# Patient Record
Sex: FEMALE | Race: WHITE | NOT HISPANIC OR LATINO | Employment: OTHER | URBAN - METROPOLITAN AREA
[De-identification: names, ages, dates, MRNs, and addresses within clinical notes are randomized per-mention and may not be internally consistent; named-entity substitution may affect disease eponyms.]

---

## 2017-01-10 ENCOUNTER — OFFICE VISIT (OUTPATIENT)
Dept: FAMILY MEDICINE | Facility: CLINIC | Age: 82
End: 2017-01-10
Payer: COMMERCIAL

## 2017-01-10 VITALS
SYSTOLIC BLOOD PRESSURE: 146 MMHG | OXYGEN SATURATION: 97 % | BODY MASS INDEX: 33.81 KG/M2 | HEART RATE: 50 BPM | DIASTOLIC BLOOD PRESSURE: 53 MMHG | WEIGHT: 198.06 LBS | HEIGHT: 64 IN | TEMPERATURE: 96 F

## 2017-01-10 DIAGNOSIS — N30.00 ACUTE CYSTITIS WITHOUT HEMATURIA: Primary | ICD-10-CM

## 2017-01-10 LAB
BACTERIA #/AREA URNS HPF: ABNORMAL /HPF
BILIRUB UR QL STRIP: NEGATIVE
CLARITY UR: ABNORMAL
COLOR UR: YELLOW
GLUCOSE UR QL STRIP: NEGATIVE
HGB UR QL STRIP: ABNORMAL
KETONES UR QL STRIP: NEGATIVE
LEUKOCYTE ESTERASE UR QL STRIP: ABNORMAL
MICROSCOPIC COMMENT: ABNORMAL
NITRITE UR QL STRIP: POSITIVE
PH UR STRIP: 7 [PH] (ref 5–8)
PROT UR QL STRIP: NEGATIVE
RBC #/AREA URNS HPF: 3 /HPF (ref 0–4)
SP GR UR STRIP: 1.01 (ref 1–1.03)
SQUAMOUS #/AREA URNS HPF: 3 /HPF
URN SPEC COLLECT METH UR: ABNORMAL
WBC #/AREA URNS HPF: >100 /HPF (ref 0–5)

## 2017-01-10 PROCEDURE — 99213 OFFICE O/P EST LOW 20 MIN: CPT | Mod: S$GLB,,,

## 2017-01-10 PROCEDURE — 3078F DIAST BP <80 MM HG: CPT | Mod: S$GLB,,,

## 2017-01-10 PROCEDURE — 1160F RVW MEDS BY RX/DR IN RCRD: CPT | Mod: S$GLB,,,

## 2017-01-10 PROCEDURE — 99999 PR PBB SHADOW E&M-EST. PATIENT-LVL V: CPT | Mod: PBBFAC,,,

## 2017-01-10 PROCEDURE — 81000 URINALYSIS NONAUTO W/SCOPE: CPT | Mod: PO

## 2017-01-10 PROCEDURE — 1159F MED LIST DOCD IN RCRD: CPT | Mod: S$GLB,,,

## 2017-01-10 PROCEDURE — 1157F ADVNC CARE PLAN IN RCRD: CPT | Mod: S$GLB,,,

## 2017-01-10 PROCEDURE — 3077F SYST BP >= 140 MM HG: CPT | Mod: S$GLB,,,

## 2017-01-10 PROCEDURE — 87086 URINE CULTURE/COLONY COUNT: CPT

## 2017-01-10 RX ORDER — SULFAMETHOXAZOLE AND TRIMETHOPRIM 800; 160 MG/1; MG/1
1 TABLET ORAL 2 TIMES DAILY
Qty: 20 TABLET | Refills: 0 | Status: SHIPPED | OUTPATIENT
Start: 2017-01-10 | End: 2017-01-20

## 2017-01-10 NOTE — PROGRESS NOTES
Subjective:       Patient ID: Marysol Lyles is a 81 y.o. female.    Chief Complaint: Urinary Tract Infection    HPI   The patient presents today with a 1 day(s) complaint of dysuria that she describes as intermittent moderate intensity pressure at Her urinary meatus during and after urination. she voices associated urinary urgency and frequency. she denies fever, flank pain, or hematuria. She can not identify and exacerbating or mitigating factors.        Review of Systems   Constitutional: Negative for chills, diaphoresis and fever.   HENT: Negative.    Eyes: Negative.    Respiratory: Negative for chest tightness, shortness of breath and wheezing.    Cardiovascular: Negative for chest pain and palpitations.   Gastrointestinal: Negative for abdominal pain.   Endocrine: Negative.    Genitourinary: Positive for dysuria, frequency and urgency. Negative for flank pain and hematuria.   Musculoskeletal: Negative for back pain.   Skin: Negative.    Allergic/Immunologic: Negative.    Neurological: Negative.    Hematological: Negative.    Psychiatric/Behavioral: Negative.          Objective:      Physical Exam   Constitutional: She is oriented to person, place, and time. She appears well-developed and well-nourished.   HENT:   Head: Normocephalic and atraumatic.   Eyes: Conjunctivae are normal. Pupils are equal, round, and reactive to light. No scleral icterus.   Neck: Normal range of motion. Neck supple.   Cardiovascular: Normal rate, regular rhythm, normal heart sounds and intact distal pulses.  Exam reveals no gallop and no friction rub.    No murmur heard.  Pulmonary/Chest: Effort normal and breath sounds normal. No respiratory distress. She has no wheezes. She has no rales. She exhibits no tenderness.   Abdominal: Normal appearance and bowel sounds are normal. There is no tenderness. There is no CVA tenderness.   Musculoskeletal: Normal range of motion.   Lymphadenopathy:     She has no cervical adenopathy.    Neurological: She is alert and oriented to person, place, and time. She exhibits normal muscle tone. Coordination normal.   Skin: Skin is warm and dry. No rash noted. No erythema. No pallor.   Psychiatric: She has a normal mood and affect. Her behavior is normal. Judgment and thought content normal.   Vitals reviewed.      Assessment:       1. Acute cystitis without hematuria          Plan:   Acute cystitis without hematuria  -     Urinalysis  -     Urine culture  -     sulfamethoxazole-trimethoprim 800-160mg (BACTRIM DS) 800-160 mg Tab; Take 1 tablet by mouth 2 (two) times daily.  Dispense: 20 tablet; Refill: 0          Disclaimer: This note is prepared using voice recognition software.  As such there may be errors in the dictation.  It has not been proofread.

## 2017-01-11 LAB — BACTERIA UR CULT: NORMAL

## 2017-02-21 ENCOUNTER — OFFICE VISIT (OUTPATIENT)
Dept: OPTOMETRY | Facility: CLINIC | Age: 82
End: 2017-02-21
Payer: COMMERCIAL

## 2017-02-21 DIAGNOSIS — H52.4 PRESBYOPIA OU: ICD-10-CM

## 2017-02-21 DIAGNOSIS — I10 ESSENTIAL HYPERTENSION: ICD-10-CM

## 2017-02-21 DIAGNOSIS — Z13.5 SCREENING FOR OTHER EYE CONDITIONS: ICD-10-CM

## 2017-02-21 DIAGNOSIS — H52.203 ASTIGMATISM, BILATERAL: ICD-10-CM

## 2017-02-21 DIAGNOSIS — H25.13 SENILE NUCLEAR SCLEROSIS, BILATERAL: Primary | ICD-10-CM

## 2017-02-21 DIAGNOSIS — H26.9 CORTICAL CATARACT OF BOTH EYES: ICD-10-CM

## 2017-02-21 PROCEDURE — 92004 COMPRE OPH EXAM NEW PT 1/>: CPT | Mod: S$GLB,,, | Performed by: OPTOMETRIST

## 2017-02-21 PROCEDURE — 99999 PR PBB SHADOW E&M-EST. PATIENT-LVL IV: CPT | Mod: PBBFAC,,, | Performed by: OPTOMETRIST

## 2017-02-21 NOTE — PATIENT INSTRUCTIONS
Bilateral nuclear sclerotic/cortical cataract, more advanced in the left eye.  Otherwise, good ocular health in both eyes.  Best correctable VA of 20/40+1 in the right eye, and 20/50+2 in the left eye.  Advised Mrs. Lyles she should consider cataract surgery in the left eye.  She will consider and will notify me when to proceed with referral to Dr. [facility]wood at the [facility].  Issued new spectacle lens Rx, but advised not to fill Rx if she will be having cataract surgery in the near future.  Otherwise, recheck in one year.

## 2017-02-21 NOTE — MR AVS SNAPSHOT
[facility]  [address]  [facility] [address]  Phone: [phone]  Fax: [phone]                  Marysol Lyles   2017 1:00 PM   Office Visit    Description:  Female : 1935   Provider:  Asa Mccarthy OD   Department:  [facility]           Reason for Visit     Concerns About Ocular Health                To Do List           Future Appointments        Provider Department Dept Phone    2017 1:00 PM KRISHAN [facility] [phone]    10/24/2017 9:00 AM DC DEXA1 [facility] [phone]    10/24/2017 9:30 AM SATNAM, MELODY [facility] [phone]      Goals (5 Years of Data)     None      [facility]     [facility] -  Assistance  Registered nurses in the [facility] provide clinical advisement, health education, appointment booking, and other advisory services.  Call for this free service at [phone].             Medications           Message regarding Medications     Verify the changes and/or additions to your medication regime listed below are the same as discussed with your clinician today.  If any of these changes or additions are incorrect, please notify your healthcare provider.             Verify that the below list of medications is an accurate representation of the medications you are currently taking.  If none reported, the list may be blank. If incorrect, please contact your healthcare provider. Carry this list with you in case of emergency.           Current Medications     ACETAMINOPHEN (TYLENOL EXTRA STRENGTH ORAL) 500 mg. Every 4-6 hours PRN    alendronate (FOSAMAX) 70 MG tablet Take 1 tablet (70 mg total) by mouth every 7 days.    atenolol (TENORMIN) 50 MG tablet Take 1 tablet (50 mg total) by mouth once daily.    BIOTIN ORAL Take by mouth once daily.    CALCIUM CARBONATE/VITAMIN D3 (CALTRATE 600 + D ORAL) Take by mouth 2 (two)  times daily.     cetirizine (ZYRTEC) 10 MG tablet Take 1 tablet (10 mg total) by mouth once daily.    ciclopirox (PENLAC) 8 % Soln     CYANOCOBALAMIN, VITAMIN B-12, (VITAMIN B-12 ORAL) As directed    dicyclomine (BENTYL) 10 MG capsule TAKE ONE CAPSULE BY MOUTH FOUR TIMES DAILY BEFORE MEALS AND AT NIGHT    duloxetine (CYMBALTA) 60 MG capsule Take 1 capsule (60 mg total) by mouth once daily.    fluticasone (FLONASE) 50 mcg/actuation nasal spray 1 spray by Each Nare route once daily.    gabapentin (NEURONTIN) 300 MG capsule Take 1 capsule (300 mg total) by mouth 3 (three) times daily.    GLUCOSAMINE HCL/CHONDRO KIDD A (GLUCOSAMINE-CHONDROITIN ORAL) Every day    LACTOBACILLUS ACIDOPHILUS (PROBIOTIC ORAL) Take by mouth.    MULTIVITAMIN ORAL 2 (two) times daily. Every day    promethazine (PHENERGAN) 25 MG tablet TK 1 T PO Q 4 TO 6 H PRN N    SIMETHICONE (GAS-X ORAL) Take by mouth.    solifenacin (VESICARE) 10 MG tablet Take 1 tablet (10 mg total) by mouth once daily.    tavaborole (KERYDIN) 5 % Rom Apply 1 application topically once daily.    urea (UMECTA NAIL FILM PEN) 40 % NFSP Apply 1 application topically 2 (two) times daily.    vit C,E,Zn,Cu glu-om3-lut-eduardo (OCUVITE ADULT 50+) 250-5-1 mg Cap Take by mouth.    vitamin D 1000 units Tab Take 185 mg by mouth once daily.           Clinical Reference Information           Your Vitals Were     Last Period                   05/06/1994           Allergies as of 2/21/2017     Formaldehyde    Latex, Natural Rubber    Mercury    Penicillins    Adhesive      Immunizations Administered on Date of Encounter - 2/21/2017     None      Instructions    Bilateral nuclear sclerotic/cortical cataract, more advanced in the left eye.  Otherwise, good ocular health in both eyes.  Best correctable VA of 20/40+1 in the right eye, and 20/50+2 in the left eye.  Advised Mrs. Lyles she should consider cataract surgery in the left eye.  She will consider and will notify me when to proceed with  referral to Dr. Haywood at the [facility].  Issued new spectacle lens Rx, but advised not to fill Rx if she will be having cataract surgery in the near future.  Otherwise, recheck in one year.          Language Assistance Services     ATTENTION: Language assistance services are available, free of charge. Please call [phone].      ATENCIÓN: Si habla fabienañol, tiene a vilchis disposición servicios gratuitos de asistencia lingüística. Llame al [phone].     CHÚ Ý: N?u b?n nói Ti?ng Vi?t, có các d?ch v? h? tr? ngôn ng? mi?n phí dành cho b?n. G?i s? [phone].         [facility] complies with applicable Federal civil rights laws and does not discriminate on the basis of race, color, national origin, age, disability, or sex.

## 2017-02-21 NOTE — PROGRESS NOTES
"HPI     Concerns About Ocular Health    Additional comments: Eye exam and refraction.   No acute ocular/vision   problems.  Uses OTC for reading.  None other except plano sunglasses   outdoors           Comments   81 yr old wf                   Approximate date of last eye examination:  02/07/2013  Name of last eye doctor seen:  Dr Mccarthy     Wears glasses full-time:  No, uses +1.75 otc readers, only  Present glasses are bifocal / S V Distance / S V Reading:    Approximate age of present glasses:    Got new glasses following last exam, or subsequently?:     Any problem with VA with glasses:    Wears CLs?:  no  Headaches:  no  Eye pain/discomfort:  no                                                                                       Flashes:  no  Floaters:  no  Diplopia/Double Vision:  no         Patient's Ocular History:         Any eye surgeries:  no         Any eye injury:  no         Any treatment for eye disease:  no  Family history of eye disease:  none  Significant patient medical history:         1. Diabetes  no   2. HBP:  yes              3. Other (describe)  High cholesterol, gastric bypass sx, lost   108 lbs     ! OTC eyedrops currently using:  no   ! Prescription eye meds currently using:  no   ! Any history of allergy/adverse reaction to any eye meds used?  no       ! Any history of adverse reaction to eyedrops used during previous eye   exams?  no                  ! Any history of allergy/adverse reaction to Novacaine or similar meds?    no              ! Any history of allergy/adverse reaction to Epinephrine or similar meds?    no                 ! Patient okay with use of anesthetic eye drops to check eye pressure?    yes             ! Okay to use drops to dilate pupils today?  yes         Allergies/Medications/Medical History/Family History reviewed today?:  yes               PD =   65/61  Desired reading distance =  15.25"                                                                        " Last edited by Asa Mccarthy, OD on 2/21/2017 12:00 PM. (History)            Assessment /Plan     For exam results, see Encounter Report.    1. Senile nuclear sclerosis, bilateral     2. Cortical cataract of both eyes     3. Essential hypertension     4. Screening for other eye conditions     5. Astigmatism, bilateral     6. Presbyopia OU                  Bilateral nuclear sclerotic/cortical cataract, more advanced in the left eye.  Otherwise, good ocular health in both eyes.  Best correctable VA of 20/40+1 in the right eye, and 20/50+2 in the left eye.  Advised Mrs. Lyles she should consider cataract surgery in the left eye.  She will consider and will notify me when to proceed with referral to Dr. Haywood at the [facility].  Issued new spectacle lens Rx, but advised not to fill Rx if she will be having cataract surgery in the near future.  Otherwise, recheck in one year.

## 2017-02-25 RX ORDER — CICLOPIROX 80 MG/ML
SOLUTION TOPICAL
Qty: 6.6 ML | Refills: 0 | Status: SHIPPED | OUTPATIENT
Start: 2017-02-25 | End: 2018-12-11

## 2017-03-01 ENCOUNTER — PATIENT MESSAGE (OUTPATIENT)
Dept: FAMILY MEDICINE | Facility: CLINIC | Age: 82
End: 2017-03-01

## 2017-03-08 DIAGNOSIS — Z83.719 FAMILY HISTORY OF COLONIC POLYPS: ICD-10-CM

## 2017-03-08 DIAGNOSIS — Z86.010 HISTORY OF COLON POLYPS: ICD-10-CM

## 2017-03-08 RX ORDER — DICYCLOMINE HYDROCHLORIDE 10 MG/1
CAPSULE ORAL
Qty: 120 CAPSULE | Refills: 0 | Status: SHIPPED | OUTPATIENT
Start: 2017-03-08 | End: 2017-05-05 | Stop reason: SDUPTHER

## 2017-03-30 ENCOUNTER — CLINICAL SUPPORT (OUTPATIENT)
Dept: FAMILY MEDICINE | Facility: CLINIC | Age: 82
End: 2017-03-30
Payer: COMMERCIAL

## 2017-03-30 VITALS — DIASTOLIC BLOOD PRESSURE: 70 MMHG | SYSTOLIC BLOOD PRESSURE: 128 MMHG

## 2017-03-30 DIAGNOSIS — Z01.30 BLOOD PRESSURE CHECK: Primary | ICD-10-CM

## 2017-03-30 PROCEDURE — 99499 UNLISTED E&M SERVICE: CPT | Mod: S$GLB,,, | Performed by: FAMILY MEDICINE

## 2017-04-01 RX ORDER — ALENDRONATE SODIUM 70 MG/1
TABLET ORAL
Qty: 12 TABLET | Refills: 0 | OUTPATIENT
Start: 2017-04-01

## 2017-05-05 DIAGNOSIS — Z86.010 HISTORY OF COLON POLYPS: ICD-10-CM

## 2017-05-05 DIAGNOSIS — Z83.719 FAMILY HISTORY OF COLONIC POLYPS: ICD-10-CM

## 2017-05-05 RX ORDER — DICYCLOMINE HYDROCHLORIDE 10 MG/1
CAPSULE ORAL
Qty: 120 CAPSULE | Refills: 0 | Status: SHIPPED | OUTPATIENT
Start: 2017-05-05 | End: 2017-07-14 | Stop reason: SDUPTHER

## 2017-06-26 RX ORDER — CETIRIZINE HYDROCHLORIDE 10 MG/1
TABLET ORAL
Qty: 30 TABLET | Refills: 0 | Status: SHIPPED | OUTPATIENT
Start: 2017-06-26 | End: 2017-11-07 | Stop reason: SDUPTHER

## 2017-07-14 DIAGNOSIS — Z86.010 HISTORY OF COLON POLYPS: ICD-10-CM

## 2017-07-14 DIAGNOSIS — Z83.719 FAMILY HISTORY OF COLONIC POLYPS: ICD-10-CM

## 2017-07-14 RX ORDER — DICYCLOMINE HYDROCHLORIDE 10 MG/1
CAPSULE ORAL
Qty: 120 CAPSULE | Refills: 0 | Status: SHIPPED | OUTPATIENT
Start: 2017-07-14 | End: 2017-09-16 | Stop reason: SDUPTHER

## 2017-07-21 ENCOUNTER — TELEPHONE (OUTPATIENT)
Dept: FAMILY MEDICINE | Facility: CLINIC | Age: 82
End: 2017-07-21

## 2017-07-21 NOTE — TELEPHONE ENCOUNTER
----- Message from Antonina Jorgensen sent at 7/21/2017 12:08 PM CDT -----  pt has bladder inf, pls call in rx today..[phone] ([place])   ..  [facility] [address] - [place]ILLO - [address] [facility]  [address]  [place] [address]  Phone: [phone] Fax: [phone]

## 2017-07-22 ENCOUNTER — OFFICE VISIT (OUTPATIENT)
Dept: FAMILY MEDICINE | Facility: CLINIC | Age: 82
End: 2017-07-22
Payer: COMMERCIAL

## 2017-07-22 VITALS
TEMPERATURE: 98 F | SYSTOLIC BLOOD PRESSURE: 129 MMHG | HEART RATE: 54 BPM | BODY MASS INDEX: 34.01 KG/M2 | WEIGHT: 199.19 LBS | HEIGHT: 64 IN | DIASTOLIC BLOOD PRESSURE: 64 MMHG

## 2017-07-22 DIAGNOSIS — N39.0 RECURRENT UTI: ICD-10-CM

## 2017-07-22 DIAGNOSIS — R30.0 DYSURIA: Primary | ICD-10-CM

## 2017-07-22 PROCEDURE — 99213 OFFICE O/P EST LOW 20 MIN: CPT | Mod: S$GLB,,, | Performed by: FAMILY MEDICINE

## 2017-07-22 PROCEDURE — 1125F AMNT PAIN NOTED PAIN PRSNT: CPT | Mod: S$GLB,,, | Performed by: FAMILY MEDICINE

## 2017-07-22 PROCEDURE — 99999 PR PBB SHADOW E&M-EST. PATIENT-LVL IV: CPT | Mod: PBBFAC,,, | Performed by: FAMILY MEDICINE

## 2017-07-22 PROCEDURE — 1159F MED LIST DOCD IN RCRD: CPT | Mod: S$GLB,,, | Performed by: FAMILY MEDICINE

## 2017-07-22 RX ORDER — SULFAMETHOXAZOLE AND TRIMETHOPRIM 800; 160 MG/1; MG/1
1 TABLET ORAL 2 TIMES DAILY
Qty: 20 TABLET | Refills: 0 | Status: SHIPPED | OUTPATIENT
Start: 2017-07-22 | End: 2017-08-01

## 2017-07-22 RX ORDER — PHENAZOPYRIDINE HYDROCHLORIDE 200 MG/1
TABLET, FILM COATED ORAL
COMMUNITY
Start: 2017-07-21 | End: 2017-11-06

## 2017-07-22 NOTE — PROGRESS NOTES
"Subjective:      Patient ID: Marysol Lyles is a 81 y.o. female.    Chief Complaint: Dysuria    HPI she has had recurrent UTI's over the last 3 years. She has been having about 2 per year.  She has not had fever. Currently, she has burning at the end of the urination. She states that it tingles with this. She has not had blood in the urine. She has been on pyridium and it helps the symtpoms. She has not had abx.      Review of Systems    Objective:   /64   Pulse (!) 54   Temp 98.1 °F (36.7 °C) (Oral)   Ht 5' 4" (1.626 m)   Wt 90.4 kg (199 lb 3.2 oz)   LMP 05/06/1994   BMI 34.19 kg/m²     Physical Exam   Constitutional: She appears well-developed and well-nourished.   Eyes: EOM are normal. Pupils are equal, round, and reactive to light.   Cardiovascular: Normal rate and regular rhythm.    Pulmonary/Chest: Effort normal and breath sounds normal.   Abdominal: Soft. Bowel sounds are normal. She exhibits no distension and no mass. There is no tenderness. There is no rebound and no guarding.       Assessment:     1. Dysuria    2. Recurrent UTI        Plan:   Marysol ROBB was seen today for dysuria.    Diagnoses and all orders for this visit:    Dysuria  -     Urinalysis; Future  -     Urine culture; Future  -     sulfamethoxazole-trimethoprim 800-160mg (BACTRIM DS) 800-160 mg Tab; Take 1 tablet by mouth 2 (two) times daily.    Recurrent UTI  -     Urinalysis; Future  -     Urine culture; Future  -     sulfamethoxazole-trimethoprim 800-160mg (BACTRIM DS) 800-160 mg Tab; Take 1 tablet by mouth 2 (two) times daily.      She will check a U/A in 2 weeks (she is on meds now treating this so I will cover her empirically) and if the U;/A is still infected then, I will get the culture and retreat.  If the U/A is neg, I will refer to urology in [place] for her recurrent uti'S.    "

## 2017-08-07 ENCOUNTER — LAB VISIT (OUTPATIENT)
Dept: LAB | Facility: HOSPITAL | Age: 82
End: 2017-08-07
Attending: FAMILY MEDICINE
Payer: COMMERCIAL

## 2017-08-07 DIAGNOSIS — R30.0 DYSURIA: ICD-10-CM

## 2017-08-07 DIAGNOSIS — N39.0 RECURRENT UTI: ICD-10-CM

## 2017-08-07 LAB
BILIRUB UR QL STRIP: NEGATIVE
CLARITY UR: CLEAR
COLOR UR: YELLOW
GLUCOSE UR QL STRIP: NEGATIVE
HGB UR QL STRIP: ABNORMAL
KETONES UR QL STRIP: NEGATIVE
LEUKOCYTE ESTERASE UR QL STRIP: NEGATIVE
NITRITE UR QL STRIP: NEGATIVE
PH UR STRIP: 6 [PH] (ref 5–8)
PROT UR QL STRIP: NEGATIVE
SP GR UR STRIP: 1.02 (ref 1–1.03)
URN SPEC COLLECT METH UR: ABNORMAL

## 2017-08-07 PROCEDURE — 87086 URINE CULTURE/COLONY COUNT: CPT

## 2017-08-07 PROCEDURE — 81002 URINALYSIS NONAUTO W/O SCOPE: CPT | Mod: PO

## 2017-08-08 LAB — BACTERIA UR CULT: NORMAL

## 2017-08-15 ENCOUNTER — TELEPHONE (OUTPATIENT)
Dept: FAMILY MEDICINE | Facility: CLINIC | Age: 82
End: 2017-08-15

## 2017-08-15 RX ORDER — METOPROLOL TARTRATE 25 MG/1
25 TABLET, FILM COATED ORAL 2 TIMES DAILY
Qty: 180 TABLET | Refills: 3 | Status: SHIPPED | OUTPATIENT
Start: 2017-08-15 | End: 2018-10-09 | Stop reason: SDUPTHER

## 2017-08-15 NOTE — TELEPHONE ENCOUNTER
----- Message from Aminata Mckinney sent at 8/15/2017  1:56 PM CDT -----  Contact:  [facility] in [place]  Pt is calling nurse staff regarding if there is another Rx to sub.Pt Atenolol is on back order.     [facility] [id number]  [place] - [address] [facility] [address] [facility]  [address]  [place] [address]  Phone: [phone] Fax: [phone]

## 2017-08-15 NOTE — TELEPHONE ENCOUNTER
Stop the atenolol.     I have signed for the following orders AND/OR meds.  Please call the patient and ask the patient to schedule the testing AND/OR inform about any medications that were sent.      No orders of the defined types were placed in this encounter.        Medications Ordered This Encounter      metoprolol tartrate (LOPRESSOR) 25 MG tablet          Sig: Take 1 tablet (25 mg total) by mouth 2 (two) times daily.          Dispense:  180 tablet          Refill:  3

## 2017-09-16 DIAGNOSIS — Z86.010 HISTORY OF COLON POLYPS: ICD-10-CM

## 2017-09-16 DIAGNOSIS — Z83.719 FAMILY HISTORY OF COLONIC POLYPS: ICD-10-CM

## 2017-09-17 RX ORDER — DICYCLOMINE HYDROCHLORIDE 10 MG/1
CAPSULE ORAL
Qty: 120 CAPSULE | Refills: 0 | Status: SHIPPED | OUTPATIENT
Start: 2017-09-17 | End: 2017-11-07 | Stop reason: SDUPTHER

## 2017-10-03 RX ORDER — GABAPENTIN 300 MG/1
300 CAPSULE ORAL 3 TIMES DAILY
Qty: 90 CAPSULE | Refills: 11 | Status: SHIPPED | OUTPATIENT
Start: 2017-10-03 | End: 2019-03-21 | Stop reason: SDUPTHER

## 2017-10-23 ENCOUNTER — TELEPHONE (OUTPATIENT)
Dept: RADIOLOGY | Facility: HOSPITAL | Age: 82
End: 2017-10-23

## 2017-10-24 ENCOUNTER — TELEPHONE (OUTPATIENT)
Dept: NEUROSURGERY | Facility: CLINIC | Age: 82
End: 2017-10-24

## 2017-10-24 ENCOUNTER — HOSPITAL ENCOUNTER (OUTPATIENT)
Dept: RADIOLOGY | Facility: HOSPITAL | Age: 82
Discharge: HOME | End: 2017-10-24
Attending: INTERNAL MEDICINE
Payer: COMMERCIAL

## 2017-10-24 DIAGNOSIS — M85.80 OSTEOPENIA: ICD-10-CM

## 2017-10-24 DIAGNOSIS — I10 ESSENTIAL HYPERTENSION: ICD-10-CM

## 2017-10-24 DIAGNOSIS — E21.3 HYPERPARATHYROIDISM: ICD-10-CM

## 2017-10-24 PROCEDURE — 77080 DXA BONE DENSITY AXIAL: CPT | Mod: TC,PO

## 2017-10-24 PROCEDURE — 77080 DXA BONE DENSITY AXIAL: CPT | Mod: 26,,, | Performed by: RADIOLOGY

## 2017-10-27 ENCOUNTER — PATIENT MESSAGE (OUTPATIENT)
Dept: FAMILY MEDICINE | Facility: CLINIC | Age: 82
End: 2017-10-27

## 2017-10-27 DIAGNOSIS — E78.5 HYPERLIPIDEMIA, UNSPECIFIED HYPERLIPIDEMIA TYPE: Primary | ICD-10-CM

## 2017-10-27 RX ORDER — ALENDRONATE SODIUM 70 MG/1
TABLET ORAL
COMMUNITY
Start: 2017-08-01 | End: 2017-11-29

## 2017-10-27 RX ORDER — GABAPENTIN 300 MG/1
300 CAPSULE ORAL
COMMUNITY
End: 2017-11-29

## 2017-10-27 RX ORDER — SOLIFENACIN SUCCINATE 10 MG/1
TABLET, FILM COATED ORAL
COMMUNITY
Start: 2017-09-16 | End: 2017-11-01

## 2017-10-27 RX ORDER — CETIRIZINE HYDROCHLORIDE 10 MG/1
10 TABLET ORAL
COMMUNITY
End: 2017-11-01

## 2017-10-27 RX ORDER — METOPROLOL TARTRATE 25 MG/1
TABLET, FILM COATED ORAL
COMMUNITY
Start: 2017-08-15 | End: 2017-11-29

## 2017-10-31 ENCOUNTER — PATIENT MESSAGE (OUTPATIENT)
Dept: FAMILY MEDICINE | Facility: CLINIC | Age: 82
End: 2017-10-31

## 2017-11-01 ENCOUNTER — HOSPITAL ENCOUNTER (OUTPATIENT)
Dept: RADIOLOGY | Facility: HOSPITAL | Age: 82
Discharge: HOME | End: 2017-11-01
Attending: NURSE PRACTITIONER
Payer: COMMERCIAL

## 2017-11-01 ENCOUNTER — OFFICE VISIT (OUTPATIENT)
Dept: FAMILY MEDICINE | Facility: CLINIC | Age: 82
End: 2017-11-01
Payer: COMMERCIAL

## 2017-11-01 VITALS
HEART RATE: 82 BPM | WEIGHT: 202 LBS | HEIGHT: 64 IN | DIASTOLIC BLOOD PRESSURE: 79 MMHG | BODY MASS INDEX: 34.49 KG/M2 | TEMPERATURE: 99 F | SYSTOLIC BLOOD PRESSURE: 136 MMHG

## 2017-11-01 DIAGNOSIS — J01.00 ACUTE MAXILLARY SINUSITIS, RECURRENCE NOT SPECIFIED: Primary | ICD-10-CM

## 2017-11-01 DIAGNOSIS — J20.9 BRONCHITIS WITH BRONCHOSPASM: ICD-10-CM

## 2017-11-01 DIAGNOSIS — J01.00 ACUTE MAXILLARY SINUSITIS, RECURRENCE NOT SPECIFIED: ICD-10-CM

## 2017-11-01 PROCEDURE — 71020 XR CHEST PA AND LATERAL: CPT | Mod: 26,,, | Performed by: RADIOLOGY

## 2017-11-01 PROCEDURE — 99213 OFFICE O/P EST LOW 20 MIN: CPT | Mod: S$GLB,,, | Performed by: NURSE PRACTITIONER

## 2017-11-01 PROCEDURE — 71020 XR CHEST PA AND LATERAL: CPT | Mod: TC,PO

## 2017-11-01 PROCEDURE — 99999 PR PBB SHADOW E&M-EST. PATIENT-LVL III: CPT | Mod: PBBFAC,,, | Performed by: NURSE PRACTITIONER

## 2017-11-01 RX ORDER — ALBUTEROL SULFATE 90 UG/1
2 AEROSOL, METERED RESPIRATORY (INHALATION) EVERY 6 HOURS PRN
Qty: 8 G | Refills: 0 | Status: SHIPPED | OUTPATIENT
Start: 2017-11-01 | End: 2018-01-19 | Stop reason: SDUPTHER

## 2017-11-01 RX ORDER — AZITHROMYCIN 250 MG/1
250 TABLET, FILM COATED ORAL DAILY
Qty: 6 TABLET | Refills: 0 | Status: SHIPPED | OUTPATIENT
Start: 2017-11-01 | End: 2017-11-06

## 2017-11-01 RX ORDER — CODEINE PHOSPHATE AND GUAIFENESIN 10; 100 MG/5ML; MG/5ML
5 SOLUTION ORAL 3 TIMES DAILY PRN
Qty: 115 ML | Refills: 0 | Status: SHIPPED | OUTPATIENT
Start: 2017-11-01 | End: 2017-11-11

## 2017-11-01 RX ORDER — BENZONATATE 200 MG/1
200 CAPSULE ORAL 3 TIMES DAILY PRN
Qty: 30 CAPSULE | Refills: 0 | Status: SHIPPED | OUTPATIENT
Start: 2017-11-01 | End: 2017-11-11

## 2017-11-01 NOTE — PATIENT INSTRUCTIONS
Acute Bacterial Rhinosinusitis (ABRS)    Acute bacterial rhinosinusitis (ABRS) is an infection of your nasal cavity and sinuses. Its caused by bacteria. Acute means that youve had symptoms for less than 12 weeks.  Understanding your sinuses  The nasal cavity is the large air-filled space behind your nose. The sinuses are a group of spaces formed by the bones of your face. They connect with your nasal cavity. ABRS causes the tissue lining these spaces to become inflamed. Mucus may not drain normally. This leads to facial pain and other symptoms.  What causes ABRS?  ABRS most often follows an upper respiratory infection caused by a virus. Bacteria then infect the lining of your nasal cavity and sinuses. But you can also get ABRS if you have:  · Nasal allergies  · Long-term nasal swelling and congestion not caused by allergies  · Blockage in the nose  Symptoms of ABRS  The symptoms of ABRS may be different for each person, and can include:  · Nasal congestion  · Runny nose  · Fluid draining from the nose down the throat (postnasal drip)  · Headache  · Cough  · Pain in the sinuses  · Thick, colored fluid from the nose (mucus)  · Fever  Diagnosing ABRS  ABRS may be diagnosed if youve had an upper respiratory infection like a cold and cough for longer than 10 to 14 days. Your health care provider will ask about your symptoms and your medical history. The provider will check your vital signs, including your temperature. Youll have a physical exam. The health care provider will check your ears, nose, and throat. You likely wont need any tests. If ABRS comes back, you may have a culture or other tests.  Treatment for ABRS  Treatment may include:  · Antibiotic medicine. This is for symptoms that last for at least 10 to 14 days.  · Nasal corticosteroid medicine. Drops or spray used in the nose can lessen swelling and congestion.  · Over-the-counter pain medicine. This is to lessen sinus pain and pressure.  · Nasal  decongestant medicine. Spray or drops may help to lessen congestion. Do not use them for more than a few days.  · Salt wash (saline irrigation). This can help to loosen mucus.  Possible complications of ABRS  ABRS may come back or become long-term (chronic).  In rare cases, ABRS may cause complications such as:   · Inflamed tissue around the brain and spinal cord (meningitis)  · Inflamed tissue around the eyes (orbital cellulitis)  · Inflamed bones around the sinuses (osteitis)  These problems may need to be treated in a hospital with intravenous (IV) antibiotic medicine or surgery.  When to call the health care provider  Call your health care provider if you have any of the following:  · Symptoms that dont get better, or get worse  · Symptoms that dont get better after 3 to 5 days on antibiotics  · Trouble seeing  · Swelling around your eyes  · Confusion or trouble staying awake   Date Last Reviewed: 3/3/2015  © 1256-0710 [employer]. [address] [place] [address]. All rights reserved. This information is not intended as a substitute for professional medical care. Always follow your healthcare professional's instructions.        What Is Acute Bronchitis?  Acute bronchitis is when the airways in your lungs (bronchial tubes) become red and swollen (inflamed). It is usually caused by a viral infection. But it can also occur because of a bacteria or allergen. Symptoms include a cough that produces yellow or greenish mucus and can last for days or sometimes weeks.  Inside healthy lungs    Air travels in and out of the lungs through the airways. The linings of these airways produce sticky mucus. This mucus traps particles that enter the lungs. Tiny structures called cilia then sweep the particles out of the airways.     Healthy airway: Airways are normally open. Air moves in and out easily.      Healthy cilia: Tiny, hairlike cilia sweep mucus and particles up and out of the airways.   Lungs  with bronchitis  Bronchitis often occurs with a cold or the flu virus. The airways become inflamed (red and swollen). There is a deep hacking cough from the extra mucus. Other symptoms may include:  · Wheezing  · Chest discomfort  · Shortness of breath  · Mild fever  A second infection, this time due to bacteria, may then occur. And airways irritated by allergens or smoke are more likely to get infected.        Inflamed airway: Inflammation and extra mucus narrow the airway, causing shortness of breath.      Impaired cilia: Extra mucus impairs cilia, causing congestion and wheezing. Smoking makes the problem worse.   Making a diagnosis  A physical exam, health history, and certain tests help your healthcare provider make the diagnosis.  Health history  Your healthcare provider will ask you about your symptoms.  The exam  Your provider listens to your chest for signs of congestion. He or she may also check your ears, nose, and throat.  Possible tests  · A sputum test for bacteria. This requires a sample of mucus from your lungs.  · A nasal or throat swab. This tests to see if you have a bacterial infection.  · A chest X-ray. This is done if your healthcare provider thinks you have pneumonia.  · Tests to check for an underlying condition. Other tests may be done to check for things such as allergies, asthma, or COPD (chronic obstructive pulmonary disease). You may need to see a specialist for more lung function testing.  Treating a cough  The main treatment for bronchitis is easing symptoms. Avoiding smoke, allergens, and other things that trigger coughing can often help. If the infection is bacterial, you may be given antibiotics. During the illness, it's important to get plenty of sleep. To ease symptoms:  · Dont smoke. Also avoid secondhand smoke.  · Use a humidifier. Or try breathing in steam from a hot shower. This may help loosen mucus.  · Drink a lot of water and juice. They can soothe the throat and may help  thin mucus.  · Sit up or use extra pillows when in bed. This helps to lessen coughing and congestion.  · Ask your provider about using medicine. Ask about using cough medicine, pain and fever medicine, or a decongestant.  Antibiotics  Most cases of bronchitis are caused by cold or flu viruses. They dont need antibiotics to treat them, even if your mucus is thick and green or yellow. Antibiotics dont treat viral illness and antibiotics have not been shown to have any benefit in cases of acute bronchitis. Taking antibiotics when they are not needed increases your risk of getting an infection later that is antibiotic-resistant. Antibiotics can also cause severe cases of diarrhea that require other antibiotics to treat.  It is important that you accept your healthcare provider's opinion to not use antibiotics. Your provider will prescribe antibiotics if the infection is caused by bacteria. If they are prescribed:  · Take all of the medicine. Take the medicine until it is used up, even if symptoms have improved. If you dont, the bronchitis may come back.  · Take the medicines as directed. For instance, some medicines should be taken with food.  · Ask about side effects. Ask your provider or pharmacist what side effects are common, and what to do about them.  Follow-up care  You should see your provider again in 2 to 3 weeks. By this time, symptoms should have improved. An infection that lasts longer may mean you have a more serious problem.  Prevention  · Avoid tobacco smoke. If you smoke, quit. Stay away from smoky places. Ask friends and family not to smoke around you, or in your home or car.  · Get checked for allergies.  · Ask your provider about getting a yearly flu shot. Also ask about pneumococcal or pneumonia shots.  · Wash your hands often. This helps reduce the chance of picking up viruses that cause colds and flu.  Call your healthcare provider if:  · Symptoms worsen, or you have new symptoms  · Breathing  problems worsen or  become severe  · Symptoms dont get better within a week, or within 3 days of taking antibiotics   Date Last Reviewed: 2/1/2017  © 5319-1994 [employer]. [address] [place] [address], [place] [address]. All rights reserved. This information is not intended as a substitute for professional medical care. Always follow your healthcare professional's instructions.

## 2017-11-01 NOTE — PROGRESS NOTES
"Subjective:      Patient ID: Marysol Lyles is a 82 y.o. female.    Chief Complaint: Wheezing and Cough    Wheezing    This is a new problem. Associated symptoms include coughing, headaches, rhinorrhea and a sore throat. Pertinent negatives include no chest pain, chills, diarrhea, ear pain, fever, rash, shortness of breath or vomiting.   Cough   This is a new problem. Episode onset: Saturday. The problem has been gradually worsening. The cough is productive of sputum (dark green sputum). Associated symptoms include headaches, postnasal drip, rhinorrhea, a sore throat and wheezing. Pertinent negatives include no chest pain, chills, ear pain, fever, nasal congestion, rash or shortness of breath. Nothing aggravates the symptoms. She has tried prescription cough suppressant (Mucinex DM) for the symptoms. The treatment provided moderate relief.     Review of Systems   Constitutional: Negative for activity change, appetite change, chills, fatigue and fever.   HENT: Positive for postnasal drip, rhinorrhea, sinus pressure and sore throat. Negative for congestion, ear pain, tinnitus and trouble swallowing.    Eyes: Negative for pain and discharge.   Respiratory: Positive for cough and wheezing. Negative for chest tightness and shortness of breath.    Cardiovascular: Negative for chest pain, palpitations and leg swelling.   Gastrointestinal: Negative for blood in stool, diarrhea, nausea and vomiting.   Endocrine: Negative.    Genitourinary: Negative for frequency.   Skin: Negative for rash and wound.   Neurological: Positive for headaches.   Hematological: Negative.    Psychiatric/Behavioral: Negative for suicidal ideas. The patient is not nervous/anxious.        Objective:     /79   Pulse 82   Temp 99.2 °F (37.3 °C) (Oral)   Ht 5' 4" (1.626 m)   Wt 91.6 kg (202 lb)   LMP 05/06/1994   BMI 34.67 kg/m²     Physical Exam   Constitutional: She is oriented to person, place, and time. She appears well-developed and " well-nourished.   HENT:   Head: Normocephalic.   Right Ear: Tympanic membrane, external ear and ear canal normal.   Left Ear: External ear and ear canal normal. A middle ear effusion is present.   Nose: Mucosal edema and rhinorrhea present. Right sinus exhibits maxillary sinus tenderness. Right sinus exhibits no frontal sinus tenderness. Left sinus exhibits maxillary sinus tenderness. Left sinus exhibits no frontal sinus tenderness.   Mouth/Throat: Posterior oropharyngeal edema and posterior oropharyngeal erythema present. No oropharyngeal exudate.   Nasal mucosa erythematous and boggy   Eyes: Pupils are equal, round, and reactive to light.   Cardiovascular: Normal rate, regular rhythm and normal heart sounds.    Pulmonary/Chest: Effort normal. No respiratory distress. She has wheezes in the right upper field, the right middle field, the right lower field, the left upper field, the left middle field and the left lower field. She has rhonchi in the left lower field. She has no rales.   Mild scattered wheezes throughout   Neurological: She is alert and oriented to person, place, and time.   Skin: Skin is warm and dry. No rash noted.   Psychiatric: She has a normal mood and affect. Her behavior is normal. Judgment and thought content normal.   Vitals reviewed.    Assessment:     1. Acute maxillary sinusitis, recurrence not specified    2. Bronchitis with bronchospasm        Plan:   Acute maxillary sinusitis, recurrence not specified  -     X-Ray Chest PA And Lateral; Future; Expected date: 11/01/2017  -     benzonatate (TESSALON) 200 MG capsule; Take 1 capsule (200 mg total) by mouth 3 (three) times daily as needed.  Dispense: 30 capsule; Refill: 0  -     guaifenesin-codeine 100-10 mg/5 ml (GUAIFENESIN AC)  mg/5 mL syrup; Take 5 mLs by mouth 3 (three) times daily as needed for Cough.  Dispense: 115 mL; Refill: 0  -     albuterol 90 mcg/actuation inhaler; Inhale 2 puffs into the lungs every 6 (six) hours as needed  for Wheezing.  Dispense: 8 g; Refill: 0  -     azithromycin (ZITHROMAX Z-NATHAN) 250 MG tablet; Take 1 tablet (250 mg total) by mouth once daily. Take 2 tablet day 1 the 1 tablet daily until complete  Dispense: 6 tablet; Refill: 0    Bronchitis with bronchospasm  -     X-Ray Chest PA And Lateral; Future; Expected date: 11/01/2017  -     benzonatate (TESSALON) 200 MG capsule; Take 1 capsule (200 mg total) by mouth 3 (three) times daily as needed.  Dispense: 30 capsule; Refill: 0  -     guaifenesin-codeine 100-10 mg/5 ml (GUAIFENESIN AC)  mg/5 mL syrup; Take 5 mLs by mouth 3 (three) times daily as needed for Cough.  Dispense: 115 mL; Refill: 0  -     albuterol 90 mcg/actuation inhaler; Inhale 2 puffs into the lungs every 6 (six) hours as needed for Wheezing.  Dispense: 8 g; Refill: 0    Educational handout provided  Return if symptoms worsen or fail to improve.

## 2017-11-06 ENCOUNTER — OFFICE VISIT (OUTPATIENT)
Dept: FAMILY MEDICINE | Facility: CLINIC | Age: 82
End: 2017-11-06
Payer: COMMERCIAL

## 2017-11-06 VITALS
HEART RATE: 57 BPM | DIASTOLIC BLOOD PRESSURE: 65 MMHG | WEIGHT: 202.63 LBS | SYSTOLIC BLOOD PRESSURE: 132 MMHG | HEIGHT: 64 IN | TEMPERATURE: 98 F | BODY MASS INDEX: 34.59 KG/M2

## 2017-11-06 DIAGNOSIS — N39.0 RECURRENT UTI: Primary | ICD-10-CM

## 2017-11-06 LAB
BACTERIA #/AREA URNS HPF: ABNORMAL /HPF
BILIRUB UR QL STRIP: NEGATIVE
CLARITY UR: ABNORMAL
COLOR UR: YELLOW
GLUCOSE UR QL STRIP: NEGATIVE
HGB UR QL STRIP: ABNORMAL
HYALINE CASTS #/AREA URNS LPF: 0 /LPF
KETONES UR QL STRIP: NEGATIVE
LEUKOCYTE ESTERASE UR QL STRIP: ABNORMAL
MICROSCOPIC COMMENT: ABNORMAL
NITRITE UR QL STRIP: NEGATIVE
PH UR STRIP: 6 [PH] (ref 5–8)
PROT UR QL STRIP: ABNORMAL
RBC #/AREA URNS HPF: 50 /HPF (ref 0–4)
SP GR UR STRIP: 1.02 (ref 1–1.03)
SQUAMOUS #/AREA URNS HPF: 6 /HPF
URN SPEC COLLECT METH UR: ABNORMAL
WBC #/AREA URNS HPF: >100 /HPF (ref 0–5)

## 2017-11-06 PROCEDURE — 99213 OFFICE O/P EST LOW 20 MIN: CPT | Mod: S$GLB,,, | Performed by: NURSE PRACTITIONER

## 2017-11-06 PROCEDURE — 99999 PR PBB SHADOW E&M-EST. PATIENT-LVL V: CPT | Mod: PBBFAC,,, | Performed by: NURSE PRACTITIONER

## 2017-11-06 PROCEDURE — 81000 URINALYSIS NONAUTO W/SCOPE: CPT | Mod: PO

## 2017-11-06 PROCEDURE — 87086 URINE CULTURE/COLONY COUNT: CPT

## 2017-11-06 RX ORDER — PHENAZOPYRIDINE HYDROCHLORIDE 100 MG/1
100 TABLET, FILM COATED ORAL 2 TIMES DAILY PRN
Qty: 6 TABLET | Refills: 0 | Status: SHIPPED | OUTPATIENT
Start: 2017-11-06 | End: 2017-11-09

## 2017-11-06 RX ORDER — NITROFURANTOIN 25; 75 MG/1; MG/1
100 CAPSULE ORAL 2 TIMES DAILY
Qty: 20 CAPSULE | Refills: 0 | Status: SHIPPED | OUTPATIENT
Start: 2017-11-06 | End: 2017-11-16

## 2017-11-06 NOTE — PROGRESS NOTES
Subjective:       Patient ID: Marysol Lyles is a 82 y.o. female.    Chief Complaint: Urinary Tract Infection    Dysuria    This is a new problem. The current episode started in the past 7 days. The problem occurs every urination. The problem has been unchanged. The quality of the pain is described as burning. The pain is mild. There has been no fever. There is no history of pyelonephritis. Associated symptoms include frequency and urgency. Pertinent negatives include no behavior changes, chills, discharge, flank pain, hematuria, hesitancy, nausea, possible pregnancy, sweats, vomiting, weight loss, bubble bath use, constipation, rash or withholding. She has tried nothing for the symptoms. The treatment provided no relief. Her past medical history is significant for hypertension and recurrent UTIs. There is no history of catheterization, diabetes insipidus, diabetes mellitus, genitourinary reflux, kidney stones, STD, urinary stasis or a urological procedure.     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    Alcohol use No      Comment: occasionally    Drug use: No    Sexual activity: No     Past Surgical History:   Procedure Laterality Date    APPENDECTOMY      COLONOSCOPY W/ BIOPSIES  2/2011    repeat in 3 years    PARTIAL KNEE ARTHROPLASTY      Right    SLEEVE GASTROPLASTY  2/8/12    TONSILLECTOMY       TOTAL HIP ARTHROPLASTY      TOTAL KNEE ARTHROPLASTY         Review of Systems   Constitutional: Negative.  Negative for chills and weight loss.   Eyes: Negative.    Respiratory: Negative.    Cardiovascular: Negative.    Gastrointestinal: Negative.  Negative for constipation, nausea and vomiting.   Endocrine: Negative.    Genitourinary: Positive for dysuria, frequency and urgency. Negative for flank pain, hematuria and hesitancy.   Musculoskeletal: Negative.    Skin: Negative.  Negative for rash.   Allergic/Immunologic: Negative.    Neurological: Negative.    Psychiatric/Behavioral: Negative.        Objective:      Physical Exam   Constitutional: She is oriented to person, place, and time. She appears well-developed and well-nourished.   HENT:   Head: Normocephalic.   Right Ear: External ear normal.   Left Ear: External ear normal.   Nose: Nose normal.   Mouth/Throat: Oropharynx is clear and moist.   Eyes: Conjunctivae are normal. Pupils are equal, round, and reactive to light.   Neck: Normal range of motion. Neck supple.   Cardiovascular: Normal rate, regular rhythm and normal heart sounds.    Pulmonary/Chest: Effort normal and breath sounds normal.   Abdominal: Soft. Bowel sounds are normal.   Musculoskeletal: Normal range of motion.   Neurological: She is alert and oriented to person, place, and time.   Skin: Skin is warm and dry. Capillary refill takes 2 to 3 seconds.   Psychiatric: She has a normal mood and affect. Her behavior is normal. Judgment and thought content normal.   Nursing note and vitals reviewed.      Assessment:       1. Recurrent UTI        Plan:           Marysol ROBB was seen today for urinary tract infection.    Diagnoses and all orders for this visit:    Recurrent UTI  -     Urinalysis  -     Ambulatory referral to Urology  -     Urine culture  -     Urinalysis Microscopic  -     nitrofurantoin, macrocrystal-monohydrate, (MACROBID) 100 MG capsule; Take 1 capsule (100 mg total) by mouth 2 (two)  times daily.  -     phenazopyridine (PYRIDIUM) 100 MG tablet; Take 1 tablet (100 mg total) by mouth 2 (two) times daily as needed.

## 2017-11-07 DIAGNOSIS — Z86.010 HISTORY OF COLON POLYPS: ICD-10-CM

## 2017-11-07 DIAGNOSIS — Z83.719 FAMILY HISTORY OF COLONIC POLYPS: ICD-10-CM

## 2017-11-07 RX ORDER — DICYCLOMINE HYDROCHLORIDE 10 MG/1
CAPSULE ORAL
Qty: 120 CAPSULE | Refills: 0 | Status: SHIPPED | OUTPATIENT
Start: 2017-11-07 | End: 2017-12-28 | Stop reason: SDUPTHER

## 2017-11-08 RX ORDER — CETIRIZINE HYDROCHLORIDE 10 MG/1
TABLET ORAL
Qty: 30 TABLET | Refills: 0 | Status: SHIPPED | OUTPATIENT
Start: 2017-11-08 | End: 2017-12-18 | Stop reason: SDUPTHER

## 2017-11-09 ENCOUNTER — LAB VISIT (OUTPATIENT)
Dept: LAB | Facility: HOSPITAL | Age: 82
End: 2017-11-09
Attending: FAMILY MEDICINE
Payer: COMMERCIAL

## 2017-11-09 DIAGNOSIS — E78.5 HYPERLIPIDEMIA, UNSPECIFIED HYPERLIPIDEMIA TYPE: ICD-10-CM

## 2017-11-09 LAB
CHOLEST SERPL-MCNC: 220 MG/DL
CHOLEST/HDLC SERPL: 3.1 {RATIO}
HDLC SERPL-MCNC: 71 MG/DL
HDLC SERPL: 32.3 %
LDLC SERPL CALC-MCNC: 133 MG/DL
NONHDLC SERPL-MCNC: 149 MG/DL
TRIGL SERPL-MCNC: 80 MG/DL

## 2017-11-09 PROCEDURE — 36415 COLL VENOUS BLD VENIPUNCTURE: CPT | Mod: PO

## 2017-11-09 PROCEDURE — 80061 LIPID PANEL: CPT

## 2017-11-10 ENCOUNTER — TELEPHONE (OUTPATIENT)
Dept: FAMILY MEDICINE | Facility: CLINIC | Age: 82
End: 2017-11-10

## 2017-11-10 DIAGNOSIS — N39.0 FREQUENT UTI: Primary | ICD-10-CM

## 2017-11-10 LAB — BACTERIA UR CULT: NO GROWTH

## 2017-11-10 NOTE — TELEPHONE ENCOUNTER
Pt is request an urologist consult to see Dr. Bryant due to her frequent past bladder infections.

## 2017-11-21 ENCOUNTER — HOSPITAL ENCOUNTER (OUTPATIENT)
Dept: RADIOLOGY | Facility: HOSPITAL | Age: 82
Discharge: HOME | End: 2017-11-21
Attending: OBSTETRICS & GYNECOLOGY
Payer: COMMERCIAL

## 2017-11-21 ENCOUNTER — OFFICE VISIT (OUTPATIENT)
Dept: OBSTETRICS AND GYNECOLOGY | Facility: CLINIC | Age: 82
End: 2017-11-21
Payer: COMMERCIAL

## 2017-11-21 VITALS
WEIGHT: 202.81 LBS | HEIGHT: 64 IN | SYSTOLIC BLOOD PRESSURE: 122 MMHG | DIASTOLIC BLOOD PRESSURE: 82 MMHG | BODY MASS INDEX: 34.81 KG/M2 | BODY MASS INDEX: 34.49 KG/M2 | WEIGHT: 202 LBS

## 2017-11-21 DIAGNOSIS — R39.15 URINARY URGENCY: Primary | ICD-10-CM

## 2017-11-21 DIAGNOSIS — Z12.31 VISIT FOR SCREENING MAMMOGRAM: ICD-10-CM

## 2017-11-21 PROCEDURE — 99999 PR PBB SHADOW E&M-EST. PATIENT-LVL III: CPT | Mod: PBBFAC,,, | Performed by: OBSTETRICS & GYNECOLOGY

## 2017-11-21 PROCEDURE — 77063 BREAST TOMOSYNTHESIS BI: CPT | Mod: 26,,, | Performed by: RADIOLOGY

## 2017-11-21 PROCEDURE — 77067 SCR MAMMO BI INCL CAD: CPT | Mod: TC

## 2017-11-21 PROCEDURE — 77067 SCR MAMMO BI INCL CAD: CPT | Mod: 26,,, | Performed by: RADIOLOGY

## 2017-11-21 PROCEDURE — 99213 OFFICE O/P EST LOW 20 MIN: CPT | Mod: S$GLB,,, | Performed by: OBSTETRICS & GYNECOLOGY

## 2017-11-21 RX ORDER — SOLIFENACIN SUCCINATE 10 MG/1
10 TABLET, FILM COATED ORAL DAILY
Qty: 90 TABLET | Refills: 3 | Status: SHIPPED | OUTPATIENT
Start: 2017-11-21 | End: 2017-12-28

## 2017-11-21 NOTE — PROGRESS NOTES
Chief Complaint   Patient presents with    Well Woman     did bone scan with PCP       History of Present Illness: Marysol Lyles is a 82 y.o. 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 Every 4-6 hours PRN      albuterol 90 mcg/actuation inhaler Inhale 2 puffs into the lungs every 6 (six) hours as needed for Wheezing. 8 g 0    alendronate (FOSAMAX) 70 MG tablet Take 1 tablet (70 mg total) by mouth every 7 days. 12 tablet 3    alendronate (FOSAMAX) 70 MG tablet TK 1 T PO Q 7 DAYS      BIOTIN ORAL Take by mouth once daily.      CALCIUM CARBONATE/VITAMIN D3 (CALTRATE 600 + D ORAL) Take by mouth 2 (two) times daily.       cetirizine (ZYRTEC) 10 MG tablet TAKE 1 TABLET BY MOUTH DAILY 30 tablet 0    ciclopirox (PENLAC) 8 % Soln APPLY TOPICALLY ONCE DAILY 6.6 mL 0    CYANOCOBALAMIN, VITAMIN B-12, (VITAMIN B-12 ORAL) As directed      dicyclomine (BENTYL) 10 MG capsule TAKE 1 CAPSULE BY MOUTH FOUR TIMES DAILY BEFORE MEALS AND AT NIGHT 120 capsule 0    duloxetine (CYMBALTA) 60 MG capsule Take 1 capsule (60 mg total) by mouth once daily. 90 capsule 3    fluticasone (FLONASE) 50  mcg/actuation nasal spray 1 spray by Each Nare route once daily. 16 g 11    gabapentin (NEURONTIN) 300 MG capsule Take 1 capsule (300 mg total) by mouth 3 (three) times daily. 90 capsule 11    gabapentin (NEURONTIN) 300 MG capsule Take 300 mg by mouth.      GLUCOSAMINE HCL/CHONDRO KIDD A (GLUCOSAMINE-CHONDROITIN ORAL) Every day      LACTOBACILLUS ACIDOPHILUS (PROBIOTIC ORAL) Take by mouth.      metoprolol tartrate (LOPRESSOR) 25 MG tablet Take 1 tablet (25 mg total) by mouth 2 (two) times daily. 180 tablet 3    metoprolol tartrate (LOPRESSOR) 25 MG tablet       MULTIVITAMIN ORAL 2 (two) times daily. Every day      solifenacin (VESICARE) 10 MG tablet Take 1 tablet (10 mg total) by mouth once daily. 90 tablet 3    tavaborole (KERYDIN) 5 % Rom Apply 1 application topically once daily. 4 mL 11    urea (UMECTA NAIL FILM PEN) 40 % NFSP Apply 1 application topically 2 (two) times daily. 18 g 5    vit C,E,Zn,Cu glu-om3-lut-eduardo (OCUVITE ADULT 50+) 250-5-1 mg Cap Take by mouth.      vitamin D 1000 units Tab Take 185 mg by mouth once daily.       No current facility-administered medications for this visit.        Review of patient's allergies indicates:   Allergen Reactions    Formaldehyde Hives and Other (See Comments)     Other reaction(s): Hives  Other reaction(s): Hives    Latex, natural rubber     Mercury Hives     Other reaction(s): Hives  Other reaction(s): Hives    Penicillins Swelling     Other reaction(s): Swelling    Adhesive Rash     Other reaction(s): Rash       Family History   Problem Relation Age of Onset    Arthritis Mother     Cancer Mother     Arthritis Father     Heart disease Father     Hyperlipidemia Father     Heart attack Father     Cancer Sister     Depression Sister     Cancer Brother     Depression Brother     Diabetes Brother     Heart disease Brother     Hyperlipidemia Brother     Alcohol abuse Son     Birth defects Son     Diabetes Son     Mental retardation Son      Cancer Maternal Aunt     Alcohol abuse Maternal Uncle     Cancer Maternal Uncle     Depression Maternal Uncle     Cancer Paternal Aunt     Cancer Paternal Uncle     Arthritis Maternal Grandmother     Hypertension Maternal Grandmother     Alcohol abuse Maternal Grandfather     Arthritis Maternal Grandfather     Depression Maternal Grandfather     Hypertension Maternal Grandfather     Arthritis Paternal Grandmother     Arthritis Paternal Grandfather     HIV Son        Social History   Substance Use Topics    Smoking status: Former Smoker    Smokeless tobacco: Never Used      Comment: Has not smoked in 40 years.    Alcohol use No      Comment: occasionally       OB History    Para Term  AB Living   5 5 5     6   SAB TAB Ectopic Multiple Live Births           4      # Outcome Date GA Lbr Gopal/2nd Weight Sex Delivery Anes PTL Lv   5 Term     M Vag-Spont  N JARED   4 Term     M Vag-Spont  N    3 Term     F Vag-Spont  N JARED   2 Term     F Vag-Spont  N JARED      Birth Comments: twins   1 Term     M Vag-Spont  N JARED          Review of Symptoms:  GENERAL: Denies weight gain or weight loss. Feeling well overall.   SKIN: Denies rash or lesions.   HEAD: Denies head injury or headache.   NODES: Denies enlarged lymph nodes.   CHEST: Denies chest pain or shortness of breath.   CARDIOVASCULAR: Denies palpitations or left sided chest pain.   ABDOMEN: No abdominal pain, constipation, diarrhea, nausea, vomiting or rectal bleeding.   URINARY: No frequency, dysuria, hematuria, or burning on urination.  HEMATOLOGIC: No easy bruisability or excessive bleeding.   MUSCULOSKELETAL: Denies joint pain or swelling.     /82   Wt 92 kg (202 lb 13.2 oz)   LMP 1994   Physical Exam:  APPEARANCE: Well nourished, well developed, in no acute distress.  SKIN: Normal skin turgor, no lesions.  NECK: Neck symmetric without masses   RESPIRATORY: Normal respiratory effort with no retractions or  use of accessory muscles  CARDIOVASCULAR: Peripheral vascular system with no swelling no varicosities and palpation of pulses normal  LYMPHATIC: No enlargements of the lymph nodes noted in the neck, axillae, or groin  ABDOMEN: Soft. No tenderness or masses. No hepatosplenomegaly. No hernias      ASSESSMENT/PLAN:  Urinary urgency  -     solifenacin (VESICARE) 10 MG tablet; Take 1 tablet (10 mg total) by mouth once daily.  Dispense: 90 tablet; Refill: 3    Visit for screening mammogram  -     Cancel: Mammo Digital Screening Bilat With CAD; Future; Expected date: 11/21/2017        15 minutes spent today with this patient. Greater than half spent in counseling today.

## 2017-11-21 NOTE — TELEPHONE ENCOUNTER
Called pt and left vm regarding Urology consult, informed pt to call and schedule at her earliest convenience.

## 2017-11-22 DIAGNOSIS — R39.15 URINARY URGENCY: ICD-10-CM

## 2017-11-22 RX ORDER — SOLIFENACIN SUCCINATE 10 MG/1
TABLET, FILM COATED ORAL
Qty: 90 TABLET | Refills: 0 | Status: SHIPPED | OUTPATIENT
Start: 2017-11-22 | End: 2018-03-26 | Stop reason: SDUPTHER

## 2017-11-24 ENCOUNTER — PATIENT MESSAGE (OUTPATIENT)
Dept: FAMILY MEDICINE | Facility: CLINIC | Age: 82
End: 2017-11-24

## 2017-11-24 DIAGNOSIS — M81.0 OSTEOPOROSIS, UNSPECIFIED OSTEOPOROSIS TYPE, UNSPECIFIED PATHOLOGICAL FRACTURE PRESENCE: Primary | ICD-10-CM

## 2017-11-24 NOTE — TELEPHONE ENCOUNTER
I can't order the prolia.    Based on this, stop the fosamax at this time.   Try to get her in with an endocrinologist elsewhere in the system for her osteoporosis.

## 2017-11-27 ENCOUNTER — TELEPHONE (OUTPATIENT)
Dept: FAMILY MEDICINE | Facility: CLINIC | Age: 82
End: 2017-11-27

## 2017-11-27 NOTE — TELEPHONE ENCOUNTER
Pt wants to see someone about her sciatic nerve pain and would like to have an MRI scheduled as well. She has an appointment with endocrinologist in [place] on 12/21/17. That is the earliest appointment that I could get for her.

## 2017-11-27 NOTE — TELEPHONE ENCOUNTER
I have signed for the following orders AND/OR meds.  Please call the patient and ask the patient to schedule the testing AND/OR inform about any medications that were sent.      Orders Placed This Encounter   Procedures    Ambulatory referral to Endocrinology     Referral Priority:   Routine     Referral Type:   Consultation     Referral Reason:   Specialty Services Required     Requested Specialty:   Endocrinology     Number of Visits Requested:   1

## 2017-11-27 NOTE — TELEPHONE ENCOUNTER
----- Message from Vida Miramontes sent at 11/27/2017  3:09 PM CST -----  Contact: Pt   States she's calling rg Dr resendiz no longer being with [facility] and is requesting an MRI of her back due to the sciatic nerve prob and when nurse calls back she will explain everything and can be reached at [phone]//thanks/dbw

## 2017-11-27 NOTE — TELEPHONE ENCOUNTER
Get her in with the NP to be able to get all of the clinicals done that are needed to be able to get an MRI approved by insurance.

## 2017-11-29 ENCOUNTER — OFFICE VISIT (OUTPATIENT)
Dept: FAMILY MEDICINE | Facility: CLINIC | Age: 82
End: 2017-11-29
Payer: COMMERCIAL

## 2017-11-29 VITALS
HEIGHT: 64 IN | DIASTOLIC BLOOD PRESSURE: 78 MMHG | HEART RATE: 55 BPM | SYSTOLIC BLOOD PRESSURE: 137 MMHG | BODY MASS INDEX: 35 KG/M2 | WEIGHT: 205 LBS | TEMPERATURE: 99 F

## 2017-11-29 DIAGNOSIS — M54.16 LUMBAR BACK PAIN WITH RADICULOPATHY AFFECTING LEFT LOWER EXTREMITY: Primary | ICD-10-CM

## 2017-11-29 DIAGNOSIS — M54.32 LEFT SIDED SCIATICA: ICD-10-CM

## 2017-11-29 PROCEDURE — 99999 PR PBB SHADOW E&M-EST. PATIENT-LVL III: CPT | Mod: PBBFAC,,, | Performed by: NURSE PRACTITIONER

## 2017-11-29 PROCEDURE — 99213 OFFICE O/P EST LOW 20 MIN: CPT | Mod: S$GLB,,, | Performed by: NURSE PRACTITIONER

## 2017-11-29 RX ORDER — CYCLOBENZAPRINE HCL 10 MG
10 TABLET ORAL 3 TIMES DAILY PRN
Qty: 30 TABLET | Refills: 1 | Status: SHIPPED | OUTPATIENT
Start: 2017-11-29 | End: 2017-12-09

## 2017-11-29 NOTE — PROGRESS NOTES
Subjective:       Patient ID: Marysol Lyles is a 82 y.o. female.    Chief Complaint: Hip Pain    Back Pain   This is a recurrent problem. The current episode started more than 1 month ago (6 weeks). The problem occurs constantly. The problem has been waxing and waning since onset. The pain is present in the gluteal and lumbar spine. The quality of the pain is described as stabbing, shooting and aching. The pain radiates to the left thigh. The pain is at a severity of 7/10. The pain is moderate. The pain is the same all the time. The symptoms are aggravated by bending, position, sitting and twisting. Stiffness is present all day. Associated symptoms include leg pain. Pertinent negatives include no abdominal pain, bladder incontinence, bowel incontinence, chest pain, dysuria, fever, headaches, numbness, paresis, paresthesias, pelvic pain, perianal numbness, tingling, weakness or weight loss. Risk factors include obesity. She has tried analgesics for the symptoms. The treatment provided mild relief.     She reports that she had a fall back in August or September, when she fell she landed on both of her knees, she tried to pull herself up to a standing position and pulled something in her back area and she continued to have low back pain that radiated to the left thigh.  She was seen by Dr. Ferguson and given orders for physical therapy which she has been doing for the last 6 weeks.  She has had no improvement in pain.    Medical history also includes a right hip replacement, left knee replacement, and right partial knee replacement all done by Dr. Ferguson.     Review of Systems   Constitutional: Negative for fatigue, fever, unexpected weight change and weight loss.   HENT: Negative for ear pain and sore throat.    Eyes: Negative for pain and visual disturbance.   Respiratory: Negative for cough and shortness of breath.    Cardiovascular: Negative for chest pain and palpitations.   Gastrointestinal: Negative for abdominal pain,  bowel incontinence, diarrhea and vomiting.   Genitourinary: Negative for bladder incontinence, dysuria, hematuria and pelvic pain.   Musculoskeletal: Positive for back pain. Negative for arthralgias and myalgias.   Skin: Negative for color change and rash.   Neurological: Negative for dizziness, tingling, weakness, numbness, headaches and paresthesias.   Psychiatric/Behavioral: Negative for dysphoric mood and sleep disturbance. The patient is not nervous/anxious.        Vitals:    11/29/17 1356   BP: 137/78   Pulse: (!) 55   Temp: 98.9 °F (37.2 °C)       Objective:     Current Outpatient Prescriptions   Medication Sig Dispense Refill    ACETAMINOPHEN (TYLENOL EXTRA STRENGTH ORAL) 500 mg. Every 4-6 hours PRN      albuterol 90 mcg/actuation inhaler Inhale 2 puffs into the lungs every 6 (six) hours as needed for Wheezing. 8 g 0    alendronate (FOSAMAX) 70 MG tablet Take 1 tablet (70 mg total) by mouth every 7 days. 12 tablet 3    BIOTIN ORAL Take by mouth once daily.      CALCIUM CARBONATE/VITAMIN D3 (CALTRATE 600 + D ORAL) Take by mouth 2 (two) times daily.       cetirizine (ZYRTEC) 10 MG tablet TAKE 1 TABLET BY MOUTH DAILY 30 tablet 0    ciclopirox (PENLAC) 8 % Soln APPLY TOPICALLY ONCE DAILY 6.6 mL 0    CYANOCOBALAMIN, VITAMIN B-12, (VITAMIN B-12 ORAL) As directed      dicyclomine (BENTYL) 10 MG capsule TAKE 1 CAPSULE BY MOUTH FOUR TIMES DAILY BEFORE MEALS AND AT NIGHT 120 capsule 0    duloxetine (CYMBALTA) 60 MG capsule Take 1 capsule (60 mg total) by mouth once daily. 90 capsule 3    fluticasone (FLONASE) 50 mcg/actuation nasal spray 1 spray by Each Nare route once daily. 16 g 11    FLUZONE HIGH-DOSE 2017-18, PF, 180 mcg/0.5 mL vaccine ADM 0.5ML IM UTD  0    gabapentin (NEURONTIN) 300 MG capsule Take 1 capsule (300 mg total) by mouth 3 (three) times daily. 90 capsule 11    GLUCOSAMINE HCL/CHONDRO KIDD A (GLUCOSAMINE-CHONDROITIN ORAL) Every day      LACTOBACILLUS ACIDOPHILUS (PROBIOTIC ORAL) Take by  mouth.      metoprolol tartrate (LOPRESSOR) 25 MG tablet Take 1 tablet (25 mg total) by mouth 2 (two) times daily. 180 tablet 3    MULTIVITAMIN ORAL 2 (two) times daily. Every day      solifenacin (VESICARE) 10 MG tablet Take 1 tablet (10 mg total) by mouth once daily. 90 tablet 3    tavaborole (KERYDIN) 5 % Rom Apply 1 application topically once daily. 4 mL 11    urea (UMECTA NAIL FILM PEN) 40 % NFSP Apply 1 application topically 2 (two) times daily. 18 g 5    VESICARE 10 mg tablet TAKE 1 TABLET(10 MG) BY MOUTH EVERY DAY 90 tablet 0    vit C,E,Zn,Cu glu-om3-lut-eduardo (OCUVITE ADULT 50+) 250-5-1 mg Cap Take by mouth.      vitamin D 1000 units Tab Take 185 mg by mouth once daily.      cyclobenzaprine (FLEXERIL) 10 MG tablet Take 1 tablet (10 mg total) by mouth 3 (three) times daily as needed for Muscle spasms. 30 tablet 1     No current facility-administered medications for this visit.        Physical Exam   Constitutional: She is oriented to person, place, and time. She appears well-developed and well-nourished. No distress.   HENT:   Head: Normocephalic and atraumatic.   Eyes: EOM are normal. Pupils are equal, round, and reactive to light.   Neck: Normal range of motion. Neck supple.   Cardiovascular: Normal rate and regular rhythm.    Pulmonary/Chest: Effort normal and breath sounds normal.   Musculoskeletal:        Lumbar back: She exhibits decreased range of motion, tenderness and pain.        Back:    Neurological: She is alert and oriented to person, place, and time.   Skin: Skin is warm and dry. No rash noted.   Psychiatric: She has a normal mood and affect. Judgment normal.   Nursing note and vitals reviewed.      Assessment:       1. Lumbar back pain with radiculopathy affecting left lower extremity    2. Left sided sciatica        Plan:   Lumbar back pain with radiculopathy affecting left lower extremity  -     MRI Lumbar Spine Without Contrast; Future; Expected date: 11/29/2017    Left sided  sciatica  -     MRI Lumbar Spine Without Contrast; Future; Expected date: 11/29/2017    Other orders  -     cyclobenzaprine (FLEXERIL) 10 MG tablet; Take 1 tablet (10 mg total) by mouth 3 (three) times daily as needed for Muscle spasms.  Dispense: 30 tablet; Refill: 1    heat to area, continue PT    She is going to contact Dr Ferguson for information related to her joint replacements to determine if she is able to have MRI, if she is unable I will change the order to a CT    No Follow-up on file.

## 2017-11-30 ENCOUNTER — HOSPITAL ENCOUNTER (OUTPATIENT)
Dept: RADIOLOGY | Facility: HOSPITAL | Age: 82
Discharge: HOME | End: 2017-11-30
Attending: NURSE PRACTITIONER
Payer: COMMERCIAL

## 2017-11-30 DIAGNOSIS — M54.16 LUMBAR BACK PAIN WITH RADICULOPATHY AFFECTING LEFT LOWER EXTREMITY: ICD-10-CM

## 2017-11-30 DIAGNOSIS — M54.32 LEFT SIDED SCIATICA: ICD-10-CM

## 2017-11-30 PROCEDURE — 72148 MRI LUMBAR SPINE W/O DYE: CPT | Mod: 26,,, | Performed by: RADIOLOGY

## 2017-11-30 PROCEDURE — 72148 MRI LUMBAR SPINE W/O DYE: CPT | Mod: TC,PO

## 2017-12-04 ENCOUNTER — TELEPHONE (OUTPATIENT)
Dept: FAMILY MEDICINE | Facility: CLINIC | Age: 82
End: 2017-12-04

## 2017-12-04 RX ORDER — TRAMADOL HYDROCHLORIDE 50 MG/1
50 TABLET ORAL EVERY 6 HOURS PRN
Qty: 30 TABLET | Refills: 0 | Status: SHIPPED | OUTPATIENT
Start: 2017-12-04 | End: 2017-12-14

## 2017-12-04 NOTE — TELEPHONE ENCOUNTER
----- Message from Aleena Saavedra sent at 12/1/2017  4:13 PM CST -----  Patient requesting a medication that is stronger than Tylenol. Please adv/call [phone].//thanks. cw

## 2017-12-05 ENCOUNTER — TELEPHONE (OUTPATIENT)
Dept: FAMILY MEDICINE | Facility: CLINIC | Age: 82
End: 2017-12-05

## 2017-12-05 DIAGNOSIS — R39.15 URINARY URGENCY: ICD-10-CM

## 2017-12-05 RX ORDER — SOLIFENACIN SUCCINATE 10 MG/1
10 TABLET, FILM COATED ORAL DAILY
Qty: 90 TABLET | Refills: 3 | Status: CANCELLED | OUTPATIENT
Start: 2017-12-05

## 2017-12-05 NOTE — TELEPHONE ENCOUNTER
Called pt and notified her of imaging lab results. Pt stated that she is following up with Dr Rodrigues in January and Dr Lara next week.

## 2017-12-05 NOTE — TELEPHONE ENCOUNTER
----- Message from Saeid Saavedra sent at 12/5/2017 10:14 AM CST -----  Contact: self  [phone]  Patient called asking for 90 day supply for rx vesicare with refills.[facility] [phone]..  Thanks!

## 2017-12-05 NOTE — TELEPHONE ENCOUNTER
----- Message from Enedina Herron NP sent at 12/4/2017  1:40 PM CST -----  There is significant arthritis throughout, slight disc bulge. If she is interested in pain management or if she would like to follow up with spinal ortho for treatment options

## 2017-12-07 ENCOUNTER — TELEPHONE (OUTPATIENT)
Dept: FAMILY MEDICINE | Facility: CLINIC | Age: 82
End: 2017-12-07

## 2017-12-07 NOTE — TELEPHONE ENCOUNTER
----- Message from Leeann Cruz sent at 12/7/2017  8:51 AM CST -----  Asking to have 11/30 th mri results be faxed to  [phone] .. Dr Osorio / [facility] medical / has an appointment on Monday / call pt at [phone] (home)

## 2017-12-11 RX ORDER — DULOXETIN HYDROCHLORIDE 60 MG/1
CAPSULE, DELAYED RELEASE ORAL
Qty: 90 CAPSULE | Refills: 0 | Status: SHIPPED | OUTPATIENT
Start: 2017-12-11 | End: 2018-05-14 | Stop reason: SDUPTHER

## 2017-12-18 RX ORDER — CETIRIZINE HYDROCHLORIDE 10 MG/1
TABLET ORAL
Qty: 30 TABLET | Refills: 0 | OUTPATIENT
Start: 2017-12-18

## 2017-12-18 RX ORDER — CETIRIZINE HYDROCHLORIDE 10 MG/1
10 TABLET ORAL DAILY
Qty: 30 TABLET | Refills: 5 | Status: SHIPPED | OUTPATIENT
Start: 2017-12-18 | End: 2018-06-04 | Stop reason: SDUPTHER

## 2017-12-21 ENCOUNTER — OFFICE VISIT (OUTPATIENT)
Dept: ENDOCRINOLOGY | Facility: CLINIC | Age: 82
End: 2017-12-21
Payer: COMMERCIAL

## 2017-12-21 VITALS
BODY MASS INDEX: 36.05 KG/M2 | WEIGHT: 211.19 LBS | HEIGHT: 64 IN | TEMPERATURE: 99 F | HEART RATE: 54 BPM | DIASTOLIC BLOOD PRESSURE: 82 MMHG | SYSTOLIC BLOOD PRESSURE: 116 MMHG

## 2017-12-21 DIAGNOSIS — Z98.84 BARIATRIC SURGERY STATUS: ICD-10-CM

## 2017-12-21 DIAGNOSIS — K21.9 GASTROESOPHAGEAL REFLUX DISEASE WITHOUT ESOPHAGITIS: ICD-10-CM

## 2017-12-21 DIAGNOSIS — M81.0 OSTEOPOROSIS WITHOUT CURRENT PATHOLOGICAL FRACTURE, UNSPECIFIED OSTEOPOROSIS TYPE: Primary | ICD-10-CM

## 2017-12-21 PROCEDURE — 99999 PR PBB SHADOW E&M-EST. PATIENT-LVL III: CPT | Mod: PBBFAC,,, | Performed by: INTERNAL MEDICINE

## 2017-12-21 PROCEDURE — 99244 OFF/OP CNSLTJ NEW/EST MOD 40: CPT | Mod: S$GLB,,, | Performed by: INTERNAL MEDICINE

## 2017-12-21 NOTE — PROGRESS NOTES
Without the homeSubjective:       Marysol Lyles is a 82 y.o. female who I am asked to see in consultation for evaluation of osteopenia   abdominal  Chris immediately gets one half of osteopenia  She was diagnosed with osteopenia by a bone density scan in 2015. Patient denies history of fracture. The cause of osteoporosis is felt to be due to hyperparathyroidism. She is currently being treated with calcium and vitamin D supplementation. She is not currently being treated with bisphosphonates-however she had been started on Fosamax previously by another endocrinologist March 2016 however she stopped taking this a few months ago, she is status post gastric sleeve in 2012 and she has gastroesophageal reflux and has been having symptoms and can no longer tolerate taking the medication and would like to switch to Prolia.    Her most recent bone density showed an elevated FRAX score of 4% showing high risk for 10 year probability of hip fracture      Osteoporosis Risk Factors   Nonmodifiable  Personal Hx of fracture as an adult: no  Hx of fracture in first-degree relative: no   race: yes  Advanced age: yes  Female sex: yes  Dementia: no  Poor health/frailty: no     Potentially modifiable:  Tobacco use: no  Low body weight (<127 lbs): no  Estrogen deficiency     early menopause (age <45) or bilateral ovariectomy: not applicable     prolonged premenopausal amenorrhea (>1 yr): no  Low calcium intake (lifelong): no  Alcoholism: no  Recurrent falls: no  Inadequate physical activity: no    Current calcium and Vit D intake: 2 calcium with d and a separate vitamin  Dietary sources: Negligible      she has had knee and hip replacements, she suffers from pain due to arthritis and she suffers from back pain due to sciatica        The following portions of the patient's history were reviewed and updated as appropriate: allergies, current medications, past family history, past medical history, past social history, past surgical  "history and problem list.    Review of Systems  A comprehensive review of systems was negative except for: Musculoskeletal: positive for arthralgias and back pain       Objective:      /82   Pulse (!) 54   Temp 98.7 °F (37.1 °C) (Tympanic)   Ht 5' 4" (1.626 m)   Wt 95.8 kg (211 lb 3.2 oz)   LMP 05/06/1994   BMI 36.25 kg/m²   General:  alert, appears stated age and cooperative   Body Habitus mildly obese   Oropharynx: lips, mucosa, and tongue normal; teeth and gums normal    Eyes:  negative findings: lids and lashes normal, conjunctivae and sclerae normal, corneas clear and pupils equal, round, reactive to light and accomodation    Ears:  normal TM's and external ear canals both ears   Neck: no adenopathy, no carotid bruit, no JVD, supple, symmetrical, trachea midline and thyroid not enlarged, symmetric, no tenderness/mass/nodules   Thyroid:  no palpable nodule   Lung: clear to auscultation bilaterally   Heart:  regular rate and rhythm, S1, S2 normal, no murmur, click, rub or gallop   Abdomen: soft, non-tender; bowel sounds normal; no masses,  no organomegaly   Extremities: extremities normal, atraumatic, no cyanosis or edema   Skin: warm and dry, no hyperpigmentation, vitiligo, or suspicious lesions   Pulses: 2+ and symmetric   Neuro: normal without focal findings, mental status, speech normal, alert and oriented x3, WERNER and reflexes normal and symmetric     Imaging  Bone Density: Spine T Score: 0.9, Hip T Score: -2.1    Assessment:     Osteopenia with GERD, unable to tolerate bisphosphonate and elevated FRAX score for hip and worsening bone density  Plan:          1.  Will try to get authorization from her insurance for  Prolia which she desires, did discuss alternative treatment with IV  Reclast, daughter would prefer Prolia which daughter actually uses    2. Calcium and vitamin D supplementation.  She will continue on same calcium and vitamin D supplementation   3. The risks and benefits of my " recommendations, as well as other treatment options were discussed with the patient and daughter today. Questions were answered.  4. Follow up: 6 months and as needed.

## 2017-12-21 NOTE — LETTER
December 21, 2017      Aguilar Vickers MD  [address] [id number]           [facility] - Endocrinology  [address]  [address]th Floor  [facility] [address]  Phone: [phone]  Fax: [phone]          Patient: Marysol Lyles   MR Number: [id number]   YOB: 1935   Date of Visit: 12/21/2017       Dear Dr. Aguilar Vickers:    Thank you for referring Marysol Lyles to me for evaluation. Attached you will find relevant portions of my assessment and plan of care.    If you have questions, please do not hesitate to call me. I look forward to following Marysol Lyles along with you.    Sincerely,    Odalys Perez MD    Enclosure  CC:  No Recipients    If you would like to receive this communication electronically, please contact [email or web address] or [phone] to request more information on [facility] Link access.    For providers and/or their staff who would like to refer a patient to [facility], please contact us through our one-stop-shop provider referral line, [facility] , at [phone].    If you feel you have received this communication in error or would no longer like to receive these types of communications, please e-mail [email or web address]

## 2017-12-28 DIAGNOSIS — Z86.010 HISTORY OF COLON POLYPS: ICD-10-CM

## 2017-12-28 DIAGNOSIS — Z83.719 FAMILY HISTORY OF COLONIC POLYPS: ICD-10-CM

## 2017-12-28 RX ORDER — DICYCLOMINE HYDROCHLORIDE 10 MG/1
CAPSULE ORAL
Qty: 120 CAPSULE | Refills: 11 | Status: SHIPPED | OUTPATIENT
Start: 2017-12-28 | End: 2019-01-19 | Stop reason: SDUPTHER

## 2017-12-28 RX ORDER — FLUTICASONE PROPIONATE 50 MCG
SPRAY, SUSPENSION (ML) NASAL
Qty: 16 G | Refills: 11 | Status: SHIPPED | OUTPATIENT
Start: 2017-12-28 | End: 2018-12-31 | Stop reason: SDUPTHER

## 2017-12-29 ENCOUNTER — TELEPHONE (OUTPATIENT)
Dept: NEUROLOGY | Facility: CLINIC | Age: 82
End: 2017-12-29

## 2017-12-29 NOTE — TELEPHONE ENCOUNTER
----- Message from Marjorie Saavedra sent at 12/29/2017  9:16 AM CST -----  Contact: daughter  Request a call concerning the pt appt on 1/8/2018, can be reached at [phone]///[email or web address]

## 2018-01-08 ENCOUNTER — CLINICAL SUPPORT (OUTPATIENT)
Dept: ENDOCRINOLOGY | Facility: CLINIC | Age: 83
End: 2018-01-08
Payer: COMMERCIAL

## 2018-01-08 PROCEDURE — 96372 THER/PROPH/DIAG INJ SC/IM: CPT | Mod: S$GLB,,, | Performed by: INTERNAL MEDICINE

## 2018-01-12 RX ORDER — ALENDRONATE SODIUM 70 MG/1
TABLET ORAL
Qty: 12 TABLET | Refills: 0 | Status: SHIPPED | OUTPATIENT
Start: 2018-01-12 | End: 2018-01-19

## 2018-01-19 ENCOUNTER — OFFICE VISIT (OUTPATIENT)
Dept: FAMILY MEDICINE | Facility: CLINIC | Age: 83
End: 2018-01-19
Payer: COMMERCIAL

## 2018-01-19 VITALS
SYSTOLIC BLOOD PRESSURE: 130 MMHG | TEMPERATURE: 100 F | DIASTOLIC BLOOD PRESSURE: 82 MMHG | WEIGHT: 209.69 LBS | HEIGHT: 64 IN | BODY MASS INDEX: 35.8 KG/M2 | HEART RATE: 60 BPM

## 2018-01-19 DIAGNOSIS — J20.9 BRONCHITIS WITH BRONCHOSPASM: ICD-10-CM

## 2018-01-19 PROCEDURE — 99213 OFFICE O/P EST LOW 20 MIN: CPT | Mod: S$GLB,,, | Performed by: NURSE PRACTITIONER

## 2018-01-19 PROCEDURE — 99999 PR PBB SHADOW E&M-EST. PATIENT-LVL III: CPT | Mod: PBBFAC,,, | Performed by: NURSE PRACTITIONER

## 2018-01-19 RX ORDER — ALBUTEROL SULFATE 90 UG/1
2 AEROSOL, METERED RESPIRATORY (INHALATION) EVERY 6 HOURS PRN
Qty: 8 G | Refills: 0 | Status: SHIPPED | OUTPATIENT
Start: 2018-01-19 | End: 2018-06-04

## 2018-01-19 RX ORDER — BENZONATATE 200 MG/1
200 CAPSULE ORAL 3 TIMES DAILY PRN
Qty: 30 CAPSULE | Refills: 0 | Status: SHIPPED | OUTPATIENT
Start: 2018-01-19 | End: 2018-01-29

## 2018-01-19 RX ORDER — HYDROCODONE BITARTRATE AND ACETAMINOPHEN 10; 325 MG/1; MG/1
TABLET ORAL
Refills: 0 | COMMUNITY
Start: 2017-12-11 | End: 2018-06-04

## 2018-01-19 NOTE — PROGRESS NOTES
"Subjective:      Patient ID: Marysol Lyles is a 82 y.o. female.    Chief Complaint: Cough and Chest Congestion    Cough   This is a new problem. The current episode started in the past 7 days. The cough is productive of sputum. Associated symptoms include nasal congestion, rhinorrhea, shortness of breath and wheezing. Pertinent negatives include no chest pain, chills, fever, headaches, postnasal drip or rash. Nothing aggravates the symptoms. She has tried nothing for the symptoms. The treatment provided no relief.        Review of Systems   Constitutional: Negative for chills, fatigue and fever.   HENT: Positive for rhinorrhea and sinus pressure. Negative for congestion and postnasal drip.    Eyes: Negative.    Respiratory: Positive for cough, shortness of breath and wheezing.    Cardiovascular: Negative for chest pain, palpitations and leg swelling.   Gastrointestinal: Negative.    Endocrine: Negative.    Genitourinary: Negative.    Musculoskeletal: Negative.    Skin: Negative for rash and wound.   Neurological: Negative for headaches.   Hematological: Negative.    Psychiatric/Behavioral: The patient is not nervous/anxious.        Objective:     /82   Pulse 60   Temp 99.5 °F (37.5 °C) (Oral)   Ht 5' 4" (1.626 m)   Wt 95.1 kg (209 lb 10.5 oz)   LMP 05/06/1994   BMI 35.99 kg/m²     Physical Exam   Constitutional: She appears well-developed and well-nourished.   HENT:   Head: Normocephalic.   Right Ear: A middle ear effusion (serous ) is present.   Left Ear: A middle ear effusion (serous) is present.   Nose: Mucosal edema and rhinorrhea (nasal mucosa erythematous and boggy) present. Right sinus exhibits no maxillary sinus tenderness and no frontal sinus tenderness. Left sinus exhibits no maxillary sinus tenderness and no frontal sinus tenderness.   Mouth/Throat: No oropharyngeal exudate, posterior oropharyngeal edema or posterior oropharyngeal erythema (clear, post nasal drainage noted to posterior " oropharynx).   Eyes: Conjunctivae and lids are normal. Pupils are equal, round, and reactive to light.   Neck: Normal range of motion and full passive range of motion without pain. Neck supple.   Cardiovascular: Normal rate, regular rhythm and normal heart sounds.    Pulmonary/Chest: Effort normal and breath sounds normal. No respiratory distress. She has no decreased breath sounds. She has no wheezes. She has no rhonchi. She has no rales.   Lymphadenopathy:     She has no cervical adenopathy.   Skin: Skin is warm and dry. No rash noted.   Psychiatric: She has a normal mood and affect. Her behavior is normal. Judgment and thought content normal.     Assessment:     1. Bronchitis with bronchospasm        Plan:     Problem List Items Addressed This Visit     None      Visit Diagnoses     Bronchitis with bronchospasm        Relevant Medications    benzonatate (TESSALON) 200 MG capsule    guaiFENesin (MUCINEX) 600 mg 12 hr tablet    albuterol 90 mcg/actuation inhaler      Symptomatic care discussed and educational handout provided  Report to ER if symptoms worsen    Follow-up if symptoms worsen or fail to improve.

## 2018-01-19 NOTE — PATIENT INSTRUCTIONS
What Is Acute Bronchitis?  Acute bronchitis is when the airways in your lungs (bronchial tubes) become red and swollen (inflamed). It is usually caused by a viral infection. But it can also occur because of a bacteria or allergen. Symptoms include a cough that produces yellow or greenish mucus and can last for days or sometimes weeks.  Inside healthy lungs    Air travels in and out of the lungs through the airways. The linings of these airways produce sticky mucus. This mucus traps particles that enter the lungs. Tiny structures called cilia then sweep the particles out of the airways.     Healthy airway: Airways are normally open. Air moves in and out easily.      Healthy cilia: Tiny, hairlike cilia sweep mucus and particles up and out of the airways.   Lungs with bronchitis  Bronchitis often occurs with a cold or the flu virus. The airways become inflamed (red and swollen). There is a deep hacking cough from the extra mucus. Other symptoms may include:  · Wheezing  · Chest discomfort  · Shortness of breath  · Mild fever  A second infection, this time due to bacteria, may then occur. And airways irritated by allergens or smoke are more likely to get infected.        Inflamed airway: Inflammation and extra mucus narrow the airway, causing shortness of breath.      Impaired cilia: Extra mucus impairs cilia, causing congestion and wheezing. Smoking makes the problem worse.   Making a diagnosis  A physical exam, health history, and certain tests help your healthcare provider make the diagnosis.  Health history  Your healthcare provider will ask you about your symptoms.  The exam  Your provider listens to your chest for signs of congestion. He or she may also check your ears, nose, and throat.  Possible tests  · A sputum test for bacteria. This requires a sample of mucus from your lungs.  · A nasal or throat swab. This tests to see if you have a bacterial infection.  · A chest X-ray. This is done if your healthcare  provider thinks you have pneumonia.  · Tests to check for an underlying condition. Other tests may be done to check for things such as allergies, asthma, or COPD (chronic obstructive pulmonary disease). You may need to see a specialist for more lung function testing.  Treating a cough  The main treatment for bronchitis is easing symptoms. Avoiding smoke, allergens, and other things that trigger coughing can often help. If the infection is bacterial, you may be given antibiotics. During the illness, it's important to get plenty of sleep. To ease symptoms:  · Dont smoke. Also avoid secondhand smoke.  · Use a humidifier. Or try breathing in steam from a hot shower. This may help loosen mucus.  · Drink a lot of water and juice. They can soothe the throat and may help thin mucus.  · Sit up or use extra pillows when in bed. This helps to lessen coughing and congestion.  · Ask your provider about using medicine. Ask about using cough medicine, pain and fever medicine, or a decongestant.  Antibiotics  Most cases of bronchitis are caused by cold or flu viruses. They dont need antibiotics to treat them, even if your mucus is thick and green or yellow. Antibiotics dont treat viral illness and antibiotics have not been shown to have any benefit in cases of acute bronchitis. Taking antibiotics when they are not needed increases your risk of getting an infection later that is antibiotic-resistant. Antibiotics can also cause severe cases of diarrhea that require other antibiotics to treat.  It is important that you accept your healthcare provider's opinion to not use antibiotics. Your provider will prescribe antibiotics if the infection is caused by bacteria. If they are prescribed:  · Take all of the medicine. Take the medicine until it is used up, even if symptoms have improved. If you dont, the bronchitis may come back.  · Take the medicines as directed. For instance, some medicines should be taken with food.  · Ask about  side effects. Ask your provider or pharmacist what side effects are common, and what to do about them.  Follow-up care  You should see your provider again in 2 to 3 weeks. By this time, symptoms should have improved. An infection that lasts longer may mean you have a more serious problem.  Prevention  · Avoid tobacco smoke. If you smoke, quit. Stay away from smoky places. Ask friends and family not to smoke around you, or in your home or car.  · Get checked for allergies.  · Ask your provider about getting a yearly flu shot. Also ask about pneumococcal or pneumonia shots.  · Wash your hands often. This helps reduce the chance of picking up viruses that cause colds and flu.  Call your healthcare provider if:  · Symptoms worsen, or you have new symptoms  · Breathing problems worsen or  become severe  · Symptoms dont get better within a week, or within 3 days of taking antibiotics   Date Last Reviewed: 2/1/2017  © 9404-7732 [employer], Anita Margar[employer]. [address] [place] [address], [place] [address]. All rights reserved. This information is not intended as a substitute for professional medical care. Always follow your healthcare professional's instructions.

## 2018-01-23 ENCOUNTER — OFFICE VISIT (OUTPATIENT)
Dept: FAMILY MEDICINE | Facility: CLINIC | Age: 83
End: 2018-01-23
Payer: COMMERCIAL

## 2018-01-23 ENCOUNTER — HOSPITAL ENCOUNTER (OUTPATIENT)
Dept: RADIOLOGY | Facility: HOSPITAL | Age: 83
Discharge: HOME | End: 2018-01-23
Attending: NURSE PRACTITIONER
Payer: COMMERCIAL

## 2018-01-23 ENCOUNTER — PATIENT MESSAGE (OUTPATIENT)
Dept: FAMILY MEDICINE | Facility: CLINIC | Age: 83
End: 2018-01-23

## 2018-01-23 VITALS
HEIGHT: 64 IN | HEART RATE: 87 BPM | DIASTOLIC BLOOD PRESSURE: 73 MMHG | SYSTOLIC BLOOD PRESSURE: 134 MMHG | TEMPERATURE: 100 F | BODY MASS INDEX: 35.16 KG/M2 | WEIGHT: 205.94 LBS | OXYGEN SATURATION: 99 %

## 2018-01-23 DIAGNOSIS — R06.2 WHEEZE: ICD-10-CM

## 2018-01-23 DIAGNOSIS — J20.9 ACUTE BRONCHITIS, UNSPECIFIED ORGANISM: Primary | ICD-10-CM

## 2018-01-23 DIAGNOSIS — R06.02 SHORTNESS OF BREATH: ICD-10-CM

## 2018-01-23 DIAGNOSIS — R05.9 COUGH: ICD-10-CM

## 2018-01-23 DIAGNOSIS — B37.31 VAGINAL YEAST INFECTION: Primary | ICD-10-CM

## 2018-01-23 PROCEDURE — 99214 OFFICE O/P EST MOD 30 MIN: CPT | Mod: 25,S$GLB,, | Performed by: NURSE PRACTITIONER

## 2018-01-23 PROCEDURE — 99999 PR PBB SHADOW E&M-EST. PATIENT-LVL IV: CPT | Mod: PBBFAC,,, | Performed by: NURSE PRACTITIONER

## 2018-01-23 PROCEDURE — 94640 AIRWAY INHALATION TREATMENT: CPT | Mod: S$GLB,,, | Performed by: FAMILY MEDICINE

## 2018-01-23 PROCEDURE — 71046 X-RAY EXAM CHEST 2 VIEWS: CPT | Mod: 26,,, | Performed by: RADIOLOGY

## 2018-01-23 PROCEDURE — 71046 X-RAY EXAM CHEST 2 VIEWS: CPT | Mod: TC,PO

## 2018-01-23 PROCEDURE — 96372 THER/PROPH/DIAG INJ SC/IM: CPT | Mod: 59,S$GLB,, | Performed by: FAMILY MEDICINE

## 2018-01-23 RX ORDER — DOXYCYCLINE HYCLATE 100 MG
100 TABLET ORAL 2 TIMES DAILY
Qty: 20 TABLET | Refills: 0 | Status: SHIPPED | OUTPATIENT
Start: 2018-01-23 | End: 2018-02-02

## 2018-01-23 RX ORDER — PREDNISONE 20 MG/1
20 TABLET ORAL 2 TIMES DAILY
Qty: 6 TABLET | Refills: 0 | Status: SHIPPED | OUTPATIENT
Start: 2018-01-23 | End: 2018-01-26

## 2018-01-23 RX ORDER — FLUCONAZOLE 150 MG/1
150 TABLET ORAL DAILY
Qty: 1 TABLET | Refills: 0 | Status: SHIPPED | OUTPATIENT
Start: 2018-01-23 | End: 2018-01-24

## 2018-01-23 RX ORDER — DEXAMETHASONE SODIUM PHOSPHATE 4 MG/ML
8 INJECTION, SOLUTION INTRA-ARTICULAR; INTRALESIONAL; INTRAMUSCULAR; INTRAVENOUS; SOFT TISSUE
Status: COMPLETED | OUTPATIENT
Start: 2018-01-23 | End: 2018-01-23

## 2018-01-23 RX ORDER — ALBUTEROL SULFATE 0.83 MG/ML
2.5 SOLUTION RESPIRATORY (INHALATION)
Status: COMPLETED | OUTPATIENT
Start: 2018-01-23 | End: 2018-01-23

## 2018-01-23 RX ADMIN — DEXAMETHASONE SODIUM PHOSPHATE 8 MG: 4 INJECTION, SOLUTION INTRA-ARTICULAR; INTRALESIONAL; INTRAMUSCULAR; INTRAVENOUS; SOFT TISSUE at 02:01

## 2018-01-23 RX ADMIN — ALBUTEROL SULFATE 2.5 MG: 0.83 SOLUTION RESPIRATORY (INHALATION) at 01:01

## 2018-01-23 NOTE — PROGRESS NOTES
"Subjective:      Patient ID: Marysol Lyles is a 82 y.o. female.    Chief Complaint: Shortness of Breath and Cough    HPI   Patient to clinic with worsening of cough, wheezing, and shortness of breath.  Her symptoms began over one week ago.  She was treated symptomatically 1/18/2018 and now has severe worsening of symptoms.  She has associated post nasal drip and rhinorrhea.  She voices a low grade temp 99F.  She reports her symptoms are constant and severe in intensity.  She cannot identify any other exacerbating or mitigating factors.    Review of Systems   Constitutional: Negative for chills, fatigue and fever.   HENT: Positive for postnasal drip and rhinorrhea. Negative for congestion, sinus pressure and sore throat.    Eyes: Negative.    Respiratory: Positive for cough, shortness of breath and wheezing.    Cardiovascular: Negative for chest pain, palpitations and leg swelling.   Gastrointestinal: Negative.    Endocrine: Negative.    Genitourinary: Negative.    Musculoskeletal: Negative.    Skin: Negative for rash and wound.   Neurological: Negative for headaches.   Hematological: Negative.    Psychiatric/Behavioral: The patient is not nervous/anxious.        Objective:     /73   Pulse 87   Temp 99.7 °F (37.6 °C) (Oral)   Ht 5' 4" (1.626 m)   Wt 93.4 kg (205 lb 14.6 oz)   LMP 05/06/1994   SpO2 99%   BMI 35.34 kg/m²     Physical Exam   Constitutional: She is oriented to person, place, and time. She appears well-developed and well-nourished.   HENT:   Head: Normocephalic.   Right Ear: A middle ear effusion is present.   Left Ear: A middle ear effusion is present.   Nose: Rhinorrhea present. No mucosal edema. Right sinus exhibits no maxillary sinus tenderness and no frontal sinus tenderness. Left sinus exhibits no maxillary sinus tenderness and no frontal sinus tenderness.   Mouth/Throat: No oropharyngeal exudate, posterior oropharyngeal edema or posterior oropharyngeal erythema.       Eyes: Pupils are " equal, round, and reactive to light.   Cardiovascular: Normal rate, regular rhythm and normal heart sounds.    Pulmonary/Chest: Effort normal. No respiratory distress. She has no decreased breath sounds. She has wheezes. She has rhonchi. She has no rales.   Scattered rhonchi and wheezing noted throughout   Lymphadenopathy:     She has no cervical adenopathy.   Neurological: She is alert and oriented to person, place, and time.   Skin: Skin is warm and dry. No rash noted.   Psychiatric: She has a normal mood and affect. Her behavior is normal. Judgment and thought content normal.   Vitals reviewed.  Narrative     2 view chest    Comparison: 11/01/2017    Findings: The lungs are clear and free of infiltrate.  No pleural effusion or pneumothorax is identified.  The heart is not enlarged.There is tortuosity of the descending thoracic aorta.   Impression         1.  No acute cardiopulmonary process.      Electronically signed by: TIERA STOKES D.O.  Date: 01/23/18  Time: 14:00        Assessment:     1. Acute bronchitis, unspecified organism    2. Cough    3. Wheeze    4. Shortness of breath        Plan:     Problem List Items Addressed This Visit     None      Visit Diagnoses     Acute bronchitis, unspecified organism    -  Primary    Relevant Medications    doxycycline (VIBRA-TABS) 100 MG tablet    predniSONE (DELTASONE) 20 MG tablet    Cough        Relevant Medications    albuterol nebulizer solution 2.5 mg (Completed)    predniSONE (DELTASONE) 20 MG tablet    dexamethasone injection 8 mg (Completed)    Other Relevant Orders    X-Ray Chest PA And Lateral (Completed)    Wheeze        Relevant Medications    albuterol nebulizer solution 2.5 mg (Completed)    predniSONE (DELTASONE) 20 MG tablet    dexamethasone injection 8 mg (Completed)    Other Relevant Orders    X-Ray Chest PA And Lateral (Completed)    Shortness of breath        Relevant Medications    albuterol nebulizer solution 2.5 mg (Completed)    Other  Relevant Orders    X-Ray Chest PA And Lateral (Completed)      Sp O2 improved to 99% after albuterol neb  BBS still with some rhonchi scattered throughout, but improved, wheezing improved  Patient instructed to continue Mucinex twice daily  Instructed patient to report to ER if symptoms worsen  Encouraged her to follow up by end of week if symptoms linger    Follow-up if symptoms worsen or fail to improve.

## 2018-02-27 ENCOUNTER — OFFICE VISIT (OUTPATIENT)
Dept: OPTOMETRY | Facility: CLINIC | Age: 83
End: 2018-02-27
Payer: COMMERCIAL

## 2018-02-27 DIAGNOSIS — H52.203 MYOPIA OF BOTH EYES WITH ASTIGMATISM: ICD-10-CM

## 2018-02-27 DIAGNOSIS — H25.13 SENILE NUCLEAR SCLEROSIS, BILATERAL: Primary | ICD-10-CM

## 2018-02-27 DIAGNOSIS — Z13.5 SCREENING FOR EYE CONDITION: ICD-10-CM

## 2018-02-27 DIAGNOSIS — H52.13 MYOPIA OF BOTH EYES WITH ASTIGMATISM: ICD-10-CM

## 2018-02-27 DIAGNOSIS — H26.9 CORTICAL CATARACT OF BOTH EYES: ICD-10-CM

## 2018-02-27 DIAGNOSIS — I10 HYPERTENSION, UNSPECIFIED TYPE: ICD-10-CM

## 2018-02-27 DIAGNOSIS — H52.4 PRESBYOPIA OF BOTH EYES: ICD-10-CM

## 2018-02-27 PROCEDURE — 92014 COMPRE OPH EXAM EST PT 1/>: CPT | Mod: S$GLB,,, | Performed by: OPTOMETRIST

## 2018-02-27 PROCEDURE — 99999 PR PBB SHADOW E&M-EST. PATIENT-LVL III: CPT | Mod: PBBFAC,,, | Performed by: OPTOMETRIST

## 2018-02-27 NOTE — PROGRESS NOTES
"HPI     Concerns About Ocular Health    Additional comments: Eye exam and refraction.  VA getting worse,   especially in the left eye.   Has never seen Dr. Haywood regarding cataract surgery, and feels VA has   gotten worse.            Comments   81 yr old wf                   Approximate date of last eye examination:  02/21/2017  Name of last eye doctor seen:  Dr Mccarthy     Wears glasses full-time:  No, uses +1.75 otc readers, only  Present glasses are bifocal / S V Distance / S V Reading:    Approximate age of present glasses:    Got new glasses following last exam, or subsequently?:     Any problem with VA with glasses:    Wears CLs?:  no  Headaches:  no  Eye pain/discomfort:  no                                                                                       Flashes:  no  Floaters:  no  Diplopia/Double Vision:  no         Patient's Ocular History:         Any eye surgeries:  no         Any eye injury:  no         Any treatment for eye disease:  no  Family history of eye disease:  none  Significant patient medical history:         1. Diabetes  no   2. HBP:  yes              3. Other (describe)  High cholesterol, gastric bypass sx, lost   108 lbs     ! OTC eyedrops currently using:  no   ! Prescription eye meds currently using:  no   ! Any history of allergy/adverse reaction to any eye meds used?  no       ! Any history of adverse reaction to eyedrops used during previous eye   exams?  no                  ! Any history of allergy/adverse reaction to Novacaine or similar meds?    no              ! Any history of allergy/adverse reaction to Epinephrine or similar meds?    no                 ! Patient okay with use of anesthetic eye drops to check eye pressure?    yes             ! Okay to use drops to dilate pupils today?  yes         Allergies/Medications/Medical History/Family History reviewed today?:  yes               PD =   65/61  Desired reading distance =  15.25"                                       "                                  Last edited by Asa Mccarthy, OD on 2/27/2018  3:17 PM. (History)            Assessment /Plan     For exam results, see Encounter Report.    1. Senile nuclear sclerosis, bilateral     2. Cortical cataract of both eyes     3. Myopia of both eyes with astigmatism     4. Presbyopia of both eyes     5. Hypertension, unspecified type     6. Screening for eye condition                  Bilateral nuclear sclerotic/cortical cataract, more advanced in the left eye.  Otherwise, good ocular health in both eyes.  Note decrease in best-corrected VA in each eye.    Advised Mrs. Lylse she should consider cataract surgery in both eyes.  Mrs. Lyles requests referral to Dr. Vidhi Haywood at the [facility].  Defer new spectacle lens Rx.  Generate referral to Dr. Haywood  Return to me (Dr. Mccarthy) when advised to so by Dr. aHywood following cataract surgery for post-operative refraction and spectacle lens Rx as needed.

## 2018-02-27 NOTE — PATIENT INSTRUCTIONS
Bilateral nuclear sclerotic/cortical cataract, more advanced in the left eye.  Otherwise, good ocular health in both eyes.  Note decrease in best-corrected VA in each eye.    Advised Mrs. Lyles she should consider cataract surgery in both eyes.  Mrs. Lyles requests referral to Dr. Vidhi Haywood at the [facility].  Defer new spectacle lens Rx.  Generate referral to Dr. Haywood  Return to me (Dr. Mccarthy) when advised to so by Dr. Haywood following cataract surgery for post-operative refraction and spectacle lens Rx as needed.

## 2018-03-26 DIAGNOSIS — R39.15 URINARY URGENCY: ICD-10-CM

## 2018-03-26 RX ORDER — SOLIFENACIN SUCCINATE 10 MG/1
TABLET, FILM COATED ORAL
Qty: 90 TABLET | Refills: 0 | Status: SHIPPED | OUTPATIENT
Start: 2018-03-26 | End: 2018-12-11 | Stop reason: SDUPTHER

## 2018-04-04 ENCOUNTER — OFFICE VISIT (OUTPATIENT)
Dept: FAMILY MEDICINE | Facility: CLINIC | Age: 83
End: 2018-04-04
Payer: COMMERCIAL

## 2018-04-04 VITALS
DIASTOLIC BLOOD PRESSURE: 71 MMHG | HEIGHT: 64 IN | SYSTOLIC BLOOD PRESSURE: 130 MMHG | BODY MASS INDEX: 35.9 KG/M2 | HEART RATE: 60 BPM | TEMPERATURE: 99 F | WEIGHT: 210.31 LBS

## 2018-04-04 DIAGNOSIS — N39.0 URINARY TRACT INFECTION WITH HEMATURIA, SITE UNSPECIFIED: ICD-10-CM

## 2018-04-04 DIAGNOSIS — R30.0 DYSURIA: ICD-10-CM

## 2018-04-04 DIAGNOSIS — R31.9 URINARY TRACT INFECTION WITH HEMATURIA, SITE UNSPECIFIED: ICD-10-CM

## 2018-04-04 LAB
BILIRUB UR QL STRIP: NEGATIVE
CLARITY UR: ABNORMAL
COLOR UR: YELLOW
GLUCOSE UR QL STRIP: NEGATIVE
HGB UR QL STRIP: ABNORMAL
KETONES UR QL STRIP: NEGATIVE
LEUKOCYTE ESTERASE UR QL STRIP: ABNORMAL
MICROSCOPIC COMMENT: ABNORMAL
NITRITE UR QL STRIP: NEGATIVE
PH UR STRIP: 6 [PH] (ref 5–8)
PROT UR QL STRIP: NEGATIVE
RBC #/AREA URNS HPF: 2 /HPF (ref 0–4)
SP GR UR STRIP: 1.02 (ref 1–1.03)
URN SPEC COLLECT METH UR: ABNORMAL
WBC #/AREA URNS HPF: >100 /HPF (ref 0–5)
WBC CLUMPS URNS QL MICRO: ABNORMAL
YEAST URNS QL MICRO: ABNORMAL

## 2018-04-04 PROCEDURE — 3075F SYST BP GE 130 - 139MM HG: CPT | Mod: CPTII,S$GLB,, | Performed by: NURSE PRACTITIONER

## 2018-04-04 PROCEDURE — 99999 PR PBB SHADOW E&M-EST. PATIENT-LVL III: CPT | Mod: PBBFAC,,, | Performed by: NURSE PRACTITIONER

## 2018-04-04 PROCEDURE — 81000 URINALYSIS NONAUTO W/SCOPE: CPT | Mod: PO

## 2018-04-04 PROCEDURE — 99213 OFFICE O/P EST LOW 20 MIN: CPT | Mod: S$GLB,,, | Performed by: NURSE PRACTITIONER

## 2018-04-04 PROCEDURE — 3078F DIAST BP <80 MM HG: CPT | Mod: CPTII,S$GLB,, | Performed by: NURSE PRACTITIONER

## 2018-04-04 RX ORDER — CYCLOBENZAPRINE HCL 10 MG
10 TABLET ORAL DAILY PRN
Refills: 1 | COMMUNITY
Start: 2018-02-11 | End: 2021-04-19 | Stop reason: SDUPTHER

## 2018-04-04 RX ORDER — FLUCONAZOLE 200 MG/1
200 TABLET ORAL ONCE
Qty: 1 TABLET | Refills: 0 | Status: SHIPPED | OUTPATIENT
Start: 2018-04-04 | End: 2018-04-04

## 2018-04-04 RX ORDER — CIPROFLOXACIN 500 MG/1
500 TABLET ORAL 2 TIMES DAILY
Qty: 14 TABLET | Refills: 0 | Status: SHIPPED | OUTPATIENT
Start: 2018-04-04 | End: 2018-05-04 | Stop reason: CLARIF

## 2018-04-04 NOTE — PROGRESS NOTES
Subjective:       Patient ID: Marysol Lyles is a 82 y.o. female.    Chief Complaint: Cystitis    Urinary Tract Infection    This is a recurrent problem. The current episode started in the past 7 days. The problem occurs every urination. The problem has been unchanged. The quality of the pain is described as burning. The pain is mild. There has been no fever. Associated symptoms include frequency and hesitancy. Pertinent negatives include no vomiting or rash. Treatments tried: Azo. The treatment provided no relief. Her past medical history is significant for recurrent UTIs.       Review of Systems   Constitutional: Negative for fatigue, fever and unexpected weight change.   HENT: Negative for ear pain and sore throat.    Eyes: Negative for pain and visual disturbance.   Respiratory: Negative for cough and shortness of breath.    Cardiovascular: Negative for chest pain and palpitations.   Gastrointestinal: Negative for abdominal pain, diarrhea and vomiting.   Genitourinary: Positive for dysuria, frequency and hesitancy.   Musculoskeletal: Negative for arthralgias and myalgias.   Skin: Negative for color change and rash.   Neurological: Negative for dizziness and headaches.   Psychiatric/Behavioral: Negative for dysphoric mood and sleep disturbance. The patient is not nervous/anxious.        Vitals:    04/04/18 1527   BP: 130/71   Pulse: 60   Temp: 98.6 °F (37 °C)       Objective:     Current Outpatient Prescriptions   Medication Sig Dispense Refill    ACETAMINOPHEN (TYLENOL EXTRA STRENGTH ORAL) 500 mg. Every 4-6 hours PRN      albuterol 90 mcg/actuation inhaler Inhale 2 puffs into the lungs every 6 (six) hours as needed for Wheezing. 8 g 0    BIOTIN ORAL Take by mouth once daily.      CALCIUM CARBONATE/VITAMIN D3 (CALTRATE 600 + D ORAL) Take by mouth 2 (two) times daily.       cetirizine (ZYRTEC) 10 MG tablet Take 1 tablet (10 mg total) by mouth once daily. 30 tablet 5    ciclopirox (PENLAC) 8 % Soln APPLY  TOPICALLY ONCE DAILY 6.6 mL 0    CYANOCOBALAMIN, VITAMIN B-12, (VITAMIN B-12 ORAL) As directed      cyclobenzaprine (FLEXERIL) 10 MG tablet   1    DENOSUMAB (PROLIA SUBQ) Inject into the skin.      dicyclomine (BENTYL) 10 MG capsule TAKE 1 CAPSULE BY MOUTH FOUR TIMES DAILY BEFORE MEALS AND AT NIGHT 120 capsule 11    DULoxetine (CYMBALTA) 60 MG capsule TAKE 1 CAPSULE(60 MG) BY MOUTH EVERY DAY 90 capsule 0    fluticasone (FLONASE) 50 mcg/actuation nasal spray SHAKE LIQUID AND USE 1 SPRAY IN EACH NOSTRIL EVERY DAY 16 g 11    FLUZONE HIGH-DOSE 2017-18, PF, 180 mcg/0.5 mL vaccine ADM 0.5ML IM UTD  0    gabapentin (NEURONTIN) 300 MG capsule Take 1 capsule (300 mg total) by mouth 3 (three) times daily. 90 capsule 11    GLUCOSAMINE HCL/CHONDRO KIDD A (GLUCOSAMINE-CHONDROITIN ORAL) Every day      hydrocodone-acetaminophen 10-325mg (NORCO)  mg Tab TK 1 T PO BID PRN P  0    LACTOBACILLUS ACIDOPHILUS (PROBIOTIC ORAL) Take by mouth.      metoprolol tartrate (LOPRESSOR) 25 MG tablet Take 1 tablet (25 mg total) by mouth 2 (two) times daily. 180 tablet 3    MULTIVITAMIN ORAL 2 (two) times daily. Every day      tavaborole (KERYDIN) 5 % Rom Apply 1 application topically once daily. 4 mL 11    urea (UMECTA NAIL FILM PEN) 40 % NFSP Apply 1 application topically 2 (two) times daily. 18 g 5    VESICARE 10 mg tablet TAKE 1 TABLET(10 MG) BY MOUTH EVERY DAY 90 tablet 0    vit C,E,Zn,Cu glu-om3-lut-eduardo (OCUVITE ADULT 50+) 250-5-1 mg Cap Take by mouth.      vitamin D 1000 units Tab Take 185 mg by mouth once daily.      ciprofloxacin HCl (CIPRO) 500 MG tablet Take 1 tablet (500 mg total) by mouth 2 (two) times daily. 14 tablet 0    fluconazole (DIFLUCAN) 200 MG Tab Take 1 tablet (200 mg total) by mouth once. 1 tablet 0     No current facility-administered medications for this visit.        Physical Exam   Constitutional: She is oriented to person, place, and time. She appears well-developed and well-nourished. No  distress.   HENT:   Head: Normocephalic and atraumatic.   Eyes: EOM are normal. Pupils are equal, round, and reactive to light.   Neck: Normal range of motion. Neck supple.   Cardiovascular: Normal rate and regular rhythm.    Pulmonary/Chest: Effort normal and breath sounds normal.   Abdominal: Soft. Normal appearance and bowel sounds are normal. There is no tenderness. There is no CVA tenderness.   Musculoskeletal: Normal range of motion.   Neurological: She is alert and oriented to person, place, and time.   Skin: Skin is warm and dry. No rash noted.   Psychiatric: She has a normal mood and affect. Judgment normal.   Nursing note and vitals reviewed.      Assessment:       1. Urinary tract infection with hematuria, site unspecified    2. Dysuria        Plan:   Urinary tract infection with hematuria, site unspecified    Dysuria  -     Urinalysis    Other orders  -     Urinalysis Microscopic  -     ciprofloxacin HCl (CIPRO) 500 MG tablet; Take 1 tablet (500 mg total) by mouth 2 (two) times daily.  Dispense: 14 tablet; Refill: 0  -     fluconazole (DIFLUCAN) 200 MG Tab; Take 1 tablet (200 mg total) by mouth once.  Dispense: 1 tablet; Refill: 0        Follow-up if symptoms worsen or fail to improve.

## 2018-04-12 ENCOUNTER — PATIENT MESSAGE (OUTPATIENT)
Dept: OBSTETRICS AND GYNECOLOGY | Facility: CLINIC | Age: 83
End: 2018-04-12

## 2018-04-16 ENCOUNTER — OFFICE VISIT (OUTPATIENT)
Dept: UROGYNECOLOGY | Facility: CLINIC | Age: 83
End: 2018-04-16
Payer: COMMERCIAL

## 2018-04-16 VITALS
DIASTOLIC BLOOD PRESSURE: 70 MMHG | SYSTOLIC BLOOD PRESSURE: 132 MMHG | HEIGHT: 64 IN | BODY MASS INDEX: 36.51 KG/M2 | WEIGHT: 213.88 LBS

## 2018-04-16 DIAGNOSIS — N81.11 MIDLINE CYSTOCELE: ICD-10-CM

## 2018-04-16 DIAGNOSIS — R39.15 URINARY URGENCY: Primary | ICD-10-CM

## 2018-04-16 DIAGNOSIS — N95.2 VAGINAL ATROPHY: ICD-10-CM

## 2018-04-16 DIAGNOSIS — N81.6 RECTOCELE: ICD-10-CM

## 2018-04-16 LAB
AMORPH CRY UR QL COMP ASSIST: NORMAL
MICROSCOPIC COMMENT: NORMAL
WBC #/AREA URNS AUTO: 1 /HPF (ref 0–5)

## 2018-04-16 PROCEDURE — 3078F DIAST BP <80 MM HG: CPT | Mod: CPTII,S$GLB,, | Performed by: NURSE PRACTITIONER

## 2018-04-16 PROCEDURE — 51701 INSERT BLADDER CATHETER: CPT | Mod: S$GLB,,, | Performed by: NURSE PRACTITIONER

## 2018-04-16 PROCEDURE — 99214 OFFICE O/P EST MOD 30 MIN: CPT | Mod: 25,S$GLB,, | Performed by: NURSE PRACTITIONER

## 2018-04-16 PROCEDURE — 87086 URINE CULTURE/COLONY COUNT: CPT

## 2018-04-16 PROCEDURE — 3075F SYST BP GE 130 - 139MM HG: CPT | Mod: CPTII,S$GLB,, | Performed by: NURSE PRACTITIONER

## 2018-04-16 PROCEDURE — 81001 URINALYSIS AUTO W/SCOPE: CPT

## 2018-04-16 PROCEDURE — 99999 PR PBB SHADOW E&M-EST. PATIENT-LVL V: CPT | Mod: PBBFAC,,, | Performed by: NURSE PRACTITIONER

## 2018-04-16 RX ORDER — URINARY ANTISEPTIC ANTISPASMODIC 81.6; 40.8; 10.8; .12 MG/1; MG/1; MG/1; MG/1
1 TABLET ORAL
Qty: 60 TABLET | Refills: 1 | Status: SHIPPED | OUTPATIENT
Start: 2018-04-16 | End: 2018-12-11 | Stop reason: SDUPTHER

## 2018-04-16 NOTE — PROGRESS NOTES
"[facility] [address] [place] [address]    Marysol Lyles  [id number]  1935    Consulting Physician: Shameka Max   GYN: Vidhi Larsen M.D.: Aguilar Vickers MD    Chief Complaint   Patient presents with    Dysuria       HPI:     1)  UI:  (+) DASHAWN  (+) UUI if bladder too full  (+) liner pads:1/day, usually dry.  Daytime frequency: Q 2 hours.  Nocturia: Yes: 0-1/night.   (--) dysuria,  (--) hematuria,  (--) frequent UTIs.  (+) complete bladder emptying. Treated for UTI last week-- was having dysuria and "nerve feeling" after she urinates.    2)  POP:  Absent.  Symptoms:(--)  .  (--) vaginal bleeding. (--) vaginal discharge. (+) sexually active.   (+)  Vaginal dryness.  (--) vaginal estrogen use.     3)  BM:  (+) constipation/straining (rare)  (--) chronic diarrhea. (--) hematochezia.  (--) fecal incontinence.  (--) fecal smearing/urgency.  (--) complete evacuation.      4)sensation of frequent uti--  urine culture has been negative x 3.  Symptoms usually relieved with pyridium    Past Medical History  Past Medical History:   Diagnosis Date    Arthritis     Chronic pain 2013    DDD (degenerative disc disease)     DDD (degenerative disc disease)     GERD (gastroesophageal reflux disease)     Hyperlipidemia     Hypertension     Osteopenia 3/2/2016    Primary hyperparathyroidism     Senile nuclear sclerosis - Both Eyes 2013        Past Surgical History  Past Surgical History:   Procedure Laterality Date    APPENDECTOMY      COLONOSCOPY W/ BIOPSIES  2011    repeat in 3 years    PARTIAL KNEE ARTHROPLASTY      Right    SLEEVE GASTROPLASTY  14    TONSILLECTOMY      TOTAL HIP ARTHROPLASTY      TOTAL KNEE ARTHROPLASTY          Hysterectomy: No    Past Ob History     x 6.   Largest infant weight: 9# 3.8 oz  yes FAVD. yes episiotomy.      Gynecologic History  LMP: Patient's last menstrual period was 1994.  Age of " menarche: 13  Age of menopause: 59  Menstrual history: hx metrorrhagia  Pap test: 10/2014  Normal-- negative hpv-- denies history of abnormal pap smear.  Mammogram: 11/2017 normal  Colonoscopy: Date of last: 2016--polyp--repeat 2021  DEXA:  Date of last: 10/2017  Osteopenia -- there is a 15% risk of a major osteoporotic fracture and a 4.2% risk of hip fracture in the next 10 years (FRAX).     Family History  Family History   Problem Relation Age of Onset    Arthritis Mother     Cancer Mother     Arthritis Father     Heart disease Father     Hyperlipidemia Father     Heart attack Father     Cancer Sister     Depression Sister     Cancer Brother     Depression Brother     Diabetes Brother     Heart disease Brother     Hyperlipidemia Brother     Alcohol abuse Son     Birth defects Son     Diabetes Son     Mental retardation Son     Cancer Maternal Aunt     Alcohol abuse Maternal Uncle     Cancer Maternal Uncle     Depression Maternal Uncle     Cancer Paternal Aunt     Cancer Paternal Uncle     Arthritis Maternal Grandmother     Hypertension Maternal Grandmother     Alcohol abuse Maternal Grandfather     Arthritis Maternal Grandfather     Depression Maternal Grandfather     Hypertension Maternal Grandfather     Arthritis Paternal Grandmother     Arthritis Paternal Grandfather     HIV Son         Social History  History   Smoking Status    Former Smoker   Smokeless Tobacco    Never Used     Comment: Has not smoked in 40 years.   .    History   Alcohol Use No     Comment: occasionally   .    History   Drug Use No   .  The patient is .  Resides in [place] [id number].  Employment status: retired stayed at home.    Allergies  Review of patient's allergies indicates:   Allergen Reactions    Formaldehyde Hives and Other (See Comments)     Other reaction(s): Other (See Comments)  Other reaction(s): Hives  Other reaction(s): Hives  Other reaction(s): Hives  Other reaction(s): Hives     Mercury Hives     Other reaction(s): Hives  Other reaction(s): Hives  Other reaction(s): Hives  Other reaction(s): Hives    Mercury (elemental) Hives    Penicillins Swelling     Other reaction(s): Swelling  Other reaction(s): Swelling    Dilaudid [hydromorphone (bulk)]     Latex, natural rubber Hives    Adhesive Rash     Other reaction(s): Rash  Other reaction(s): Rash    Hydromorphone Itching       Medications  Current Outpatient Prescriptions on File Prior to Visit   Medication Sig Dispense Refill    ACETAMINOPHEN (TYLENOL EXTRA STRENGTH ORAL) 500 mg. Every 4-6 hours PRN      albuterol 90 mcg/actuation inhaler Inhale 2 puffs into the lungs every 6 (six) hours as needed for Wheezing. 8 g 0    BIOTIN ORAL Take by mouth once daily.      CALCIUM CARBONATE/VITAMIN D3 (CALTRATE 600 + D ORAL) Take by mouth 2 (two) times daily.       cetirizine (ZYRTEC) 10 MG tablet Take 1 tablet (10 mg total) by mouth once daily. 30 tablet 5    ciclopirox (PENLAC) 8 % Soln APPLY TOPICALLY ONCE DAILY 6.6 mL 0    ciprofloxacin HCl (CIPRO) 500 MG tablet Take 1 tablet (500 mg total) by mouth 2 (two) times daily. 14 tablet 0    CYANOCOBALAMIN, VITAMIN B-12, (VITAMIN B-12 ORAL) As directed      cyclobenzaprine (FLEXERIL) 10 MG tablet   1    DENOSUMAB (PROLIA SUBQ) Inject into the skin.      dicyclomine (BENTYL) 10 MG capsule TAKE 1 CAPSULE BY MOUTH FOUR TIMES DAILY BEFORE MEALS AND AT NIGHT 120 capsule 11    DULoxetine (CYMBALTA) 60 MG capsule TAKE 1 CAPSULE(60 MG) BY MOUTH EVERY DAY 90 capsule 0    fluticasone (FLONASE) 50 mcg/actuation nasal spray SHAKE LIQUID AND USE 1 SPRAY IN EACH NOSTRIL EVERY DAY 16 g 11    FLUZONE HIGH-DOSE 2017-18, PF, 180 mcg/0.5 mL vaccine ADM 0.5ML IM UTD  0    gabapentin (NEURONTIN) 300 MG capsule Take 1 capsule (300 mg total) by mouth 3 (three) times daily. 90 capsule 11    GLUCOSAMINE HCL/CHONDRO KIDD A (GLUCOSAMINE-CHONDROITIN ORAL) Every day      hydrocodone-acetaminophen 10-325mg (NORCO)  " mg Tab TK 1 T PO BID PRN P  0    LACTOBACILLUS ACIDOPHILUS (PROBIOTIC ORAL) Take by mouth.      metoprolol tartrate (LOPRESSOR) 25 MG tablet Take 1 tablet (25 mg total) by mouth 2 (two) times daily. 180 tablet 3    MULTIVITAMIN ORAL 2 (two) times daily. Every day      tavaborole (KERYDIN) 5 % Rom Apply 1 application topically once daily. 4 mL 11    urea (UMECTA NAIL FILM PEN) 40 % NFSP Apply 1 application topically 2 (two) times daily. 18 g 5    VESICARE 10 mg tablet TAKE 1 TABLET(10 MG) BY MOUTH EVERY DAY 90 tablet 0    vit C,E,Zn,Cu glu-om3-lut-eduardo (OCUVITE ADULT 50+) 250-5-1 mg Cap Take by mouth.      vitamin D 1000 units Tab Take 185 mg by mouth once daily.       No current facility-administered medications on file prior to visit.        Review of Systems A 14 point ROS was reviewed with pertinent positives as noted above in the history of present illness.      Constitutional: negative  Eyes: negative  Endocrine: negative  Gastrointestinal: negative  Cardiovascular: negative  Respiratory: negative  Allergic/Immunologic: negative  Integumentary: negative  Psychiatric: negative  Musculoskeletal: negative   Ear/Nose/Throat: negative  Neurologic: negative  Genitourinary: SEE HPI  Hematologic/Lymphatic: negative   Breast: negative    Urogynecologic Exam  /70 (BP Location: Right arm, Patient Position: Sitting)   Ht 5' 4" (1.626 m)   Wt 97 kg (213 lb 13.5 oz)   LMP 05/06/1994   BMI 36.71 kg/m²     GENERAL APPEARANCE:  The patient is well-developed, well-nourished. female  Neck:  Supple with no thyromegaly, no carotid bruits.  Heart:  Regular rate and rhythm, no murmurs, rubs or gallops.  Lungs:  Clear.  No CVA tenderness.  Abdomen:  Soft, nontender, nondistended, no hepatosplenomegaly.      PELVIC:    External genitalia:  Normal Bartholins, Skenes and labia bilaterally.    Urethra:  No caruncle, diverticulum or masses.  (+) hypermobility.    Vagina:  Atrophy (+) , no bladder masses or tender, " no discharge.    Cervix:  normal appearance  Uterus: normal size, contour, position, consistency, mobility, non-tender  Adnexa: Not palpable.    POP-Q:  Aa -1; Ba -1; C -5; Ap -1; Bp -1; D -7.  Genital hiatus 4, perineal body 2 total vaginal length 8.  Deferred.  No obvious POP present with valsalva.     NEUROLOGIC:  Cranial nerves 2 through 12 intact.  Strength 5/5.  DTRs 2+ lower extremities.  S2 through 4 normal.  Sacral reflexes intact.    EXT: CANDELARIA, 2+ pulses bilaterally, no C/C/E    COUGH STRESS TEST:  negative  KEGEL: 1 /5    RECTAL:    External:  Normal, (+) hemorrhoids, (--) dovetailing.       PVR: 10 mL    Impression    1. Urinary urgency    2. Vaginal atrophy    3. Midline cystocele    4. Rectocele        Initial Plan  The patient was counseled regarding these issues. The patient was given a summary sheet containing each of these issues with possible options for evaluation and management. When appropriate, we also reviewed computer-generated diagrams specific to their diagnoses..  All questions were addressed to the patient's satisfaction.     1.  Mixed urinary incontinence, urge > stress:    --Empty bladder every 3 hours.  Empty well: wait a minute, lean forward on toilet.    --Avoid dietary irritants (see sheet).  Keep diary x 3-5 days to determine your irritants.  --KEGELS: do 10 in AM and 10 in PM, holding each x 10 seconds.  When you feel urge to go, STOP, KEGEL, and when urge has passed, then go to bathroom.  Consider PT in future.    --URGE: continue vesicare. Takes 2-4 weeks to see if will have effect.  For dry mouth: get sour, sugar free lozenge or gum.    --STRESS:  Pessary vs. Sling.     2. Concern for frequent uti symptoms  --urine culture and micro today  --rx for urogesic blue-- take one twice daily as needed  --if urogesic blue is too expensive, use azo urinary relief  --call if you feel like you have a uti-- will get you to drop off a specimen for a urine cutlure  --treating vaginal dryness  may help  --if recurrent  uti or you have blood in your urine, will conisider cystoscopy and imaging    3. Vaginal atrophy (dryness):  Use 1 gram of estrogen cream in vagina nightly x 2 weeks, then twice a week thereafter.  Use REPLENS or REFRESH OTC: 1/2 applicator full in vagina twice a week.      4. 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    5. RTC 3-4 months    Approximately 40 min were spent in consult, 90 % in discussion.     Thank you for requesting consultation of your patient.  I look forward to participating in their care.    Aneta Lawson  Female Pelvic Medicine and Reconstructive Surgery  [facility] [place], [place]

## 2018-04-16 NOTE — PATIENT INSTRUCTIONS
1.  Mixed urinary incontinence, urge > stress:    --Empty bladder every 3 hours.  Empty well: wait a minute, lean forward on toilet.    --Avoid dietary irritants (see sheet).  Keep diary x 3-5 days to determine your irritants.  --KEGELS: do 10 in AM and 10 in PM, holding each x 10 seconds.  When you feel urge to go, STOP, KEGEL, and when urge has passed, then go to bathroom.  Consider PT in future.    --URGE: continue vesicare. Takes 2-4 weeks to see if will have effect.  For dry mouth: get sour, sugar free lozenge or gum.    --STRESS:  Pessary vs. Sling.     2. Concern for frequent uti symptoms  --urine culture and micro today  --rx for urogesic blue-- take one twice daily as needed  --if urogesic blue is too expensive, use azo urinary relief  --call if you feel like you have a uti-- will get you to drop off a specimen for a urine cutlure  --treating vaginal dryness may help  --if recurrent  uti or you have blood in your urine, will conisider cystoscopy and imaging    3. Vaginal atrophy (dryness):  Use 1 gram of estrogen cream in vagina nightly x 2 weeks, then twice a week thereafter.  Use REPLENS or REFRESH OTC: 1/2 applicator full in vagina twice a week.      4. 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    5. RTC 3-4 months    __________________________________________________________________________________    Bladder Irritants  Certain foods and drinks have been associated with worsening symptoms of urinary frequency, urgency, urge incontinence, or  bladder pain. If you suffer from any of these conditions, you may wish to try eliminating one or more of these foods from your  diet and see if your symptoms improve. If bladder symptoms are related to dietary factors, strict adherence to a diet that  eliminates the food should bring marked relief in 10 days. Once you are feeling better, you can begin to add foods back into  your diet, one at a time. If  symptoms return, you will be able to identify the irritant. As you add foods back to your diet it is  very important that you drink significant amounts of water.  Low-acid fruit substitutions include apricots, papaya, pears and watermelon. Coffee drinkers can drink Kava or other lowacid  instant drinks. Tea drinkers can substitute non-citrus herbal and sun brewed teas. Calcium carbonate co-buffered with  calcium ascorbate can be substituted for Vitamin C. Prelief is a dietary supplement that works as an acid blocker for the  bladder.  Where to get more information:   Overcoming Bladder Disorders by Mary Vazquez and Jennifer Johnson, 1990   You Dont Have to Live with Cystitis! By Melissa Ramirez, 1988  List of Common Bladder Irritants*  Alcoholic beverages  Apples and apple juice  Cantaloupe  Carbonated beverages  Chili and spicy foods  Chocolate  Citrus fruit  Coffee (including decaffeinated)  Cranberries and cranberry juice  Grapes  Guava  Milk Products: milk, cheese, cottage cheese, yogurt, ice cream  Peaches  Pineapple  Plums  Strawberries  Sugar especially artificial sweeteners, saccharin, aspartame, corn sweeteners, honey, fructose, sucrose, lactose  Tea  Tomatoes and tomato juice  Vitamin B complex  Vinegar  *Most people are not sensitive to ALL of these products; your goal is to find the foods that make YOUR  symptoms worse

## 2018-04-18 ENCOUNTER — PATIENT MESSAGE (OUTPATIENT)
Dept: UROGYNECOLOGY | Facility: CLINIC | Age: 83
End: 2018-04-18

## 2018-04-18 LAB — BACTERIA UR CULT: NO GROWTH

## 2018-04-24 ENCOUNTER — INITIAL CONSULT (OUTPATIENT)
Dept: OPHTHALMOLOGY | Facility: CLINIC | Age: 83
End: 2018-04-24
Payer: COMMERCIAL

## 2018-04-24 DIAGNOSIS — H43.812 POSTERIOR VITREOUS DETACHMENT OF LEFT EYE: ICD-10-CM

## 2018-04-24 DIAGNOSIS — H52.7 REFRACTIVE ERROR: ICD-10-CM

## 2018-04-24 DIAGNOSIS — H35.89 RPE MOTTLING OF MACULA: ICD-10-CM

## 2018-04-24 DIAGNOSIS — H40.039 ANATOMICAL NARROW ANGLE: ICD-10-CM

## 2018-04-24 DIAGNOSIS — H25.13 NUCLEAR SCLEROSIS, BILATERAL: Primary | ICD-10-CM

## 2018-04-24 PROCEDURE — 92014 COMPRE OPH EXAM EST PT 1/>: CPT | Mod: S$GLB,,, | Performed by: OPHTHALMOLOGY

## 2018-04-24 PROCEDURE — 92020 GONIOSCOPY: CPT | Mod: S$GLB,,, | Performed by: OPHTHALMOLOGY

## 2018-04-24 PROCEDURE — 99999 PR PBB SHADOW E&M-EST. PATIENT-LVL III: CPT | Mod: PBBFAC,,, | Performed by: OPHTHALMOLOGY

## 2018-04-24 PROCEDURE — 92134 CPTRZ OPH DX IMG PST SGM RTA: CPT | Mod: S$GLB,,, | Performed by: OPHTHALMOLOGY

## 2018-04-24 NOTE — PATIENT INSTRUCTIONS
What Are Cataracts?  A clear lens in the eye focuses light. This lets the eye see images sharply. With age, the lens slowly becomes cloudy. The cloudy lens is a cataract. A cataract scatters light and makes it hard for the eye to focus. Cataracts often form in both eyes. But one lens may cloud faster than the other.      The aging of your lens  Your lens may cloud so slowly that you don`t notice any vision changes at first. But as the cataract gets worse, the eye has a harder time focusing. In early stages, glasses may help you see better. As the lens gets more cloudy, your doctor may recommend surgery to restore your vision.  Date Last Reviewed: 6/14/2015  © 4369-9811 [employer]. [address] [place] [address]. All rights reserved. This information is not intended as a substitute for professional medical care. Always follow your healthcare professional's instructions.        Small-Incision Cataract Surgery: Removing the Old Lens  You may be surprised by how little time small-incision cataract surgery takes.  The procedure  Your doctor uses a microscope and tiny tools to make the incision and remove the old lens. A special tool breaks apart the old lens with sound waves (ultrasound) and then takes out the pieces. This process is called phacoemulsification. The natural membrane (capsule) that held your lens is left in place.  Incision size  A smaller incision (top) means a shorter recovery time for you. The location of the incision will vary.         Date Last Reviewed: 6/14/2015  © 9813-3537 [employer]. [address] [place] [address]. All rights reserved. This information is not intended as a substitute for professional medical care. Always follow your healthcare professional's instructions.        Small-Incision Cataract Surgery: Implanting the New Lens  Once your old lens has been removed, your doctor slips the new lens (IOL or intraocular lens) in through  the incision. The IOL is then placed in the capsule that held your old lens. With the new lens in place, your doctor is ready to close the incision. Some incisions may be closed with stitches. Others are self-sealing. That means they will stay closed on their own without stitches. The IOL does much the same thing as your old lens did before it became cloudy. It focuses light, letting you see sharp images and vivid colors. The IOL normally lasts a lifetime.  How small is an IOL?        Flexible tabs hold the IOL in place in the eye's natural capsule.     Date Last Reviewed: 6/14/2015  © 6342-8996 [employer]. [address] [place] [address], [place] [address]. All rights reserved. This information is not intended as a substitute for professional medical care. Always follow your healthcare professional's instructions.        Before Small-Incision Cataract Surgery    Like any operation, small-incision cataract surgery requires preparation.  Your health history  Your eye doctor will review your health history. Based on that, he or she may order tests or talk to your other health care providers. Tell your eye doctor which medicines you take. That includes over-the-counter medicine such as aspirin.  Your eye exam  You will have a complete eye exam. Your eye doctor or a technician will use devices that measure the length and curve of your eye. These measurements let your doctor select the proper new lens (IOL or intraocular lens) for you.  The night before surgery  Dont eat or drink anything after midnight the night before your surgery. This includes water, coffee, chewing gum, and mints. If you have been told to continue your daily medicine, take it only with small sips of water. Make sure you follow any other instructions your doctor gives you.  The day of surgery  Have someone you know drive you to and from the outpatient surgery clinic or hospital. Plan to be there for about 2 to 3 hours. When you arrive, youll  sign a consent form if you havent done so already. This form explains the risks of surgery. Just before surgery, your doctor will give you medicine that will relax you and keep you from feeling pain. You may sleep lightly.  Risks and complications  · Your doctor may have to shift from a small incision to a larger incision.  · There is a small chance of bleeding, infection, or swelling.   Date Last Reviewed: 6/14/2015 © 2000-2016 [employer]. [address] [place] [address], [place] [address]. All rights reserved. This information is not intended as a substitute for professional medical care. Always follow your healthcare professional's instructions.        After Small-Incision Cataract Surgery: The First 24 Hours    After surgery, youll rest in a recovery area for about an hour. Even though you may feel fine, you should take it easy. Your doctor will let you know what you should and shouldnt do once you get home. You may need to wear eye protection the first day. Also remember to take any eye drops or other medicine your doctor prescribes.  Back at home  Spend your first day relaxing at home. Watch TV, read, or talk to a friend. Be careful to:  · Not rub your eye  · Not lift anything that makes you strain  · Not drink alcohol within the first 24 hours  What to expect  Its normal for your eye to be bruised or bloodshot at first. You may also feel itching or mild discomfort. You may have some short-term (temporary) fluid discharge. These wont last long.   Getting back in action  You may be able to get back to much of your routine on the first day. But with some tasks, your doctor may ask you to wait. Always follow your doctor's recommendations.  Here are some general guidelines:  · You can shower again in 2 to 3 days.  · You can cook again in 2 to 3 days.  · You can drive again in 5 to 7 days.  · You can exercise again in 14 days.  When to call your doctor  Call your doctor if:  · Your pain is not relieved  by over-the-counter medicine.  · You have nausea or vomiting.  · Your vision suddenly becomes worse.   Date Last Reviewed: 6/14/2015  © 6945-9825 [employer]. [address] [place] [address], [place] [address]. All rights reserved. This information is not intended as a substitute for professional medical care. Always follow your healthcare professional's instructions.

## 2018-04-24 NOTE — PROGRESS NOTES
HPI     Blurred Vision    Additional comments: blurred va x 2 yrs//  Fabio ref for cat //           Comments   blurred va x 2 yrs//  Fabio ref for cat //   Pos for floaters not   new// no flashes//    Agree with above. OS more bothersome than OD.         Last edited by Vidhi Haywood MD on 4/24/2018 11:14 AM.   (History)        ROS     Positive for: Cardiovascular (HTN - controlled with meds per pt),   Allergic/Imm (flonase - daily, zyrtec QAM)    Negative for: Neurological (denies seizure/tremor/restless legs),   Genitourinary (denies flomax), Endocrine (denies DM), Eyes (denies eye   surgery/trauma), Respiratory (denies asthma - inhaler was for bronchitis;   denies orthopnea), Heme/Lymph (denies ASA)    Last edited by Vidhi Haywood MD on 4/24/2018 11:23 AM.   (History)        Assessment /Plan     For exam results, see Encounter Report.    Nuclear sclerosis, bilateral    RPE mottling of macula  -     Posterior Segment OCT Retina-Both eyes    Anatomical narrow angle    Posterior vitreous detachment of left eye    Refractive error          Nuclear sclerosis, bilateral - visually significant OU. More bothersome OS - schedule that first. Dilates to 7mm with 1% (did not want both drops, as she says it leaves her dilated for extra long time), denies flomax.     RPE mottling of macula - remote history smoking, taking Ocuvite  -     Posterior Segment OCT Retina-Both eyes    Anatomical narrow angle - open to TM OU, IOP/C:D WNL OU    Posterior vitreous detachment of left eye - RD precautions    Refractive error

## 2018-04-25 ENCOUNTER — PATIENT MESSAGE (OUTPATIENT)
Dept: FAMILY MEDICINE | Facility: CLINIC | Age: 83
End: 2018-04-25

## 2018-04-26 ENCOUNTER — OFFICE VISIT (OUTPATIENT)
Dept: OBSTETRICS AND GYNECOLOGY | Facility: CLINIC | Age: 83
End: 2018-04-26
Payer: COMMERCIAL

## 2018-04-26 VITALS — WEIGHT: 214.5 LBS | BODY MASS INDEX: 36.82 KG/M2

## 2018-04-26 DIAGNOSIS — R31.1 BENIGN ESSENTIAL MICROSCOPIC HEMATURIA: Primary | ICD-10-CM

## 2018-04-26 DIAGNOSIS — N64.4 BREAST PAIN: ICD-10-CM

## 2018-04-26 PROCEDURE — 99213 OFFICE O/P EST LOW 20 MIN: CPT | Mod: S$GLB,,, | Performed by: OBSTETRICS & GYNECOLOGY

## 2018-04-26 PROCEDURE — 99999 PR PBB SHADOW E&M-EST. PATIENT-LVL III: CPT | Mod: PBBFAC,,, | Performed by: OBSTETRICS & GYNECOLOGY

## 2018-04-26 NOTE — PROGRESS NOTES
Chief Complaint   Patient presents with    Breast Pain     right breast pain 3-4 days, has resolved       History of Present Illness: Marysol Lyles is a 82 y.o. female that presents today 4/26/2018 for   Chief Complaint   Patient presents with    Breast Pain     right breast pain 3-4 days, has resolved         Past Medical History:   Diagnosis Date    Arthritis     Chronic pain 8/14/2013    DDD (degenerative disc disease)     DDD (degenerative disc disease)     GERD (gastroesophageal reflux disease)     Hyperlipidemia     Hypertension     Osteopenia 3/2/2016    Primary hyperparathyroidism     Senile nuclear sclerosis - Both Eyes 2/7/2013       Past Surgical History:   Procedure Laterality Date    APPENDECTOMY      COLONOSCOPY W/ BIOPSIES  2/2011    repeat in 3 years    PARTIAL KNEE ARTHROPLASTY      Right    SLEEVE GASTROPLASTY  2/8/12    TONSILLECTOMY      TOTAL HIP ARTHROPLASTY      TOTAL KNEE ARTHROPLASTY         Current Outpatient Prescriptions   Medication Sig Dispense Refill    ACETAMINOPHEN (TYLENOL EXTRA STRENGTH ORAL) 500 mg. Every 4-6 hours PRN      albuterol 90 mcg/actuation inhaler Inhale 2 puffs into the lungs every 6 (six) hours as needed for Wheezing. 8 g 0    BIOTIN ORAL Take by mouth once daily.      CALCIUM CARBONATE/VITAMIN D3 (CALTRATE 600 + D ORAL) Take by mouth 2 (two) times daily.       cetirizine (ZYRTEC) 10 MG tablet Take 1 tablet (10 mg total) by mouth once daily. 30 tablet 5    ciclopirox (PENLAC) 8 % Soln APPLY TOPICALLY ONCE DAILY 6.6 mL 0    ciprofloxacin HCl (CIPRO) 500 MG tablet Take 1 tablet (500 mg total) by mouth 2 (two) times daily. 14 tablet 0    conjugated estrogens (PREMARIN) vaginal cream Place 0.5 g vaginally twice a week. 30 g 3    CYANOCOBALAMIN, VITAMIN B-12, (VITAMIN B-12 ORAL) As directed      cyclobenzaprine (FLEXERIL) 10 MG tablet   1    DENOSUMAB (PROLIA SUBQ) Inject into the skin.      dicyclomine (BENTYL) 10 MG capsule TAKE 1 CAPSULE BY  MOUTH FOUR TIMES DAILY BEFORE MEALS AND AT NIGHT 120 capsule 11    DULoxetine (CYMBALTA) 60 MG capsule TAKE 1 CAPSULE(60 MG) BY MOUTH EVERY DAY 90 capsule 0    fluticasone (FLONASE) 50 mcg/actuation nasal spray SHAKE LIQUID AND USE 1 SPRAY IN EACH NOSTRIL EVERY DAY 16 g 11    FLUZONE HIGH-DOSE 2017-18, PF, 180 mcg/0.5 mL vaccine ADM 0.5ML IM UTD  0    gabapentin (NEURONTIN) 300 MG capsule Take 1 capsule (300 mg total) by mouth 3 (three) times daily. 90 capsule 11    GLUCOSAMINE HCL/CHONDRO KIDD A (GLUCOSAMINE-CHONDROITIN ORAL) Every day      hydrocodone-acetaminophen 10-325mg (NORCO)  mg Tab TK 1 T PO BID PRN P  0    LACTOBACILLUS ACIDOPHILUS (PROBIOTIC ORAL) Take by mouth.      methen-sod phos-meth blue-hyos (UROGESIC-BLUE) 81.6-40.8-0.12 mg Tab Take 1 tablet by mouth twice a week. 60 tablet 1    metoprolol tartrate (LOPRESSOR) 25 MG tablet Take 1 tablet (25 mg total) by mouth 2 (two) times daily. 180 tablet 3    MULTIVITAMIN ORAL 2 (two) times daily. Every day      tavaborole (KERYDIN) 5 % oRm Apply 1 application topically once daily. 4 mL 11    urea (UMECTA NAIL FILM PEN) 40 % NFSP Apply 1 application topically 2 (two) times daily. 18 g 5    VESICARE 10 mg tablet TAKE 1 TABLET(10 MG) BY MOUTH EVERY DAY 90 tablet 0    vit C,E,Zn,Cu glu-om3-lut-eduardo (OCUVITE ADULT 50+) 250-5-1 mg Cap Take by mouth.      vitamin D 1000 units Tab Take 185 mg by mouth once daily.       No current facility-administered medications for this visit.        Review of patient's allergies indicates:   Allergen Reactions    Formaldehyde Hives and Other (See Comments)     Other reaction(s): Other (See Comments)  Other reaction(s): Hives  Other reaction(s): Hives  Other reaction(s): Hives  Other reaction(s): Hives    Mercury Hives     Other reaction(s): Hives  Other reaction(s): Hives  Other reaction(s): Hives  Other reaction(s): Hives    Mercury (elemental) Hives    Penicillins Swelling     Other reaction(s):  Swelling  Other reaction(s): Swelling    Dilaudid [hydromorphone (bulk)]     Latex, natural rubber Hives    Adhesive Rash     Other reaction(s): Rash  Other reaction(s): Rash    Hydromorphone Itching       Family History   Problem Relation Age of Onset    Arthritis Mother     Cancer Mother     Arthritis Father     Heart disease Father     Hyperlipidemia Father     Heart attack Father     Cancer Sister     Depression Sister     Cataracts Sister     Cancer Brother     Depression Brother     Diabetes Brother     Heart disease Brother     Hyperlipidemia Brother     Blindness Brother      dm    Retinal detachment Brother     Alcohol abuse Son     Birth defects Son     Diabetes Son     Mental retardation Son     Cancer Maternal Aunt     Alcohol abuse Maternal Uncle     Cancer Maternal Uncle     Depression Maternal Uncle     Cancer Paternal Aunt     Cancer Paternal Uncle     Arthritis Maternal Grandmother     Hypertension Maternal Grandmother     Alcohol abuse Maternal Grandfather     Arthritis Maternal Grandfather     Depression Maternal Grandfather     Hypertension Maternal Grandfather     Arthritis Paternal Grandmother     Arthritis Paternal Grandfather     HIV Son     Glaucoma Son     Amblyopia Neg Hx     Macular degeneration Neg Hx     Strabismus Neg Hx     Stroke Neg Hx     Thyroid disease Neg Hx        Social History   Substance Use Topics    Smoking status: Former Smoker    Smokeless tobacco: Never Used      Comment: Has not smoked in 40 years.    Alcohol use Yes      Comment: occasionally       OB History    Para Term  AB Living   6 6 6     6   SAB TAB Ectopic Multiple Live Births           4      # Outcome Date GA Lbr Gopal/2nd Weight Sex Delivery Anes PTL Lv   6 Term     M Vag-Spont  N JARED   5 Term     M Vag-Spont  N    4 Term     F Vag-Spont  N JARED   3 Term     F Vag-Spont  N JARED      Birth Comments: twins   2 Term     M Vag-Spont   N JARED   1 Term                   Review of Symptoms:  GENERAL: Denies weight gain or weight loss. Feeling well overall.   SKIN: Denies rash or lesions.   HEAD: Denies head injury or headache.   NODES: Denies enlarged lymph nodes.   CHEST: Denies chest pain or shortness of breath.   CARDIOVASCULAR: Denies palpitations or left sided chest pain.   ABDOMEN: No abdominal pain, constipation, diarrhea, nausea, vomiting or rectal bleeding.   URINARY: No frequency, dysuria, hematuria, or burning on urination.  HEMATOLOGIC: No easy bruisability or excessive bleeding.   MUSCULOSKELETAL: Denies joint pain or swelling.     Wt 97.3 kg (214 lb 8.1 oz)   LMP 05/06/1994   Physical Exam:  APPEARANCE: Well nourished, well developed, in no acute distress.  SKIN: Normal skin turgor, no lesions.  NECK: Neck symmetric without masses   RESPIRATORY: Normal respiratory effort with no retractions or use of accessory muscles  CARDIOVASCULAR: Peripheral vascular system with no swelling no varicosities and palpation of pulses normal  LYMPHATIC: No enlargements of the lymph nodes noted in the neck, axillae, or groin  ABDOMEN: Soft. No tenderness or masses. No hepatosplenomegaly. No hernias.  BREASTS: Symmetrical, no skin changes or visible lesions. No palpable masses, nipple discharge or adenopathy bilaterally.  EXTREMITIES: No clubbing cyanosis or edema.    ASSESSMENT/PLAN:  Benign essential microscopic hematuria  -     Ambulatory consult to Urology    Breast pain      15 minutes spent today with this patient. Greater than half spent in counseling today.

## 2018-05-02 ENCOUNTER — PATIENT MESSAGE (OUTPATIENT)
Dept: OPHTHALMOLOGY | Facility: CLINIC | Age: 83
End: 2018-05-02

## 2018-05-03 ENCOUNTER — PATIENT OUTREACH (OUTPATIENT)
Dept: ADMINISTRATIVE | Facility: HOSPITAL | Age: 83
End: 2018-05-03

## 2018-05-04 ENCOUNTER — OFFICE VISIT (OUTPATIENT)
Dept: FAMILY MEDICINE | Facility: CLINIC | Age: 83
End: 2018-05-04
Payer: COMMERCIAL

## 2018-05-04 VITALS
BODY MASS INDEX: 36.54 KG/M2 | WEIGHT: 214 LBS | TEMPERATURE: 99 F | HEIGHT: 64 IN | SYSTOLIC BLOOD PRESSURE: 132 MMHG | DIASTOLIC BLOOD PRESSURE: 71 MMHG | HEART RATE: 52 BPM

## 2018-05-04 DIAGNOSIS — E21.0 PRIMARY HYPERPARATHYROIDISM: ICD-10-CM

## 2018-05-04 DIAGNOSIS — I10 HYPERTENSION, UNSPECIFIED TYPE: ICD-10-CM

## 2018-05-04 DIAGNOSIS — Z01.818 PREOPERATIVE CLEARANCE: Primary | ICD-10-CM

## 2018-05-04 DIAGNOSIS — H25.13 NUCLEAR SENILE CATARACT OF BOTH EYES: ICD-10-CM

## 2018-05-04 DIAGNOSIS — M19.90 ARTHRITIS: ICD-10-CM

## 2018-05-04 DIAGNOSIS — Z98.84 BARIATRIC SURGERY STATUS: ICD-10-CM

## 2018-05-04 DIAGNOSIS — E78.5 HYPERLIPIDEMIA, UNSPECIFIED HYPERLIPIDEMIA TYPE: ICD-10-CM

## 2018-05-04 PROCEDURE — 93005 ELECTROCARDIOGRAM TRACING: CPT | Mod: S$GLB,,, | Performed by: FAMILY MEDICINE

## 2018-05-04 PROCEDURE — 93010 ELECTROCARDIOGRAM REPORT: CPT | Mod: S$GLB,,, | Performed by: INTERNAL MEDICINE

## 2018-05-04 PROCEDURE — 99214 OFFICE O/P EST MOD 30 MIN: CPT | Mod: S$GLB,,, | Performed by: FAMILY MEDICINE

## 2018-05-04 PROCEDURE — 3075F SYST BP GE 130 - 139MM HG: CPT | Mod: CPTII,S$GLB,, | Performed by: FAMILY MEDICINE

## 2018-05-04 PROCEDURE — 99999 PR PBB SHADOW E&M-EST. PATIENT-LVL III: CPT | Mod: PBBFAC,,, | Performed by: FAMILY MEDICINE

## 2018-05-04 PROCEDURE — 3078F DIAST BP <80 MM HG: CPT | Mod: CPTII,S$GLB,, | Performed by: FAMILY MEDICINE

## 2018-05-04 RX ORDER — ATENOLOL 50 MG/1
TABLET ORAL
COMMUNITY
End: 2018-05-04

## 2018-05-04 RX ORDER — NITROFURANTOIN 25; 75 MG/1; MG/1
CAPSULE ORAL
COMMUNITY
End: 2018-05-04 | Stop reason: CLARIF

## 2018-05-04 NOTE — PROGRESS NOTES
Subjective:      Patient ID: Marysol Lyles is a 82 y.o. female.    Chief Complaint: Pre-op Exam    HPI   The patient is here for a preop clearance to have surgery on the eyes for a cataract.    The physician that is performing the surgery is Dr. Haywood.    The surgery is being planned for 5/16/2018.    I will send a copy of the referral to the surgeon via [facility].   The patient states that there has not been previous problems with sedation.    Past Medical History:  Past Medical History:   Diagnosis Date    Arthritis     Chronic pain 8/14/2013    DDD (degenerative disc disease)     DDD (degenerative disc disease)     GERD (gastroesophageal reflux disease)     Hyperlipidemia     Hypertension     Osteopenia 3/2/2016    Primary hyperparathyroidism     Senile nuclear sclerosis - Both Eyes 2/7/2013     Past Surgical History:   Procedure Laterality Date    APPENDECTOMY      COLONOSCOPY W/ BIOPSIES  2/2011    repeat in 3 years    PARTIAL KNEE ARTHROPLASTY Right     Right    SLEEVE GASTROPLASTY  2/8/12    TONSILLECTOMY      TOTAL HIP ARTHROPLASTY Right     TOTAL KNEE ARTHROPLASTY Left      Review of patient's allergies indicates:   Allergen Reactions    Formaldehyde Hives and Other (See Comments)     Other reaction(s): Other (See Comments)  Other reaction(s): Hives  Other reaction(s): Hives  Other reaction(s): Hives  Other reaction(s): Hives    Mercury Hives     Other reaction(s): Hives  Other reaction(s): Hives  Other reaction(s): Hives  Other reaction(s): Hives    Mercury (elemental) Hives    Penicillins Swelling     Other reaction(s): Swelling  Other reaction(s): Swelling    Dilaudid [hydromorphone (bulk)]     Latex, natural rubber Hives    Adhesive Rash     Other reaction(s): Rash  Other reaction(s): Rash    Hydromorphone Itching     Current Outpatient Prescriptions on File Prior to Visit   Medication Sig Dispense Refill    ACETAMINOPHEN (TYLENOL EXTRA STRENGTH ORAL) 500 mg. Every 4-6 hours PRN       albuterol 90 mcg/actuation inhaler Inhale 2 puffs into the lungs every 6 (six) hours as needed for Wheezing. 8 g 0    BIOTIN ORAL Take by mouth once daily.      CALCIUM CARBONATE/VITAMIN D3 (CALTRATE 600 + D ORAL) Take by mouth 2 (two) times daily.       cetirizine (ZYRTEC) 10 MG tablet Take 1 tablet (10 mg total) by mouth once daily. 30 tablet 5    ciclopirox (PENLAC) 8 % Soln APPLY TOPICALLY ONCE DAILY 6.6 mL 0    conjugated estrogens (PREMARIN) vaginal cream Place 0.5 g vaginally twice a week. 30 g 3    CYANOCOBALAMIN, VITAMIN B-12, (VITAMIN B-12 ORAL) As directed      cyclobenzaprine (FLEXERIL) 10 MG tablet   1    DENOSUMAB (PROLIA SUBQ) Inject into the skin.      dicyclomine (BENTYL) 10 MG capsule TAKE 1 CAPSULE BY MOUTH FOUR TIMES DAILY BEFORE MEALS AND AT NIGHT 120 capsule 11    DULoxetine (CYMBALTA) 60 MG capsule TAKE 1 CAPSULE(60 MG) BY MOUTH EVERY DAY 90 capsule 0    fluticasone (FLONASE) 50 mcg/actuation nasal spray SHAKE LIQUID AND USE 1 SPRAY IN EACH NOSTRIL EVERY DAY 16 g 11    FLUZONE HIGH-DOSE 2017-18, PF, 180 mcg/0.5 mL vaccine ADM 0.5ML IM UTD  0    gabapentin (NEURONTIN) 300 MG capsule Take 1 capsule (300 mg total) by mouth 3 (three) times daily. 90 capsule 11    GLUCOSAMINE HCL/CHONDRO KIDD A (GLUCOSAMINE-CHONDROITIN ORAL) Every day      hydrocodone-acetaminophen 10-325mg (NORCO)  mg Tab TK 1 T PO BID PRN P  0    LACTOBACILLUS ACIDOPHILUS (PROBIOTIC ORAL) Take by mouth.      methen-sod phos-meth blue-hyos (UROGESIC-BLUE) 81.6-40.8-0.12 mg Tab Take 1 tablet by mouth twice a week. 60 tablet 1    metoprolol tartrate (LOPRESSOR) 25 MG tablet Take 1 tablet (25 mg total) by mouth 2 (two) times daily. 180 tablet 3    MULTIVITAMIN ORAL 2 (two) times daily. Every day      tavaborole (KERYDIN) 5 % Rom Apply 1 application topically once daily. 4 mL 11    urea (UMECTA NAIL FILM PEN) 40 % NFSP Apply 1 application topically 2 (two) times daily. 18 g 5    VESICARE 10 mg tablet  TAKE 1 TABLET(10 MG) BY MOUTH EVERY DAY 90 tablet 0    vit C,E,Zn,Cu glu-om3-lut-eduardo (OCUVITE ADULT 50+) 250-5-1 mg Cap Take by mouth.      vitamin D 1000 units Tab Take 185 mg by mouth once daily.      [DISCONTINUED] ciprofloxacin HCl (CIPRO) 500 MG tablet Take 1 tablet (500 mg total) by mouth 2 (two) times daily. 14 tablet 0     No current facility-administered medications on file prior to visit.      Social History     Social History    Marital status:      Spouse name: N/A    Number of children: N/A    Years of education: N/A     Occupational History    Not on file.     Social History Main Topics    Smoking status: Former Smoker    Smokeless tobacco: Never Used      Comment: Has not smoked in 40 years.    Alcohol use Yes      Comment: occasionally    Drug use: No    Sexual activity: No     Other Topics Concern    Not on file     Social History Narrative    Wears a seatbelt.     Family History   Problem Relation Age of Onset    Arthritis Mother     Cancer Mother     Arthritis Father     Heart disease Father     Hyperlipidemia Father     Heart attack Father     Cancer Sister     Depression Sister     Cataracts Sister     Cancer Brother     Depression Brother     Diabetes Brother     Heart disease Brother     Hyperlipidemia Brother     Blindness Brother         dm    Retinal detachment Brother     Alcohol abuse Son     Birth defects Son     Diabetes Son     Mental retardation Son     Cancer Maternal Aunt     Alcohol abuse Maternal Uncle     Cancer Maternal Uncle     Depression Maternal Uncle     Cancer Paternal Aunt     Cancer Paternal Uncle     Arthritis Maternal Grandmother     Hypertension Maternal Grandmother     Alcohol abuse Maternal Grandfather     Arthritis Maternal Grandfather     Depression Maternal Grandfather     Hypertension Maternal Grandfather     Arthritis Paternal Grandmother     Arthritis Paternal Grandfather     HIV Son     Glaucoma Son      "Amblyopia Neg Hx     Macular degeneration Neg Hx     Strabismus Neg Hx     Stroke Neg Hx     Thyroid disease Neg Hx          Review of Systems   Constitutional: Negative for activity change and unexpected weight change.   HENT: Negative for hearing loss, rhinorrhea and trouble swallowing.    Eyes: Positive for visual disturbance. Negative for discharge.   Respiratory: Negative for chest tightness and wheezing.    Cardiovascular: Negative for chest pain and palpitations.   Gastrointestinal: Negative for blood in stool, constipation, diarrhea and vomiting.   Endocrine: Negative for polydipsia and polyuria.   Genitourinary: Negative for difficulty urinating, dysuria, hematuria and menstrual problem.   Musculoskeletal: Positive for neck pain. Negative for arthralgias and joint swelling.   Neurological: Negative for weakness and headaches.   Psychiatric/Behavioral: Negative for confusion and dysphoric mood.       Objective:   /71   Pulse (!) 52   Temp 98.9 °F (37.2 °C) (Oral)   Ht 5' 4" (1.626 m)   Wt 97.1 kg (214 lb)   LMP 05/06/1994   BMI 36.73 kg/m²     Physical Exam   Constitutional: She appears well-developed and well-nourished. She is cooperative.   HENT:   Head: Normocephalic and atraumatic.   Right Ear: Tympanic membrane, external ear and ear canal normal.   Left Ear: Tympanic membrane, external ear and ear canal normal.   Nose: Nose normal.   Mouth/Throat: Uvula is midline and mucous membranes are normal. No oral lesions. No oropharyngeal exudate, posterior oropharyngeal edema or posterior oropharyngeal erythema.   Eyes: EOM and lids are normal. Pupils are equal, round, and reactive to light. Right eye exhibits no discharge. Left eye exhibits no discharge. Right conjunctiva is not injected. Right conjunctiva has no hemorrhage. Left conjunctiva is not injected. Left conjunctiva has no hemorrhage. No scleral icterus. Right eye exhibits no nystagmus. Left eye exhibits no nystagmus.   Neck: Normal " range of motion and full passive range of motion without pain. Neck supple. No JVD present. No tracheal tenderness present. Carotid bruit is not present. No tracheal deviation present. No thyroid mass and no thyromegaly present.   Cardiovascular: Normal rate, regular rhythm, S1 normal and S2 normal.    No murmur heard.  Pulses:       Carotid pulses are 2+ on the right side, and 2+ on the left side.       Radial pulses are 2+ on the right side, and 2+ on the left side.        Posterior tibial pulses are 2+ on the right side, and 2+ on the left side.   varicosities are noted in the legs.   Pulmonary/Chest: Effort normal and breath sounds normal. No respiratory distress. She has no wheezes. She has no rhonchi. She has no rales.   Abdominal: Soft. Normal appearance, normal aorta and bowel sounds are normal. She exhibits no distension, no abdominal bruit, no pulsatile midline mass and no mass. There is no hepatosplenomegaly. There is no tenderness. There is no rebound.   Musculoskeletal:        Right knee: She exhibits no swelling. No tenderness found.        Left knee: She exhibits no swelling. No tenderness found.   Lymphadenopathy:        Head (right side): No submental and no submandibular adenopathy present.        Head (left side): No submental and no submandibular adenopathy present.     She has no cervical adenopathy.   Neurological: She is alert. She has normal strength. No cranial nerve deficit or sensory deficit.   Skin: Skin is warm and dry. No rash noted. No cyanosis. Nails show no clubbing.   Psychiatric: She has a normal mood and affect. Her speech is normal and behavior is normal. Thought content normal. Cognition and memory are normal.       Assessment:     1. Preoperative clearance    2. Nuclear senile cataract of both eyes    3. Primary hyperparathyroidism    4. Hypertension, unspecified type    5. Hyperlipidemia, unspecified hyperlipidemia type    6. Bariatric surgery status    7. Arthritis         Plan:   She is cleared for surgery.

## 2018-05-11 ENCOUNTER — OFFICE VISIT (OUTPATIENT)
Dept: OPHTHALMOLOGY | Facility: CLINIC | Age: 83
End: 2018-05-11
Payer: COMMERCIAL

## 2018-05-11 DIAGNOSIS — H40.039 ANATOMICAL NARROW ANGLE: ICD-10-CM

## 2018-05-11 DIAGNOSIS — H43.812 POSTERIOR VITREOUS DETACHMENT OF LEFT EYE: ICD-10-CM

## 2018-05-11 DIAGNOSIS — H52.7 REFRACTIVE ERROR: ICD-10-CM

## 2018-05-11 DIAGNOSIS — H35.89 RPE MOTTLING OF MACULA: ICD-10-CM

## 2018-05-11 DIAGNOSIS — H25.13 NUCLEAR SCLEROSIS, BILATERAL: Primary | ICD-10-CM

## 2018-05-11 PROCEDURE — 92136 OPHTHALMIC BIOMETRY: CPT | Mod: LT,S$GLB,, | Performed by: OPHTHALMOLOGY

## 2018-05-11 PROCEDURE — 92012 INTRM OPH EXAM EST PATIENT: CPT | Mod: S$GLB,,, | Performed by: OPHTHALMOLOGY

## 2018-05-11 PROCEDURE — 99999 PR PBB SHADOW E&M-EST. PATIENT-LVL V: CPT | Mod: PBBFAC,,, | Performed by: OPHTHALMOLOGY

## 2018-05-11 RX ORDER — PREDNISOLONE ACETATE 10 MG/ML
1 SUSPENSION/ DROPS OPHTHALMIC 4 TIMES DAILY
Qty: 5 ML | Refills: 2 | Status: SHIPPED | OUTPATIENT
Start: 2018-05-16 | End: 2018-06-26

## 2018-05-11 RX ORDER — SODIUM CHLORIDE 9 MG/ML
INJECTION, SOLUTION INTRAVENOUS CONTINUOUS
Status: CANCELLED | OUTPATIENT
Start: 2018-05-11

## 2018-05-11 RX ORDER — MOXIFLOXACIN 5 MG/ML
1 SOLUTION/ DROPS OPHTHALMIC
Status: CANCELLED | OUTPATIENT
Start: 2018-05-11 | End: 2018-05-11

## 2018-05-11 RX ORDER — PROPARACAINE HYDROCHLORIDE 5 MG/ML
1 SOLUTION/ DROPS OPHTHALMIC
Status: CANCELLED | OUTPATIENT
Start: 2018-05-11

## 2018-05-11 RX ORDER — CYCLOPENTOLATE HYDROCHLORIDE 10 MG/ML
1 SOLUTION/ DROPS OPHTHALMIC
Status: CANCELLED | OUTPATIENT
Start: 2018-05-11

## 2018-05-11 RX ORDER — KETOROLAC TROMETHAMINE 5 MG/ML
1 SOLUTION OPHTHALMIC 4 TIMES DAILY
Qty: 5 ML | Refills: 2 | Status: SHIPPED | OUTPATIENT
Start: 2018-05-13 | End: 2018-06-26

## 2018-05-11 RX ORDER — PROPARACAINE HYDROCHLORIDE 5 MG/ML
1 SOLUTION/ DROPS OPHTHALMIC
Status: CANCELLED | OUTPATIENT
Start: 2018-05-11 | End: 2018-05-11

## 2018-05-11 RX ORDER — PHENYLEPHRINE HYDROCHLORIDE 25 MG/ML
1 SOLUTION/ DROPS OPHTHALMIC
Status: CANCELLED | OUTPATIENT
Start: 2018-05-11

## 2018-05-11 RX ORDER — MOXIFLOXACIN 5 MG/ML
1 SOLUTION/ DROPS OPHTHALMIC 4 TIMES DAILY
Qty: 3 ML | Refills: 0 | Status: SHIPPED | OUTPATIENT
Start: 2018-05-15 | End: 2018-06-04

## 2018-05-11 RX ORDER — LIDOCAINE HYDROCHLORIDE 40 MG/ML
1 INJECTION, SOLUTION RETROBULBAR
Status: CANCELLED | OUTPATIENT
Start: 2018-05-11 | End: 2018-05-11

## 2018-05-11 RX ORDER — PHENYLEPHRINE HYDROCHLORIDE 100 MG/ML
1 SOLUTION/ DROPS OPHTHALMIC ONCE
Status: CANCELLED | OUTPATIENT
Start: 2018-05-11 | End: 2018-05-11

## 2018-05-11 RX ORDER — LIDOCAINE HYDROCHLORIDE 40 MG/ML
1 INJECTION, SOLUTION RETROBULBAR
Status: CANCELLED | OUTPATIENT
Start: 2018-05-11

## 2018-05-11 RX ORDER — TROPICAMIDE 10 MG/ML
1 SOLUTION/ DROPS OPHTHALMIC
Status: CANCELLED | OUTPATIENT
Start: 2018-05-11

## 2018-05-14 RX ORDER — DULOXETIN HYDROCHLORIDE 60 MG/1
CAPSULE, DELAYED RELEASE ORAL
Qty: 90 CAPSULE | Refills: 3 | Status: SHIPPED | OUTPATIENT
Start: 2018-05-14 | End: 2019-02-18 | Stop reason: SDUPTHER

## 2018-05-15 ENCOUNTER — ANESTHESIA EVENT (OUTPATIENT)
Dept: SURGERY | Facility: AMBULARY SURGERY CENTER | Age: 83
End: 2018-05-15
Payer: COMMERCIAL

## 2018-05-16 ENCOUNTER — ANESTHESIA (OUTPATIENT)
Dept: SURGERY | Facility: AMBULARY SURGERY CENTER | Age: 83
End: 2018-05-16
Payer: COMMERCIAL

## 2018-05-16 ENCOUNTER — SURGERY (OUTPATIENT)
Age: 83
End: 2018-05-16

## 2018-05-16 ENCOUNTER — HOSPITAL ENCOUNTER (OUTPATIENT)
Facility: AMBULARY SURGERY CENTER | Age: 83
Discharge: HOME | End: 2018-05-16
Attending: OPHTHALMOLOGY | Admitting: OPHTHALMOLOGY
Payer: COMMERCIAL

## 2018-05-16 DIAGNOSIS — H25.13 NUCLEAR SCLEROSIS, BILATERAL: ICD-10-CM

## 2018-05-16 PROCEDURE — 66984 XCAPSL CTRC RMVL W/O ECP: CPT | Performed by: OPHTHALMOLOGY

## 2018-05-16 PROCEDURE — D9220A PRA ANESTHESIA: Mod: CRNA,,, | Performed by: NURSE ANESTHETIST, CERTIFIED REGISTERED

## 2018-05-16 PROCEDURE — 66984 XCAPSL CTRC RMVL W/O ECP: CPT | Mod: LT,,, | Performed by: OPHTHALMOLOGY

## 2018-05-16 PROCEDURE — D9220A PRA ANESTHESIA: Mod: ANES,,, | Performed by: ANESTHESIOLOGY

## 2018-05-16 DEVICE — LENS 20.5 ACRYSOF: Type: IMPLANTABLE DEVICE | Site: EYE | Status: FUNCTIONAL

## 2018-05-16 RX ORDER — PHENYLEPHRINE HYDROCHLORIDE 25 MG/ML
1 SOLUTION/ DROPS OPHTHALMIC
Status: DISCONTINUED | OUTPATIENT
Start: 2018-05-16 | End: 2018-05-16 | Stop reason: HOSPADM

## 2018-05-16 RX ORDER — EPINEPHRINE 1 MG/ML
INJECTION, SOLUTION INTRACARDIAC; INTRAMUSCULAR; INTRAVENOUS; SUBCUTANEOUS
Status: DISCONTINUED | OUTPATIENT
Start: 2018-05-16 | End: 2018-05-16 | Stop reason: HOSPADM

## 2018-05-16 RX ORDER — MIDAZOLAM HYDROCHLORIDE 1 MG/ML
INJECTION INTRAMUSCULAR; INTRAVENOUS
Status: COMPLETED
Start: 2018-05-16 | End: 2018-05-16

## 2018-05-16 RX ORDER — ACETAMINOPHEN 325 MG/1
650 TABLET ORAL EVERY 6 HOURS PRN
Status: DISCONTINUED | OUTPATIENT
Start: 2018-05-16 | End: 2018-05-16 | Stop reason: HOSPADM

## 2018-05-16 RX ORDER — LIDOCAINE HYDROCHLORIDE 40 MG/ML
1 INJECTION, SOLUTION RETROBULBAR
Status: COMPLETED | OUTPATIENT
Start: 2018-05-16 | End: 2018-05-16

## 2018-05-16 RX ORDER — PROPARACAINE HYDROCHLORIDE 5 MG/ML
SOLUTION/ DROPS OPHTHALMIC
Status: DISCONTINUED | OUTPATIENT
Start: 2018-05-16 | End: 2018-05-16 | Stop reason: HOSPADM

## 2018-05-16 RX ORDER — PHENYLEPHRINE HYDROCHLORIDE 100 MG/ML
1 SOLUTION/ DROPS OPHTHALMIC ONCE
Status: DISCONTINUED | OUTPATIENT
Start: 2018-05-16 | End: 2018-05-16 | Stop reason: HOSPADM

## 2018-05-16 RX ORDER — ONDANSETRON 2 MG/ML
INJECTION INTRAMUSCULAR; INTRAVENOUS
Status: DISCONTINUED | OUTPATIENT
Start: 2018-05-16 | End: 2018-05-16

## 2018-05-16 RX ORDER — ONDANSETRON 2 MG/ML
4 INJECTION INTRAMUSCULAR; INTRAVENOUS ONCE AS NEEDED
Status: DISCONTINUED | OUTPATIENT
Start: 2018-05-16 | End: 2018-05-16 | Stop reason: HOSPADM

## 2018-05-16 RX ORDER — LIDOCAINE HYDROCHLORIDE 10 MG/ML
1 INJECTION, SOLUTION EPIDURAL; INFILTRATION; INTRACAUDAL; PERINEURAL ONCE
Status: COMPLETED | OUTPATIENT
Start: 2018-05-16 | End: 2018-05-16

## 2018-05-16 RX ORDER — SODIUM CHLORIDE 9 MG/ML
INJECTION, SOLUTION INTRAVENOUS CONTINUOUS
Status: DISCONTINUED | OUTPATIENT
Start: 2018-05-16 | End: 2018-05-16

## 2018-05-16 RX ORDER — PROPARACAINE HYDROCHLORIDE 5 MG/ML
1 SOLUTION/ DROPS OPHTHALMIC
Status: COMPLETED | OUTPATIENT
Start: 2018-05-16 | End: 2018-05-16

## 2018-05-16 RX ORDER — TROPICAMIDE 10 MG/ML
1 SOLUTION/ DROPS OPHTHALMIC
Status: DISCONTINUED | OUTPATIENT
Start: 2018-05-16 | End: 2018-05-16 | Stop reason: HOSPADM

## 2018-05-16 RX ORDER — CYCLOPENTOLATE HYDROCHLORIDE 10 MG/ML
1 SOLUTION/ DROPS OPHTHALMIC
Status: DISCONTINUED | OUTPATIENT
Start: 2018-05-16 | End: 2018-05-16 | Stop reason: HOSPADM

## 2018-05-16 RX ORDER — MOXIFLOXACIN 5 MG/ML
1 SOLUTION/ DROPS OPHTHALMIC
Status: COMPLETED | OUTPATIENT
Start: 2018-05-16 | End: 2018-05-16

## 2018-05-16 RX ORDER — MIDAZOLAM HYDROCHLORIDE 1 MG/ML
INJECTION, SOLUTION INTRAMUSCULAR; INTRAVENOUS
Status: DISCONTINUED | OUTPATIENT
Start: 2018-05-16 | End: 2018-05-16

## 2018-05-16 RX ORDER — MOXIFLOXACIN 5 MG/ML
SOLUTION/ DROPS OPHTHALMIC
Status: DISCONTINUED | OUTPATIENT
Start: 2018-05-16 | End: 2018-05-16 | Stop reason: HOSPADM

## 2018-05-16 RX ORDER — LIDOCAINE HYDROCHLORIDE 40 MG/ML
1 INJECTION, SOLUTION RETROBULBAR
Status: DISCONTINUED | OUTPATIENT
Start: 2018-05-16 | End: 2018-05-16 | Stop reason: HOSPADM

## 2018-05-16 RX ORDER — SODIUM CHLORIDE, SODIUM LACTATE, POTASSIUM CHLORIDE, CALCIUM CHLORIDE 600; 310; 30; 20 MG/100ML; MG/100ML; MG/100ML; MG/100ML
INJECTION, SOLUTION INTRAVENOUS CONTINUOUS
Status: DISCONTINUED | OUTPATIENT
Start: 2018-05-16 | End: 2018-05-16 | Stop reason: HOSPADM

## 2018-05-16 RX ORDER — SODIUM CHLORIDE, SODIUM LACTATE, POTASSIUM CHLORIDE, CALCIUM CHLORIDE 600; 310; 30; 20 MG/100ML; MG/100ML; MG/100ML; MG/100ML
1000 INJECTION, SOLUTION INTRAVENOUS ONCE
Status: DISCONTINUED | OUTPATIENT
Start: 2018-05-16 | End: 2018-05-16 | Stop reason: HOSPADM

## 2018-05-16 RX ORDER — LIDOCAINE HYDROCHLORIDE 10 MG/ML
INJECTION, SOLUTION EPIDURAL; INFILTRATION; INTRACAUDAL; PERINEURAL
Status: DISCONTINUED | OUTPATIENT
Start: 2018-05-16 | End: 2018-05-16 | Stop reason: HOSPADM

## 2018-05-16 RX ORDER — PROPARACAINE HYDROCHLORIDE 5 MG/ML
1 SOLUTION/ DROPS OPHTHALMIC
Status: DISCONTINUED | OUTPATIENT
Start: 2018-05-16 | End: 2018-05-16 | Stop reason: HOSPADM

## 2018-05-16 RX ADMIN — TROPICAMIDE 1 DROP: 10 SOLUTION/ DROPS OPHTHALMIC at 10:05

## 2018-05-16 RX ADMIN — MOXIFLOXACIN 1 DROP: 5 SOLUTION/ DROPS OPHTHALMIC at 10:05

## 2018-05-16 RX ADMIN — MIDAZOLAM HYDROCHLORIDE 1 MG: 1 INJECTION, SOLUTION INTRAMUSCULAR; INTRAVENOUS at 11:05

## 2018-05-16 RX ADMIN — PROPARACAINE HYDROCHLORIDE 1 DROP: 5 SOLUTION/ DROPS OPHTHALMIC at 11:05

## 2018-05-16 RX ADMIN — EPINEPHRINE 0.3 MG: 1 INJECTION, SOLUTION INTRACARDIAC; INTRAMUSCULAR; INTRAVENOUS; SUBCUTANEOUS at 11:05

## 2018-05-16 RX ADMIN — LIDOCAINE HYDROCHLORIDE 4 MG: 40 INJECTION, SOLUTION RETROBULBAR at 11:05

## 2018-05-16 RX ADMIN — LIDOCAINE HYDROCHLORIDE 0.2 MG: 10 INJECTION, SOLUTION EPIDURAL; INFILTRATION; INTRACAUDAL; PERINEURAL at 10:05

## 2018-05-16 RX ADMIN — CYCLOPENTOLATE HYDROCHLORIDE 1 DROP: 10 SOLUTION/ DROPS OPHTHALMIC at 10:05

## 2018-05-16 RX ADMIN — SODIUM CHLORIDE, SODIUM LACTATE, POTASSIUM CHLORIDE, CALCIUM CHLORIDE: 600; 310; 30; 20 INJECTION, SOLUTION INTRAVENOUS at 10:05

## 2018-05-16 RX ADMIN — PHENYLEPHRINE HYDROCHLORIDE 1 DROP: 25 SOLUTION/ DROPS OPHTHALMIC at 10:05

## 2018-05-16 RX ADMIN — PROPARACAINE HYDROCHLORIDE 1 DROP: 5 SOLUTION/ DROPS OPHTHALMIC at 10:05

## 2018-05-16 RX ADMIN — ACETAMINOPHEN 650 MG: 325 TABLET ORAL at 12:05

## 2018-05-16 RX ADMIN — PHENYLEPHRINE HYDROCHLORIDE 1 DROP: 25 SOLUTION/ DROPS OPHTHALMIC at 11:05

## 2018-05-16 RX ADMIN — TROPICAMIDE 1 DROP: 10 SOLUTION/ DROPS OPHTHALMIC at 11:05

## 2018-05-16 RX ADMIN — ONDANSETRON 4 MG: 2 INJECTION INTRAMUSCULAR; INTRAVENOUS at 11:05

## 2018-05-16 RX ADMIN — LIDOCAINE HYDROCHLORIDE 1 ML: 10 INJECTION, SOLUTION EPIDURAL; INFILTRATION; INTRACAUDAL; PERINEURAL at 11:05

## 2018-05-16 RX ADMIN — MOXIFLOXACIN 1 DROP: 5 SOLUTION/ DROPS OPHTHALMIC at 11:05

## 2018-05-16 RX ADMIN — CYCLOPENTOLATE HYDROCHLORIDE 1 DROP: 10 SOLUTION/ DROPS OPHTHALMIC at 11:05

## 2018-05-16 NOTE — OP NOTE
Date of surgery: 5/16/2018    Operative Report    Preoperative Diagnosis: Nuclear sclerosis, left eye    Postoperative Diagnosis: Nuclear sclerosis, left eye    Procedure: Phacoemulsification with posterior chamber intraocular lens, left eye    Surgeon: Vidhi Haywood M.D.    Anesthesia: MAC with topical    Procedure in detail:   Informed consent was obtained. Indications, risks and alternatives to the procedure were explained to the patient. The patient was given the opportunity to ask questions and consented to the procedure in writing. In the preoperative holding area, the patients identity and operative site were confirmed.  Topical anesthetic was administered, consisting of alternating 0.5% Proparacaine and 4% preservative-free Lidocaine Q 5 minutes times 3.  The patient was taken to the operative suite.  A time-out was performed.  The left eye was prepped with 5% Betadine and draped in sterile fashion.  A lid speculum was placed.  A paracentesis was made at the 4 oclock position. 1% preservative-free lidocaine was placed in the anterior chamber, followed by Viscoat.  A bi-planar clear corneal incision was made at the 2 oclock position.  The cystotome was used to begin the continuous curvilinear capsulorrhexis, which was completed with the Utrata forceps.  BSS on a blunt-tipped cannula was used to hydrodissect and hydrodelineate the lens nucleus until it was noted to rotate freely.  Phacoemulsification was used in a divide-and-conquer technique to remove the lens nucleus, followed by irrigation and aspiration of the lens cortex.  The capsular bag was inflated with Healon.  The lens, which was an [employer] Acrysoft IQ IOL, model SN60WF, power 20.5, was placed in the capsular bag and rotated into position.  The remaining viscoelastic material was removed by I&A.  The wound and paracentesis were hydrated.  The lens was centered.  The anterior chamber was deep.  The eye pressure was good.  The wounds were checked  and watertight.  The lid speculum and drape were removed.  A drop of Vigamox was placed in the eye.  A clear shield was taped in place over the eye.    TOTAL ULTRASOUND TIME:  53.0 seconds    PERCENTAGE OF TIME IN POSITION 3: 21.0 %    Estimated blood loss:  none    Specimens:   none  Complications:  none    Disposition:   stable to PACU

## 2018-05-16 NOTE — PLAN OF CARE
Stable, states ready to go home, t ol po fluids, pain tolerable, ambulated  To car with RN and daughters

## 2018-05-16 NOTE — ANESTHESIA POSTPROCEDURE EVALUATION
"Anesthesia Post Evaluation    Patient: Marysol Lyles    Procedure(s) Performed: Procedure(s) (LRB):  phaco/pciol (Left)    Final Anesthesia Type: MAC  Patient location during evaluation: PACU  Patient participation: Yes- Able to Participate  Level of consciousness: awake and alert  Post-procedure vital signs: reviewed and stable  Pain management: adequate  Airway patency: patent  PONV status at discharge: No PONV  Anesthetic complications: no      Cardiovascular status: hemodynamically stable  Respiratory status: unassisted and room air  Hydration status: euvolemic  Follow-up not needed.        Visit Vitals  /65   Pulse (!) 54   Temp 37 °C (98.6 °F) (Skin)   Resp 20   Ht 5' 4" (1.626 m)   Wt 97.1 kg (214 lb 1.1 oz)   LMP 05/06/1994   SpO2 (!) 93%   Breastfeeding? No   BMI 36.74 kg/m²       Pain/Froylan Score: Pain Assessment Performed: Yes (5/16/2018 12:05 PM)  Presence of Pain: complains of pain/discomfort (5/16/2018 12:05 PM)  Froylan Score: 10 (5/16/2018 12:05 PM)      "

## 2018-05-16 NOTE — H&P (VIEW-ONLY)
Subjective:      Patient ID: Marysol Lyles is a 82 y.o. female.    Chief Complaint: Pre-op Exam    HPI   The patient is here for a preop clearance to have surgery on the eyes for a cataract.    The physician that is performing the surgery is Dr. Haywood.    The surgery is being planned for 5/16/2018.    I will send a copy of the referral to the surgeon via [employer]   The patient states that there has not been previous problems with sedation.    Past Medical History:  Past Medical History:   Diagnosis Date    Arthritis     Chronic pain 8/14/2013    DDD (degenerative disc disease)     DDD (degenerative disc disease)     GERD (gastroesophageal reflux disease)     Hyperlipidemia     Hypertension     Osteopenia 3/2/2016    Primary hyperparathyroidism     Senile nuclear sclerosis - Both Eyes 2/7/2013     Past Surgical History:   Procedure Laterality Date    APPENDECTOMY      COLONOSCOPY W/ BIOPSIES  2/2011    repeat in 3 years    PARTIAL KNEE ARTHROPLASTY Right     Right    SLEEVE GASTROPLASTY  2/8/12    TONSILLECTOMY      TOTAL HIP ARTHROPLASTY Right     TOTAL KNEE ARTHROPLASTY Left      Review of patient's allergies indicates:   Allergen Reactions    Formaldehyde Hives and Other (See Comments)     Other reaction(s): Other (See Comments)  Other reaction(s): Hives  Other reaction(s): Hives  Other reaction(s): Hives  Other reaction(s): Hives    Mercury Hives     Other reaction(s): Hives  Other reaction(s): Hives  Other reaction(s): Hives  Other reaction(s): Hives    Mercury (elemental) Hives    Penicillins Swelling     Other reaction(s): Swelling  Other reaction(s): Swelling    Dilaudid [hydromorphone (bulk)]     Latex, natural rubber Hives    Adhesive Rash     Other reaction(s): Rash  Other reaction(s): Rash    Hydromorphone Itching     Current Outpatient Prescriptions on File Prior to Visit   Medication Sig Dispense Refill    ACETAMINOPHEN (TYLENOL EXTRA STRENGTH ORAL) 500 mg. Every 4-6 hours PRN       albuterol 90 mcg/actuation inhaler Inhale 2 puffs into the lungs every 6 (six) hours as needed for Wheezing. 8 g 0    BIOTIN ORAL Take by mouth once daily.      CALCIUM CARBONATE/VITAMIN D3 (CALTRATE 600 + D ORAL) Take by mouth 2 (two) times daily.       cetirizine (ZYRTEC) 10 MG tablet Take 1 tablet (10 mg total) by mouth once daily. 30 tablet 5    ciclopirox (PENLAC) 8 % Soln APPLY TOPICALLY ONCE DAILY 6.6 mL 0    conjugated estrogens (PREMARIN) vaginal cream Place 0.5 g vaginally twice a week. 30 g 3    CYANOCOBALAMIN, VITAMIN B-12, (VITAMIN B-12 ORAL) As directed      cyclobenzaprine (FLEXERIL) 10 MG tablet   1    DENOSUMAB (PROLIA SUBQ) Inject into the skin.      dicyclomine (BENTYL) 10 MG capsule TAKE 1 CAPSULE BY MOUTH FOUR TIMES DAILY BEFORE MEALS AND AT NIGHT 120 capsule 11    DULoxetine (CYMBALTA) 60 MG capsule TAKE 1 CAPSULE(60 MG) BY MOUTH EVERY DAY 90 capsule 0    fluticasone (FLONASE) 50 mcg/actuation nasal spray SHAKE LIQUID AND USE 1 SPRAY IN EACH NOSTRIL EVERY DAY 16 g 11    FLUZONE HIGH-DOSE 2017-18, PF, 180 mcg/0.5 mL vaccine ADM 0.5ML IM UTD  0    gabapentin (NEURONTIN) 300 MG capsule Take 1 capsule (300 mg total) by mouth 3 (three) times daily. 90 capsule 11    GLUCOSAMINE HCL/CHONDRO KIDD A (GLUCOSAMINE-CHONDROITIN ORAL) Every day      hydrocodone-acetaminophen 10-325mg (NORCO)  mg Tab TK 1 T PO BID PRN P  0    LACTOBACILLUS ACIDOPHILUS (PROBIOTIC ORAL) Take by mouth.      methen-sod phos-meth blue-hyos (UROGESIC-BLUE) 81.6-40.8-0.12 mg Tab Take 1 tablet by mouth twice a week. 60 tablet 1    metoprolol tartrate (LOPRESSOR) 25 MG tablet Take 1 tablet (25 mg total) by mouth 2 (two) times daily. 180 tablet 3    MULTIVITAMIN ORAL 2 (two) times daily. Every day      tavaborole (KERYDIN) 5 % Rom Apply 1 application topically once daily. 4 mL 11    urea (UMECTA NAIL FILM PEN) 40 % NFSP Apply 1 application topically 2 (two) times daily. 18 g 5    VESICARE 10 mg tablet  TAKE 1 TABLET(10 MG) BY MOUTH EVERY DAY 90 tablet 0    vit C,E,Zn,Cu glu-om3-lut-eduardo (OCUVITE ADULT 50+) 250-5-1 mg Cap Take by mouth.      vitamin D 1000 units Tab Take 185 mg by mouth once daily.      [DISCONTINUED] ciprofloxacin HCl (CIPRO) 500 MG tablet Take 1 tablet (500 mg total) by mouth 2 (two) times daily. 14 tablet 0     No current facility-administered medications on file prior to visit.      Social History     Social History    Marital status:      Spouse name: N/A    Number of children: N/A    Years of education: N/A     Occupational History    Not on file.     Social History Main Topics    Smoking status: Former Smoker    Smokeless tobacco: Never Used      Comment: Has not smoked in 40 years.    Alcohol use Yes      Comment: occasionally    Drug use: No    Sexual activity: No     Other Topics Concern    Not on file     Social History Narrative    Wears a seatbelt.     Family History   Problem Relation Age of Onset    Arthritis Mother     Cancer Mother     Arthritis Father     Heart disease Father     Hyperlipidemia Father     Heart attack Father     Cancer Sister     Depression Sister     Cataracts Sister     Cancer Brother     Depression Brother     Diabetes Brother     Heart disease Brother     Hyperlipidemia Brother     Blindness Brother         dm    Retinal detachment Brother     Alcohol abuse Son     Birth defects Son     Diabetes Son     Mental retardation Son     Cancer Maternal Aunt     Alcohol abuse Maternal Uncle     Cancer Maternal Uncle     Depression Maternal Uncle     Cancer Paternal Aunt     Cancer Paternal Uncle     Arthritis Maternal Grandmother     Hypertension Maternal Grandmother     Alcohol abuse Maternal Grandfather     Arthritis Maternal Grandfather     Depression Maternal Grandfather     Hypertension Maternal Grandfather     Arthritis Paternal Grandmother     Arthritis Paternal Grandfather     HIV Son     Glaucoma Son      "Amblyopia Neg Hx     Macular degeneration Neg Hx     Strabismus Neg Hx     Stroke Neg Hx     Thyroid disease Neg Hx          Review of Systems   Constitutional: Negative for activity change and unexpected weight change.   HENT: Negative for hearing loss, rhinorrhea and trouble swallowing.    Eyes: Positive for visual disturbance. Negative for discharge.   Respiratory: Negative for chest tightness and wheezing.    Cardiovascular: Negative for chest pain and palpitations.   Gastrointestinal: Negative for blood in stool, constipation, diarrhea and vomiting.   Endocrine: Negative for polydipsia and polyuria.   Genitourinary: Negative for difficulty urinating, dysuria, hematuria and menstrual problem.   Musculoskeletal: Positive for neck pain. Negative for arthralgias and joint swelling.   Neurological: Negative for weakness and headaches.   Psychiatric/Behavioral: Negative for confusion and dysphoric mood.       Objective:   /71   Pulse (!) 52   Temp 98.9 °F (37.2 °C) (Oral)   Ht 5' 4" (1.626 m)   Wt 97.1 kg (214 lb)   LMP 05/06/1994   BMI 36.73 kg/m²     Physical Exam   Constitutional: She appears well-developed and well-nourished. She is cooperative.   HENT:   Head: Normocephalic and atraumatic.   Right Ear: Tympanic membrane, external ear and ear canal normal.   Left Ear: Tympanic membrane, external ear and ear canal normal.   Nose: Nose normal.   Mouth/Throat: Uvula is midline and mucous membranes are normal. No oral lesions. No oropharyngeal exudate, posterior oropharyngeal edema or posterior oropharyngeal erythema.   Eyes: EOM and lids are normal. Pupils are equal, round, and reactive to light. Right eye exhibits no discharge. Left eye exhibits no discharge. Right conjunctiva is not injected. Right conjunctiva has no hemorrhage. Left conjunctiva is not injected. Left conjunctiva has no hemorrhage. No scleral icterus. Right eye exhibits no nystagmus. Left eye exhibits no nystagmus.   Neck: Normal " range of motion and full passive range of motion without pain. Neck supple. No JVD present. No tracheal tenderness present. Carotid bruit is not present. No tracheal deviation present. No thyroid mass and no thyromegaly present.   Cardiovascular: Normal rate, regular rhythm, S1 normal and S2 normal.    No murmur heard.  Pulses:       Carotid pulses are 2+ on the right side, and 2+ on the left side.       Radial pulses are 2+ on the right side, and 2+ on the left side.        Posterior tibial pulses are 2+ on the right side, and 2+ on the left side.   varicosities are noted in the legs.   Pulmonary/Chest: Effort normal and breath sounds normal. No respiratory distress. She has no wheezes. She has no rhonchi. She has no rales.   Abdominal: Soft. Normal appearance, normal aorta and bowel sounds are normal. She exhibits no distension, no abdominal bruit, no pulsatile midline mass and no mass. There is no hepatosplenomegaly. There is no tenderness. There is no rebound.   Musculoskeletal:        Right knee: She exhibits no swelling. No tenderness found.        Left knee: She exhibits no swelling. No tenderness found.   Lymphadenopathy:        Head (right side): No submental and no submandibular adenopathy present.        Head (left side): No submental and no submandibular adenopathy present.     She has no cervical adenopathy.   Neurological: She is alert. She has normal strength. No cranial nerve deficit or sensory deficit.   Skin: Skin is warm and dry. No rash noted. No cyanosis. Nails show no clubbing.   Psychiatric: She has a normal mood and affect. Her speech is normal and behavior is normal. Thought content normal. Cognition and memory are normal.       Assessment:     1. Preoperative clearance    2. Nuclear senile cataract of both eyes    3. Primary hyperparathyroidism    4. Hypertension, unspecified type    5. Hyperlipidemia, unspecified hyperlipidemia type    6. Bariatric surgery status    7. Arthritis         Plan:   She is cleared for surgery.

## 2018-05-16 NOTE — ANESTHESIA PREPROCEDURE EVALUATION
05/16/2018  Marysol Lyles is a 82 y.o., female.    Anesthesia Evaluation    I have reviewed the Patient Summary Reports.    I have reviewed the Nursing Notes.   I have reviewed the Medications.     Review of Systems  Anesthesia Hx:  No problems with previous Anesthesia    Social:  Former Smoker    Hematology/Oncology:  Hematology Normal   Oncology Normal     EENT/Dental:EENT/Dental Normal   Cardiovascular:   Hypertension hyperlipidemia    Pulmonary:  Pulmonary Normal    Hepatic/GI:   GERD    Musculoskeletal:   Arthritis   Spine Disorders: Degenerative disease and Disc disease    Neurological:  Dementia        Physical Exam  General:  Obesity    Airway/Jaw/Neck:  Airway Findings: Mouth Opening: Normal Tongue: Normal  General Airway Assessment: Adult  Mallampati: II        Eyes/Ears/Nose:  EYES/EARS/NOSE FINDINGS: Normal   Dental:  DENTAL FINDINGS: Normal   Chest/Lungs:  Chest/Lungs Findings: Clear to auscultation, Normal Respiratory Rate     Heart/Vascular:  Heart Findings: Rate: Normal  Rhythm: Regular Rhythm        Mental Status:  Mental Status Findings:  Cooperative, Alert and Oriented         Anesthesia Plan  Type of Anesthesia, risks & benefits discussed:  Anesthesia Type:  MAC  Patient's Preference:   Intra-op Monitoring Plan: standard ASA monitors  Intra-op Monitoring Plan Comments:   Post Op Pain Control Plan: IV/PO Opioids PRN  Post Op Pain Control Plan Comments:   Induction:   IV  Beta Blocker:  Patient is on a Beta-Blocker and has received one dose within the past 24 hours (No further documentation required).       Informed Consent: Patient understands risks and agrees with Anesthesia plan.  Questions answered. Anesthesia consent signed with patient.  ASA Score: 2     Day of Surgery Review of History & Physical: I have interviewed and examined the patient. I have reviewed the patient's H&P dated:   There are no significant changes.  H&P update referred to the surgeon.         Ready For Surgery From Anesthesia Perspective.

## 2018-05-16 NOTE — INTERVAL H&P NOTE
"See PCP H&P. No changes noted. Heart and lungs per anesthesia. "This patient has been cleared for surgery in an ambulatory surgery center/facility."    Vitals:    05/16/18 1018   BP: 130/70   Pulse: (!) 54   Resp: 20   Temp: 98.6 °F (37 °C)       "

## 2018-05-16 NOTE — DISCHARGE INSTRUCTIONS
Anesthesia information    Anesthesia Safety      You have been given medicine  to sedate you during your procedure today. This may have included both a pain medicine and sleeping medicine. Most of the effects have worn off; however, you may continue to have some drowsiness for the next  24 hours. Anesthesia and pain medicines can cause nausea, sleepiness, dizziness and  constipation.    HOME CARE:  1) For the next EIGHT HOURS, you should be watched by a responsible adult to look for any worsening of your condition.  2) DO NOT DRINK any ALCOHOL for the next 24 HOURS.  3) DO NOT DRIVE or operate dangerous machinery during the next 24 HOURS.  FOLLOW UP with your doctor or this facility if you are not alert and back to your usual level of activity within 24 hrs.  GET PROMPT MEDICAL ATTENTION if any of the following occur:  -- Increased drowsiness  -- Increased weakness or dizziness  -- Repeated vomiting  -- If you cannot be awakened    Discharge Instructions for Cataract Surgery    DR. ZAIDI:  OFFICE [phone]  CELL [phone] PAGE [phone]  USE DROPS AS INSTRUCTED:    PREDNISOLONE  ONE DROP 4 TIMES A DAY  VIGAMOX ONE DROP 4 TIMES A DAY  KETEROLAC ONE DROP 4 TIMES A DAY   WAIT 2 MINUTES BETWEEN DROPS     BRING ALL EYE DROPS TO YOUR CLINIC VISITS  Do not sleep on the affected side and wear eye shield while sleeping for 2 weeks.  No exercise or lifting greater than 10 lbs for 2 weeks.  Try not to cough. If coughing a lot, take a cough suppressent  Do not bend over for 2 weeks.  Keep water it of your eye for 2 weeks.             Wear sunglasses for comfort as needed.  It is normal to feel a slight irritation, like there is an eyelash in the eye that had surgery.   You may take Tylenol for discomfort but if pain intensifies , call Dr Zaidi  Immediately.    If you use eye drops for glaucoma you may continue to use them.

## 2018-05-16 NOTE — BRIEF OP NOTE
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 The patient was discharged home in stable condition the same day.    Diet: Advance as tolerated    Follow-Up: Follow up with Dr. Haywood, next day, as previously scheduled  Activity as tolerated.      Activity: Per Handout Instructions    Disposition: Home or self care    Patient Instructions:   Current Discharge Medication List      CONTINUE these medications which have NOT CHANGED    Details   metoprolol tartrate (LOPRESSOR) 25 MG tablet Take 1 tablet (25 mg total) by mouth 2 (two) times daily.  Qty: 180 tablet, Refills: 3      ACETAMINOPHEN (TYLENOL EXTRA STRENGTH ORAL) 500 mg. Every 4-6 hours PRN      albuterol 90 mcg/actuation inhaler Inhale 2 puffs into the lungs every 6 (six) hours as needed for Wheezing.  Qty: 8 g, Refills: 0    Associated Diagnoses: Bronchitis with bronchospasm      BIOTIN ORAL Take by mouth once daily.      CALCIUM CARBONATE/VITAMIN D3 (CALTRATE 600 + D ORAL) Take by mouth 2 (two) times daily.       cetirizine (ZYRTEC) 10 MG tablet Take 1 tablet (10 mg total) by mouth once daily.  Qty: 30 tablet, Refills: 5      ciclopirox (PENLAC) 8 % Soln APPLY  TOPICALLY ONCE DAILY  Qty: 6.6 mL, Refills: 0      conjugated estrogens (PREMARIN) vaginal cream Place 0.5 g vaginally twice a week.  Qty: 30 g, Refills: 3    Associated Diagnoses: Vaginal atrophy      CYANOCOBALAMIN, VITAMIN B-12, (VITAMIN B-12 ORAL) As directed      cyclobenzaprine (FLEXERIL) 10 MG tablet Refills: 1      DENOSUMAB (PROLIA SUBQ) Inject into the skin.      dicyclomine (BENTYL) 10 MG capsule TAKE 1 CAPSULE BY MOUTH FOUR TIMES DAILY BEFORE MEALS AND AT NIGHT  Qty: 120 capsule, Refills: 11    Associated Diagnoses: Family history of colonic polyps; History of colon polyps      DULoxetine (CYMBALTA) 60 MG capsule TAKE 1 CAPSULE(60 MG) BY MOUTH EVERY DAY  Qty: 90 capsule, Refills: 3      fluticasone (FLONASE) 50 mcg/actuation nasal spray SHAKE LIQUID AND USE 1 SPRAY IN EACH NOSTRIL EVERY DAY  Qty: 16 g, Refills: 11      FLUZONE HIGH-DOSE 2017-18, PF, 180 mcg/0.5 mL vaccine ADM 0.5ML IM UTD  Refills: 0      gabapentin (NEURONTIN) 300 MG capsule Take 1 capsule (300 mg total) by mouth 3 (three) times daily.  Qty: 90 capsule, Refills: 11      GLUCOSAMINE HCL/CHONDRO KIDD A (GLUCOSAMINE-CHONDROITIN ORAL) Every day      hydrocodone-acetaminophen 10-325mg (NORCO)  mg Tab TK 1 T PO BID PRN P  Refills: 0      ketorolac 0.5% (ACULAR) 0.5 % Drop Place 1 drop into the left eye 4 (four) times daily. Start 3 days before surgery.  Qty: 5 mL, Refills: 2      LACTOBACILLUS ACIDOPHILUS (PROBIOTIC ORAL) Take by mouth.      methen-sod phos-meth blue-hyos (UROGESIC-BLUE) 81.6-40.8-0.12 mg Tab Take 1 tablet by mouth twice a week.  Qty: 60 tablet, Refills: 1    Associated Diagnoses: Urinary urgency      moxifloxacin (VIGAMOX) 0.5 % ophthalmic solution Place 1 drop into the left eye 4 (four) times daily. Start 1 day before cataract surgery.  Qty: 3 mL, Refills: 0      MULTIVITAMIN ORAL 2 (two) times daily. Every day      prednisoLONE acetate (PRED FORTE) 1 % DrpS Place 1 drop into the left eye 4 (four) times daily. Start on  day of surgery.  Qty: 5 mL, Refills: 2      tavaborole (KERYDIN) 5 % Rom Apply 1 application topically once daily.  Qty: 4 mL, Refills: 11      urea (UMECTA NAIL FILM PEN) 40 % NFSP Apply 1 application topically 2 (two) times daily.  Qty: 18 g, Refills: 5      VESICARE 10 mg tablet TAKE 1 TABLET(10 MG) BY MOUTH EVERY DAY  Qty: 90 tablet, Refills: 0    Associated Diagnoses: Urinary urgency      vit C,E,Zn,Cu glu-om3-lut-eduardo (OCUVITE ADULT 50+) 250-5-1 mg Cap Take by mouth.      vitamin D 1000 units Tab Take 185 mg by mouth once daily.             Discharge Procedure Orders (must include Diet, Follow-up, Activity)  No discharge procedures on file.

## 2018-05-16 NOTE — TRANSFER OF CARE
"Anesthesia Transfer of Care Note    Patient: Marysol Lyles    Procedure(s) Performed: Procedure(s) (LRB):  phaco/pciol (Left)    Patient location: PACU    Anesthesia Type: MAC    Transport from OR: Transported from OR on room air with adequate spontaneous ventilation    Post pain: adequate analgesia    Post assessment: no apparent anesthetic complications and tolerated procedure well    Post vital signs: stable    Level of consciousness: awake, alert and oriented    Nausea/Vomiting: no nausea/vomiting    Complications: none    Transfer of care protocol was followed      Last vitals:   Visit Vitals  /70 (BP Location: Right arm, Patient Position: Sitting)   Pulse (!) 54   Temp 37 °C (98.6 °F) (Skin)   Resp 20   Ht 5' 4" (1.626 m)   Wt 97.1 kg (214 lb 1.1 oz)   LMP 05/06/1994   SpO2 (!) 94%   Breastfeeding? No   BMI 36.74 kg/m²     "

## 2018-05-17 ENCOUNTER — OFFICE VISIT (OUTPATIENT)
Dept: OPHTHALMOLOGY | Facility: CLINIC | Age: 83
End: 2018-05-17
Payer: COMMERCIAL

## 2018-05-17 ENCOUNTER — PATIENT MESSAGE (OUTPATIENT)
Dept: OPHTHALMOLOGY | Facility: CLINIC | Age: 83
End: 2018-05-17

## 2018-05-17 DIAGNOSIS — H52.7 REFRACTIVE ERROR: ICD-10-CM

## 2018-05-17 DIAGNOSIS — Z98.890 POST-OPERATIVE STATE: Primary | ICD-10-CM

## 2018-05-17 DIAGNOSIS — H43.812 POSTERIOR VITREOUS DETACHMENT OF LEFT EYE: ICD-10-CM

## 2018-05-17 DIAGNOSIS — H35.89 RPE MOTTLING OF MACULA: ICD-10-CM

## 2018-05-17 DIAGNOSIS — H25.13 NUCLEAR SCLEROSIS, BILATERAL: ICD-10-CM

## 2018-05-17 DIAGNOSIS — H40.039 ANATOMICAL NARROW ANGLE: ICD-10-CM

## 2018-05-17 PROCEDURE — 99024 POSTOP FOLLOW-UP VISIT: CPT | Mod: S$GLB,,, | Performed by: OPHTHALMOLOGY

## 2018-05-17 PROCEDURE — 99999 PR PBB SHADOW E&M-EST. PATIENT-LVL II: CPT | Mod: PBBFAC,,, | Performed by: OPHTHALMOLOGY

## 2018-05-17 NOTE — PROGRESS NOTES
HPI     Pre-op Exam    Additional comments: phaco iol OS to be done in [facility] 5/16/2018///           Comments   phaco iol OS to be done in [facility] 5/16/2018///       Last edited by Breanne Flowers on 5/11/2018  2:59 PM. (History)        ROS     Positive for: Cardiovascular (HTN - controlled with meds per pt),   Allergic/Imm (flonase - daily, zyrtec QAM)    Negative for: Neurological (denies seizure/tremor/restless legs),   Genitourinary (denies flomax), Endocrine (denies DM), Eyes (denies eye   surgery/trauma), Respiratory (denies asthma - inhaler was for bronchitis;   denies orthopnea), Heme/Lymph (denies ASA)    Last edited by Vidhi Haywood MD on 5/11/2018  3:03 PM.   (History)        Assessment /Plan     For exam results, see Encounter Report.    Nuclear sclerosis, bilateral  -     Cancel: Place in Outpatient; Standing  -     Cancel: Vital signs; Standing  -     Cancel: Diet NPO; Standing  -     Case Request Operating Room: phaco/pciol  -     IOL Master - OS - Left Eye    RPE mottling of macula    Anatomical narrow angle    Posterior vitreous detachment of left eye    Refractive error    Other orders  -     Cancel: 0.9%  NaCl infusion; Inject into the vein continuous.  -     Cancel: proparacaine 0.5 % ophthalmic solution 1 drop; Place 1 drop into the left eye On call Procedure for Other (Surgery).  -     Cancel: tropicamide 1% ophthalmic solution 1 drop; Place 1 drop into the left eye On call Procedure (Surgery).  -     Cancel: phenylephrine HCL 2.5% ophthalmic solution 1 drop; Place 1 drop into the left eye On call Procedure for Irritation (Surgery).  -     Cancel: cyclopentolate 1% ophthalmic solution 1 drop; Place 1 drop into the left eye On call Procedure for Other (Surgery).  -     Cancel: phenylephrine HCL 10% ophthalmic solution 1 drop; Place 1 drop into the left eye once.  -     Cancel: moxifloxacin 0.5 % ophthalmic solution 1 drop; Place 1 drop into the left eye every 5 (five)  minutes.  -     Cancel: proparacaine 0.5 % ophthalmic solution 1 drop; Place 1 drop into the left eye every 5 (five) minutes.  -     Cancel: lidocaine (PF) 40 mg/mL (4 %) injection 4 mg; 0.1 mLs (4 mg total) by Other route every 5 (five) minutes.  -     Cancel: lidocaine (PF) 40 mg/mL (4 %) injection 4 mg; 0.1 mLs (4 mg total) by Other route as needed (eye pain).  -     moxifloxacin (VIGAMOX) 0.5 % ophthalmic solution; Place 1 drop into the left eye 4 (four) times daily. Start 1 day before cataract surgery.  Dispense: 3 mL; Refill: 0  -     prednisoLONE acetate (PRED FORTE) 1 % DrpS; Place 1 drop into the left eye 4 (four) times daily. Start on day of surgery.  Dispense: 5 mL; Refill: 2  -     ketorolac 0.5% (ACULAR) 0.5 % Drop; Place 1 drop into the left eye 4 (four) times daily. Start 3 days before surgery.  Dispense: 5 mL; Refill: 2            Nuclear sclerosis, bilateral - visually significant OU. More bothersome OS - pre-op done today. Dilated to 7mm with 1% (did not want both drops, as she says it leaves her dilated for extra long time), denies flomax.     RPE mottling of macula - remote history smoking, taking Ocuvite. OCT WNL OD; OS - large druse nasal to fovea, otherwise normal foveal contour.    Anatomical narrow angle - open to TM OU, IOP/C:D WNL OU    Posterior vitreous detachment of left eye - RD precautions    Refractive error

## 2018-05-17 NOTE — PROGRESS NOTES
HPI     Post-op Evaluation    Additional comments: 1 day s/p phaco iol OS 5/16           Comments   Pt states no pain or irritation today. Seeing well and no floaters or   flashes.     Gtts: Ketorolac, Vigamox, Prednisolone QID OS       Last edited by Cheryle Quintana on 5/17/2018 10:40 AM. (History)            Assessment /Plan     For exam results, see Encounter Report.    Post-operative state    Nuclear sclerosis, bilateral    RPE mottling of macula    Anatomical narrow angle    Posterior vitreous detachment of left eye    Refractive error            Nuclear sclerosis, bilateral - POD #1 s/p Phaco/PCIOL OS on 5/16/18. Doing well. Continue drops QID. Precautions reviewed. RTC 1 week  visually significant OU. More bothersome OS - schedule that first. Dilates to 7mm with 1% (did not want both drops, as she says it leaves her dilated for extra long time), denies flomax.     RPE mottling of macula - remote history smoking, taking Ocuvite  -     Posterior Segment OCT Retina - OD WNL; OS large druse nasal to fovea    Anatomical narrow angle - open to TM OU, IOP/C:D WNL OU    Posterior vitreous detachment of left eye - RD precautions    Refractive error

## 2018-05-17 NOTE — PATIENT INSTRUCTIONS
Continue drops 4x per day (Vigamox, Prednisolone, Ketorolac).    Call MD immediately if signs of infection occur (pain, redness, blurred vision). Do not wait for next appointment.    Call MD immediately if you experience new floaters/shadows in your vision, flashes of light, or a curtain or veil appearing to in your vision.    Cell number [phone].

## 2018-05-21 VITALS
RESPIRATION RATE: 20 BRPM | HEIGHT: 64 IN | BODY MASS INDEX: 36.55 KG/M2 | SYSTOLIC BLOOD PRESSURE: 132 MMHG | DIASTOLIC BLOOD PRESSURE: 63 MMHG | TEMPERATURE: 98 F | HEART RATE: 60 BPM | OXYGEN SATURATION: 92 % | WEIGHT: 214.06 LBS

## 2018-05-24 ENCOUNTER — OFFICE VISIT (OUTPATIENT)
Dept: OPHTHALMOLOGY | Facility: CLINIC | Age: 83
End: 2018-05-24
Payer: COMMERCIAL

## 2018-05-24 DIAGNOSIS — H40.039 ANATOMICAL NARROW ANGLE: ICD-10-CM

## 2018-05-24 DIAGNOSIS — H25.13 NUCLEAR SCLEROSIS, BILATERAL: ICD-10-CM

## 2018-05-24 DIAGNOSIS — H35.89 RPE MOTTLING OF MACULA: ICD-10-CM

## 2018-05-24 DIAGNOSIS — Z98.890 POST-OPERATIVE STATE: Primary | ICD-10-CM

## 2018-05-24 DIAGNOSIS — H43.812 POSTERIOR VITREOUS DETACHMENT OF LEFT EYE: ICD-10-CM

## 2018-05-24 DIAGNOSIS — H52.7 REFRACTIVE ERROR: ICD-10-CM

## 2018-05-24 PROCEDURE — 99999 PR PBB SHADOW E&M-EST. PATIENT-LVL III: CPT | Mod: PBBFAC,,, | Performed by: OPHTHALMOLOGY

## 2018-05-24 PROCEDURE — 99024 POSTOP FOLLOW-UP VISIT: CPT | Mod: S$GLB,,, | Performed by: OPHTHALMOLOGY

## 2018-05-24 NOTE — PROGRESS NOTES
HPI     Post-op Evaluation    Additional comments: 1 wk sp phaco  iol OS on 5/16/2018// drops instilled   as directed//           Comments   1 wk sp phaco  iol OS on 5/16/2018// drops instilled as directed//         Last edited by Breanne Flowers on 5/24/2018  1:08 PM. (History)            Assessment /Plan     For exam results, see Encounter Report.    Post-operative state    Nuclear sclerosis, bilateral    RPE mottling of macula    Anatomical narrow angle    Posterior vitreous detachment of left eye    Refractive error            Nuclear sclerosis, bilateral - POD #8 s/p Phaco/PCIOL OS on 5/16/18. Doing well. Starting tomorrow, Continue Vigamox QID until gone. Taper prednisolone and ketorolac 3x/day for 1 week then 2x/day for 1 week then 1x/day for 1 week then stop.  visually significant OU. More bothersome OS - schedule that first. Dilates to 7mm with 1% (did not want both drops, as she says it leaves her dilated for extra long time), denies flomax.     RPE mottling of macula - remote history smoking, taking Ocuvite  -     Posterior Segment OCT Retina - OD WNL; OS large druse nasal to fovea    Anatomical narrow angle - open to TM OU, IOP/C:D WNL OU    Posterior vitreous detachment of left eye - RD precautions    Refractive error - I do not recommend monovision

## 2018-05-24 NOTE — PATIENT INSTRUCTIONS
Continue Vigamox QID until gone. Taper prednisolone and ketorolac 3x/day for 1 week then 2x/day for 1 week then 1x/day for 1 week then stop.

## 2018-06-04 ENCOUNTER — OFFICE VISIT (OUTPATIENT)
Dept: UROLOGY | Facility: CLINIC | Age: 83
End: 2018-06-04
Payer: COMMERCIAL

## 2018-06-04 VITALS
SYSTOLIC BLOOD PRESSURE: 138 MMHG | HEIGHT: 64 IN | BODY MASS INDEX: 36.51 KG/M2 | WEIGHT: 213.88 LBS | HEART RATE: 79 BPM | DIASTOLIC BLOOD PRESSURE: 79 MMHG

## 2018-06-04 DIAGNOSIS — N39.41 URGE INCONTINENCE: Primary | ICD-10-CM

## 2018-06-04 DIAGNOSIS — N39.0 RECURRENT UTI: ICD-10-CM

## 2018-06-04 PROCEDURE — 51701 INSERT BLADDER CATHETER: CPT | Mod: S$GLB,,, | Performed by: UROLOGY

## 2018-06-04 PROCEDURE — 3078F DIAST BP <80 MM HG: CPT | Mod: CPTII,S$GLB,, | Performed by: UROLOGY

## 2018-06-04 PROCEDURE — 3075F SYST BP GE 130 - 139MM HG: CPT | Mod: CPTII,S$GLB,, | Performed by: UROLOGY

## 2018-06-04 PROCEDURE — 87086 URINE CULTURE/COLONY COUNT: CPT

## 2018-06-04 PROCEDURE — 99999 PR PBB SHADOW E&M-EST. PATIENT-LVL V: CPT | Mod: PBBFAC,,, | Performed by: UROLOGY

## 2018-06-04 PROCEDURE — 81002 URINALYSIS NONAUTO W/O SCOPE: CPT | Mod: S$GLB,,, | Performed by: UROLOGY

## 2018-06-04 PROCEDURE — 99205 OFFICE O/P NEW HI 60 MIN: CPT | Mod: 25,S$GLB,, | Performed by: UROLOGY

## 2018-06-04 RX ORDER — DOXYCYCLINE HYCLATE 100 MG
100 TABLET ORAL ONCE
Status: CANCELLED | OUTPATIENT
Start: 2018-06-04 | End: 2018-06-04

## 2018-06-04 RX ORDER — LIDOCAINE HYDROCHLORIDE 20 MG/ML
JELLY TOPICAL ONCE
Status: CANCELLED | OUTPATIENT
Start: 2018-06-04 | End: 2018-06-04

## 2018-06-04 RX ORDER — CETIRIZINE HYDROCHLORIDE 10 MG/1
TABLET ORAL
Qty: 30 TABLET | Status: SHIPPED | OUTPATIENT
Start: 2018-06-04 | End: 2018-06-04 | Stop reason: SDUPTHER

## 2018-06-04 RX ORDER — CETIRIZINE HYDROCHLORIDE 10 MG/1
10 TABLET ORAL DAILY
Qty: 90 TABLET | Refills: 3 | Status: SHIPPED | OUTPATIENT
Start: 2018-06-04 | End: 2019-06-25 | Stop reason: SDUPTHER

## 2018-06-04 NOTE — LETTER
June 4, 2018      Vidhi Randall MD  [address] [facility]  [address]  [facility] [address]           [facility] - Urology 4th Floor  [address]  [place] [address]  Phone: [phone]          Patient: Marysol Lyles   MR Number: [id number]   YOB: 1935   Date of Visit: 6/4/2018       Dear Dr. Vidhi Randall:    Thank you for referring Marysol Lyles to me for evaluation. Attached you will find relevant portions of my assessment and plan of care.    If you have questions, please do not hesitate to call me. I look forward to following Marysol Lyles along with you.    Sincerely,    Lesli Bryant MD    Enclosure  CC:  No Recipients    If you would like to receive this communication electronically, please contact [email or web address] or [phone] to request more information on [email or web address] Link access.    For providers and/or their staff who would like to refer a patient to [facility], please contact us through our one-stop-shop provider referral line, [facility], at [phone].    If you feel you have received this communication in error or would no longer like to receive these types of communications, please e-mail [email or web address]

## 2018-06-04 NOTE — Clinical Note
Please let the patient know that I decided to do a renal ultrasound due to the recurrent UTI's.  She can have it done in [place].  Thanks. Order is in [facility].

## 2018-06-04 NOTE — PROGRESS NOTES
CHIEF COMPLAINT:    Mrs. Lyles is a 82 y.o. female presenting for a consultation at the request of Dr. Vidhi Randall. Patient presents with recurrent UTI and urgency, urge incontinence.    PRESENTING ILLNESS:    Marysol Lyles is a 82 y.o. female who presents stating that she had never had a history of recurrent UTI's until 2 years ago, when she had E coli.  She has been treated with Bactrim and Cipro.  She states her bowels are normal, she was just started on Premarin vaginal cream 2 weeks ago.  She manifests with a malodorous urine, has pain when her stream stops.  No hospitalizations no fevers, chills or flank tenderness.  Review of the chart reveals one positive urine culture on 2016 which was E coli.  Subsequent cultures have shown no growth.     She also has urge incontinence with large volume losses and has been on Vesicare 10 mg and urogesic blue.  She also has stress incontinence with a full bladder.  She wears panty liners.  No gross hematuria    , uterus in place,  and not sexually active, bowels are normal.     REVIEW OF SYSTEMS:    Review of Systems   Constitutional: Negative.    HENT: Negative.    Eyes: Negative.    Respiratory: Negative.    Cardiovascular: Negative.    Gastrointestinal: Negative for constipation.   Genitourinary: Positive for dysuria and urgency.   Musculoskeletal: Positive for back pain.   Skin: Negative.    Neurological: Negative.    Endo/Heme/Allergies: Negative.    Psychiatric/Behavioral: Negative.      PATIENT HISTORY:    Past Medical History:   Diagnosis Date    Arthritis     Chronic pain 2013    DDD (degenerative disc disease)     DDD (degenerative disc disease)     GERD (gastroesophageal reflux disease)     Hyperlipidemia     Hypertension     Osteopenia 3/2/2016    Primary hyperparathyroidism     Senile cataract     OD       Past Surgical History:   Procedure Laterality Date    APPENDECTOMY      CATARACT EXTRACTION W/  INTRAOCULAR LENS IMPLANT Left  05/16/2018    Dr Haywood    COLONOSCOPY W/ BIOPSIES  2/2011    repeat in 3 years    PARTIAL KNEE ARTHROPLASTY Right     Right    SLEEVE GASTROPLASTY  2/8/12    TONSILLECTOMY      TOTAL HIP ARTHROPLASTY Right     TOTAL KNEE ARTHROPLASTY Left        Family History   Problem Relation Age of Onset    Arthritis Mother     Cancer Mother     Arthritis Father     Heart disease Father     Hyperlipidemia Father     Heart attack Father     Cancer Sister     Depression Sister     Cataracts Sister     Cancer Brother     Depression Brother     Diabetes Brother     Heart disease Brother     Hyperlipidemia Brother     Blindness Brother         dm    Retinal detachment Brother     Alcohol abuse Son     Birth defects Son     Diabetes Son     Mental retardation Son     Cancer Maternal Aunt     Alcohol abuse Maternal Uncle     Cancer Maternal Uncle     Depression Maternal Uncle     Cancer Paternal Aunt     Cancer Paternal Uncle     Arthritis Maternal Grandmother     Hypertension Maternal Grandmother     Alcohol abuse Maternal Grandfather     Arthritis Maternal Grandfather     Depression Maternal Grandfather     Hypertension Maternal Grandfather     Arthritis Paternal Grandmother     Arthritis Paternal Grandfather     HIV Son     Glaucoma Son      Social History    Marital status:      Social History Main Topics    Smoking status: Former Smoker    Smokeless tobacco: Never Used      Comment: Has not smoked in 40 years.    Alcohol use Yes      Comment: occasionally    Drug use: No    Sexual activity: No     Social History Narrative    Wears a seatbelt.       Allergies:  Formaldehyde; Mercury; Mercury (elemental); Penicillins; Dilaudid [hydromorphone (bulk)]; Latex, natural rubber; Adhesive; and Hydromorphone    Medications:  Outpatient Encounter Prescriptions as of 6/4/2018   Medication Sig Dispense Refill    ACETAMINOPHEN (TYLENOL EXTRA STRENGTH ORAL) 500 mg. Every 4-6 hours PRN       BIOTIN ORAL Take by mouth once daily.      CALCIUM CARBONATE/VITAMIN D3 (CALTRATE 600 + D ORAL) Take by mouth 2 (two) times daily.       ciclopirox (PENLAC) 8 % Soln APPLY TOPICALLY ONCE DAILY 6.6 mL 0    CYANOCOBALAMIN, VITAMIN B-12, (VITAMIN B-12 ORAL) As directed      cyclobenzaprine (FLEXERIL) 10 MG tablet   1    DENOSUMAB (PROLIA SUBQ) Inject into the skin.      dicyclomine (BENTYL) 10 MG capsule TAKE 1 CAPSULE BY MOUTH FOUR TIMES DAILY BEFORE MEALS AND AT NIGHT 120 capsule 11    DULoxetine (CYMBALTA) 60 MG capsule TAKE 1 CAPSULE(60 MG) BY MOUTH EVERY DAY 90 capsule 3    fluticasone (FLONASE) 50 mcg/actuation nasal spray SHAKE LIQUID AND USE 1 SPRAY IN EACH NOSTRIL EVERY DAY 16 g 11    FLUZONE HIGH-DOSE 2017-18, PF, 180 mcg/0.5 mL vaccine ADM 0.5ML IM UTD  0    gabapentin (NEURONTIN) 300 MG capsule Take 1 capsule (300 mg total) by mouth 3 (three) times daily. 90 capsule 11    GLUCOSAMINE HCL/CHONDRO KIDD A (GLUCOSAMINE-CHONDROITIN ORAL) Every day      ketorolac 0.5% (ACULAR) 0.5 % Drop Place 1 drop into the left eye 4 (four) times daily. Start 3 days before surgery. 5 mL 2    LACTOBACILLUS ACIDOPHILUS (PROBIOTIC ORAL) Take by mouth.      methen-sod phos-meth blue-hyos (UROGESIC-BLUE) 81.6-40.8-0.12 mg Tab Take 1 tablet by mouth twice a week. 60 tablet 1    metoprolol tartrate (LOPRESSOR) 25 MG tablet Take 1 tablet (25 mg total) by mouth 2 (two) times daily. 180 tablet 3    MULTIVITAMIN ORAL 2 (two) times daily. Every day      prednisoLONE acetate (PRED FORTE) 1 % DrpS Place 1 drop into the left eye 4 (four) times daily. Start on day of surgery. 5 mL 2    VESICARE 10 mg tablet TAKE 1 TABLET(10 MG) BY MOUTH EVERY DAY 90 tablet 0    vit C,E,Zn,Cu glu-om3-lut-eduardo (OCUVITE ADULT 50+) 250-5-1 mg Cap Take by mouth.      vitamin D 1000 units Tab Take 185 mg by mouth once daily.      [DISCONTINUED] cetirizine (ZYRTEC) 10 MG tablet Take 1 tablet (10 mg total) by mouth once daily. 30 tablet 5     [DISCONTINUED] moxifloxacin (VIGAMOX) 0.5 % ophthalmic solution Place 1 drop into the left eye 4 (four) times daily. Start 1 day before cataract surgery. 3 mL 0    conjugated estrogens (PREMARIN) vaginal cream Place 0.5 g vaginally twice a week. 30 g 3    [DISCONTINUED] albuterol 90 mcg/actuation inhaler Inhale 2 puffs into the lungs every 6 (six) hours as needed for Wheezing. 8 g 0    [DISCONTINUED] hydrocodone-acetaminophen 10-325mg (NORCO)  mg Tab TK 1 T PO BID PRN P  0    [DISCONTINUED] tavaborole (KERYDIN) 5 % Rom Apply 1 application topically once daily. 4 mL 11    [DISCONTINUED] urea (UMECTA NAIL FILM PEN) 40 % NFSP Apply 1 application topically 2 (two) times daily. 18 g 5     No facility-administered encounter medications on file as of 6/4/2018.          PHYSICAL EXAMINATION:    The patient generally appears in good health, is appropriately interactive, and is in no apparent distress.    Skin: No lesions.    Mental: Cooperative with normal affect.    Neuro: Grossly intact.    HEENT: Normal. No evidence of lymphadenopathy.    Chest:  normal inspiratory effort.    Abdomen:  Soft, non-tender. No masses or organomegaly. Bladder is not palpable. No evidence of flank discomfort. No evidence of inguinal hernia.    Extremities: No clubbing, cyanosis, or edema    Normal external female genitalia  Urethral meatus is normal  Urethra and bladder are nontender to bimanual exam  Well supported anteriorly and posteriorly   Uterus and cervix are normal  No adnexal masses  PVR by catheterization was 30 ml    LABS:    Lab Results   Component Value Date    BUN 17 10/24/2017    CREATININE 0.8 10/24/2017     UA 1.010, PH 5, + leuk, slight hematuria, otherwise, negative    IMPRESSION:    Encounter Diagnoses   Name Primary?    Urge incontinence Yes    Recurrent UTI        PLAN:    1.  SUDS/cysto  2.  The catheterized specimen was sent for culture  3.  Renal ultrasound.      Copy to:  Dr. Vidhi Randall

## 2018-06-04 NOTE — PATIENT INSTRUCTIONS
Urodynamics Studies     The bladder holds urine until it leaves the body through the urethra.     Urodynamics studies are a series of tests that give your doctor a close look at the working of your bladder and urethra. The tests can help your doctor learn about any problems storing urine or voiding (eliminating) urine from your body.  Understanding the lower urinary tract  The lower part of the urinary tract has several parts.  · The bladder stores urine until youre ready to release it.  · The urethra is the tube that carries urine from the bladder out of the body.  · The sphincter is made up of muscles around the opening of the bladder. The sphincter muscles tighten to hold urine in the bladder. They relax to let urine flow. Signals from the brain tell the sphincter when to tighten and relax. These signals also tell the bladder when to contract to let urine flow out of the body.  Why you need a urodynamics study  This test may be ordered if you:  · Are incontinent (leak urine)  · Have a bladder that does not empty all the way.  · Have symptoms such as the need to urinate often or a constant strong need to urinate  · Have intermittent or weak urine stream  · Have persistent urinary tract infections  Preparing for the study  · Tell your doctor about any medicine youre taking. Ask if you should stop them before the study.  · Keep a diary of your bathroom habits. Do this for a few days before the study. This diary can be a helpful part of the evaluation.  · Ask if you need to arrive for the study with a full bladder.  Date Last Reviewed: 1/1/2017  © 7075-5766 [employer]. [address] [place] [address], [place] [address]. All rights reserved. This information is not intended as a substitute for professional medical care. Always follow your healthcare professional's instructions.          Urodynamic Studies     The equipment used for the study varies depending upon the facility and what tests are done.      Urodynamic studies may be done in your doctors office, a clinic, or a hospital. The studies may take up to an hour or more. This depends on which tests your doctor does. The tests are generally painless. You wont need sedating medicine.  Tests that may be done  Uroflowmetry. This measures the amount and speed of urine you void from your bladder. You urinate into a funnel. Its attached to a computer that records your urine flow over time. The amount of urine left in your bladder after you void may also be measured right after this test.  Cystometry. This test evaluates how much your bladder can hold. It also measures how strong your bladder muscle is and how well the signals work that tell you when your bladder is full. Your healthcare provider fills your bladder with sterile water or saline solution, through a catheter. Your doctor will instruct you to report any sensations you feel. Mention if theyre similar to symptoms youve felt at home. Your doctor may ask you to cough, stand and walk, or bear down during this test.  Electromyogram. This helps evaluate the muscle contractions that control urination, such as sphincter muscle contractions. Your healthcare provider may place electrode patches or wires near your rectum or urethra to make the recording. He or she may ask you to try to tighten or relax your sphincter muscles during this test.  Pressure flow study. This test measures your detrusor, urethral, and abdominal pressures. Detrusor is the muscle surrounding the bladder walls that relaxes to allow your bladder to fill, and and contracts to squeeze out urine. A pressure flow study is often done after cystometry. Youre asked to urinate while a probe in your urethra measures pressures.  Video cystourethrography. This takes video pictures of urine flow through your urinary tract. It can help identify blockages or other problems. The bladder is filled with an X-ray contrast fluid. Then X-ray video  pictures are taken as the fluid is urinated out. Ultrasound imaging may also be combined with routine urodynamic studies.  Ambulatory urodynamics. This test can be used to evaluate you while doing usual activities.  Getting your results  After the study, youll get dressed and return to the consultation room. Test results may be ready soon after the study is finished. Or, you may return to your doctors office in a few days for your results. Your doctor can talk with you about the study report and your options.   Date Last Reviewed: 1/1/2017 © 2000-2017 [employer]. [address] [place] [address]. All rights reserved. This information is not intended as a substitute for professional medical care. Always follow your healthcare professional's instructions.          Cystoscopy    Cystoscopy is a procedure that lets your doctor look directly inside your urethra and bladder. It can be used to:  · Help diagnose a problem with your urethra, bladder, or kidneys.  · Take a sample (biopsy) of bladder or urethral tissue.  · Treat certain problems (such as removing kidney stones).  · Place a stent to bypass an obstruction.  · Take special X-rays of the kidneys.  Based on the findings, your doctor may recommend other tests or treatments.  What is a cystoscope?  A cystoscope is a telescope-like instrument that contains lenses and fiberoptics (small glass wires that make bright light). The cystoscope may be straight and rigid, or flexible to bend around curves in the urethra. The doctor may look directly into the cystoscope, or project the image onto a monitor.  Getting ready  · Ask your doctor if you should stop taking any medicines before the procedure.  · Ask whether you should avoid eating or drinking anything after midnight before the procedure.  · Follow any other instructions your doctor gives you.  Tell your doctor before the exam if you:  · Take any medicines, such as aspirin or blood  thinners  · Have allergies to any medicines  · Are pregnant   The procedure  Cystoscopy is done in the doctors office, surgery center, or hospital. The doctor and a nurse are present during the procedure. It takes only a few minutes, longer if a biopsy, X-ray, or treatment needs to be done.  During the procedure:  · You lie on an exam table on your back, knees bent and legs apart. You are covered with a drape.  · Your urethra and the area around it are washed. Anesthetic jelly may be applied to numb the urethra. Other pain medicine is usually not needed. In some cases, you may be offered a mild sedative to help you relax. If a more extensive procedure is to be done, such as a biopsy or kidney stone removal, general anesthesia may be needed.  · The cystoscope is inserted. A sterile fluid is put into the bladder to expand it. You may feel pressure from this fluid.  · When the procedure is done, the cystoscope is removed.  After the procedure  If you had a sedative, general anesthesia, or spinal anesthesia, you must have someone drive you home. Once youre home:  · Drink plenty of fluids.  · You may have burning or light bleeding when you urinate--this is normal.  · Medicines may be prescribed to ease any discomfort or prevent infection. Take these as directed.  · Call your doctor if you have heavy bleeding or blood clots, burning that lasts more than a day, a fever over 100°F  (38° C), or trouble urinating.  Date Last Reviewed: 1/1/2017  © 4444-7091 [employer]. [address] [place] [address], [place] [address]. All rights reserved. This information is not intended as a substitute for professional medical care. Always follow your healthcare professional's instructions.

## 2018-06-05 ENCOUNTER — PATIENT MESSAGE (OUTPATIENT)
Dept: FAMILY MEDICINE | Facility: CLINIC | Age: 83
End: 2018-06-05

## 2018-06-05 ENCOUNTER — PATIENT MESSAGE (OUTPATIENT)
Dept: OPHTHALMOLOGY | Facility: CLINIC | Age: 83
End: 2018-06-05

## 2018-06-05 LAB — BACTERIA UR CULT: NO GROWTH

## 2018-06-06 ENCOUNTER — TELEPHONE (OUTPATIENT)
Dept: OPHTHALMOLOGY | Facility: CLINIC | Age: 83
End: 2018-06-06

## 2018-06-08 ENCOUNTER — TELEPHONE (OUTPATIENT)
Dept: OPHTHALMOLOGY | Facility: CLINIC | Age: 83
End: 2018-06-08

## 2018-06-08 ENCOUNTER — TELEPHONE (OUTPATIENT)
Dept: RADIOLOGY | Facility: HOSPITAL | Age: 83
End: 2018-06-08

## 2018-06-08 NOTE — TELEPHONE ENCOUNTER
----- Message from Ezio Garcia sent at 6/8/2018  8:25 AM CDT -----  Contact: same  Patient called in and stated she had cataract surgery on 5/24/18 on left eye.  Patient has noticed that her left eye is blurry & eyelid is drooping.      Patient would like a call back at [phone]

## 2018-06-10 DIAGNOSIS — J20.9 BRONCHITIS WITH BRONCHOSPASM: ICD-10-CM

## 2018-06-11 RX ORDER — ALBUTEROL SULFATE 90 UG/1
AEROSOL, METERED RESPIRATORY (INHALATION)
Qty: 18 G | Refills: 0 | Status: SHIPPED | OUTPATIENT
Start: 2018-06-11 | End: 2018-12-11

## 2018-06-12 ENCOUNTER — OFFICE VISIT (OUTPATIENT)
Dept: OPHTHALMOLOGY | Facility: CLINIC | Age: 83
End: 2018-06-12
Payer: COMMERCIAL

## 2018-06-12 DIAGNOSIS — H43.812 POSTERIOR VITREOUS DETACHMENT OF LEFT EYE: ICD-10-CM

## 2018-06-12 DIAGNOSIS — Z98.890 POST-OPERATIVE STATE: Primary | ICD-10-CM

## 2018-06-12 DIAGNOSIS — H40.039 ANATOMICAL NARROW ANGLE: ICD-10-CM

## 2018-06-12 DIAGNOSIS — H35.89 RPE MOTTLING OF MACULA: ICD-10-CM

## 2018-06-12 DIAGNOSIS — H52.7 REFRACTIVE ERROR: ICD-10-CM

## 2018-06-12 DIAGNOSIS — H25.13 NUCLEAR SCLEROSIS, BILATERAL: ICD-10-CM

## 2018-06-12 PROCEDURE — 99999 PR PBB SHADOW E&M-EST. PATIENT-LVL IV: CPT | Mod: PBBFAC,,, | Performed by: OPHTHALMOLOGY

## 2018-06-12 PROCEDURE — 99024 POSTOP FOLLOW-UP VISIT: CPT | Mod: S$GLB,,, | Performed by: OPHTHALMOLOGY

## 2018-06-12 NOTE — PROGRESS NOTES
HPI     Post-op Evaluation    Additional comments: pt coming in eary due to blurred va with droopy eye   sp phaco iol OS d 5/24/2018//  pred and Ket QD//           Comments   pt coming in eary due to blurred va with droopy eye sp phaco iol OS d   5/24/2018//  pred and Ket QD//       Last edited by Breanne Flowers on 6/12/2018  9:17 AM. (History)            Assessment /Plan     For exam results, see Encounter Report.    Post-operative state    Nuclear sclerosis, bilateral    RPE mottling of macula  -     Posterior Segment OCT Retina-Both eyes; Future    Anatomical narrow angle    Posterior vitreous detachment of left eye    Refractive error            Nuclear sclerosis, bilateral - POD #27 s/p Phaco/PCIOL OS on 5/16/18. Lots of SPK OS - will treat with frequent non-preserved artificial tears. Re-check vision at scheduled post-op visit, also obtain OCT macula at that time due to drusen noted pre-op. Ptosis is mild, discussed observation - recommend waiting at least 6 months after 2nd eye is done for any referral to allow time for spontaneous resolution.  visually significant OU. More bothersome OS - schedule that first. Dilates to 7mm with 1% (did not want both drops, as she says it leaves her dilated for extra long time), denies flomax.     RPE mottling of macula - remote history smoking, taking Ocuvite  -     Posterior Segment OCT Retina - OD WNL; OS large druse nasal to fovea    Anatomical narrow angle - open to TM OU, IOP/C:D WNL OU    Posterior vitreous detachment of left eye - RD precautions    Refractive error - I do not recommend monovision

## 2018-06-12 NOTE — PATIENT INSTRUCTIONS
I recommend that you use artificial tears at least 4 times daily. Recommended brands include Systane, Refresh, Genteal, and Thera-tears. Non-preserved formulas will be gentler - they will come in small individual vials instead of a bottle.

## 2018-06-18 ENCOUNTER — TELEPHONE (OUTPATIENT)
Dept: RADIOLOGY | Facility: HOSPITAL | Age: 83
End: 2018-06-18

## 2018-06-19 ENCOUNTER — HOSPITAL ENCOUNTER (OUTPATIENT)
Dept: RADIOLOGY | Facility: HOSPITAL | Age: 83
Discharge: HOME | End: 2018-06-19
Attending: UROLOGY
Payer: COMMERCIAL

## 2018-06-19 DIAGNOSIS — N39.0 RECURRENT UTI: ICD-10-CM

## 2018-06-19 PROCEDURE — 76770 US EXAM ABDO BACK WALL COMP: CPT | Mod: TC,PO

## 2018-06-19 PROCEDURE — 76770 US EXAM ABDO BACK WALL COMP: CPT | Mod: 26,,, | Performed by: RADIOLOGY

## 2018-06-20 ENCOUNTER — PROCEDURE VISIT (OUTPATIENT)
Dept: UROLOGY | Facility: CLINIC | Age: 83
End: 2018-06-20
Payer: COMMERCIAL

## 2018-06-20 VITALS
BODY MASS INDEX: 37.11 KG/M2 | HEIGHT: 64 IN | TEMPERATURE: 97 F | DIASTOLIC BLOOD PRESSURE: 81 MMHG | WEIGHT: 217.38 LBS | HEART RATE: 55 BPM | RESPIRATION RATE: 18 BRPM | SYSTOLIC BLOOD PRESSURE: 187 MMHG

## 2018-06-20 DIAGNOSIS — N39.41 URGE INCONTINENCE: ICD-10-CM

## 2018-06-20 PROCEDURE — 52000 CYSTOURETHROSCOPY: CPT | Mod: 59,S$GLB,, | Performed by: UROLOGY

## 2018-06-20 PROCEDURE — 51741 ELECTRO-UROFLOWMETRY FIRST: CPT | Mod: S$GLB,,, | Performed by: UROLOGY

## 2018-06-20 PROCEDURE — 51797 INTRAABDOMINAL PRESSURE TEST: CPT | Mod: S$GLB,,, | Performed by: UROLOGY

## 2018-06-20 PROCEDURE — 51784 ANAL/URINARY MUSCLE STUDY: CPT | Mod: S$GLB,,, | Performed by: UROLOGY

## 2018-06-20 PROCEDURE — 51728 CYSTOMETROGRAM W/VP: CPT | Mod: S$GLB,,, | Performed by: UROLOGY

## 2018-06-20 RX ORDER — DOXYCYCLINE HYCLATE 100 MG
100 TABLET ORAL ONCE
Status: COMPLETED | OUTPATIENT
Start: 2018-06-20 | End: 2018-06-20

## 2018-06-20 RX ORDER — LIDOCAINE HYDROCHLORIDE 20 MG/ML
JELLY TOPICAL ONCE
Status: COMPLETED | OUTPATIENT
Start: 2018-06-20 | End: 2018-06-20

## 2018-06-20 RX ADMIN — Medication 100 MG: at 02:06

## 2018-06-20 RX ADMIN — LIDOCAINE HYDROCHLORIDE: 20 JELLY TOPICAL at 02:06

## 2018-06-20 NOTE — PATIENT INSTRUCTIONS
SIMPLE URODYNAMIC STUDY (SUDS) & CYSTOSCOPY  UROLOGY CLINIC DISCHARGE INSTRUCTIONS    You have had a procedure that will require time to properly heal. Follow the instructions you have been given on how to care for yourself once you are home. Below is additional information to help in your recovery.    ACTIVITY  · There are no restrictions in activity. Start doing again the things you did before the procedure.  · You may experience a slight burning sensation. You may notice a small amount of blood in your urine. This will clear up within a day. Call the clinic if this continues beyond 48 hours.    DIET  · Continue your normal diet. You may eat the same foods you ate before your procedure.  · Drink plenty of fluids during the first 24-48 hours following your procedure.    MEDICATIONS  · Resume all other previous medications from your prescribing physician.  · Continue any pre=procedure antibiotics until they are all gone.    SIGNS AND SYMPTOMS TO REPORT TO THE DOCTOR  · Chills or fever greater than 101° F within 24 hours of procedure.  · Changes in urination, such as increased bleeding, foul smell, cloudy urine, or painful urination.  · Call your doctor with any questions or concerns.    For any emergency situation, call [phone] immediately or go to your nearest emergency room.    [facility]  [phone]  After hours and on the weekends call [phone] and ask to speak with an urology resident on-call with any questions or concerns

## 2018-06-21 NOTE — PROCEDURES
Procedures     Urodynamic Report    Indication:  Urgency, frequency refractory to medications    Patient was taken to the Urodynamic Suite with a comfortably full bladder and asked to perform a free uroflow.  Next, the patient was prepped and the urinary residual was drained with a 14 Fr catheter.  A 7 Fr dual lumen catheter was placed to measure intravesical pressures.  A 10 Fr balloon manometer was placed into the rectum for abdominal pressure measurements.  Patch EMG electrodes were placed on the perineum.  The patient was connected to the [employer] Urodynamic machine, using a multichannel technique, the data were interpreted.  The bladder was filled with sterile water at room temperature at a rate of 30 ml/min.  Patient is filled to urgency.  Filling is performed with the patient in the seated position.  Abdominal leak point pressures are checked at 1st desire, then serially at 50cc increments first with Valsalva then with coughing.  The patient was then asked to sit and void for a pressure flow study.    The following are the results of the study:  1.  Uroflow       Q max:  40.9 ml/sec       Voided volume:  36.1 ml       Pattern of the curve:  intermittent    2.  PVR:  350 ml    3.  CMG       Sensation:         First Desire:  379.1 ml         Normal Desire:  575 ml         Strong Desire:  634.6 ml         Urgency:  764.7 ml       Capacity:  946 ml       Abnormal Contractions:  none       Compliance:  normal    4.  Abdominal Leak Point Pressure:       Valsalva:  82.5 cm H2O at 636.8 ml infused       Cough:  121.9 cm H2O at 636.8 ml infused       Had stress incontinence at 124.9 cm H2O at 380 ml infused    5.  EMG:  Normal guarding reflex, increased significantly during voiding (dysfunctional voider)    6.  Voiding phase       Q max:  154.3 ml/sec       P det at Q max:  30.5 cm H2O       Pattern of the curve:  Intermittent with the katarzyna to baseline       Voided volume:  421.9 ml       PVR:  525    7.  Analysis:   Decreased sensation, increased capacity, stress incontinence, incomplete emptying with dysfunctional voiding    8.  Recommendations:       A.  Discussed timed voiding, CIC, InterStim and PTNS       B.  She would like to try timed voiding, recommended 5-6 times a day       C.  She will let me know how she does over the next several months    CYSTOSCOPY REPORT    Pre Procedure Diagnosis:  Refractory urgency, frequency    Post Procedure Diagnosis:  Normal lower urinary tract    Anesthesia: 10 cc 2% lidocaine jelly applied per urethra.    14 FR Flexible Olympus cystoscope used.    FINDINGS:  Dome, anterior, posterior, lateral walls and bladder base free of urothelial abnormalities. Right and left ureteral orifices in the normal postion and configuration, both effluxed clear urine.  Bladder neck and urethra were normal.    Specimen:  none    The patient was taken to the cystoscopy suite and placed in dorsal lithotomy position.  The genitalia was prepped and draped  in the usual sterile fashion.  Two percent lidocaine jelly was inserted in the urethra.  After sufficent time had passed to allow good local anesthesia, the cystoscope was inserted in the urethra and passed into the bladder visualizing the urethra along its entire course.  The dome, anterior, posterior and lateral walls were examined systematically.  The ureteral orifices were in their usual position and configuration.  The cystoscope was turned upon itself 180 degrees to visualize the bladder neck.  The cystoscope was then brought to the level of the bladder neck, the water was turned on and the urethra was visualized.  The cystoscope was removed and the patient was instructed to urinate prior to leaving the office.     Post procedure medication:  Doxycycline 100 mg x 1     ASSESSMENT/PLAN:  82 year old woman status post flexible cystoscopy.  1. Push fluids for 24 hours.  2. May see blood in the urine, this should gradually improve over the next 2-3 days.  3.  The patient was instructed to return to the office or go to the emergency should fever, chills, cloudy urine, or inability to urinate develop.  4. Follow up in 2-3 months.      Addendum:  Called the patient because I had not counselled her about the dysfunctional voiding.  She is willing to go to physical therapy and her daughter saw someone she liked for female pelvic floor work.  She will let me know the name so I can send the Rx.

## 2018-06-24 ENCOUNTER — PATIENT MESSAGE (OUTPATIENT)
Dept: UROLOGY | Facility: CLINIC | Age: 83
End: 2018-06-24

## 2018-06-24 DIAGNOSIS — N20.0 RENAL CALCULUS: ICD-10-CM

## 2018-06-24 DIAGNOSIS — D17.71 ANGIOMYOLIPOMA OF LEFT KIDNEY: Primary | ICD-10-CM

## 2018-06-26 ENCOUNTER — PATIENT MESSAGE (OUTPATIENT)
Dept: UROLOGY | Facility: CLINIC | Age: 83
End: 2018-06-26

## 2018-06-26 ENCOUNTER — OFFICE VISIT (OUTPATIENT)
Dept: OPHTHALMOLOGY | Facility: CLINIC | Age: 83
End: 2018-06-26
Payer: COMMERCIAL

## 2018-06-26 DIAGNOSIS — H43.812 POSTERIOR VITREOUS DETACHMENT OF LEFT EYE: ICD-10-CM

## 2018-06-26 DIAGNOSIS — H52.7 REFRACTIVE ERROR: ICD-10-CM

## 2018-06-26 DIAGNOSIS — H40.039 ANATOMICAL NARROW ANGLE: ICD-10-CM

## 2018-06-26 DIAGNOSIS — H25.11 NUCLEAR SCLEROSIS, RIGHT: ICD-10-CM

## 2018-06-26 DIAGNOSIS — Z98.890 POST-OPERATIVE STATE: Primary | ICD-10-CM

## 2018-06-26 DIAGNOSIS — Z96.1 PSEUDOPHAKIA, LEFT EYE: ICD-10-CM

## 2018-06-26 PROCEDURE — 99024 POSTOP FOLLOW-UP VISIT: CPT | Mod: S$GLB,,, | Performed by: OPHTHALMOLOGY

## 2018-06-26 PROCEDURE — 99999 PR PBB SHADOW E&M-EST. PATIENT-LVL IV: CPT | Mod: PBBFAC,,, | Performed by: OPHTHALMOLOGY

## 2018-06-26 NOTE — PROGRESS NOTES
HPI     Post-op Evaluation    Additional comments: 1 month s/p phaco iol OS 5/16           Comments   Pt states seeing a little better since last visit. + no itching or   irritation since last visit. Using Systane PRN. Left upper lid seems to be   drooping more since surgery.        Last edited by Cheryle Quintana on 6/26/2018  2:15 PM. (History)            Assessment /Plan     For exam results, see Encounter Report.    Post-operative state    Nuclear sclerosis, right    Pseudophakia, left eye    Anatomical narrow angle    Posterior vitreous detachment of left eye    Refractive error              Nuclear sclerosis, bilateral - POD #27 s/p Phaco/PCIOL OS on 5/16/18. Less SPK OS - likely due to preservatives, not requiring as frequent non-preserved artificial tears lately. Ok to defer OD for now, MRx given today.  visually significant OU. More bothersome OS - schedule that first. Dilates to 7mm with 1% (did not want both drops, as she says it leaves her dilated for extra long time), denies flomax.     RPE mottling of macula - remote history smoking, taking Ocuvite. Baseline OCT macula 4/24/18.  .   Anatomical narrow angle - open to TM OU, IOP/C:D WNL OU    Ptosis is mild, discussed observation - recommend waiting at least 6 months after 2nd eye is done for any referral to allow time for spontaneous resolution; might benefit from Muellerectomy or Fassanella-Servat procedure due to it looking better today after Phenylephrine.    Posterior vitreous detachment of left eye - RD precautions    Refractive error - I do not recommend monovision. MRx given today.

## 2018-06-26 NOTE — LETTER
2018    Tala Sow  [facility]   [address] [place] [address]             [facility] - Urology 4th Floor  [address]  [facility] [address]  Phone: [phone] Dear Mrs. Sow,    Mrs. 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  The diagnosis was made based on urodynamics.      Please evaluate and treat.  The frequency and duration as you see fit.  Please fax reports to my office ([phone].)      If you have any questions or concerns, please don't hesitate to call.    Sincerely,        Lesli Bryant MD

## 2018-06-26 NOTE — TELEPHONE ENCOUNTER
Called the patient and she would like to do physical therapy in [place] (her daughter went to a PT in [place] who she really liked.)  I asked that she call me with the info and I will be happy to give her an Rx. For dysfunctional voiding.    Discussed the angiomyolipoma and will repeat the renal ultrasound in 1 year.  Order done and will have Jennifer do a recall for her

## 2018-06-30 ENCOUNTER — PATIENT MESSAGE (OUTPATIENT)
Dept: UROLOGY | Facility: CLINIC | Age: 83
End: 2018-06-30

## 2018-07-10 ENCOUNTER — TELEPHONE (OUTPATIENT)
Dept: UROGYNECOLOGY | Facility: CLINIC | Age: 83
End: 2018-07-10

## 2018-07-10 NOTE — TELEPHONE ENCOUNTER
Informed pt that her rx was for 60 pills and also refaxed her rx to her pharmacy. Pt voiced understanding and call ended.

## 2018-07-23 ENCOUNTER — TELEPHONE (OUTPATIENT)
Dept: ENDOCRINOLOGY | Facility: CLINIC | Age: 83
End: 2018-07-23

## 2018-07-23 NOTE — TELEPHONE ENCOUNTER
----- Message from Lizbet De La Fuente sent at 7/23/2018  2:30 PM CDT -----  Contact: daughter- luciano-  [phone]  Daughter is calling to schedule injection for mother.  Daughter states she is suppose to come in every 6 months.  Patient needs to be seen this week.

## 2018-07-23 NOTE — TELEPHONE ENCOUNTER
Pt's daughter stated that pt is two weeks overdue for Prolia injection, and is requesting that an order be placed ASAP for scheduling. Please advise.

## 2018-07-23 NOTE — TELEPHONE ENCOUNTER
I believe this is the patient who said at her last visit that because of where she lived she was going to schedule her next Prolia injection at a closer [facility] facility and establish care with another endocrinologist which she did schedule with Dr. Diaz in March to get established but that appointment was canceled and since Dr. Diaz is no longer there please see if we have Prolia available because she was approved for the year then she can come in this week any time for the injection

## 2018-07-24 ENCOUNTER — CLINICAL SUPPORT (OUTPATIENT)
Dept: ENDOCRINOLOGY | Facility: CLINIC | Age: 83
End: 2018-07-24
Payer: COMMERCIAL

## 2018-07-24 ENCOUNTER — TELEPHONE (OUTPATIENT)
Dept: ENDOCRINOLOGY | Facility: CLINIC | Age: 83
End: 2018-07-24

## 2018-07-24 PROCEDURE — 99999 PR PBB SHADOW E&M-EST. PATIENT-LVL I: CPT | Mod: PBBFAC,,, | Performed by: INTERNAL MEDICINE

## 2018-07-24 PROCEDURE — 96372 THER/PROPH/DIAG INJ SC/IM: CPT | Mod: S$GLB,,, | Performed by: INTERNAL MEDICINE

## 2018-07-24 NOTE — TELEPHONE ENCOUNTER
Pt's daughter advised of Prolia injection appt and verbalized understanding. Call ended pleasantly.

## 2018-07-24 NOTE — PROGRESS NOTES
It is noted that patient has an ALLERGY to latex.  She reports that when she would see the dentist she will get a rash around her mouth and she is not sure if it was the latex gloves or mercury that might have been used in the past.  Latex is not an ingredient of Prolia but it is contained in the grey needle that is supplied with the medication and therefore patient did not come in contact with latex when she previously received Prolia nor did she have any reaction with injection previously

## 2018-07-25 ENCOUNTER — PATIENT MESSAGE (OUTPATIENT)
Dept: ADMINISTRATIVE | Facility: HOSPITAL | Age: 83
End: 2018-07-25

## 2018-07-25 DIAGNOSIS — M85.80 OSTEOPENIA, UNSPECIFIED LOCATION: Primary | ICD-10-CM

## 2018-07-25 NOTE — TELEPHONE ENCOUNTER
Please arrange for her to see Dr. Monteiro as per her request.  Please let her know the approximate date that she can be seen.  He is usually very difficult to book with so she may consider another provider 1st so that she can get whatever she needs done in a timely manner.  Does she know if she had osteoporosis in the past because her last 3 bone density tests showed that she just has osteopenia.  Did she have a major fracture in the past?      I have signed for the following orders AND/OR meds.  Please call the patient and ask the patient to schedule the testing AND/OR inform about any medications that were sent.      Orders Placed This Encounter   Procedures    Ambulatory referral to Endocrinology     Referral Priority:   Routine     Referral Type:   Consultation     Referral Reason:   Specialty Services Required     Referred to Provider:   Richmond Stubbs DO     Requested Specialty:   Endocrinology     Number of Visits Requested:   1

## 2018-07-26 ENCOUNTER — TELEPHONE (OUTPATIENT)
Dept: ENDOCRINOLOGY | Facility: CLINIC | Age: 83
End: 2018-07-26

## 2018-07-26 NOTE — TELEPHONE ENCOUNTER
Pt daughter (Nafisa)will call in Dec for June/July appt to establish Prolia care with Dr. Stubbs  Will cont care with Dr. Perez until then

## 2018-07-26 NOTE — TELEPHONE ENCOUNTER
----- Message from Karen White sent at 7/26/2018  2:24 PM CDT -----  Contact: pt daughter  Type: Needs Medical Advice    Who Called:  Nafisa   Symptoms (please be specific):  n/a  How long has patient had these symptoms:  n/a  Pharmacy name and phone #:  n/a  Best Call Back Number:    Additional Information:  Pt daughter is calling to speak with office about changing from their previous endocrinologist in [place] over to Dr. Stubbs and seeing if they can continue her prolia injections. Please call back and advise.

## 2018-07-29 ENCOUNTER — PATIENT MESSAGE (OUTPATIENT)
Dept: UROLOGY | Facility: CLINIC | Age: 83
End: 2018-07-29

## 2018-10-09 RX ORDER — METOPROLOL TARTRATE 25 MG/1
TABLET, FILM COATED ORAL
Qty: 180 TABLET | Refills: 0 | Status: SHIPPED | OUTPATIENT
Start: 2018-10-09 | End: 2018-12-29 | Stop reason: SDUPTHER

## 2018-11-01 ENCOUNTER — PATIENT MESSAGE (OUTPATIENT)
Dept: ENDOCRINOLOGY | Facility: CLINIC | Age: 83
End: 2018-11-01

## 2018-11-09 ENCOUNTER — TELEPHONE (OUTPATIENT)
Dept: ENDOCRINOLOGY | Facility: CLINIC | Age: 83
End: 2018-11-09

## 2018-11-09 ENCOUNTER — PATIENT MESSAGE (OUTPATIENT)
Dept: ENDOCRINOLOGY | Facility: CLINIC | Age: 83
End: 2018-11-09

## 2018-11-09 NOTE — TELEPHONE ENCOUNTER
Attempted to reach patient in regards to a message she left in her patient poral but number is busy.

## 2018-11-12 ENCOUNTER — PATIENT MESSAGE (OUTPATIENT)
Dept: ENDOCRINOLOGY | Facility: CLINIC | Age: 83
End: 2018-11-12

## 2018-11-12 DIAGNOSIS — M81.0 OSTEOPOROSIS WITHOUT CURRENT PATHOLOGICAL FRACTURE, UNSPECIFIED OSTEOPOROSIS TYPE: Primary | ICD-10-CM

## 2018-11-13 ENCOUNTER — PATIENT MESSAGE (OUTPATIENT)
Dept: ADMINISTRATIVE | Facility: HOSPITAL | Age: 83
End: 2018-11-13

## 2018-11-14 ENCOUNTER — PATIENT MESSAGE (OUTPATIENT)
Dept: OBSTETRICS AND GYNECOLOGY | Facility: CLINIC | Age: 83
End: 2018-11-14

## 2018-11-29 ENCOUNTER — HOSPITAL ENCOUNTER (OUTPATIENT)
Dept: RADIOLOGY | Facility: HOSPITAL | Age: 83
Discharge: HOME | End: 2018-11-29
Attending: OBSTETRICS & GYNECOLOGY
Payer: COMMERCIAL

## 2018-11-29 VITALS — HEIGHT: 64 IN | WEIGHT: 217 LBS | BODY MASS INDEX: 37.05 KG/M2

## 2018-11-29 DIAGNOSIS — Z12.39 ENCOUNTER FOR SPECIAL SCREENING EXAMINATION FOR NEOPLASM OF BREAST: ICD-10-CM

## 2018-11-29 PROCEDURE — 77067 SCR MAMMO BI INCL CAD: CPT | Mod: TC,PN

## 2018-11-29 PROCEDURE — 77067 SCR MAMMO BI INCL CAD: CPT | Mod: 26,,, | Performed by: RADIOLOGY

## 2018-11-29 PROCEDURE — 77063 BREAST TOMOSYNTHESIS BI: CPT | Mod: TC,PN

## 2018-11-29 PROCEDURE — 77063 BREAST TOMOSYNTHESIS BI: CPT | Mod: 26,,, | Performed by: RADIOLOGY

## 2018-12-11 ENCOUNTER — OFFICE VISIT (OUTPATIENT)
Dept: OBSTETRICS AND GYNECOLOGY | Facility: CLINIC | Age: 83
End: 2018-12-11
Payer: COMMERCIAL

## 2018-12-11 VITALS
DIASTOLIC BLOOD PRESSURE: 80 MMHG | SYSTOLIC BLOOD PRESSURE: 132 MMHG | BODY MASS INDEX: 36.96 KG/M2 | HEIGHT: 64 IN | RESPIRATION RATE: 16 BRPM | WEIGHT: 216.5 LBS

## 2018-12-11 DIAGNOSIS — R39.15 URINARY URGENCY: ICD-10-CM

## 2018-12-11 DIAGNOSIS — Z12.4 ENCOUNTER FOR PAP SMEAR OF CERVIX WITH HPV DNA COTESTING: Primary | ICD-10-CM

## 2018-12-11 DIAGNOSIS — N95.2 VAGINAL ATROPHY: ICD-10-CM

## 2018-12-11 PROCEDURE — 99397 PER PM REEVAL EST PAT 65+ YR: CPT | Mod: S$GLB,,, | Performed by: OBSTETRICS & GYNECOLOGY

## 2018-12-11 PROCEDURE — 88175 CYTOPATH C/V AUTO FLUID REDO: CPT

## 2018-12-11 PROCEDURE — 99999 PR PBB SHADOW E&M-EST. PATIENT-LVL V: CPT | Mod: PBBFAC,,, | Performed by: OBSTETRICS & GYNECOLOGY

## 2018-12-11 PROCEDURE — 87624 HPV HI-RISK TYP POOLED RSLT: CPT

## 2018-12-11 RX ORDER — SOLIFENACIN SUCCINATE 10 MG/1
TABLET, FILM COATED ORAL
Qty: 90 TABLET | Refills: 3 | Status: SHIPPED | OUTPATIENT
Start: 2018-12-11 | End: 2019-02-21 | Stop reason: CLARIF

## 2018-12-11 RX ORDER — URINARY ANTISEPTIC ANTISPASMODIC 81.6; 40.8; 10.8; .12 MG/1; MG/1; MG/1; MG/1
1 TABLET ORAL
Qty: 60 TABLET | Refills: 1 | Status: SHIPPED | OUTPATIENT
Start: 2018-12-13 | End: 2020-02-03

## 2018-12-11 NOTE — PROGRESS NOTES
Chief Complaint   Patient presents with    Well Woman       History of Present Illness: Marysol Lyles is a 83 y.o. female that presents today 12/11/2018 for well gyn visit.    Past Medical History:   Diagnosis Date    Arthritis     Chronic pain 8/14/2013    DDD (degenerative disc disease)     DDD (degenerative disc disease)     GERD (gastroesophageal reflux disease)     Hyperlipidemia     Hypertension     Osteopenia 3/2/2016    Primary hyperparathyroidism     Senile cataract     OD       Past Surgical History:   Procedure Laterality Date    APPENDECTOMY      CATARACT EXTRACTION W/  INTRAOCULAR LENS IMPLANT Left 05/16/2018    Dr Haywood    COLONOSCOPY W/ BIOPSIES  2/2011    repeat in 3 years    PARTIAL KNEE ARTHROPLASTY Right     Right    phaco/pciol Left 5/16/2018    Performed by Vidhi [facility]wood MD at [facility] OR    SLEEVE GASTROPLASTY  2/8/12    TONSILLECTOMY      TOTAL HIP ARTHROPLASTY Right     TOTAL KNEE ARTHROPLASTY Left        Current Outpatient Medications   Medication Sig Dispense Refill    ACETAMINOPHEN (TYLENOL EXTRA STRENGTH ORAL) 500 mg. Every 4-6 hours PRN      BIOTIN ORAL Take by mouth once daily.      C,E,zinc,copper 11/cijfy2p/lut (OCUVITE ADULT 50 PLUS ORAL) Take by mouth.      CALCIUM CARBONATE/VITAMIN D3 (CALTRATE 600 + D ORAL) Take by mouth 2 (two) times daily.       cetirizine (ZYRTEC) 10 MG tablet Take 1 tablet (10 mg total) by mouth once daily. 90 tablet 3    CYANOCOBALAMIN, VITAMIN B-12, (VITAMIN B-12 ORAL) As directed      cyclobenzaprine (FLEXERIL) 10 MG tablet   1    DENOSUMAB (PROLIA SUBQ) Inject into the skin.      dicyclomine (BENTYL) 10 MG capsule TAKE 1 CAPSULE BY MOUTH FOUR TIMES DAILY BEFORE MEALS AND AT NIGHT 120 capsule 11    DULoxetine (CYMBALTA) 60 MG capsule TAKE 1 CAPSULE(60 MG) BY MOUTH EVERY DAY 90 capsule 3    fluticasone (FLONASE) 50 mcg/actuation nasal spray SHAKE LIQUID AND USE 1 SPRAY IN EACH NOSTRIL EVERY DAY 16 g 11    FLUZONE  HIGH-DOSE 2017-18, PF, 180 mcg/0.5 mL vaccine ADM 0.5ML IM UTD  0    gabapentin (NEURONTIN) 300 MG capsule Take 1 capsule (300 mg total) by mouth 3 (three) times daily. 90 capsule 11    GLUCOSAMINE HCL/CHONDRO KIDD A (GLUCOSAMINE-CHONDROITIN ORAL) Every day      LACTOBACILLUS ACIDOPHILUS (PROBIOTIC ORAL) Take by mouth.      [START ON 12/13/2018] methen-sod phos-meth blue-hyos (UROGESIC-BLUE) 81.6-40.8-0.12 mg Tab Take 1 tablet by mouth twice a week. 60 tablet 1    metoprolol tartrate (LOPRESSOR) 25 MG tablet TAKE 1 TABLET(25 MG) BY MOUTH TWICE DAILY 180 tablet 0    MULTIVITAMIN ORAL 2 (two) times daily. Every day      solifenacin (VESICARE) 10 MG tablet TAKE 1 TABLET(10 MG) BY MOUTH EVERY DAY 90 tablet 3    vit C,E,Zn,Cu glu-om3-lut-eduardo (OCUVITE ADULT 50+) 250-5-1 mg Cap Take by mouth.      vitamin D 1000 units Tab Take 185 mg by mouth once daily.      [START ON 12/13/2018] conjugated estrogens (PREMARIN) vaginal cream Place 0.5 g vaginally twice a week. 30 g 3     No current facility-administered medications for this visit.        Review of patient's allergies indicates:   Allergen Reactions    Formaldehyde Hives and Other (See Comments)     Other reaction(s): Other (See Comments)  Other reaction(s): Hives  Other reaction(s): Hives  Other reaction(s): Hives  Other reaction(s): Hives    Mercury Hives     Other reaction(s): Hives  Other reaction(s): Hives  Other reaction(s): Hives  Other reaction(s): Hives    Mercury (elemental) Hives    Penicillins Swelling     Other reaction(s): Swelling  Other reaction(s): Swelling    Adhesive tape-silicones     Dilaudid [hydromorphone (bulk)]     Latex, natural rubber Hives    Adhesive Rash     Other reaction(s): Rash  Other reaction(s): Rash    Hydromorphone Itching       Family History   Problem Relation Age of Onset    Arthritis Mother     Cancer Mother     Breast cancer Mother     Arthritis Father     Heart disease Father     Hyperlipidemia Father      Heart attack Father     Cancer Sister     Depression Sister     Cataracts Sister     Cancer Brother     Depression Brother     Diabetes Brother     Heart disease Brother     Hyperlipidemia Brother     Blindness Brother         dm    Retinal detachment Brother     Alcohol abuse Son     Birth defects Son     Diabetes Son     Mental retardation Son     Cancer Maternal Aunt     Alcohol abuse Maternal Uncle     Cancer Maternal Uncle     Depression Maternal Uncle     Cancer Paternal Aunt     Cancer Paternal Uncle     Arthritis Maternal Grandmother     Hypertension Maternal Grandmother     Alcohol abuse Maternal Grandfather     Arthritis Maternal Grandfather     Depression Maternal Grandfather     Hypertension Maternal Grandfather     Arthritis Paternal Grandmother     Arthritis Paternal Grandfather     HIV Son     Glaucoma Son     Breast cancer Paternal Cousin     Amblyopia Neg Hx     Macular degeneration Neg Hx     Strabismus Neg Hx     Stroke Neg Hx     Thyroid disease Neg Hx        Social History     Socioeconomic History    Marital status:      Spouse name: None    Number of children: None    Years of education: None    Highest education level: None   Social Needs    Financial resource strain: None    Food insecurity - worry: None    Food insecurity - inability: None    Transportation needs - medical: None    Transportation needs - non-medical: None   Occupational History    None   Tobacco Use    Smoking status: Former Smoker    Smokeless tobacco: Never Used    Tobacco comment: Has not smoked in 40 years.   Substance and Sexual Activity    Alcohol use: Yes     Comment: occasionally    Drug use: No    Sexual activity: No     Birth control/protection: Post-menopausal   Other Topics Concern    None   Social History Narrative    Wears a seatbelt.       OB History    Para Term  AB Living   6 6 6     6   SAB TAB Ectopic Multiple Live Births            "4      # Outcome Date GA Lbr Gopal/2nd Weight Sex Delivery Anes PTL Lv   6 Term 1962    M Vag-Spont  N JARED   5 Term 1961    M Vag-Spont  N    4 Term 1959    F Vag-Spont  N JARED   3 Term 1957    F Vag-Spont  N JARED      Birth Comments: twins   2 Term 1956    M Vag-Spont  N JARED   1 Term                   Review of Symptoms:  GENERAL: Denies weight gain or weight loss. Feeling well overall.   SKIN: Denies rash or lesions.   HEAD: Denies head injury or headache.   NODES: Denies enlarged lymph nodes.   CHEST: Denies chest pain or shortness of breath.   CARDIOVASCULAR: Denies palpitations or left sided chest pain.   ABDOMEN: No abdominal pain, constipation, diarrhea, nausea, vomiting or rectal bleeding.   URINARY: No frequency, dysuria, hematuria, or burning on urination.  HEMATOLOGIC: No easy bruisability or excessive bleeding.   MUSCULOSKELETAL: Denies joint pain or swelling.     /80   Resp 16   Ht 5' 4" (1.626 m)   Wt 98.2 kg (216 lb 7.9 oz)   LMP 05/06/1994   Physical Exam:  APPEARANCE: Well nourished, well developed, in no acute distress.  SKIN: Normal skin turgor, no lesions.  NECK: Neck symmetric without masses   RESPIRATORY: Normal respiratory effort with no retractions or use of accessory muscles  CARDIOVASCULAR: Peripheral vascular system with no swelling no varicosities and palpation of pulses normal  LYMPHATIC: No enlargements of the lymph nodes noted in the neck, axillae, or groin  ABDOMEN: Soft. No tenderness or masses. No hepatosplenomegaly. No hernias.PELVIC: Normal external female genitalia without lesions. Normal hair distribution. Adequate perineal body, normal urethral meatus. Urethra with no masses.  Bladder nontender. Vagina moist and well rugated without lesions or discharge. Cervix pink and without lesions. No significant cystocele or rectocele. Bimanual exam showed uterus normal size, shape, position, mobile and nontender. Adnexa without masses or tenderness. Urethra and bladder normal. "   EXTREMITIES: No clubbing cyanosis or edema.    ASSESSMENT/PLAN:  Encounter for Pap smear of cervix with HPV DNA cotesting  -     Liquid-based pap smear, screening  -     HPV High Risk Genotypes, PCR    Urinary urgency  -     solifenacin (VESICARE) 10 MG tablet; TAKE 1 TABLET(10 MG) BY MOUTH EVERY DAY  Dispense: 90 tablet; Refill: 3  -     methen-sod phos-meth blue-hyos (UROGESIC-BLUE) 81.6-40.8-0.12 mg Tab; Take 1 tablet by mouth twice a week.  Dispense: 60 tablet; Refill: 1    Vaginal atrophy  -     conjugated estrogens (PREMARIN) vaginal cream; Place 0.5 g vaginally twice a week.  Dispense: 30 g; Refill: 3          Patient was counseled today on Pap guidelines, recommendation for pelvic exams, mammograms every other year after the age of 40 and annually after the age of 50, Colonoscopy after the age of 50, Dexa Bone Scan and calcium and vitamin D supplementation in menopause and to see her PCP for other health maintenance.   FOLLOW-UP:prn

## 2018-12-12 ENCOUNTER — TELEPHONE (OUTPATIENT)
Dept: ENDOCRINOLOGY | Facility: CLINIC | Age: 83
End: 2018-12-12

## 2018-12-12 DIAGNOSIS — M85.80 OSTEOPENIA, UNSPECIFIED LOCATION: Primary | ICD-10-CM

## 2018-12-12 NOTE — TELEPHONE ENCOUNTER
----- Message from Simona Cottrell sent at 12/12/2018  9:29 AM CST -----  Type: Needs Medical Advice    Who Called:  Daughter/Nafisa Laguna Call Back Number: [phone]  Additional Information: Patient has an appointment on 6/24/18. She gets the prolia injections. Daughter wants to know if there are any tests that need to be done prior to this appointment. Please call to advise.

## 2018-12-12 NOTE — TELEPHONE ENCOUNTER
Dr Stubbs, pt was seen last by Dr Perez, is following up with you in June  Please advise labs needed prior to appt

## 2018-12-14 LAB
HPV HR 12 DNA CVX QL NAA+PROBE: NEGATIVE
HPV16 AG SPEC QL: NEGATIVE
HPV18 DNA SPEC QL NAA+PROBE: NEGATIVE

## 2018-12-19 ENCOUNTER — PATIENT MESSAGE (OUTPATIENT)
Dept: FAMILY MEDICINE | Facility: CLINIC | Age: 83
End: 2018-12-19

## 2018-12-19 RX ORDER — NYSTATIN 100000 [USP'U]/G
POWDER TOPICAL 4 TIMES DAILY
Qty: 60 G | Refills: 11 | Status: SHIPPED | OUTPATIENT
Start: 2018-12-19 | End: 2020-04-06

## 2018-12-26 ENCOUNTER — PATIENT MESSAGE (OUTPATIENT)
Dept: FAMILY MEDICINE | Facility: CLINIC | Age: 83
End: 2018-12-26

## 2018-12-27 NOTE — TELEPHONE ENCOUNTER
There are medication interactions between the Transderm-Scop, Bentyl/dicyclomine, VESIcare.  I would not recommend taking all of these together.

## 2018-12-30 RX ORDER — METOPROLOL TARTRATE 25 MG/1
TABLET, FILM COATED ORAL
Qty: 180 TABLET | Refills: 11 | Status: SHIPPED | OUTPATIENT
Start: 2018-12-30 | End: 2020-01-18

## 2019-01-01 RX ORDER — FLUTICASONE PROPIONATE 50 MCG
SPRAY, SUSPENSION (ML) NASAL
Qty: 16 ML | Refills: 11 | Status: SHIPPED | OUTPATIENT
Start: 2019-01-01 | End: 2020-02-07

## 2019-01-15 ENCOUNTER — PATIENT OUTREACH (OUTPATIENT)
Dept: ADMINISTRATIVE | Facility: HOSPITAL | Age: 84
End: 2019-01-15

## 2019-01-15 DIAGNOSIS — I10 ESSENTIAL HYPERTENSION: ICD-10-CM

## 2019-01-15 DIAGNOSIS — E78.5 HYPERLIPIDEMIA, UNSPECIFIED HYPERLIPIDEMIA TYPE: Primary | ICD-10-CM

## 2019-01-17 ENCOUNTER — LAB VISIT (OUTPATIENT)
Dept: LAB | Facility: HOSPITAL | Age: 84
End: 2019-01-17
Attending: INTERNAL MEDICINE
Payer: COMMERCIAL

## 2019-01-17 DIAGNOSIS — M81.0 OSTEOPOROSIS WITHOUT CURRENT PATHOLOGICAL FRACTURE, UNSPECIFIED OSTEOPOROSIS TYPE: ICD-10-CM

## 2019-01-17 DIAGNOSIS — I10 ESSENTIAL HYPERTENSION: ICD-10-CM

## 2019-01-17 DIAGNOSIS — E78.5 HYPERLIPIDEMIA, UNSPECIFIED HYPERLIPIDEMIA TYPE: ICD-10-CM

## 2019-01-17 LAB
ANION GAP SERPL CALC-SCNC: 10 MMOL/L
BUN SERPL-MCNC: 27 MG/DL
CALCIUM SERPL-MCNC: 10.8 MG/DL
CHLORIDE SERPL-SCNC: 101 MMOL/L
CHOLEST SERPL-MCNC: 205 MG/DL
CHOLEST/HDLC SERPL: 3.1 {RATIO}
CO2 SERPL-SCNC: 28 MMOL/L
CREAT SERPL-MCNC: 0.9 MG/DL
EST. GFR  (AFRICAN AMERICAN): >60 ML/MIN/1.73 M^2
EST. GFR  (NON AFRICAN AMERICAN): >60 ML/MIN/1.73 M^2
GLUCOSE SERPL-MCNC: 101 MG/DL
HDLC SERPL-MCNC: 67 MG/DL
HDLC SERPL: 32.7 %
LDLC SERPL CALC-MCNC: 115.6 MG/DL
NONHDLC SERPL-MCNC: 138 MG/DL
POTASSIUM SERPL-SCNC: 4 MMOL/L
SODIUM SERPL-SCNC: 139 MMOL/L
TRIGL SERPL-MCNC: 112 MG/DL

## 2019-01-17 PROCEDURE — 36415 COLL VENOUS BLD VENIPUNCTURE: CPT | Mod: PO

## 2019-01-17 PROCEDURE — 80048 BASIC METABOLIC PNL TOTAL CA: CPT

## 2019-01-17 PROCEDURE — 80061 LIPID PANEL: CPT

## 2019-01-19 DIAGNOSIS — Z83.719 FAMILY HISTORY OF COLONIC POLYPS: ICD-10-CM

## 2019-01-19 DIAGNOSIS — Z86.010 HISTORY OF COLON POLYPS: ICD-10-CM

## 2019-01-19 RX ORDER — DICYCLOMINE HYDROCHLORIDE 10 MG/1
CAPSULE ORAL
Qty: 120 CAPSULE | Refills: 0 | Status: SHIPPED | OUTPATIENT
Start: 2019-01-19 | End: 2019-02-18 | Stop reason: SDUPTHER

## 2019-01-24 ENCOUNTER — CLINICAL SUPPORT (OUTPATIENT)
Dept: ENDOCRINOLOGY | Facility: CLINIC | Age: 84
End: 2019-01-24
Payer: COMMERCIAL

## 2019-01-24 PROCEDURE — 96372 PR INJECTION,THERAP/PROPH/DIAG2ST, IM OR SUBCUT: ICD-10-PCS | Mod: S$GLB,,, | Performed by: INTERNAL MEDICINE

## 2019-01-24 PROCEDURE — 96372 THER/PROPH/DIAG INJ SC/IM: CPT | Mod: S$GLB,,, | Performed by: INTERNAL MEDICINE

## 2019-01-28 ENCOUNTER — TELEPHONE (OUTPATIENT)
Dept: ENDOCRINOLOGY | Facility: CLINIC | Age: 84
End: 2019-01-28

## 2019-01-28 NOTE — PROGRESS NOTES
Administered injection and patient tolerated. Advised patient to wait in clinic for 15 minutes to ensure no reaction.

## 2019-02-13 ENCOUNTER — OFFICE VISIT (OUTPATIENT)
Dept: OPTOMETRY | Facility: CLINIC | Age: 84
End: 2019-02-13
Payer: COMMERCIAL

## 2019-02-13 DIAGNOSIS — Z98.42 S/P CATARACT SURGERY, LEFT: ICD-10-CM

## 2019-02-13 DIAGNOSIS — Z96.1 PSEUDOPHAKIA OF LEFT EYE: ICD-10-CM

## 2019-02-13 DIAGNOSIS — H26.492 POSTERIOR CAPSULAR OPACIFICATION NON VISUALLY SIGNIFICANT, LEFT EYE: ICD-10-CM

## 2019-02-13 DIAGNOSIS — H52.203 ASTIGMATISM OF BOTH EYES, UNSPECIFIED TYPE: ICD-10-CM

## 2019-02-13 DIAGNOSIS — H25.11 NUCLEAR SCLEROTIC CATARACT OF RIGHT EYE: Primary | ICD-10-CM

## 2019-02-13 DIAGNOSIS — H52.4 PRESBYOPIA: ICD-10-CM

## 2019-02-13 DIAGNOSIS — H26.9 CORTICAL CATARACT OF RIGHT EYE: ICD-10-CM

## 2019-02-13 DIAGNOSIS — H53.8 BLURRED VISION, BILATERAL: ICD-10-CM

## 2019-02-13 PROCEDURE — 92015 DETERMINE REFRACTIVE STATE: CPT | Mod: S$GLB,,, | Performed by: OPTOMETRIST

## 2019-02-13 PROCEDURE — 92014 PR EYE EXAM, EST PATIENT,COMPREHESV: ICD-10-PCS | Mod: S$GLB,,, | Performed by: OPTOMETRIST

## 2019-02-13 PROCEDURE — 92015 PR REFRACTION: ICD-10-PCS | Mod: S$GLB,,, | Performed by: OPTOMETRIST

## 2019-02-13 PROCEDURE — 92014 COMPRE OPH EXAM EST PT 1/>: CPT | Mod: S$GLB,,, | Performed by: OPTOMETRIST

## 2019-02-13 PROCEDURE — 99999 PR PBB SHADOW E&M-EST. PATIENT-LVL III: CPT | Mod: PBBFAC,,, | Performed by: OPTOMETRIST

## 2019-02-13 PROCEDURE — 99999 PR PBB SHADOW E&M-EST. PATIENT-LVL III: ICD-10-PCS | Mod: PBBFAC,,, | Performed by: OPTOMETRIST

## 2019-02-13 NOTE — PATIENT INSTRUCTIONS
Nuclear sclerotic/cortical cataract in the right eye.  S/P cataract surgery in the left eye, with posterior chamber intraocular lens.  Paracentral posterior capsular opacification in the right eye, but does not appear to need YAG laser posterior capsulotomy in the left eye at this time.    Astigmatic refractive error in the right eye with best corrected VA of 20/50-2+3.  Residual astigmatic refractive error in the left eye, with best-corrected VA of 20/30-1+1.  Mrs. Lyles unhappy with VA following cataract surgery in the left eye.  She requests referral to Dr. Be at the [facility] for consult regarding her perceived need for cataract surgery in the right eye.    Defer new spectacle lens Rx.  Generate referral to Dr. Boswell for consult.   Return to me (Dr. Mccarthy) when advised to do so by Dr. Be for recheck of refraction (presumably following cataract surgery in the right eye) and new spectacle lens Rx.

## 2019-02-13 NOTE — PROGRESS NOTES
"HPI     Concerns About Ocular Health      Additional comments: Unhappy with VA since she had cataract surgery in   OS.  "I thought my vision would be better".   Reports problem with "shadows" around image(s).                Comments     Patient's age: 83 y.o. WF  Occupation: Retired   Approximate date of last eye examination:  06/26/2018  Name of last eye doctor seen: Vidhi Haywood MD  City/State: [place]   Wears glasses? No      If yes, wears  Full-time or part-time?  No   Present glasses are: Bifocal, SV Distance, SV Reading?  Readers   Approximate age of present glasses:     Got new glasses following last exam, or subsequently?:     Any problem with VA with glasses?  Pt complains of trouble seeing at a   distance.   Wears CLs?:     Headaches?  No   Eye pain/discomfort?  No                                                                                      Flashes?  Occasional in the corner   Floaters?  Yes   Diplopia/Double vision?  No, more like a shadow   Patient's Ocular History:         Any eye surgeries? S/p phaco w/IOL OS.         Any eye injury?  No          Any treatment for eye disease?  No   Family history of eye disease?  No   Significant patient medical history:         1. Diabetes?  No        If yes, IDDM or NIDDM? n/a   2. HBP?  Yes, controlled with meds.                  ! OTC eyedrops currently using:  No    ! Prescription eye meds currently using:  No    ! Any history of allergy/adverse reaction to any eye meds used   previously?  No     ! Any history of allergy/adverse reaction to eyedrops used during prior   eye exam(s)? No     ! Any history of allergy/adverse reaction to Novacaine or similar meds?   No    ! Any history of allergy/adverse reaction to Epinephrine or similar meds?   No     ! Patient okay with use of anesthetic eyedrops to check eye pressure?    Yes         ! Patient okay with use of eyedrops to dilate pupils today?  Yes    !  Allergies/Medications/Medical " "History/Family History reviewed today?    Yes       PD =   65/61  Desired reading distance =  15.25"                                                                      Last edited by Asa Mccarthy, OD on 2/13/2019  3:19 PM. (History)            Assessment /Plan     For exam results, see Encounter Report.    1. Nuclear sclerotic cataract of right eye  Ambulatory Referral to Ophthalmology   2. Cortical cataract of right eye  Ambulatory Referral to Ophthalmology   3. S/P cataract surgery, left     4. Pseudophakia of left eye     5. Posterior capsular opacification non visually significant, left eye     6. Astigmatism of both eyes, unspecified type     7. Presbyopia     8. Blurred vision, bilateral                  Nuclear sclerotic/cortical cataract in the right eye.  S/P cataract surgery in the left eye, with posterior chamber intraocular lens.  Paracentral posterior capsular opacification in the right eye, but does not appear to need YAG laser posterior capsulotomy in the left eye at this time.    Astigmatic refractive error in the right eye with best corrected VA of 20/50-2+3.  Residual astigmatic refractive error in the left eye, with best-corrected VA of 20/30-1+1.  Mrs. Lyles unhappy with VA following cataract surgery in the left eye.  She requests referral to Dr. Be at the [facility] for consult regarding her perceived need for cataract surgery in the right eye.    Defer new spectacle lens Rx.  Generate referral to Dr. Boswell for consult.   Return to me (Dr. Mccarthy) when advised to do so by Dr. Be for recheck of refraction (presumably following cataract surgery in the right eye) and new spectacle lens Rx.         "

## 2019-02-14 ENCOUNTER — HOSPITAL ENCOUNTER (OUTPATIENT)
Dept: RADIOLOGY | Facility: HOSPITAL | Age: 84
Discharge: HOME | End: 2019-02-14
Attending: NURSE PRACTITIONER
Payer: COMMERCIAL

## 2019-02-14 DIAGNOSIS — M54.16 LUMBAR RADICULOPATHY: ICD-10-CM

## 2019-02-14 PROCEDURE — 72148 MRI LUMBAR SPINE W/O DYE: CPT | Mod: 26,,, | Performed by: RADIOLOGY

## 2019-02-14 PROCEDURE — 72148 MRI LUMBAR SPINE WITHOUT CONTRAST: ICD-10-PCS | Mod: 26,,, | Performed by: RADIOLOGY

## 2019-02-14 PROCEDURE — 72148 MRI LUMBAR SPINE W/O DYE: CPT | Mod: TC,PO

## 2019-02-18 DIAGNOSIS — Z83.719 FAMILY HISTORY OF COLONIC POLYPS: ICD-10-CM

## 2019-02-18 DIAGNOSIS — Z86.010 HISTORY OF COLON POLYPS: ICD-10-CM

## 2019-02-18 RX ORDER — DULOXETIN HYDROCHLORIDE 60 MG/1
CAPSULE, DELAYED RELEASE ORAL
Qty: 90 CAPSULE | Refills: 11 | Status: SHIPPED | OUTPATIENT
Start: 2019-02-18 | End: 2020-05-13

## 2019-02-18 RX ORDER — DICYCLOMINE HYDROCHLORIDE 10 MG/1
CAPSULE ORAL
Qty: 120 CAPSULE | Refills: 11 | Status: SHIPPED | OUTPATIENT
Start: 2019-02-18 | End: 2020-03-03

## 2019-02-20 ENCOUNTER — TELEPHONE (OUTPATIENT)
Dept: UROLOGY | Facility: CLINIC | Age: 84
End: 2019-02-20

## 2019-02-20 DIAGNOSIS — R39.15 URINARY URGENCY: Primary | ICD-10-CM

## 2019-02-20 NOTE — TELEPHONE ENCOUNTER
----- Message from Gordy Jimenez sent at 2/20/2019 12:33 PM CST -----  Contact: Pt:[phone]  .Needs Advice    Reason for call:Pt called and states her insurance company no longer pays for Vesicare anymore. The pt states there is something over the counter that she can take for bladder.         Communication Preference:Pt:[phone]    Additional Information:

## 2019-02-21 RX ORDER — TOLTERODINE 4 MG/1
4 CAPSULE, EXTENDED RELEASE ORAL DAILY
Qty: 30 CAPSULE | Refills: 5 | Status: SHIPPED | OUTPATIENT
Start: 2019-02-21 | End: 2019-09-15 | Stop reason: SDUPTHER

## 2019-03-19 ENCOUNTER — HOSPITAL ENCOUNTER (OUTPATIENT)
Dept: RADIOLOGY | Facility: HOSPITAL | Age: 84
Discharge: HOME | End: 2019-03-19
Attending: STUDENT IN AN ORGANIZED HEALTH CARE EDUCATION/TRAINING PROGRAM
Payer: COMMERCIAL

## 2019-03-19 DIAGNOSIS — M54.50 LUMBAGO: ICD-10-CM

## 2019-03-19 PROCEDURE — 72120 X-RAY BEND ONLY L-S SPINE: CPT | Mod: 26,,, | Performed by: RADIOLOGY

## 2019-03-19 PROCEDURE — 72120 X-RAY BEND ONLY L-S SPINE: CPT | Mod: TC,PO

## 2019-03-19 PROCEDURE — 72100 XR LUMBAR SPINE AP AND LAT WITH FLEX/EXT: ICD-10-PCS | Mod: 26,,, | Performed by: RADIOLOGY

## 2019-03-19 PROCEDURE — 72120 XR LUMBAR SPINE AP AND LAT WITH FLEX/EXT: ICD-10-PCS | Mod: 26,,, | Performed by: RADIOLOGY

## 2019-03-19 PROCEDURE — 72100 X-RAY EXAM L-S SPINE 2/3 VWS: CPT | Mod: 26,,, | Performed by: RADIOLOGY

## 2019-03-21 RX ORDER — GABAPENTIN 300 MG/1
300 CAPSULE ORAL 3 TIMES DAILY
Qty: 90 CAPSULE | Refills: 0 | Status: SHIPPED | OUTPATIENT
Start: 2019-03-21 | End: 2019-04-22 | Stop reason: SDUPTHER

## 2019-03-21 NOTE — TELEPHONE ENCOUNTER
I have refilled the patient's requested medication x 1 month.  However, the patient is due for a follow up visit.  Call the patient on the phone and book an appointment. Due for annual

## 2019-03-26 ENCOUNTER — PATIENT MESSAGE (OUTPATIENT)
Dept: FAMILY MEDICINE | Facility: CLINIC | Age: 84
End: 2019-03-26

## 2019-03-26 ENCOUNTER — OFFICE VISIT (OUTPATIENT)
Dept: FAMILY MEDICINE | Facility: CLINIC | Age: 84
End: 2019-03-26
Payer: COMMERCIAL

## 2019-03-26 VITALS
DIASTOLIC BLOOD PRESSURE: 87 MMHG | HEIGHT: 64 IN | BODY MASS INDEX: 34.72 KG/M2 | TEMPERATURE: 98 F | HEART RATE: 87 BPM | WEIGHT: 203.38 LBS | SYSTOLIC BLOOD PRESSURE: 132 MMHG

## 2019-03-26 DIAGNOSIS — H61.20 IMPACTED CERUMEN, UNSPECIFIED LATERALITY: Primary | ICD-10-CM

## 2019-03-26 PROCEDURE — 3079F PR MOST RECENT DIASTOLIC BLOOD PRESSURE 80-89 MM HG: ICD-10-PCS | Mod: CPTII,S$GLB,, | Performed by: FAMILY MEDICINE

## 2019-03-26 PROCEDURE — 69209 REMOVE IMPACTED EAR WAX UNI: CPT | Mod: S$GLB,,, | Performed by: FAMILY MEDICINE

## 2019-03-26 PROCEDURE — 3079F DIAST BP 80-89 MM HG: CPT | Mod: CPTII,S$GLB,, | Performed by: FAMILY MEDICINE

## 2019-03-26 PROCEDURE — 99213 PR OFFICE/OUTPT VISIT, EST, LEVL III, 20-29 MIN: ICD-10-PCS | Mod: 25,S$GLB,, | Performed by: FAMILY MEDICINE

## 2019-03-26 PROCEDURE — 99999 PR PBB SHADOW E&M-EST. PATIENT-LVL IV: ICD-10-PCS | Mod: PBBFAC,,, | Performed by: FAMILY MEDICINE

## 2019-03-26 PROCEDURE — 1101F PR PT FALLS ASSESS DOC 0-1 FALLS W/OUT INJ PAST YR: ICD-10-PCS | Mod: CPTII,S$GLB,, | Performed by: FAMILY MEDICINE

## 2019-03-26 PROCEDURE — 99999 PR PBB SHADOW E&M-EST. PATIENT-LVL IV: CPT | Mod: PBBFAC,,, | Performed by: FAMILY MEDICINE

## 2019-03-26 PROCEDURE — 1101F PT FALLS ASSESS-DOCD LE1/YR: CPT | Mod: CPTII,S$GLB,, | Performed by: FAMILY MEDICINE

## 2019-03-26 PROCEDURE — 3075F SYST BP GE 130 - 139MM HG: CPT | Mod: CPTII,S$GLB,, | Performed by: FAMILY MEDICINE

## 2019-03-26 PROCEDURE — 99213 OFFICE O/P EST LOW 20 MIN: CPT | Mod: 25,S$GLB,, | Performed by: FAMILY MEDICINE

## 2019-03-26 PROCEDURE — 3075F PR MOST RECENT SYSTOLIC BLOOD PRESS GE 130-139MM HG: ICD-10-PCS | Mod: CPTII,S$GLB,, | Performed by: FAMILY MEDICINE

## 2019-03-26 PROCEDURE — 69209 PR REMOVAL IMPACTED CERUMEN USING IRRIGATION/LAVAGE, UNILATERAL: ICD-10-PCS | Mod: S$GLB,,, | Performed by: FAMILY MEDICINE

## 2019-03-26 NOTE — PROGRESS NOTES
The patient complains of ear being stopped up with difficulty hearing. Symptoms started over the past several weeks after she had MRI with ear plugs. No current treatment. Denies upper respiratory symptoms. No significant otalgia.    Past Medical History:  Past Medical History:   Diagnosis Date    Arthritis     Chronic pain 8/14/2013    DDD (degenerative disc disease)     DDD (degenerative disc disease)     GERD (gastroesophageal reflux disease)     Hyperlipidemia     Hypertension     Osteopenia 3/2/2016    Primary hyperparathyroidism     Senile cataract     OD     Past Surgical History:   Procedure Laterality Date    APPENDECTOMY      CATARACT EXTRACTION W/  INTRAOCULAR LENS IMPLANT Left 05/16/2018    Dr Haywood    COLONOSCOPY W/ BIOPSIES  2/2011    repeat in 3 years    PARTIAL KNEE ARTHROPLASTY Right     Right    phaco/pciol Left 5/16/2018    [facility]    SLEEVE GASTROPLASTY  2/8/12    TONSILLECTOMY      TOTAL HIP ARTHROPLASTY Right     TOTAL KNEE ARTHROPLASTY Left      Review of patient's allergies indicates:   Allergen Reactions    Formaldehyde Hives and Other (See Comments)     Other reaction(s): Other (See Comments)  Other reaction(s): Hives  Other reaction(s): Hives  Other reaction(s): Hives  Other reaction(s): Hives    Mercury Hives     Other reaction(s): Hives  Other reaction(s): Hives  Other reaction(s): Hives  Other reaction(s): Hives    Mercury (elemental) Hives    Penicillins Swelling     Other reaction(s): Swelling  Other reaction(s): Swelling    Adhesive tape-silicones     Dilaudid [hydromorphone (bulk)]     Latex, natural rubber Hives    Adhesive Rash     Other reaction(s): Rash  Other reaction(s): Rash    Hydromorphone Itching     Current Outpatient Medications on File Prior to Visit   Medication Sig Dispense Refill    ACETAMINOPHEN (TYLENOL EXTRA STRENGTH ORAL) 500 mg. Every 4-6 hours PRN      BIOTIN ORAL Take by mouth once  daily.      C,E,zinc,copper 11/zgsxq7l/lut (OCUVITE ADULT 50 PLUS ORAL) Take by mouth.      CALCIUM CARBONATE/VITAMIN D3 (CALTRATE 600 + D ORAL) Take by mouth 2 (two) times daily.       cetirizine (ZYRTEC) 10 MG tablet Take 1 tablet (10 mg total) by mouth once daily. 90 tablet 3    CYANOCOBALAMIN, VITAMIN B-12, (VITAMIN B-12 ORAL) As directed      cyclobenzaprine (FLEXERIL) 10 MG tablet   1    DENOSUMAB (PROLIA SUBQ) Inject into the skin.      dicyclomine (BENTYL) 10 MG capsule TAKE 1 CAPSULE BY MOUTH FOUR TIMES DAILY BEFORE MEALS AND AT NIGHT 120 capsule 11    DULoxetine (CYMBALTA) 60 MG capsule TAKE 1 CAPSULE(60 MG) BY MOUTH EVERY DAY 90 capsule 11    fluticasone (FLONASE) 50 mcg/actuation nasal spray SHAKE LIQUID AND USE 1 SPRAY IN EACH NOSTRIL EVERY DAY 16 mL 11    gabapentin (NEURONTIN) 300 MG capsule Take 1 capsule (300 mg total) by mouth 3 (three) times daily. 90 capsule 0    GLUCOSAMINE HCL/CHONDRO KIDD A (GLUCOSAMINE-CHONDROITIN ORAL) Every day      LACTOBACILLUS ACIDOPHILUS (PROBIOTIC ORAL) Take by mouth.      methen-sod phos-meth blue-hyos (UROGESIC-BLUE) 81.6-40.8-0.12 mg Tab Take 1 tablet by mouth twice a week. 60 tablet 1    metoprolol tartrate (LOPRESSOR) 25 MG tablet TAKE 1 TABLET(25 MG) BY MOUTH TWICE DAILY 180 tablet 11    MULTIVITAMIN ORAL 2 (two) times daily. Every day      nystatin (MYCOSTATIN) powder Apply topically 4 (four) times daily. 60 g 11    tolterodine (DETROL LA) 4 MG 24 hr capsule Take 1 capsule (4 mg total) by mouth once daily. 30 capsule 5    vit C,E,Zn,Cu glu-om3-lut-eduardo (OCUVITE ADULT 50+) 250-5-1 mg Cap Take by mouth.      vitamin D 1000 units Tab Take 185 mg by mouth once daily.      conjugated estrogens (PREMARIN) vaginal cream Place 0.5 g vaginally twice a week. 30 g 3     No current facility-administered medications on file prior to visit.      Social History     Socioeconomic History    Marital status:      Spouse name: Not on file    Number of  children: Not on file    Years of education: Not on file    Highest education level: Not on file   Occupational History    Not on file   Social Needs    Financial resource strain: Not on file    Food insecurity:     Worry: Not on file     Inability: Not on file    Transportation needs:     Medical: Not on file     Non-medical: Not on file   Tobacco Use    Smoking status: Former Smoker    Smokeless tobacco: Never Used    Tobacco comment: Has not smoked in 40 years.   Substance and Sexual Activity    Alcohol use: Yes     Comment: occasionally    Drug use: No    Sexual activity: Never     Birth control/protection: Post-menopausal   Lifestyle    Physical activity:     Days per week: Not on file     Minutes per session: Not on file    Stress: Not on file   Relationships    Social connections:     Talks on phone: Not on file     Gets together: Not on file     Attends Yazidism service: Not on file     Active member of club or organization: Not on file     Attends meetings of clubs or organizations: Not on file     Relationship status: Not on file    Intimate partner violence:     Fear of current or ex partner: Not on file     Emotionally abused: Not on file     Physically abused: Not on file     Forced sexual activity: Not on file   Other Topics Concern    Not on file   Social History Narrative    Wears a seatbelt.     Family History   Problem Relation Age of Onset    Arthritis Mother     Cancer Mother     Breast cancer Mother     Arthritis Father     Heart disease Father     Hyperlipidemia Father     Heart attack Father     Cancer Sister     Depression Sister     Cataracts Sister     Cancer Brother     Depression Brother     Diabetes Brother     Heart disease Brother     Hyperlipidemia Brother     Blindness Brother         dm    Retinal detachment Brother     Alcohol abuse Son     Birth defects Son     Diabetes Son     Mental retardation Son     Cancer Maternal Aunt     Alcohol  abuse Maternal Uncle     Cancer Maternal Uncle     Depression Maternal Uncle     Cancer Paternal Aunt     Cancer Paternal Uncle     Arthritis Maternal Grandmother     Hypertension Maternal Grandmother     Alcohol abuse Maternal Grandfather     Arthritis Maternal Grandfather     Depression Maternal Grandfather     Hypertension Maternal Grandfather     Arthritis Paternal Grandmother     Arthritis Paternal Grandfather     HIV Son     Glaucoma Son     Breast cancer Paternal Cousin     Amblyopia Neg Hx     Macular degeneration Neg Hx     Strabismus Neg Hx     Stroke Neg Hx     Thyroid disease Neg Hx              Physical Exam: See vital signs note   general: No acute distress   HEENT: Head is atraumatic normocephalic. Ears:  Right Cerumen impaction irrigated until clear.  Left normal.  Normal tympanic membranes and external auditory canals. eyes pupils equal and reactive. Extraocular movements are intact. Nose and throat are clear.   Neck supple no adenopathy   Chest clear to auscultation. Normal respiratory effort     Marysol was seen today for hearing problem.    Diagnoses and all orders for this visit:    Impacted cerumen, unspecified laterality       Irrigated as above with resolution of symptoms. Followup as needed.

## 2019-04-22 RX ORDER — GABAPENTIN 300 MG/1
300 CAPSULE ORAL 3 TIMES DAILY
Qty: 90 CAPSULE | Refills: 0 | Status: SHIPPED | OUTPATIENT
Start: 2019-04-22 | End: 2019-05-20 | Stop reason: SDUPTHER

## 2019-05-08 ENCOUNTER — PATIENT MESSAGE (OUTPATIENT)
Dept: ENDOCRINOLOGY | Facility: CLINIC | Age: 84
End: 2019-05-08

## 2019-05-11 ENCOUNTER — PATIENT MESSAGE (OUTPATIENT)
Dept: ENDOCRINOLOGY | Facility: CLINIC | Age: 84
End: 2019-05-11

## 2019-05-13 NOTE — TELEPHONE ENCOUNTER
Would need to do labs 1st to make sure they are ok before approving the Prolia.   If she would like can send me a message fater doing labs and I can place order

## 2019-05-20 RX ORDER — GABAPENTIN 300 MG/1
300 CAPSULE ORAL 3 TIMES DAILY
Qty: 90 CAPSULE | Refills: 0 | Status: SHIPPED | OUTPATIENT
Start: 2019-05-20 | End: 2019-06-18 | Stop reason: SDUPTHER

## 2019-06-19 RX ORDER — GABAPENTIN 300 MG/1
300 CAPSULE ORAL 3 TIMES DAILY
Qty: 90 CAPSULE | Refills: 0 | Status: SHIPPED | OUTPATIENT
Start: 2019-06-19 | End: 2019-07-18 | Stop reason: SDUPTHER

## 2019-06-21 ENCOUNTER — LAB VISIT (OUTPATIENT)
Dept: LAB | Facility: HOSPITAL | Age: 84
End: 2019-06-21
Attending: INTERNAL MEDICINE
Payer: COMMERCIAL

## 2019-06-21 DIAGNOSIS — M85.80 OSTEOPENIA, UNSPECIFIED LOCATION: ICD-10-CM

## 2019-06-21 LAB
ALBUMIN SERPL BCP-MCNC: 3.8 G/DL (ref 3.5–5.2)
ALP SERPL-CCNC: 30 U/L (ref 55–135)
ALT SERPL W/O P-5'-P-CCNC: 23 U/L (ref 10–44)
ANION GAP SERPL CALC-SCNC: 10 MMOL/L (ref 8–16)
AST SERPL-CCNC: 33 U/L (ref 10–40)
BILIRUB SERPL-MCNC: 0.3 MG/DL (ref 0.1–1)
BUN SERPL-MCNC: 26 MG/DL (ref 8–23)
CALCIUM SERPL-MCNC: 10.7 MG/DL (ref 8.7–10.5)
CHLORIDE SERPL-SCNC: 99 MMOL/L (ref 95–110)
CO2 SERPL-SCNC: 30 MMOL/L (ref 23–29)
CREAT SERPL-MCNC: 0.9 MG/DL (ref 0.5–1.4)
EST. GFR  (AFRICAN AMERICAN): >60 ML/MIN/1.73 M^2
EST. GFR  (NON AFRICAN AMERICAN): >60 ML/MIN/1.73 M^2
GLUCOSE SERPL-MCNC: 89 MG/DL (ref 70–110)
PHOSPHATE SERPL-MCNC: 3.7 MG/DL (ref 2.7–4.5)
POTASSIUM SERPL-SCNC: 3.9 MMOL/L (ref 3.5–5.1)
PROT SERPL-MCNC: 6.7 G/DL (ref 6–8.4)
SODIUM SERPL-SCNC: 139 MMOL/L (ref 136–145)

## 2019-06-21 PROCEDURE — 83970 ASSAY OF PARATHORMONE: CPT

## 2019-06-21 PROCEDURE — 80053 COMPREHEN METABOLIC PANEL: CPT

## 2019-06-21 PROCEDURE — 82306 VITAMIN D 25 HYDROXY: CPT

## 2019-06-21 PROCEDURE — 84100 ASSAY OF PHOSPHORUS: CPT

## 2019-06-21 PROCEDURE — 36415 COLL VENOUS BLD VENIPUNCTURE: CPT | Mod: PO

## 2019-06-22 LAB
25(OH)D3+25(OH)D2 SERPL-MCNC: 74 NG/ML (ref 30–96)
PTH-INTACT SERPL-MCNC: 58 PG/ML (ref 9–77)

## 2019-06-24 ENCOUNTER — TELEPHONE (OUTPATIENT)
Dept: ENDOCRINOLOGY | Facility: CLINIC | Age: 84
End: 2019-06-24

## 2019-06-24 ENCOUNTER — OFFICE VISIT (OUTPATIENT)
Dept: ENDOCRINOLOGY | Facility: CLINIC | Age: 84
End: 2019-06-24
Payer: COMMERCIAL

## 2019-06-24 VITALS
DIASTOLIC BLOOD PRESSURE: 66 MMHG | WEIGHT: 197 LBS | HEIGHT: 64 IN | HEART RATE: 55 BPM | SYSTOLIC BLOOD PRESSURE: 110 MMHG | BODY MASS INDEX: 33.63 KG/M2

## 2019-06-24 DIAGNOSIS — E83.52 HYPERCALCEMIA: ICD-10-CM

## 2019-06-24 DIAGNOSIS — M85.80 OSTEOPENIA, UNSPECIFIED LOCATION: Primary | ICD-10-CM

## 2019-06-24 DIAGNOSIS — K21.9 GASTROESOPHAGEAL REFLUX DISEASE WITHOUT ESOPHAGITIS: ICD-10-CM

## 2019-06-24 PROCEDURE — 3074F PR MOST RECENT SYSTOLIC BLOOD PRESSURE < 130 MM HG: ICD-10-PCS | Mod: CPTII,S$GLB,, | Performed by: INTERNAL MEDICINE

## 2019-06-24 PROCEDURE — 99999 PR PBB SHADOW E&M-EST. PATIENT-LVL III: CPT | Mod: PBBFAC,,, | Performed by: INTERNAL MEDICINE

## 2019-06-24 PROCEDURE — 1101F PT FALLS ASSESS-DOCD LE1/YR: CPT | Mod: CPTII,S$GLB,, | Performed by: INTERNAL MEDICINE

## 2019-06-24 PROCEDURE — 99999 PR PBB SHADOW E&M-EST. PATIENT-LVL III: ICD-10-PCS | Mod: PBBFAC,,, | Performed by: INTERNAL MEDICINE

## 2019-06-24 PROCEDURE — 3078F PR MOST RECENT DIASTOLIC BLOOD PRESSURE < 80 MM HG: ICD-10-PCS | Mod: CPTII,S$GLB,, | Performed by: INTERNAL MEDICINE

## 2019-06-24 PROCEDURE — 99214 PR OFFICE/OUTPT VISIT, EST, LEVL IV, 30-39 MIN: ICD-10-PCS | Mod: S$GLB,,, | Performed by: INTERNAL MEDICINE

## 2019-06-24 PROCEDURE — 3074F SYST BP LT 130 MM HG: CPT | Mod: CPTII,S$GLB,, | Performed by: INTERNAL MEDICINE

## 2019-06-24 PROCEDURE — 3078F DIAST BP <80 MM HG: CPT | Mod: CPTII,S$GLB,, | Performed by: INTERNAL MEDICINE

## 2019-06-24 PROCEDURE — 99214 OFFICE O/P EST MOD 30 MIN: CPT | Mod: S$GLB,,, | Performed by: INTERNAL MEDICINE

## 2019-06-24 PROCEDURE — 1101F PR PT FALLS ASSESS DOC 0-1 FALLS W/OUT INJ PAST YR: ICD-10-PCS | Mod: CPTII,S$GLB,, | Performed by: INTERNAL MEDICINE

## 2019-06-24 RX ORDER — FERROUS GLUCONATE 324(38)MG
324 TABLET ORAL
COMMUNITY

## 2019-06-24 RX ORDER — LANOLIN ALCOHOL/MO/W.PET/CERES
1 CREAM (GRAM) TOPICAL 2 TIMES DAILY
COMMUNITY

## 2019-06-24 NOTE — PROGRESS NOTES
CHIEF COMPLAINT: Osteopenia  83 year old being seen as a new patient to me. Had been seeing DR. Diaz and last saw her in 2016 and then saw DR. Perez. Diagnosed in 2015. Had been on Fosamax. Then switched to Prolia. Last Prolia Jan 2019. Tolerating Prolia. Taking Ca + D. No fractures. No kidney stones. She has had falls in the past but not in past few months. No exercise. Menopause [age]. Was on HRT for a short period. No long term steroids. Has had periodic steroid injections due to back.       PAST MEDICAL HISTORY/PAST SURGICAL HISTORY:  Reviewed in [facility]    SOCIAL HISTORY: No T/A    FAMILY HISTORY:  + Osteoporosis. No hip fracture    MEDICATIONS/ALLERGIES: The patient's MedCard has been updated and reviewed.      ROS:   Constitutional: No recent significant weight change  Eyes: Waiting to have cataract surgery.   ENT: No dysphagia  Cardiovascular: Denies current anginal symptoms  Respiratory: Denies current respiratory difficulty  Gastrointestinal: + GERD  GenitoUrinary - No dysuria  Skin: No new skin rash  Neurologic: No focal neurologic complaints  Remainder ROS negative        PE:    GENERAL: Well developed, well nourished.  PSYCH:  appropriate mood and affect  EYES:  PERRL, EOM intact.  ENT: Nares patent, oropharynx clear, mucosa pink,   NECK: Supple, trachea midline, No palpable thyroid nodules.   CHEST: Resp even and unlabored, CTA bilateral.  CARDIAC: RRR, S1, S2 heard, no murmurs, rubs, S3, or S4  ABDOMEN: Soft, non-tender, non-distended;  No organomegaly  VASCULAR:  DP pulses +2/4 bilaterally, no edema  NEURO: Gait steady, CN II-VII grossly intact  SKIN: No areas of breakdown, no acanthosis nigracans.    LABS   Results for MELISSA OSUNA (MRN [id number]) as of 6/24/2019 13:44   Ref. Range 6/21/2019 14:02   Sodium Latest Ref Range: 136 - 145 mmol/L 139   Potassium Latest Ref Range: 3.5 - 5.1 mmol/L 3.9   Chloride Latest Ref Range: 95 - 110 mmol/L 99   CO2 Latest Ref Range: 23 - 29 mmol/L 30 (H)   Anion Gap  Latest Ref Range: 8 - 16 mmol/L 10   BUN, Bld Latest Ref Range: 8 - 23 mg/dL 26 (H)   Creatinine Latest Ref Range: 0.5 - 1.4 mg/dL 0.9   eGFR if non African American Latest Ref Range: >60 mL/min/1.73 m^2 >60.0   eGFR if African American Latest Ref Range: >60 mL/min/1.73 m^2 >60.0   Glucose Latest Ref Range: 70 - 110 mg/dL 89   Calcium Latest Ref Range: 8.7 - 10.5 mg/dL 10.7 (H)   Phosphorus Latest Ref Range: 2.7 - 4.5 mg/dL 3.7   Alkaline Phosphatase Latest Ref Range: 55 - 135 U/L 30 (L)   PROTEIN TOTAL Latest Ref Range: 6.0 - 8.4 g/dL 6.7   Albumin Latest Ref Range: 3.5 - 5.2 g/dL 3.8   BILIRUBIN TOTAL Latest Ref Range: 0.1 - 1.0 mg/dL 0.3   AST Latest Ref Range: 10 - 40 U/L 33   ALT Latest Ref Range: 10 - 44 U/L 23   Vit D, 25-Hydroxy Latest Ref Range: 30 - 96 ng/mL 74   PTH Latest Ref Range: 9.0 - 77.0 pg/mL 58.0   Results for MELISSA OSUNA (MRN [id number]) as of 6/24/2019 13:53   Ref. Range 6/21/2019 14:02   Vit D, 25-Hydroxy Latest Ref Range: 30 - 96 ng/mL 74     DXA scan was performed over the left hip and lumbar spine.  Review of the images confirms satisfactory positioning and technique.      The L1 to L4 vertebral bone mineral density is equal to 1.150 g/cm squared with a T score of 0.9 and a Z score of 3.6.      The left femoral neck bone mineral density is equal to 0.617 g/cm squared with a T score of -2.1 and a Z score of 0.2.      Impression      Osteopenia -- there is a 15% risk of a major osteoporotic fracture and a 4.2% risk of hip fracture in the next 10 years (FRAX).         ASSESSMENT/PLAN:  1. Osteopenia- No Fracture history. Had been on fosamax and now on Prolia. Tolerating well. Will continue current Tx. Discussed taking Ca + D. Discussed fall precautions. Discussed benefits of weight bearing exercise. Check DEXA at f/u.     2. Hypercalcemia- Appears PTH Mediated. Already on Prolia which should prevent bone loss. Ca not significantly elevated. Will cehck 24 hr urine at f/u.     3. GERD- Unable to  take PO bisphosphonates.       FOLLOWUP  OK to get Prolia  F/U 6 months with Fasting CTX, CMP, PTH, Phos, DEXA,24 hr urine Ca/Cr

## 2019-06-25 RX ORDER — CETIRIZINE HYDROCHLORIDE 10 MG/1
TABLET ORAL
Qty: 30 TABLET | Refills: 11 | Status: SHIPPED | OUTPATIENT
Start: 2019-06-25 | End: 2020-07-14 | Stop reason: SDUPTHER

## 2019-07-01 ENCOUNTER — PATIENT MESSAGE (OUTPATIENT)
Dept: ENDOCRINOLOGY | Facility: CLINIC | Age: 84
End: 2019-07-01

## 2019-07-01 ENCOUNTER — INFUSION (OUTPATIENT)
Dept: INFUSION THERAPY | Facility: HOSPITAL | Age: 84
End: 2019-07-01
Attending: INTERNAL MEDICINE
Payer: COMMERCIAL

## 2019-07-01 ENCOUNTER — TELEPHONE (OUTPATIENT)
Dept: ENDOCRINOLOGY | Facility: CLINIC | Age: 84
End: 2019-07-01

## 2019-07-01 VITALS
SYSTOLIC BLOOD PRESSURE: 122 MMHG | TEMPERATURE: 99 F | BODY MASS INDEX: 33.63 KG/M2 | RESPIRATION RATE: 18 BRPM | HEART RATE: 70 BPM | HEIGHT: 64 IN | DIASTOLIC BLOOD PRESSURE: 69 MMHG | WEIGHT: 197 LBS

## 2019-07-01 DIAGNOSIS — M85.80 OSTEOPENIA, UNSPECIFIED LOCATION: Primary | ICD-10-CM

## 2019-07-01 PROCEDURE — 63600175 PHARM REV CODE 636 W HCPCS: Mod: JG,PN | Performed by: INTERNAL MEDICINE

## 2019-07-01 PROCEDURE — 96372 THER/PROPH/DIAG INJ SC/IM: CPT | Mod: PN

## 2019-07-01 RX ADMIN — DENOSUMAB 60 MG: 60 INJECTION SUBCUTANEOUS at 11:07

## 2019-07-01 NOTE — TELEPHONE ENCOUNTER
Pt is scheduled for Prolia today and is having a pre op for knee replacement surgery today as well.  Can she still get Prolia today?

## 2019-07-18 ENCOUNTER — PATIENT MESSAGE (OUTPATIENT)
Dept: UROLOGY | Facility: CLINIC | Age: 84
End: 2019-07-18

## 2019-07-18 ENCOUNTER — LAB VISIT (OUTPATIENT)
Dept: LAB | Facility: HOSPITAL | Age: 84
End: 2019-07-18
Attending: FAMILY MEDICINE
Payer: COMMERCIAL

## 2019-07-18 ENCOUNTER — TELEPHONE (OUTPATIENT)
Dept: FAMILY MEDICINE | Facility: CLINIC | Age: 84
End: 2019-07-18

## 2019-07-18 DIAGNOSIS — R30.0 DYSURIA: Primary | ICD-10-CM

## 2019-07-18 DIAGNOSIS — R30.0 DYSURIA: ICD-10-CM

## 2019-07-18 LAB
BACTERIA #/AREA URNS HPF: ABNORMAL /HPF
BILIRUB UR QL STRIP: NEGATIVE
CLARITY UR: CLEAR
COLOR UR: YELLOW
GLUCOSE UR QL STRIP: NEGATIVE
HGB UR QL STRIP: NEGATIVE
KETONES UR QL STRIP: NEGATIVE
LEUKOCYTE ESTERASE UR QL STRIP: ABNORMAL
MICROSCOPIC COMMENT: ABNORMAL
NITRITE UR QL STRIP: NEGATIVE
PH UR STRIP: 7 [PH] (ref 5–8)
PROT UR QL STRIP: NEGATIVE
RBC #/AREA URNS HPF: 1 /HPF (ref 0–4)
SP GR UR STRIP: 1.01 (ref 1–1.03)
SQUAMOUS #/AREA URNS HPF: 7 /HPF
URN SPEC COLLECT METH UR: ABNORMAL
WBC #/AREA URNS HPF: 6 /HPF (ref 0–5)

## 2019-07-18 PROCEDURE — 87147 CULTURE TYPE IMMUNOLOGIC: CPT

## 2019-07-18 PROCEDURE — 87088 URINE BACTERIA CULTURE: CPT

## 2019-07-18 PROCEDURE — 81000 URINALYSIS NONAUTO W/SCOPE: CPT | Mod: PO

## 2019-07-18 PROCEDURE — 87086 URINE CULTURE/COLONY COUNT: CPT

## 2019-07-18 RX ORDER — NITROFURANTOIN 25; 75 MG/1; MG/1
100 CAPSULE ORAL 2 TIMES DAILY
Qty: 14 CAPSULE | Refills: 0 | Status: SHIPPED | OUTPATIENT
Start: 2019-07-18 | End: 2019-07-25

## 2019-07-18 RX ORDER — GABAPENTIN 300 MG/1
300 CAPSULE ORAL 3 TIMES DAILY
Qty: 90 CAPSULE | Refills: 0 | Status: SHIPPED | OUTPATIENT
Start: 2019-07-18 | End: 2019-08-17 | Stop reason: SDUPTHER

## 2019-07-18 NOTE — TELEPHONE ENCOUNTER
Sending a Rx for Macrobid to the pharmacy, there is a mild infection. Still waiting on the culture

## 2019-07-18 NOTE — TELEPHONE ENCOUNTER
Patient daughter states patient has been on bactrim has uti and bactrim is not helping she was prescribed this by her orthopedic dr. Daughter requesting a ua and uc to see what she can take to help uti. They are coming in to do a urine today and results will go to you for treatment.     Thank you

## 2019-07-18 NOTE — TELEPHONE ENCOUNTER
Pts daughter was asking for results and wanting something called in right away.     Call meds into: [facility] in [place] [address]

## 2019-07-18 NOTE — TELEPHONE ENCOUNTER
----- Message from Brody Agudelo sent at 7/18/2019  1:08 PM CDT -----  Contact: Luis's daughter  Type:  Test Results    Who Called: Ad Mahoney's daughter  Name of Test (Lab/Mammo/Etc): urine specimen  Date of Test: 07/18/19  Ordering Provider: alexander  Where the test was performed[facility]  Would the patient rather a call back or a response via [facility]? Call back   Best Call Back Number: [phone]  Additional Information:  N/a

## 2019-07-19 ENCOUNTER — PATIENT MESSAGE (OUTPATIENT)
Dept: FAMILY MEDICINE | Facility: CLINIC | Age: 84
End: 2019-07-19

## 2019-07-19 LAB — BACTERIA UR CULT: ABNORMAL

## 2019-07-23 ENCOUNTER — LAB VISIT (OUTPATIENT)
Dept: LAB | Facility: HOSPITAL | Age: 84
End: 2019-07-23
Attending: INTERNAL MEDICINE
Payer: COMMERCIAL

## 2019-07-23 ENCOUNTER — TELEPHONE (OUTPATIENT)
Dept: FAMILY MEDICINE | Facility: CLINIC | Age: 84
End: 2019-07-23

## 2019-07-23 DIAGNOSIS — E83.52 HYPERCALCEMIA: ICD-10-CM

## 2019-07-23 DIAGNOSIS — K21.9 GASTROESOPHAGEAL REFLUX DISEASE WITHOUT ESOPHAGITIS: ICD-10-CM

## 2019-07-23 DIAGNOSIS — R30.0 DYSURIA: Primary | ICD-10-CM

## 2019-07-23 DIAGNOSIS — M85.80 OSTEOPENIA, UNSPECIFIED LOCATION: ICD-10-CM

## 2019-07-23 NOTE — TELEPHONE ENCOUNTER
----- Message from Leeanna Tejada sent at 7/23/2019  4:49 PM CDT -----  Contact: self/[phone]  Type:  Test Results    Who Called: Marysol Lyles    Name of Test (Lab/Mammo/Etc): test  Date of Test: 07-23-19  Ordering Provider: Dr Vickers  Where the test was performed: [facility]  Would the patient rather a call back or a response via [facility]? Call back  Best Call Back Number: [phone]  Additional Information:  Patient would like to know her results of the Urine test. Please call back at [phone]. Thanks/ar

## 2019-07-25 ENCOUNTER — PATIENT MESSAGE (OUTPATIENT)
Dept: UROLOGY | Facility: CLINIC | Age: 84
End: 2019-07-25

## 2019-08-05 ENCOUNTER — PATIENT MESSAGE (OUTPATIENT)
Dept: FAMILY MEDICINE | Facility: CLINIC | Age: 84
End: 2019-08-05

## 2019-08-19 RX ORDER — GABAPENTIN 300 MG/1
CAPSULE ORAL
Qty: 90 CAPSULE | Refills: 0 | Status: SHIPPED | OUTPATIENT
Start: 2019-08-19 | End: 2019-09-16 | Stop reason: SDUPTHER

## 2019-08-26 ENCOUNTER — PATIENT MESSAGE (OUTPATIENT)
Dept: FAMILY MEDICINE | Facility: CLINIC | Age: 84
End: 2019-08-26

## 2019-08-27 ENCOUNTER — PATIENT MESSAGE (OUTPATIENT)
Dept: FAMILY MEDICINE | Facility: CLINIC | Age: 84
End: 2019-08-27

## 2019-08-27 DIAGNOSIS — R41.3 MEMORY DIFFICULTIES: Primary | ICD-10-CM

## 2019-08-27 NOTE — TELEPHONE ENCOUNTER
I called the [facility]  for a neurology appointment for mom this morning and was told that you need to make the referral for her. The neurologist that specializes in memory loss is Dr. Prakash Swanson, fax # [phone].

## 2019-08-28 ENCOUNTER — TELEPHONE (OUTPATIENT)
Dept: NEUROLOGY | Facility: CLINIC | Age: 84
End: 2019-08-28

## 2019-08-28 NOTE — TELEPHONE ENCOUNTER
----- Message from Fede Pozo sent at 8/28/2019  9:34 AM CDT -----  Contact: Nafisa Lyles - Daughter  Type: Needs Medical Advice    Who Called:  Nafisa Laguna Call Back Number: [phone]  Additional Information: Caller would like to schedule a new patient appointment from referral. Please call to advise. Thanks!

## 2019-09-15 DIAGNOSIS — R39.15 URINARY URGENCY: ICD-10-CM

## 2019-09-16 RX ORDER — GABAPENTIN 300 MG/1
CAPSULE ORAL
Qty: 90 CAPSULE | Refills: 0 | Status: SHIPPED | OUTPATIENT
Start: 2019-09-16 | End: 2019-10-16 | Stop reason: SDUPTHER

## 2019-09-16 RX ORDER — TOLTERODINE 4 MG/1
CAPSULE, EXTENDED RELEASE ORAL
Qty: 30 CAPSULE | Refills: 0 | Status: SHIPPED | OUTPATIENT
Start: 2019-09-16 | End: 2019-09-23 | Stop reason: SDUPTHER

## 2019-09-21 ENCOUNTER — PATIENT MESSAGE (OUTPATIENT)
Dept: FAMILY MEDICINE | Facility: CLINIC | Age: 84
End: 2019-09-21

## 2019-09-21 DIAGNOSIS — R39.15 URINARY URGENCY: ICD-10-CM

## 2019-09-23 RX ORDER — TOLTERODINE 4 MG/1
4 CAPSULE, EXTENDED RELEASE ORAL DAILY
Qty: 30 CAPSULE | Refills: 11 | Status: SHIPPED | OUTPATIENT
Start: 2019-09-23 | End: 2019-10-22 | Stop reason: SDUPTHER

## 2019-09-23 NOTE — TELEPHONE ENCOUNTER
Cause I have signed for the following orders AND/OR meds.  Please call the patient and ask the patient to schedule the testing AND/OR inform about any medications that were sent.      No orders of the defined types were placed in this encounter.      Medications Ordered This Encounter   Medications    tolterodine (DETROL LA) 4 MG 24 hr capsule     Sig: Take 1 capsule (4 mg total) by mouth once daily.     Dispense:  30 capsule     Refill:  11     Patient needs an appointment

## 2019-09-30 ENCOUNTER — IMMUNIZATION (OUTPATIENT)
Dept: FAMILY MEDICINE | Facility: CLINIC | Age: 84
End: 2019-09-30
Payer: COMMERCIAL

## 2019-09-30 PROCEDURE — 90662 IIV NO PRSV INCREASED AG IM: CPT | Mod: S$GLB,,, | Performed by: INTERNAL MEDICINE

## 2019-09-30 PROCEDURE — 90471 IMMUNIZATION ADMIN: CPT | Mod: S$GLB,,, | Performed by: INTERNAL MEDICINE

## 2019-09-30 PROCEDURE — 90662 FLU VACCINE - HIGH DOSE (65+) PRESERVATIVE FREE IM: ICD-10-PCS | Mod: S$GLB,,, | Performed by: INTERNAL MEDICINE

## 2019-09-30 PROCEDURE — 90471 FLU VACCINE - HIGH DOSE (65+) PRESERVATIVE FREE IM: ICD-10-PCS | Mod: S$GLB,,, | Performed by: INTERNAL MEDICINE

## 2019-10-08 NOTE — TELEPHONE ENCOUNTER
----- Message from Jina Ohara NP sent at 11/10/2017  9:25 AM CST -----  Culture shows no growth   no

## 2019-10-16 RX ORDER — GABAPENTIN 300 MG/1
CAPSULE ORAL
Qty: 90 CAPSULE | Refills: 0 | Status: SHIPPED | OUTPATIENT
Start: 2019-10-16 | End: 2019-11-15 | Stop reason: SDUPTHER

## 2019-10-22 DIAGNOSIS — R39.15 URINARY URGENCY: ICD-10-CM

## 2019-10-22 RX ORDER — TOLTERODINE 4 MG/1
4 CAPSULE, EXTENDED RELEASE ORAL DAILY
Qty: 30 CAPSULE | Refills: 11 | Status: SHIPPED | OUTPATIENT
Start: 2019-10-22 | End: 2020-10-19 | Stop reason: SDUPTHER

## 2019-10-22 NOTE — TELEPHONE ENCOUNTER
----- Message from Anna Choudhary sent at 10/22/2019  2:04 PM CDT -----  Contact: PATIENT  Type:  RX Refill Request    Who Called: PATIENT  Refill or New Rx:REFILL  RX Name and Strength: TOLTERODINE TART ER 4MG CAP  How is the patient currently taking it? (ex. 1XDay):1 PILL DAILY  Is this a 30 day or 90 day RX: 90 DAY CHEAPER PRICE    Preferred Pharmacy with phone number:  [id number] [place] - [address] AT [facility] [facility]  [address]  [place] [address]  Phone: [phone] Fax: [phone]    Local or Mail Order:CALL  Ordering Provider:ALLEN  Would the patient rather a call back or a response via [facility]? CALL  Best Call Back Number: [phone]  Additional Information: REQUESTING 90 DAY SUPPLY. STATES IT IS CHEAPER TO GET IT THAT WAY. THANKS, HECTOR

## 2019-10-25 ENCOUNTER — OFFICE VISIT (OUTPATIENT)
Dept: NEUROLOGY | Facility: CLINIC | Age: 84
End: 2019-10-25
Payer: COMMERCIAL

## 2019-10-25 VITALS
BODY MASS INDEX: 32.21 KG/M2 | WEIGHT: 188.69 LBS | HEART RATE: 54 BPM | SYSTOLIC BLOOD PRESSURE: 127 MMHG | DIASTOLIC BLOOD PRESSURE: 70 MMHG | HEIGHT: 64 IN | RESPIRATION RATE: 16 BRPM

## 2019-10-25 DIAGNOSIS — R41.3 MEMORY LOSS: Primary | ICD-10-CM

## 2019-10-25 PROCEDURE — 99204 PR OFFICE/OUTPT VISIT, NEW, LEVL IV, 45-59 MIN: ICD-10-PCS | Mod: S$GLB,,, | Performed by: PSYCHIATRY & NEUROLOGY

## 2019-10-25 PROCEDURE — 3074F PR MOST RECENT SYSTOLIC BLOOD PRESSURE < 130 MM HG: ICD-10-PCS | Mod: CPTII,S$GLB,, | Performed by: PSYCHIATRY & NEUROLOGY

## 2019-10-25 PROCEDURE — 3074F SYST BP LT 130 MM HG: CPT | Mod: CPTII,S$GLB,, | Performed by: PSYCHIATRY & NEUROLOGY

## 2019-10-25 PROCEDURE — 1100F PTFALLS ASSESS-DOCD GE2>/YR: CPT | Mod: CPTII,S$GLB,, | Performed by: PSYCHIATRY & NEUROLOGY

## 2019-10-25 PROCEDURE — 99204 OFFICE O/P NEW MOD 45 MIN: CPT | Mod: S$GLB,,, | Performed by: PSYCHIATRY & NEUROLOGY

## 2019-10-25 PROCEDURE — 1100F PR PT FALLS ASSESS DOC 2+ FALLS/FALL W/INJURY/YR: ICD-10-PCS | Mod: CPTII,S$GLB,, | Performed by: PSYCHIATRY & NEUROLOGY

## 2019-10-25 PROCEDURE — 3078F PR MOST RECENT DIASTOLIC BLOOD PRESSURE < 80 MM HG: ICD-10-PCS | Mod: CPTII,S$GLB,, | Performed by: PSYCHIATRY & NEUROLOGY

## 2019-10-25 PROCEDURE — 3078F DIAST BP <80 MM HG: CPT | Mod: CPTII,S$GLB,, | Performed by: PSYCHIATRY & NEUROLOGY

## 2019-10-25 PROCEDURE — 3288F PR FALLS RISK ASSESSMENT DOCUMENTED: ICD-10-PCS | Mod: CPTII,S$GLB,, | Performed by: PSYCHIATRY & NEUROLOGY

## 2019-10-25 PROCEDURE — 99999 PR PBB SHADOW E&M-EST. PATIENT-LVL IV: CPT | Mod: PBBFAC,,, | Performed by: PSYCHIATRY & NEUROLOGY

## 2019-10-25 PROCEDURE — 3288F FALL RISK ASSESSMENT DOCD: CPT | Mod: CPTII,S$GLB,, | Performed by: PSYCHIATRY & NEUROLOGY

## 2019-10-25 PROCEDURE — 99999 PR PBB SHADOW E&M-EST. PATIENT-LVL IV: ICD-10-PCS | Mod: PBBFAC,,, | Performed by: PSYCHIATRY & NEUROLOGY

## 2019-10-25 NOTE — LETTER
October 25, 2019      Aguilar Vickers MD  [address]  [place] [address]           [facility]  [address] [place] [address]  Phone: [phone]  Fax: [phone]          Patient: Marysol Lyles   MR Number: [id number]   YOB: 1935   Date of Visit: 10/25/2019       Dear Dr. Aguilar Vickers:    Thank you for referring Marysol Lyles to me for evaluation. Attached you will find relevant portions of my assessment and plan of care.    If you have questions, please do not hesitate to call me. I look forward to following Marysol Lyles along with you.    Sincerely,    Prakash Swanson Jr., MD    Enclosure  CC:  No Recipients    If you would like to receive this communication electronically, please contact [email or web address] or [phone] to request more information on [employer] access.    For providers and/or their staff who would like to refer a patient to [facility], please contact us through our one-stop-shop provider referral line, [facility], at [phone].    If you feel you have received this communication in error or would no longer like to receive these types of communications, please e-mail [email or web address]

## 2019-10-25 NOTE — PROGRESS NOTES
Date of service:  10/25/2019    Chief complaint:  Memory Loss    History of present illness:  The patient is a 84 y.o. female referred for evaluation of memory loss.  She is seen with her twin daughters who supply additional history.  This issue began about a year ago. It involves short-term memory more than long-term memory.  She may have some difficulties with executive function.  There may also be issues with multiple step processes. She has no problems with ADLs. She does not get lost in familiar areas. There are no hallucinations. There are no personality changes.  She does not endorse depression.    Her family has concerns about her driving.  In discussing this with her daughters, they do feel that she is safe to drive locally.  They do not feel she would be safe to drive on interstates.      Past Medical History:   Diagnosis Date    Arthritis     Chronic pain 8/14/2013    DDD (degenerative disc disease)     DDD (degenerative disc disease)     GERD (gastroesophageal reflux disease)     Hyperlipidemia     Hypertension     Osteopenia 3/2/2016    Primary hyperparathyroidism     Senile cataract     OD       Past Surgical History:   Procedure Laterality Date    APPENDECTOMY      CATARACT EXTRACTION W/  INTRAOCULAR LENS IMPLANT Left 05/16/2018    Dr Haywood    COLONOSCOPY W/ BIOPSIES  2/2011    repeat in 3 years    PARTIAL KNEE ARTHROPLASTY Right     Right    SLEEVE GASTROPLASTY  2/8/12    TONSILLECTOMY      TOTAL HIP ARTHROPLASTY Right     TOTAL KNEE ARTHROPLASTY Left        Family History   Problem Relation Age of Onset    Arthritis Mother     Cancer Mother     Breast cancer Mother     Arthritis Father     Heart disease Father     Hyperlipidemia Father     Heart attack Father     Cancer Sister     Depression Sister     Cataracts Sister     Cancer Brother     Depression Brother     Diabetes Brother     Heart disease Brother     Hyperlipidemia Brother     Blindness Brother          dm    Retinal detachment Brother     Alcohol abuse Son     Birth defects Son     Diabetes Son     Mental retardation Son     Cancer Maternal Aunt     Alcohol abuse Maternal Uncle     Cancer Maternal Uncle     Depression Maternal Uncle     Cancer Paternal Aunt     Cancer Paternal Uncle     Arthritis Maternal Grandmother     Hypertension Maternal Grandmother     Alcohol abuse Maternal Grandfather     Arthritis Maternal Grandfather     Depression Maternal Grandfather     Hypertension Maternal Grandfather     Arthritis Paternal Grandmother     Arthritis Paternal Grandfather     HIV Son     Glaucoma Son     Breast cancer Paternal Cousin     Amblyopia Neg Hx     Macular degeneration Neg Hx     Strabismus Neg Hx     Stroke Neg Hx     Thyroid disease Neg Hx        Social History     Socioeconomic History    Marital status:      Spouse name: Not on file    Number of children: Not on file    Years of education: Not on file    Highest education level: Not on file   Occupational History    Not on file   Social Needs    Financial resource strain: Not on file    Food insecurity:     Worry: Not on file     Inability: Not on file    Transportation needs:     Medical: Not on file     Non-medical: Not on file   Tobacco Use    Smoking status: Former Smoker    Smokeless tobacco: Never Used    Tobacco comment: Has not smoked in 40 years.   Substance and Sexual Activity    Alcohol use: Yes     Comment: occasionally    Drug use: No    Sexual activity: Never     Birth control/protection: Post-menopausal   Lifestyle    Physical activity:     Days per week: Not on file     Minutes per session: Not on file    Stress: Not on file   Relationships    Social connections:     Talks on phone: Not on file     Gets together: Not on file     Attends L[employer] service: Not on file     Active member of club or organization: Not on file     Attends meetings of clubs or organizations: Not on file      "Relationship status: Not on file   Other Topics Concern    Not on file   Social History Narrative    Wears a seatbelt.        Review of patient's allergies indicates:   Allergen Reactions    Formaldehyde Hives and Other (See Comments)     Other reaction(s): Other (See Comments)  Other reaction(s): Hives  Other reaction(s): Hives  Other reaction(s): Hives  Other reaction(s): Hives    Mercury Hives     Other reaction(s): Hives  Other reaction(s): Hives  Other reaction(s): Hives  Other reaction(s): Hives    Mercury (elemental) Hives    Penicillins Swelling     Other reaction(s): Swelling  Other reaction(s): Swelling    Adhesive tape-silicones     Dilaudid [hydromorphone (bulk)]     Latex, natural rubber Hives    Adhesive Rash     Other reaction(s): Rash  Other reaction(s): Rash    Hydromorphone Itching       Review of Systems  General/Constitutional:  No unintentional weight loss, No change in appetite  Eyes/Vision:  No change in vision, No double vision  ENT:  No frequent nose bleeds, No ringing in the ears  Respiratory:  No cough, No wheezing  Cardiovascular:  No chest pain, No palpitations  Gastrointestinal:  No jaundice, No nausea/vomiting  Genitourinary:  No incontinence, No burning with urination  Hematologic/Lymphatic:  No easy bruising/bleeding, No night sweats  Neurological:  No numbness, No weakness  Endocrine:  No fatigue, No heat/cold intolerance  Allergy/Immunologic:  No fevers, No chills  Musculoskeletal:  No muscle pain, No joint pain   Psychiatric:  No thoughts of harming self/others, No depression  Integumentary:  No rashes, No sores that do not heal    Physical exam:  /70   Pulse (!) 54   Resp 16   Ht 5' 4" (1.626 m)   Wt 85.6 kg (188 lb 11.4 oz)   LMP 05/06/1994   BMI 32.39 kg/m²   General: Well developed, well nourished.  No acute distress.  ENT: Mucus membranes moist.  Atraumatic external nose and ears.  Lymphatic: No apparent lymphadenopathy.  Cardiovascular: Regular rate and " rhythm.  Pulmonary: No increased work of breathing.  Abdomen/GI: No guarding.  Musculoskeletal: No obvious joint deformities, moves all extremities well.    Neurological exam:  Mental status: Awake and alert.  Oriented x4.  Speech fluent and appropriate.  Recent and remote memory appear to be intact.  Fund of knowledge grossly normal.  MMSE = 30/30.  Cranial nerves: Pupils equal round and reactive to light, extraocular movements intact, facial strength and sensation intact bilaterally, palate and tongue midline, hearing grossly intact bilaterally.  Motor: 5 out of 5 strength throughout the upper and lower extremities bilaterally. Normal bulk and tone.  Sensation: Intact to light touch and temperature bilaterally.  DTR: 2+ at the knees and biceps bilaterally.  Coordination: Finger-nose-finger testing intact bilaterally.  Gait: Normal gait. Good tandem.      Data base:  Notes of the referring physician were reviewed.  Briefly summarized, it appears that the patient's family requested this evaluation.    Labs (7/19):  UA: 3+ leukocytes    MRI brain:  NA    Neuropsychological testing:  NA    Assessment and plan:  The patient is a 84 y.o. female referred for evaluation of memory loss. I feel that the differential diagnosis includes both MCI and pseudodementia.  Given her performance on testing today in clinic and psychosocial stressors reported today in clinic, I favor the latter. I think a reasonable workup would feature neuropsychological testing.  The patient asked to be placed on Aricept and Namenda; however, I indicated that we should wait to start any medications until we have a firm diagnosis.  She was agreeable to this.  We also discussed driving.  While there was no obvious objective neurologic finding in clinic today that would preclude her from driving, her family does have concerns on this front.  I have asked her to restrict her driving to local roads only and to agree to stop driving altogether, should her  family feel this is unsafe for her.  The patient and her daughters were comfortable with this arrangement.  We may consider a formal driving evaluation, should her neuropsychological testing not rule out her resuming normal driving.  We will see her after the neuropsychological testing.

## 2019-10-29 ENCOUNTER — TELEPHONE (OUTPATIENT)
Dept: NEUROLOGY | Facility: CLINIC | Age: 84
End: 2019-10-29

## 2019-10-29 NOTE — TELEPHONE ENCOUNTER
Spoke with Mrs. Lyles regarding scheduling neuropsych testing. Informed her that she would be contacted in December with the soonest available appt in 2020.  Mrs. Lyles stated that she did not want to wait that long. Mrs. Lyles asked that her mother's referral be kept open and in the meantime she would look into outside referrals for testing as well.

## 2019-11-04 ENCOUNTER — OFFICE VISIT (OUTPATIENT)
Dept: FAMILY MEDICINE | Facility: CLINIC | Age: 84
End: 2019-11-04
Payer: COMMERCIAL

## 2019-11-04 VITALS
HEIGHT: 64 IN | TEMPERATURE: 98 F | HEART RATE: 60 BPM | SYSTOLIC BLOOD PRESSURE: 142 MMHG | BODY MASS INDEX: 32.44 KG/M2 | WEIGHT: 190 LBS | DIASTOLIC BLOOD PRESSURE: 74 MMHG

## 2019-11-04 DIAGNOSIS — N30.00 ACUTE CYSTITIS WITHOUT HEMATURIA: Primary | ICD-10-CM

## 2019-11-04 PROCEDURE — 3077F SYST BP >= 140 MM HG: CPT | Mod: CPTII,S$GLB,, | Performed by: INTERNAL MEDICINE

## 2019-11-04 PROCEDURE — 99213 PR OFFICE/OUTPT VISIT, EST, LEVL III, 20-29 MIN: ICD-10-PCS | Mod: S$GLB,,, | Performed by: INTERNAL MEDICINE

## 2019-11-04 PROCEDURE — 3078F DIAST BP <80 MM HG: CPT | Mod: CPTII,S$GLB,, | Performed by: INTERNAL MEDICINE

## 2019-11-04 PROCEDURE — 87086 URINE CULTURE/COLONY COUNT: CPT

## 2019-11-04 PROCEDURE — 1101F PT FALLS ASSESS-DOCD LE1/YR: CPT | Mod: CPTII,S$GLB,, | Performed by: INTERNAL MEDICINE

## 2019-11-04 PROCEDURE — 99999 PR PBB SHADOW E&M-EST. PATIENT-LVL IV: CPT | Mod: PBBFAC,,, | Performed by: INTERNAL MEDICINE

## 2019-11-04 PROCEDURE — 99999 PR PBB SHADOW E&M-EST. PATIENT-LVL IV: ICD-10-PCS | Mod: PBBFAC,,, | Performed by: INTERNAL MEDICINE

## 2019-11-04 PROCEDURE — 3077F PR MOST RECENT SYSTOLIC BLOOD PRESSURE >= 140 MM HG: ICD-10-PCS | Mod: CPTII,S$GLB,, | Performed by: INTERNAL MEDICINE

## 2019-11-04 PROCEDURE — 99213 OFFICE O/P EST LOW 20 MIN: CPT | Mod: S$GLB,,, | Performed by: INTERNAL MEDICINE

## 2019-11-04 PROCEDURE — 81000 URINALYSIS NONAUTO W/SCOPE: CPT | Mod: PO

## 2019-11-04 PROCEDURE — 3078F PR MOST RECENT DIASTOLIC BLOOD PRESSURE < 80 MM HG: ICD-10-PCS | Mod: CPTII,S$GLB,, | Performed by: INTERNAL MEDICINE

## 2019-11-04 PROCEDURE — 1101F PR PT FALLS ASSESS DOC 0-1 FALLS W/OUT INJ PAST YR: ICD-10-PCS | Mod: CPTII,S$GLB,, | Performed by: INTERNAL MEDICINE

## 2019-11-04 RX ORDER — PHENAZOPYRIDINE HYDROCHLORIDE 200 MG/1
1 TABLET, FILM COATED ORAL 3 TIMES DAILY
COMMUNITY
End: 2019-11-04 | Stop reason: SDUPTHER

## 2019-11-04 RX ORDER — METHYLPREDNISOLONE 4 MG
1 TABLET, DOSE PACK ORAL DAILY
COMMUNITY

## 2019-11-04 RX ORDER — PHENAZOPYRIDINE HYDROCHLORIDE 200 MG/1
200 TABLET, FILM COATED ORAL 3 TIMES DAILY
Qty: 15 TABLET | Refills: 0 | Status: SHIPPED | OUTPATIENT
Start: 2019-11-04 | End: 2019-11-09

## 2019-11-04 NOTE — PROGRESS NOTES
Assessment/Plan:    Acute cystitis without hematuria  Symptoms of increased frequency/urgency which patient states is similar to symptoms of UTI in the past. She also has a hx of vaginal atrophy and is currently using topical estrogen. Plan to check UA with culture today. If evidence of infection, will start abx treatment. She also described bladder spasms which she states occurs during infection. If no UTI present, may need to make sure patient is not having adverse effect of the detrol she is currently on, maybe with a trial of stopping this med.    _____________________________________________________________________________________________________________________________________________________    Orders this visit:    Acute cystitis without hematuria  -     Urinalysis; Future; Expected date: 11/04/2019  -     Urine culture; Future; Expected date: 11/04/2019  -     phenazopyridine (PYRIDIUM) 200 MG tablet; Take 1 tablet (200 mg total) by mouth 3 (three) times daily. for 5 days  Dispense: 15 tablet; Refill: 0      Follow up if symptoms worsen or fail to improve.    Erna Wilburn MD  _____________________________________________________________________________________________________________________________________________________    HPI:    Patient is in clinic today as an established patient with a new complaint of possible UTI. Patient states that she feels like she is having bladder spasms. This is similar to the feeling she has had in the past with UTI's. Reports increased frequency and urgency as well. She has had several UTI's over the past couple of years. Denies fevers or flank pain. Patient also has hx of vaginal atrophy, currently using topical estrogen. Denies vaginal itching or irritation. Also with hx of urinary incontinence, on detrol for this. Denies recent confusion or somnolence with these acute symptoms. No other complaints today.     Past Medical History:  Past Medical History:   Diagnosis  Date    Arthritis     Chronic pain 8/14/2013    DDD (degenerative disc disease)     DDD (degenerative disc disease)     GERD (gastroesophageal reflux disease)     Hyperlipidemia     Hypertension     Osteopenia 3/2/2016    Primary hyperparathyroidism     Senile cataract     OD     Past Surgical History:   Procedure Laterality Date    APPENDECTOMY      CATARACT EXTRACTION W/  INTRAOCULAR LENS IMPLANT Left 05/16/2018    Dr Haywood    COLONOSCOPY W/ BIOPSIES  2/2011    repeat in 3 years    PARTIAL KNEE ARTHROPLASTY Right     Right    SLEEVE GASTROPLASTY  2/8/12    TONSILLECTOMY      TOTAL HIP ARTHROPLASTY Right     TOTAL KNEE ARTHROPLASTY Left      Review of patient's allergies indicates:   Allergen Reactions    Formaldehyde Hives and Other (See Comments)     Other reaction(s): Other (See Comments)  Other reaction(s): Hives  Other reaction(s): Hives  Other reaction(s): Hives  Other reaction(s): Hives    Mercury Hives     Other reaction(s): Hives  Other reaction(s): Hives  Other reaction(s): Hives  Other reaction(s): Hives    Mercury (elemental) Hives    Penicillins Swelling     Other reaction(s): Swelling  Other reaction(s): Swelling    Adhesive tape-silicones     Dilaudid [hydromorphone (bulk)]     Latex, natural rubber Hives    Adhesive Rash     Other reaction(s): Rash  Other reaction(s): Rash    Hydromorphone Itching     Social History     Tobacco Use    Smoking status: Former Smoker    Smokeless tobacco: Never Used    Tobacco comment: Has not smoked in 40 years.   Substance Use Topics    Alcohol use: Yes     Comment: occasionally    Drug use: No     Family History   Problem Relation Age of Onset    Arthritis Mother     Cancer Mother     Breast cancer Mother     Arthritis Father     Heart disease Father     Hyperlipidemia Father     Heart attack Father     Cancer Sister     Depression Sister     Cataracts Sister     Cancer Brother     Depression Brother     Diabetes  Brother     Heart disease Brother     Hyperlipidemia Brother     Blindness Brother         dm    Retinal detachment Brother     Alcohol abuse Son     Birth defects Son     Diabetes Son     Mental retardation Son     Cancer Maternal Aunt     Alcohol abuse Maternal Uncle     Cancer Maternal Uncle     Depression Maternal Uncle     Cancer Paternal Aunt     Cancer Paternal Uncle     Arthritis Maternal Grandmother     Hypertension Maternal Grandmother     Alcohol abuse Maternal Grandfather     Arthritis Maternal Grandfather     Depression Maternal Grandfather     Hypertension Maternal Grandfather     Arthritis Paternal Grandmother     Arthritis Paternal Grandfather     HIV Son     Glaucoma Son     Breast cancer Paternal Cousin     Amblyopia Neg Hx     Macular degeneration Neg Hx     Strabismus Neg Hx     Stroke Neg Hx     Thyroid disease Neg Hx      Current Outpatient Medications on File Prior to Visit   Medication Sig Dispense Refill    ACETAMINOPHEN (TYLENOL EXTRA STRENGTH ORAL) 500 mg. Every 4-6 hours PRN      BIOTIN ORAL Take by mouth once daily.      C,E,zinc,copper 11/juleq4s/lut (OCUVITE ADULT 50 PLUS ORAL) Take by mouth.      calcium citrate-vitamin D3 315-200 mg (CITRACAL+D) 315-200 mg-unit per tablet Take 1 tablet by mouth 2 (two) times daily.      cetirizine (ZYRTEC) 10 MG tablet TAKE 1 TABLET BY MOUTH EVERY DAY 30 tablet 11    CYANOCOBALAMIN, VITAMIN B-12, (VITAMIN B-12 ORAL) As directed      cyclobenzaprine (FLEXERIL) 10 MG tablet   1    DENOSUMAB (PROLIA SUBQ) Inject into the skin.      DULoxetine (CYMBALTA) 60 MG capsule TAKE 1 CAPSULE(60 MG) BY MOUTH EVERY DAY 90 capsule 11    ferrous gluconate (FERGON) 324 MG tablet Take 324 mg by mouth daily with breakfast.      fluticasone (FLONASE) 50 mcg/actuation nasal spray SHAKE LIQUID AND USE 1 SPRAY IN EACH NOSTRIL EVERY DAY 16 mL 11    gabapentin (NEURONTIN) 300 MG capsule TAKE ONE CAPSULE BY MOUTH THREE TIMES DAILY 90  capsule 0    glucosamine sulfate 500 mg Tab Take 1 tablet by mouth once daily.      LACTOBACILLUS ACIDOPHILUS (PROBIOTIC ORAL) Take by mouth.      methen-sod phos-meth blue-hyos (UROGESIC-BLUE) 81.6-40.8-0.12 mg Tab Take 1 tablet by mouth twice a week. 60 tablet 1    metoprolol tartrate (LOPRESSOR) 25 MG tablet TAKE 1 TABLET(25 MG) BY MOUTH TWICE DAILY 180 tablet 11    MULTIVITAMIN ORAL 2 (two) times daily. Every day      nystatin (MYCOSTATIN) powder Apply topically 4 (four) times daily. 60 g 11    tolterodine (DETROL LA) 4 MG 24 hr capsule Take 1 capsule (4 mg total) by mouth once daily. 30 capsule 11    vitamin D 1000 units Tab Take 185 mg by mouth once daily.      [DISCONTINUED] phenazopyridine (PYRIDIUM) 200 MG tablet Take 1 tablet by mouth 3 (three) times daily.      conjugated estrogens (PREMARIN) vaginal cream Place 0.5 g vaginally twice a week. 30 g 3    dicyclomine (BENTYL) 10 MG capsule TAKE 1 CAPSULE BY MOUTH FOUR TIMES DAILY BEFORE MEALS AND AT NIGHT 120 capsule 11    GLUCOSAMINE HCL/CHONDRO KIDD A (GLUCOSAMINE-CHONDROITIN ORAL) Every day       No current facility-administered medications on file prior to visit.        Review of Systems   Constitutional: Negative for chills, diaphoresis, fatigue and fever.   HENT: Negative for congestion, ear pain, postnasal drip, sinus pain and sore throat.    Eyes: Negative for pain and redness.   Respiratory: Negative for cough, chest tightness and shortness of breath.    Cardiovascular: Negative for chest pain and leg swelling.   Gastrointestinal: Negative for abdominal pain, constipation, diarrhea, nausea and vomiting.   Genitourinary: Positive for frequency and urgency. Negative for dysuria, hematuria, vaginal bleeding, vaginal discharge and vaginal pain.   Musculoskeletal: Negative for arthralgias and joint swelling.   Skin: Negative for rash.   Neurological: Negative for dizziness, syncope and headaches.       Vitals:    11/04/19 1606   BP: (!) 142/74  "  Pulse: 60   Temp: 98.3 °F (36.8 °C)   Weight: 86.2 kg (190 lb)   Height: 5' 4" (1.626 m)       Wt Readings from Last 3 Encounters:   11/04/19 86.2 kg (190 lb)   10/25/19 85.6 kg (188 lb 11.4 oz)   07/01/19 89.4 kg (197 lb)       Physical Exam   Constitutional: She is oriented to person, place, and time. She appears well-developed and well-nourished. No distress.   HENT:   Head: Normocephalic and atraumatic.   Eyes: Conjunctivae and EOM are normal.   Neck: Normal range of motion. Neck supple.   Cardiovascular: Normal rate, regular rhythm, normal heart sounds and intact distal pulses.   No murmur heard.  Pulmonary/Chest: Effort normal and breath sounds normal. No respiratory distress.   Abdominal: Soft. Bowel sounds are normal. She exhibits no distension. There is no tenderness.   No flank pain   Musculoskeletal: Normal range of motion.   Neurological: She is alert and oriented to person, place, and time.   Skin: Skin is warm and dry. No rash noted.   Psychiatric: She has a normal mood and affect.       "

## 2019-11-04 NOTE — ASSESSMENT & PLAN NOTE
Symptoms of increased frequency/urgency which patient states is similar to symptoms of UTI in the past. She also has a hx of vaginal atrophy and is currently using topical estrogen. Plan to check UA with culture today. If evidence of infection, will start abx treatment. She also described bladder spasms which she states occurs during infection. If no UTI present, may need to make sure patient is not having adverse effect of the detrol she is currently on, maybe with a trial of stopping this med.

## 2019-11-05 ENCOUNTER — TELEPHONE (OUTPATIENT)
Dept: FAMILY MEDICINE | Facility: CLINIC | Age: 84
End: 2019-11-05

## 2019-11-05 DIAGNOSIS — N30.00 ACUTE CYSTITIS WITHOUT HEMATURIA: Primary | ICD-10-CM

## 2019-11-05 LAB
BACTERIA #/AREA URNS HPF: ABNORMAL /HPF
BILIRUB UR QL STRIP: NEGATIVE
CLARITY UR: CLEAR
COLOR UR: YELLOW
GLUCOSE UR QL STRIP: NEGATIVE
HGB UR QL STRIP: ABNORMAL
KETONES UR QL STRIP: NEGATIVE
LEUKOCYTE ESTERASE UR QL STRIP: ABNORMAL
MICROSCOPIC COMMENT: ABNORMAL
NITRITE UR QL STRIP: POSITIVE
PH UR STRIP: 6 [PH] (ref 5–8)
PROT UR QL STRIP: NEGATIVE
RBC #/AREA URNS HPF: 1 /HPF (ref 0–4)
SP GR UR STRIP: 1.01 (ref 1–1.03)
SQUAMOUS #/AREA URNS HPF: 7 /HPF
TRI-PHOS CRY URNS QL MICRO: ABNORMAL
URN SPEC COLLECT METH UR: ABNORMAL
WBC #/AREA URNS HPF: 50 /HPF (ref 0–5)
WBC CLUMPS URNS QL MICRO: ABNORMAL

## 2019-11-05 RX ORDER — NITROFURANTOIN 25; 75 MG/1; MG/1
100 CAPSULE ORAL 2 TIMES DAILY
Qty: 10 CAPSULE | Refills: 0 | Status: SHIPPED | OUTPATIENT
Start: 2019-11-05 | End: 2019-11-10

## 2019-11-05 NOTE — TELEPHONE ENCOUNTER
Spoke with pt, was told that results weren't in yet. Was informed that she would receive a call when they were in.

## 2019-11-05 NOTE — TELEPHONE ENCOUNTER
----- Message from Marietta Giles sent at 11/5/2019 10:31 AM CST -----  Contact: (luciano) Daughter  Caller would like nurse to contact her regarding pt urine test, caller asked nurse to contact her regarding test results. ([phone])

## 2019-11-05 NOTE — TELEPHONE ENCOUNTER
----- Message from Lena Mcgowan sent at 11/5/2019  9:46 AM CST -----  ..Type:  Patient Returning Call    Who Called:pt   Who Left Message for Patient:  Does the patient know what this is regarding?: urine samples  Would the patient rather a call back or a response via [facility]? Call back   Best Call Back Number: [phone]  Additional Information: Pt is requesting a call from nurse to discuss an dropping off urine sample

## 2019-11-05 NOTE — TELEPHONE ENCOUNTER
Urine test concerning for infection. Urine culture is pending and will follow up. Will start on antibiotic. Sent to [facility]    I have signed for the following orders AND/OR meds.  Please call the patient and ask the patient to schedule the testing AND/OR inform about any medications that were sent.      No orders of the defined types were placed in this encounter.      Medications Ordered This Encounter   Medications    nitrofurantoin, macrocrystal-monohydrate, (MACROBID) 100 MG capsule     Sig: Take 1 capsule (100 mg total) by mouth 2 (two) times daily. for 5 days     Dispense:  10 capsule     Refill:  0

## 2019-11-07 ENCOUNTER — TELEPHONE (OUTPATIENT)
Dept: FAMILY MEDICINE | Facility: CLINIC | Age: 84
End: 2019-11-07

## 2019-11-07 LAB
BACTERIA UR CULT: NORMAL
BACTERIA UR CULT: NORMAL

## 2019-11-07 NOTE — TELEPHONE ENCOUNTER
----- Message from Coni Gonsalez sent at 11/7/2019 10:49 AM CST -----  Contact: pt  .Type:  Test Results    Who Called:  pt  Name of Test (Lab/Mammo/Etc):  Lab   Date of Test:    Ordering Provider:    Where the test was performed:    Would the patient rather a call back or a response via [facility]?  Call back   Best Call Back Number:  [phone] (home)   Additional Information:   Pt request to speak with nurse Merino      Problem: Patient Care Overview  Goal: Plan of Care Review  Outcome: Ongoing (interventions implemented as appropriate)  Pt. On room air in no apparent distress. Will cont. To monitor.

## 2019-11-15 RX ORDER — GABAPENTIN 300 MG/1
CAPSULE ORAL
Qty: 90 CAPSULE | Refills: 0 | Status: SHIPPED | OUTPATIENT
Start: 2019-11-15 | End: 2020-01-22 | Stop reason: SDUPTHER

## 2019-11-27 ENCOUNTER — PATIENT MESSAGE (OUTPATIENT)
Dept: ENDOCRINOLOGY | Facility: CLINIC | Age: 84
End: 2019-11-27

## 2019-12-03 ENCOUNTER — TELEPHONE (OUTPATIENT)
Dept: RADIOLOGY | Facility: HOSPITAL | Age: 84
End: 2019-12-03

## 2019-12-03 ENCOUNTER — TELEPHONE (OUTPATIENT)
Dept: ENDOCRINOLOGY | Facility: CLINIC | Age: 84
End: 2019-12-03

## 2019-12-03 DIAGNOSIS — E83.52 HYPERCALCEMIA: Primary | ICD-10-CM

## 2019-12-03 NOTE — TELEPHONE ENCOUNTER
----- Message from Medina Bhat sent at 12/3/2019  8:44 AM CST -----  Contact: Valerie with [facility]  Type: Needs Medical Advice    Who Called:  Valerie with [facility]  Best Call Back Number: [phone] or [phone]  Additional Information: Calling to speak with the nurse about patient's 24hr urine, she has a quick question.

## 2019-12-03 NOTE — TELEPHONE ENCOUNTER
Valerie salazar/ [facility] states pt turned in her 24 hr urine specimen in a milk jug.  She gave pt the correct supplies this morning and will have pt recollect.  New orders placed.

## 2019-12-04 ENCOUNTER — HOSPITAL ENCOUNTER (OUTPATIENT)
Dept: RADIOLOGY | Facility: HOSPITAL | Age: 84
Discharge: HOME | End: 2019-12-04
Attending: UROLOGY
Payer: COMMERCIAL

## 2019-12-04 ENCOUNTER — PATIENT OUTREACH (OUTPATIENT)
Dept: ADMINISTRATIVE | Facility: HOSPITAL | Age: 84
End: 2019-12-04

## 2019-12-04 ENCOUNTER — HOSPITAL ENCOUNTER (OUTPATIENT)
Dept: RADIOLOGY | Facility: HOSPITAL | Age: 84
Discharge: HOME | End: 2019-12-04
Attending: INTERNAL MEDICINE
Payer: COMMERCIAL

## 2019-12-04 DIAGNOSIS — N20.0 RENAL CALCULUS: ICD-10-CM

## 2019-12-04 DIAGNOSIS — M85.80 OSTEOPENIA, UNSPECIFIED LOCATION: ICD-10-CM

## 2019-12-04 DIAGNOSIS — D17.71 ANGIOMYOLIPOMA OF LEFT KIDNEY: ICD-10-CM

## 2019-12-04 DIAGNOSIS — E83.52 HYPERCALCEMIA: ICD-10-CM

## 2019-12-04 DIAGNOSIS — K21.9 GASTROESOPHAGEAL REFLUX DISEASE WITHOUT ESOPHAGITIS: ICD-10-CM

## 2019-12-04 PROCEDURE — 77080 DEXA BONE DENSITY SPINE HIP: ICD-10-PCS | Mod: 26,,, | Performed by: RADIOLOGY

## 2019-12-04 PROCEDURE — 77080 DXA BONE DENSITY AXIAL: CPT | Mod: 26,,, | Performed by: RADIOLOGY

## 2019-12-04 PROCEDURE — 76770 US RETROPERITONEAL COMPLETE: ICD-10-PCS | Mod: 26,,, | Performed by: RADIOLOGY

## 2019-12-04 PROCEDURE — 77080 DXA BONE DENSITY AXIAL: CPT | Mod: TC,PO

## 2019-12-04 PROCEDURE — 76770 US EXAM ABDO BACK WALL COMP: CPT | Mod: 26,,, | Performed by: RADIOLOGY

## 2019-12-04 PROCEDURE — 76770 US EXAM ABDO BACK WALL COMP: CPT | Mod: TC,PO

## 2019-12-04 NOTE — PROGRESS NOTES
Spoke to pt's daughter regarding b/p check via HTN report.  Pt scheduled for 12/12/2019 at 1:45 pm.

## 2019-12-05 ENCOUNTER — TELEPHONE (OUTPATIENT)
Dept: ENDOCRINOLOGY | Facility: CLINIC | Age: 84
End: 2019-12-05

## 2019-12-05 ENCOUNTER — LAB VISIT (OUTPATIENT)
Dept: LAB | Facility: HOSPITAL | Age: 84
End: 2019-12-05
Attending: FAMILY MEDICINE
Payer: COMMERCIAL

## 2019-12-05 DIAGNOSIS — M85.80 OSTEOPENIA, UNSPECIFIED LOCATION: Primary | ICD-10-CM

## 2019-12-05 DIAGNOSIS — M85.80 OSTEOPENIA, UNSPECIFIED LOCATION: ICD-10-CM

## 2019-12-05 LAB
ALBUMIN SERPL BCP-MCNC: 3.7 G/DL (ref 3.5–5.2)
ALP SERPL-CCNC: 34 U/L (ref 55–135)
ALT SERPL W/O P-5'-P-CCNC: 17 U/L (ref 10–44)
ANION GAP SERPL CALC-SCNC: 8 MMOL/L (ref 8–16)
AST SERPL-CCNC: 25 U/L (ref 10–40)
BILIRUB SERPL-MCNC: 0.4 MG/DL (ref 0.1–1)
BUN SERPL-MCNC: 18 MG/DL (ref 8–23)
CALCIUM SERPL-MCNC: 9.9 MG/DL (ref 8.7–10.5)
CHLORIDE SERPL-SCNC: 103 MMOL/L (ref 95–110)
CO2 SERPL-SCNC: 30 MMOL/L (ref 23–29)
CREAT SERPL-MCNC: 0.8 MG/DL (ref 0.5–1.4)
EST. GFR  (AFRICAN AMERICAN): >60 ML/MIN/1.73 M^2
EST. GFR  (NON AFRICAN AMERICAN): >60 ML/MIN/1.73 M^2
GLUCOSE SERPL-MCNC: 82 MG/DL (ref 70–110)
POTASSIUM SERPL-SCNC: 4.4 MMOL/L (ref 3.5–5.1)
PROT SERPL-MCNC: 7.1 G/DL (ref 6–8.4)
SODIUM SERPL-SCNC: 141 MMOL/L (ref 136–145)

## 2019-12-05 PROCEDURE — 36415 COLL VENOUS BLD VENIPUNCTURE: CPT | Mod: PO

## 2019-12-05 PROCEDURE — 83970 ASSAY OF PARATHORMONE: CPT

## 2019-12-05 PROCEDURE — 80053 COMPREHEN METABOLIC PANEL: CPT

## 2019-12-05 PROCEDURE — 82523 COLLAGEN CROSSLINKS: CPT

## 2019-12-05 NOTE — TELEPHONE ENCOUNTER
----- Message from Twanya CHRISTINE Mary Beth sent at 12/5/2019  8:52 AM CST -----  Regarding: Lab Client Services  Contact: [phone]  Good Morning,    My name is Tawnya Clinton I work in the Lab Client Services. We had a problem with some lab work on this patient. If someone from your office could call us at [phone] or ext. [phone] that would be great. Anyone in my department can help.      Thank you

## 2019-12-05 NOTE — TELEPHONE ENCOUNTER
Spoke to lab and they adv that the labs collected from yesterday hemolyzed and need to be redone. Spoke to pt's daughter and adv. Pt will go repeat labs today.

## 2019-12-06 LAB — PTH-INTACT SERPL-MCNC: 80 PG/ML (ref 9–77)

## 2019-12-07 LAB — COLLAGEN CTX SERPL-MCNC: 90 PG/ML

## 2019-12-09 ENCOUNTER — LAB VISIT (OUTPATIENT)
Dept: LAB | Facility: HOSPITAL | Age: 84
End: 2019-12-09
Attending: INTERNAL MEDICINE
Payer: COMMERCIAL

## 2019-12-09 DIAGNOSIS — E83.52 HYPERCALCEMIA: ICD-10-CM

## 2019-12-09 LAB
CALCIUM 24H UR-MRATE: 16 MG/HR (ref 4–12)
CALCIUM UR-MCNC: 18.7 MG/DL (ref 0–15)
CALCIUM URINE (MG/SPEC): 388 MG/SPEC
CREAT 24H UR-MRATE: 41.5 MG/HR (ref 40–75)
CREAT UR-MCNC: 48 MG/DL (ref 15–325)
CREATININE, URINE (MG/SPEC): 996 MG/SPEC
URINE COLLECTION DURATION: 24 HR
URINE COLLECTION DURATION: 24 HR
URINE VOLUME: 2075 ML
URINE VOLUME: 2075 ML

## 2019-12-09 PROCEDURE — 82570 ASSAY OF URINE CREATININE: CPT

## 2019-12-09 PROCEDURE — 82340 ASSAY OF CALCIUM IN URINE: CPT

## 2019-12-11 ENCOUNTER — OFFICE VISIT (OUTPATIENT)
Dept: ENDOCRINOLOGY | Facility: CLINIC | Age: 84
End: 2019-12-11
Payer: COMMERCIAL

## 2019-12-11 VITALS
WEIGHT: 190.06 LBS | BODY MASS INDEX: 32.45 KG/M2 | HEART RATE: 52 BPM | SYSTOLIC BLOOD PRESSURE: 144 MMHG | HEIGHT: 64 IN | DIASTOLIC BLOOD PRESSURE: 62 MMHG

## 2019-12-11 DIAGNOSIS — I10 BENIGN ESSENTIAL HTN: ICD-10-CM

## 2019-12-11 DIAGNOSIS — M85.80 OSTEOPENIA, UNSPECIFIED LOCATION: Primary | ICD-10-CM

## 2019-12-11 DIAGNOSIS — E21.3 HYPERPARATHYROIDISM: ICD-10-CM

## 2019-12-11 PROCEDURE — 99999 PR PBB SHADOW E&M-EST. PATIENT-LVL III: ICD-10-PCS | Mod: PBBFAC,,, | Performed by: INTERNAL MEDICINE

## 2019-12-11 PROCEDURE — 1125F AMNT PAIN NOTED PAIN PRSNT: CPT | Mod: S$GLB,,, | Performed by: INTERNAL MEDICINE

## 2019-12-11 PROCEDURE — 1159F PR MEDICATION LIST DOCUMENTED IN MEDICAL RECORD: ICD-10-PCS | Mod: S$GLB,,, | Performed by: INTERNAL MEDICINE

## 2019-12-11 PROCEDURE — 99214 OFFICE O/P EST MOD 30 MIN: CPT | Mod: S$GLB,,, | Performed by: INTERNAL MEDICINE

## 2019-12-11 PROCEDURE — 3078F DIAST BP <80 MM HG: CPT | Mod: CPTII,S$GLB,, | Performed by: INTERNAL MEDICINE

## 2019-12-11 PROCEDURE — 3077F SYST BP >= 140 MM HG: CPT | Mod: CPTII,S$GLB,, | Performed by: INTERNAL MEDICINE

## 2019-12-11 PROCEDURE — 1101F PR PT FALLS ASSESS DOC 0-1 FALLS W/OUT INJ PAST YR: ICD-10-PCS | Mod: CPTII,S$GLB,, | Performed by: INTERNAL MEDICINE

## 2019-12-11 PROCEDURE — 1125F PR PAIN SEVERITY QUANTIFIED, PAIN PRESENT: ICD-10-PCS | Mod: S$GLB,,, | Performed by: INTERNAL MEDICINE

## 2019-12-11 PROCEDURE — 1159F MED LIST DOCD IN RCRD: CPT | Mod: S$GLB,,, | Performed by: INTERNAL MEDICINE

## 2019-12-11 PROCEDURE — 3078F PR MOST RECENT DIASTOLIC BLOOD PRESSURE < 80 MM HG: ICD-10-PCS | Mod: CPTII,S$GLB,, | Performed by: INTERNAL MEDICINE

## 2019-12-11 PROCEDURE — 99214 PR OFFICE/OUTPT VISIT, EST, LEVL IV, 30-39 MIN: ICD-10-PCS | Mod: S$GLB,,, | Performed by: INTERNAL MEDICINE

## 2019-12-11 PROCEDURE — 99999 PR PBB SHADOW E&M-EST. PATIENT-LVL III: CPT | Mod: PBBFAC,,, | Performed by: INTERNAL MEDICINE

## 2019-12-11 PROCEDURE — 1101F PT FALLS ASSESS-DOCD LE1/YR: CPT | Mod: CPTII,S$GLB,, | Performed by: INTERNAL MEDICINE

## 2019-12-11 PROCEDURE — 3077F PR MOST RECENT SYSTOLIC BLOOD PRESSURE >= 140 MM HG: ICD-10-PCS | Mod: CPTII,S$GLB,, | Performed by: INTERNAL MEDICINE

## 2019-12-11 NOTE — PROGRESS NOTES
CHIEF COMPLAINT: Osteopenia  84 year old being seen as a f/u. Had been seeing DR. Diaz and last saw her in 2016 and then saw DR. Perez. Diagnosed in 2015. Had been on Fosamax. Then switched to Prolia. Tolerating Prolia. Scheduled for Prolia Jan 6. No falls. No fractures.         PAST MEDICAL HISTORY/PAST SURGICAL HISTORY:  Reviewed in [facility]    SOCIAL HISTORY: No T/A    FAMILY HISTORY:  + Osteoporosis. No hip fracture    MEDICATIONS/ALLERGIES: The patient's MedCard has been updated and reviewed.      ROS:   Constitutional: No recent significant weight change  Eyes: Waiting to have cataract surgery.   ENT: No dysphagia  Cardiovascular: Denies current anginal symptoms  Respiratory: Denies current respiratory difficulty  Gastrointestinal: + GERD- stable.   GenitoUrinary - No dysuria  Skin: No new skin rash  Neurologic: No focal neurologic complaints  Remainder ROS negative        PE:    GENERAL: Well developed, well nourished.  NECK: Supple, trachea midline, No palpable thyroid nodules.   CHEST: Resp even and unlabored, CTA bilateral.  CARDIAC: RRR, S1, S2 heard, no murmurs, rubs, S3, or S4    Results for ENRRIQUE MELISSA CEZAR (MRN [id number]) as of 12/11/2019 10:44   Ref. Range 12/5/2019 11:31 12/9/2019 09:55 12/9/2019 09:55   Sodium Latest Ref Range: 136 - 145 mmol/L 141     Potassium Latest Ref Range: 3.5 - 5.1 mmol/L 4.4     Chloride Latest Ref Range: 95 - 110 mmol/L 103     CO2 Latest Ref Range: 23 - 29 mmol/L 30 (H)     Anion Gap Latest Ref Range: 8 - 16 mmol/L 8     BUN, Bld Latest Ref Range: 8 - 23 mg/dL 18     Creatinine Latest Ref Range: 0.5 - 1.4 mg/dL 0.8     eGFR if non African American Latest Ref Range: >60 mL/min/1.73 m^2 >60.0     eGFR if African American Latest Ref Range: >60 mL/min/1.73 m^2 >60.0     Glucose Latest Ref Range: 70 - 110 mg/dL 82     Calcium Latest Ref Range: 8.7 - 10.5 mg/dL 9.9     Alkaline Phosphatase Latest Ref Range: 55 - 135 U/L 34 (L)     PROTEIN TOTAL Latest Ref Range: 6.0 - 8.4 g/dL 7.1      Albumin Latest Ref Range: 3.5 - 5.2 g/dL 3.7     BILIRUBIN TOTAL Latest Ref Range: 0.1 - 1.0 mg/dL 0.4     AST Latest Ref Range: 10 - 40 U/L 25     ALT Latest Ref Range: 10 - 44 U/L 17     PTH Latest Ref Range: 9.0 - 77.0 pg/mL 80.0 (H)     C Telopeptide (CTX), Serum Latest Units: pg/mL 90     CREATININE, URINE (SEND OUT) Latest Ref Range: 15.0 - 325.0 mg/dL   48.0   Calcium, Urine Latest Ref Range: 0.0 - 15.0 mg/dL  18.7 (H)    Calcium, 24H Urine Latest Ref Range: 4 - 12 mg/Hr  16 (H)    CA Urine (mg/Spec) Latest Units: mg/Spec  388    Creatinine, Ur (mg/spec) Latest Units: mg/Spec   996.0     DEXA BONE DENSITY SPINE HIP    CLINICAL HISTORY:  Other specified disorders of bone density and structure, unspecified site    TECHNIQUE:  DXA scanning was performed over the left hip and lumbar spine.  Review of the images confirms satisfactory positioning and technique.    COMPARISON:  10/24/2017.    FINDINGS:  The L1 to L4 vertebral bone mineral density is equal to 1.329 g/cm squared with a T score of 1.2.    The left femoral neck bone mineral density is equal to 0.87 g/cm squared with a T score of -1.2.  This represents a 41% increase when compared to the previous exam.    There is a 11.7% risk of a major osteoporotic fracture and a 2.6% risk of hip fracture in the next 10 years (FRAX).      Impression       Osteopenia as above         ASSESSMENT/PLAN:  1. Osteopenia- No Fracture history. Had been on fosamax and now on Prolia. Tolerating well. Will continue current Tx. Discussed taking Ca + D. Discussed fall precautions. Discussed benefits of weight bearing exercise.     2. Primary hyperparathyroidism- based on history of hypercalcemia and high urine Ca. At this point Ca WNL. On Prolia for osteopenia.      3. GERD- Unable to take PO bisphosphonates.     4. HTN-  discussed checking at home. If still elevated will need to f/u with PCP      FOLLOWUP  Coordinate Ezio Prolia with Son.   F/U 6 months prior to next prolai  Fasting CTX, CMP, PTH, Phos,

## 2019-12-12 ENCOUNTER — PATIENT MESSAGE (OUTPATIENT)
Dept: ENDOCRINOLOGY | Facility: CLINIC | Age: 84
End: 2019-12-12

## 2019-12-12 ENCOUNTER — CLINICAL SUPPORT (OUTPATIENT)
Dept: FAMILY MEDICINE | Facility: CLINIC | Age: 84
End: 2019-12-12
Payer: COMMERCIAL

## 2019-12-12 VITALS — DIASTOLIC BLOOD PRESSURE: 65 MMHG | HEART RATE: 52 BPM | SYSTOLIC BLOOD PRESSURE: 115 MMHG

## 2019-12-12 DIAGNOSIS — Z01.30 BP CHECK: Primary | ICD-10-CM

## 2019-12-12 PROCEDURE — 99999 PR PBB SHADOW E&M-EST. PATIENT-LVL III: ICD-10-PCS | Mod: PBBFAC,,,

## 2019-12-12 PROCEDURE — 99999 PR PBB SHADOW E&M-EST. PATIENT-LVL III: CPT | Mod: PBBFAC,,,

## 2019-12-27 ENCOUNTER — PATIENT MESSAGE (OUTPATIENT)
Dept: UROGYNECOLOGY | Facility: CLINIC | Age: 84
End: 2019-12-27

## 2019-12-27 ENCOUNTER — PATIENT MESSAGE (OUTPATIENT)
Dept: OBSTETRICS AND GYNECOLOGY | Facility: CLINIC | Age: 84
End: 2019-12-27

## 2019-12-27 DIAGNOSIS — N95.2 VAGINAL ATROPHY: ICD-10-CM

## 2019-12-27 NOTE — TELEPHONE ENCOUNTER
Aneta Kemp,  Can you please refill the premerin vaginal cream for me? I'm almost out of my current prescription. I use [facility] [address] in [place].  Thanks,  Marysol Lyles

## 2019-12-30 ENCOUNTER — PATIENT MESSAGE (OUTPATIENT)
Dept: UROGYNECOLOGY | Facility: CLINIC | Age: 84
End: 2019-12-30

## 2019-12-30 NOTE — TELEPHONE ENCOUNTER
Aneta Kemp,  This is Marysol Vanegas's daughter. She told me she requested a refill of her prescription, Premerin Vaginal Cream. Her insurance co-pay has been met for the year, so this refill will be $0 co-pay for her if it's refilled in 2019. Can you be sure to refill it before 2020 so she has a $0 co-pay? This med is $55.00. Your help is truly appreciated.  I will make sure she gets an appointment with you ASAP.  Thanks so much!  Nafisa Lyles

## 2020-01-18 RX ORDER — METOPROLOL TARTRATE 25 MG/1
TABLET, FILM COATED ORAL
Qty: 180 TABLET | Refills: 11 | Status: SHIPPED | OUTPATIENT
Start: 2020-01-18 | End: 2020-11-09 | Stop reason: SDUPTHER

## 2020-01-20 ENCOUNTER — INFUSION (OUTPATIENT)
Dept: INFUSION THERAPY | Facility: HOSPITAL | Age: 85
End: 2020-01-20
Attending: INTERNAL MEDICINE
Payer: COMMERCIAL

## 2020-01-20 VITALS
HEART RATE: 64 BPM | SYSTOLIC BLOOD PRESSURE: 136 MMHG | TEMPERATURE: 98 F | DIASTOLIC BLOOD PRESSURE: 74 MMHG | RESPIRATION RATE: 18 BRPM

## 2020-01-20 DIAGNOSIS — M85.80 OSTEOPENIA, UNSPECIFIED LOCATION: Primary | ICD-10-CM

## 2020-01-20 PROCEDURE — 96372 THER/PROPH/DIAG INJ SC/IM: CPT | Mod: PN

## 2020-01-20 PROCEDURE — 63600175 PHARM REV CODE 636 W HCPCS: Mod: JG,PN | Performed by: INTERNAL MEDICINE

## 2020-01-20 RX ADMIN — DENOSUMAB 60 MG: 60 INJECTION SUBCUTANEOUS at 03:01

## 2020-01-22 RX ORDER — GABAPENTIN 300 MG/1
300 CAPSULE ORAL 3 TIMES DAILY
Qty: 90 CAPSULE | Refills: 11 | Status: SHIPPED | OUTPATIENT
Start: 2020-01-22 | End: 2021-01-31 | Stop reason: SDUPTHER

## 2020-02-03 ENCOUNTER — TELEPHONE (OUTPATIENT)
Dept: FAMILY MEDICINE | Facility: CLINIC | Age: 85
End: 2020-02-03

## 2020-02-03 DIAGNOSIS — R39.15 URINARY URGENCY: ICD-10-CM

## 2020-02-03 DIAGNOSIS — E78.5 HYPERLIPIDEMIA, UNSPECIFIED HYPERLIPIDEMIA TYPE: Primary | ICD-10-CM

## 2020-02-03 RX ORDER — METHENAMINE, SODIUM PHOSPHATE, MONOBASIC, METHYLENE BLUE, AND HYOSCYAMINE SULFATE 81.6; 40.8; 10.8; .12 MG/1; MG/1; MG/1; MG/1
TABLET, COATED ORAL
Qty: 60 TABLET | Refills: 1 | Status: SHIPPED | OUTPATIENT
Start: 2020-02-03 | End: 2021-02-19 | Stop reason: SDUPTHER

## 2020-02-03 NOTE — TELEPHONE ENCOUNTER
----- Message from Wilbert Garcia sent at 2/3/2020  4:42 PM CST -----  Contact: Pt  Please call Marysol to discuss a lipid test she has questions about [phone].

## 2020-02-03 NOTE — TELEPHONE ENCOUNTER
I have signed for the following orders AND/OR meds.  Please call the patient and ask the patient to schedule the testing AND/OR inform about any medications that were sent.      Orders Placed This Encounter   Procedures    Lipid panel     Standing Status:   Standing     Number of Occurrences:   99     Standing Expiration Date:   4/3/2021

## 2020-02-07 RX ORDER — FLUTICASONE PROPIONATE 50 MCG
SPRAY, SUSPENSION (ML) NASAL
Qty: 16 G | Refills: 0 | Status: SHIPPED | OUTPATIENT
Start: 2020-02-07 | End: 2020-04-07

## 2020-02-18 ENCOUNTER — TELEPHONE (OUTPATIENT)
Dept: FAMILY MEDICINE | Facility: CLINIC | Age: 85
End: 2020-02-18

## 2020-02-18 NOTE — TELEPHONE ENCOUNTER
----- Message from Merline Alejandro sent at 2/18/2020  3:59 PM CST -----  Contact: Pt   Pt is calling regarding requesting to have nurse call pt back in reference to medication and fasting labs. .[phone]      .Thank You  Merline Alejandro

## 2020-02-18 NOTE — TELEPHONE ENCOUNTER
Returned phone call to pt. Addressed concern with medication/labs. Advised pt that she may take her medications as normal with a few sips of water the day of her fasting labs. Pt verbalized understanding.

## 2020-02-19 ENCOUNTER — LAB VISIT (OUTPATIENT)
Dept: LAB | Facility: HOSPITAL | Age: 85
End: 2020-02-19
Attending: FAMILY MEDICINE
Payer: COMMERCIAL

## 2020-02-19 DIAGNOSIS — E78.5 HYPERLIPIDEMIA, UNSPECIFIED HYPERLIPIDEMIA TYPE: ICD-10-CM

## 2020-02-19 LAB
CHOLEST SERPL-MCNC: 229 MG/DL (ref 120–199)
CHOLEST/HDLC SERPL: 3 {RATIO} (ref 2–5)
HDLC SERPL-MCNC: 77 MG/DL (ref 40–75)
HDLC SERPL: 33.6 % (ref 20–50)
LDLC SERPL CALC-MCNC: 138.8 MG/DL (ref 63–159)
NONHDLC SERPL-MCNC: 152 MG/DL
TRIGL SERPL-MCNC: 66 MG/DL (ref 30–150)

## 2020-02-19 PROCEDURE — 36415 COLL VENOUS BLD VENIPUNCTURE: CPT | Mod: PO

## 2020-02-19 PROCEDURE — 80061 LIPID PANEL: CPT

## 2020-03-03 ENCOUNTER — OFFICE VISIT (OUTPATIENT)
Dept: FAMILY MEDICINE | Facility: CLINIC | Age: 85
End: 2020-03-03
Payer: COMMERCIAL

## 2020-03-03 VITALS
TEMPERATURE: 99 F | HEART RATE: 60 BPM | WEIGHT: 189.81 LBS | HEIGHT: 64 IN | SYSTOLIC BLOOD PRESSURE: 140 MMHG | DIASTOLIC BLOOD PRESSURE: 76 MMHG | BODY MASS INDEX: 32.41 KG/M2

## 2020-03-03 DIAGNOSIS — R30.0 DYSURIA: ICD-10-CM

## 2020-03-03 DIAGNOSIS — N39.0 URINARY TRACT INFECTION WITHOUT HEMATURIA, SITE UNSPECIFIED: Primary | ICD-10-CM

## 2020-03-03 LAB
BACTERIA #/AREA URNS HPF: ABNORMAL /HPF
BILIRUB UR QL STRIP: NEGATIVE
CLARITY UR: CLEAR
COLOR UR: YELLOW
GLUCOSE UR QL STRIP: NEGATIVE
HGB UR QL STRIP: ABNORMAL
KETONES UR QL STRIP: NEGATIVE
LEUKOCYTE ESTERASE UR QL STRIP: ABNORMAL
MICROSCOPIC COMMENT: ABNORMAL
NITRITE UR QL STRIP: NEGATIVE
PH UR STRIP: 7 [PH] (ref 5–8)
PROT UR QL STRIP: NEGATIVE
RBC #/AREA URNS HPF: 3 /HPF (ref 0–4)
SP GR UR STRIP: 1.01 (ref 1–1.03)
SQUAMOUS #/AREA URNS HPF: 8 /HPF
URN SPEC COLLECT METH UR: ABNORMAL
WBC #/AREA URNS HPF: >100 /HPF (ref 0–5)

## 2020-03-03 PROCEDURE — 99213 OFFICE O/P EST LOW 20 MIN: CPT | Mod: S$GLB,,, | Performed by: NURSE PRACTITIONER

## 2020-03-03 PROCEDURE — 99213 PR OFFICE/OUTPT VISIT, EST, LEVL III, 20-29 MIN: ICD-10-PCS | Mod: S$GLB,,, | Performed by: NURSE PRACTITIONER

## 2020-03-03 PROCEDURE — 1101F PR PT FALLS ASSESS DOC 0-1 FALLS W/OUT INJ PAST YR: ICD-10-PCS | Mod: CPTII,S$GLB,, | Performed by: NURSE PRACTITIONER

## 2020-03-03 PROCEDURE — 99999 PR PBB SHADOW E&M-EST. PATIENT-LVL V: ICD-10-PCS | Mod: PBBFAC,,, | Performed by: NURSE PRACTITIONER

## 2020-03-03 PROCEDURE — 99999 PR PBB SHADOW E&M-EST. PATIENT-LVL V: CPT | Mod: PBBFAC,,, | Performed by: NURSE PRACTITIONER

## 2020-03-03 PROCEDURE — 1126F PR PAIN SEVERITY QUANTIFIED, NO PAIN PRESENT: ICD-10-PCS | Mod: S$GLB,,, | Performed by: NURSE PRACTITIONER

## 2020-03-03 PROCEDURE — 1101F PT FALLS ASSESS-DOCD LE1/YR: CPT | Mod: CPTII,S$GLB,, | Performed by: NURSE PRACTITIONER

## 2020-03-03 PROCEDURE — 3077F SYST BP >= 140 MM HG: CPT | Mod: CPTII,S$GLB,, | Performed by: NURSE PRACTITIONER

## 2020-03-03 PROCEDURE — 1159F MED LIST DOCD IN RCRD: CPT | Mod: S$GLB,,, | Performed by: NURSE PRACTITIONER

## 2020-03-03 PROCEDURE — 3077F PR MOST RECENT SYSTOLIC BLOOD PRESSURE >= 140 MM HG: ICD-10-PCS | Mod: CPTII,S$GLB,, | Performed by: NURSE PRACTITIONER

## 2020-03-03 PROCEDURE — 3078F PR MOST RECENT DIASTOLIC BLOOD PRESSURE < 80 MM HG: ICD-10-PCS | Mod: CPTII,S$GLB,, | Performed by: NURSE PRACTITIONER

## 2020-03-03 PROCEDURE — 1126F AMNT PAIN NOTED NONE PRSNT: CPT | Mod: S$GLB,,, | Performed by: NURSE PRACTITIONER

## 2020-03-03 PROCEDURE — 3078F DIAST BP <80 MM HG: CPT | Mod: CPTII,S$GLB,, | Performed by: NURSE PRACTITIONER

## 2020-03-03 PROCEDURE — 1159F PR MEDICATION LIST DOCUMENTED IN MEDICAL RECORD: ICD-10-PCS | Mod: S$GLB,,, | Performed by: NURSE PRACTITIONER

## 2020-03-03 PROCEDURE — 81000 URINALYSIS NONAUTO W/SCOPE: CPT | Mod: PO

## 2020-03-03 RX ORDER — OXYCODONE AND ACETAMINOPHEN 10; 325 MG/1; MG/1
TABLET ORAL
COMMUNITY
Start: 2020-02-13 | End: 2020-12-11 | Stop reason: ALTCHOICE

## 2020-03-03 RX ORDER — NITROFURANTOIN 25; 75 MG/1; MG/1
100 CAPSULE ORAL 2 TIMES DAILY
Qty: 20 CAPSULE | Refills: 0 | Status: SHIPPED | OUTPATIENT
Start: 2020-03-03 | End: 2020-03-13

## 2020-03-26 ENCOUNTER — TELEPHONE (OUTPATIENT)
Dept: NEUROLOGY | Facility: CLINIC | Age: 85
End: 2020-03-26

## 2020-04-02 ENCOUNTER — PATIENT MESSAGE (OUTPATIENT)
Dept: NEUROLOGY | Facility: CLINIC | Age: 85
End: 2020-04-02

## 2020-04-06 ENCOUNTER — PATIENT MESSAGE (OUTPATIENT)
Dept: FAMILY MEDICINE | Facility: CLINIC | Age: 85
End: 2020-04-06

## 2020-04-06 RX ORDER — ACETAMINOPHEN 500 MG
1 TABLET ORAL DAILY
Qty: 1 EACH | Refills: 0 | Status: SHIPPED | OUTPATIENT
Start: 2020-04-06

## 2020-04-06 RX ORDER — NYSTATIN 100000 [USP'U]/G
POWDER TOPICAL 3 TIMES DAILY PRN
Qty: 60 G | Refills: 11 | Status: SHIPPED | OUTPATIENT
Start: 2020-04-06 | End: 2022-05-03 | Stop reason: SDUPTHER

## 2020-04-06 NOTE — TELEPHONE ENCOUNTER
I have signed for the following orders AND/OR meds.  Please call the patient and ask the patient to schedule the testing AND/OR inform about any medications that were sent.      No orders of the defined types were placed in this encounter.      Medications Ordered This Encounter   Medications    blood pressure test kit-large Kit     Si kit by Misc.(Non-Drug; Combo Route) route once daily.     Dispense:  1 each     Refill:  0

## 2020-04-07 RX ORDER — FLUTICASONE PROPIONATE 50 MCG
SPRAY, SUSPENSION (ML) NASAL
Qty: 16 G | Refills: 0 | Status: SHIPPED | OUTPATIENT
Start: 2020-04-07 | End: 2020-05-08 | Stop reason: SDUPTHER

## 2020-05-08 RX ORDER — FLUTICASONE PROPIONATE 50 MCG
1 SPRAY, SUSPENSION (ML) NASAL DAILY
Qty: 16 G | Refills: 0 | Status: SHIPPED | OUTPATIENT
Start: 2020-05-08

## 2020-05-13 RX ORDER — DULOXETIN HYDROCHLORIDE 60 MG/1
CAPSULE, DELAYED RELEASE ORAL
Qty: 90 CAPSULE | Refills: 0 | Status: SHIPPED | OUTPATIENT
Start: 2020-05-13 | End: 2020-08-11 | Stop reason: SDUPTHER

## 2020-06-04 ENCOUNTER — PATIENT MESSAGE (OUTPATIENT)
Dept: ENDOCRINOLOGY | Facility: CLINIC | Age: 85
End: 2020-06-04

## 2020-06-16 ENCOUNTER — TELEPHONE (OUTPATIENT)
Dept: FAMILY MEDICINE | Facility: CLINIC | Age: 85
End: 2020-06-16

## 2020-06-16 NOTE — TELEPHONE ENCOUNTER
----- Message from Nabila Rice sent at 6/16/2020  9:27 AM CDT -----  Contact: Kinjal/ANGELI Layton would like a call back at [phone] with ref# [id number], Regards to digit hypertension program that she needs more information of how would it be billed.    Thanks  Td

## 2020-07-09 ENCOUNTER — LAB VISIT (OUTPATIENT)
Dept: LAB | Facility: HOSPITAL | Age: 85
End: 2020-07-09
Attending: INTERNAL MEDICINE
Payer: COMMERCIAL

## 2020-07-09 DIAGNOSIS — M85.80 OSTEOPENIA, UNSPECIFIED LOCATION: ICD-10-CM

## 2020-07-09 DIAGNOSIS — E21.3 HYPERPARATHYROIDISM: ICD-10-CM

## 2020-07-09 LAB
ALBUMIN SERPL BCP-MCNC: 3.9 G/DL (ref 3.5–5.2)
ALP SERPL-CCNC: 31 U/L (ref 55–135)
ALT SERPL W/O P-5'-P-CCNC: 18 U/L (ref 10–44)
ANION GAP SERPL CALC-SCNC: 8 MMOL/L (ref 8–16)
AST SERPL-CCNC: 25 U/L (ref 10–40)
BILIRUB SERPL-MCNC: 0.4 MG/DL (ref 0.1–1)
BUN SERPL-MCNC: 17 MG/DL (ref 8–23)
CALCIUM SERPL-MCNC: 10.4 MG/DL (ref 8.7–10.5)
CHLORIDE SERPL-SCNC: 103 MMOL/L (ref 95–110)
CO2 SERPL-SCNC: 33 MMOL/L (ref 23–29)
CREAT SERPL-MCNC: 0.8 MG/DL (ref 0.5–1.4)
EST. GFR  (AFRICAN AMERICAN): >60 ML/MIN/1.73 M^2
EST. GFR  (NON AFRICAN AMERICAN): >60 ML/MIN/1.73 M^2
GLUCOSE SERPL-MCNC: 87 MG/DL (ref 70–110)
PHOSPHATE SERPL-MCNC: 3.8 MG/DL (ref 2.7–4.5)
POTASSIUM SERPL-SCNC: 4.1 MMOL/L (ref 3.5–5.1)
PROT SERPL-MCNC: 7 G/DL (ref 6–8.4)
PTH-INTACT SERPL-MCNC: 79 PG/ML (ref 9–77)
SARS-COV-2 IGG SERPLBLD QL IA.RAPID: NEGATIVE
SODIUM SERPL-SCNC: 144 MMOL/L (ref 136–145)

## 2020-07-09 PROCEDURE — 84100 ASSAY OF PHOSPHORUS: CPT

## 2020-07-09 PROCEDURE — 36415 COLL VENOUS BLD VENIPUNCTURE: CPT | Mod: PO

## 2020-07-09 PROCEDURE — 80053 COMPREHEN METABOLIC PANEL: CPT

## 2020-07-09 PROCEDURE — 82523 COLLAGEN CROSSLINKS: CPT

## 2020-07-09 PROCEDURE — 83970 ASSAY OF PARATHORMONE: CPT

## 2020-07-09 PROCEDURE — 86769 SARS-COV-2 COVID-19 ANTIBODY: CPT

## 2020-07-13 LAB — COLLAGEN CTX SERPL-MCNC: 57 PG/ML

## 2020-07-14 RX ORDER — CETIRIZINE HYDROCHLORIDE 10 MG/1
10 TABLET ORAL DAILY
Qty: 30 TABLET | Refills: 11 | Status: SHIPPED | OUTPATIENT
Start: 2020-07-14

## 2020-07-15 ENCOUNTER — PATIENT OUTREACH (OUTPATIENT)
Dept: ADMINISTRATIVE | Facility: OTHER | Age: 85
End: 2020-07-15

## 2020-07-15 NOTE — PROGRESS NOTES
Requested updates within Care Everywhere.  Patient's chart was reviewed for overdue ROCCO topics.  Immunizations reconciled.

## 2020-07-16 ENCOUNTER — OFFICE VISIT (OUTPATIENT)
Dept: ENDOCRINOLOGY | Facility: CLINIC | Age: 85
End: 2020-07-16
Payer: COMMERCIAL

## 2020-07-16 DIAGNOSIS — I10 BENIGN ESSENTIAL HTN: ICD-10-CM

## 2020-07-16 DIAGNOSIS — E21.3 HYPERPARATHYROIDISM: ICD-10-CM

## 2020-07-16 DIAGNOSIS — M85.80 OSTEOPENIA, UNSPECIFIED LOCATION: Primary | ICD-10-CM

## 2020-07-16 PROCEDURE — 1101F PT FALLS ASSESS-DOCD LE1/YR: CPT | Mod: CPTII,,, | Performed by: INTERNAL MEDICINE

## 2020-07-16 PROCEDURE — 1159F PR MEDICATION LIST DOCUMENTED IN MEDICAL RECORD: ICD-10-PCS | Mod: ,,, | Performed by: INTERNAL MEDICINE

## 2020-07-16 PROCEDURE — 99214 OFFICE O/P EST MOD 30 MIN: CPT | Mod: 95,,, | Performed by: INTERNAL MEDICINE

## 2020-07-16 PROCEDURE — 1159F MED LIST DOCD IN RCRD: CPT | Mod: ,,, | Performed by: INTERNAL MEDICINE

## 2020-07-16 PROCEDURE — 1101F PR PT FALLS ASSESS DOC 0-1 FALLS W/OUT INJ PAST YR: ICD-10-PCS | Mod: CPTII,,, | Performed by: INTERNAL MEDICINE

## 2020-07-16 PROCEDURE — 99214 PR OFFICE/OUTPT VISIT, EST, LEVL IV, 30-39 MIN: ICD-10-PCS | Mod: 95,,, | Performed by: INTERNAL MEDICINE

## 2020-07-16 NOTE — PROGRESS NOTES
The patient location is: [place]    Visit type: audiovisual    Face to Face time with patient: 17  20 minutes of total time spent on the encounter, which includes face to face time and non-face to face time preparing to see the patient (eg, review of tests), Obtaining and/or reviewing separately obtained history, Documenting clinical information in the electronic or other health record, Independently interpreting results (not separately reported) and communicating results to the patient/family/caregiver, or Care coordination (not separately reported).         Each patient to whom he or she provides medical services by telemedicine is:  (1) informed of the relationship between the physician and patient and the respective role of any other health care provider with respect to management of the patient; and (2) notified that he or she may decline to receive medical services by telemedicine and may withdraw from such care at any time.      CHIEF COMPLAINT: Osteopenia  84 year old being seen as a f/u. Daughters with her. Had been seeing DR. Diaz and last saw her in 2016 and then saw DR. Perez. Diagnosed in 2015. Had been on Fosamax. Then switched to Prolia. Tolerating Prolia. Scheduled for Prolia 7/20. No falls. No fractures. No invasive dental procedures. Taking ca + D. States check BP at home < 140/90.         PAST MEDICAL HISTORY/PAST SURGICAL HISTORY:  Reviewed in [facility]    SOCIAL HISTORY: No T/A    FAMILY HISTORY:  + Osteoporosis. No hip fracture    MEDICATIONS/ALLERGIES: The patient's MedCard has been updated and reviewed.      ROS:   Constitutional: weight stable.   Eyes: Vision stable.   ENT: No dysphagia  Cardiovascular: Denies current anginal symptoms  Respiratory: Denies current respiratory difficulty  Gastrointestinal: States GERD improved with diet.    GenitoUrinary - No dysuria  Skin: No new skin rash  Neurologic: No focal neurologic complaints  Remainder ROS negative        PE:  Virtual Visit    Results for  MELISSA OSUNA (MRN [id number]) as of 7/16/2020 11:01   Ref. Range 7/9/2020 08:00   Sodium Latest Ref Range: 136 - 145 mmol/L 144   Potassium Latest Ref Range: 3.5 - 5.1 mmol/L 4.1   Chloride Latest Ref Range: 95 - 110 mmol/L 103   CO2 Latest Ref Range: 23 - 29 mmol/L 33 (H)   Anion Gap Latest Ref Range: 8 - 16 mmol/L 8   BUN, Bld Latest Ref Range: 8 - 23 mg/dL 17   Creatinine Latest Ref Range: 0.5 - 1.4 mg/dL 0.8   eGFR if non African American Latest Ref Range: >60 mL/min/1.73 m^2 >60.0   eGFR if African American Latest Ref Range: >60 mL/min/1.73 m^2 >60.0   Glucose Latest Ref Range: 70 - 110 mg/dL 87   Calcium Latest Ref Range: 8.7 - 10.5 mg/dL 10.4   Phosphorus Latest Ref Range: 2.7 - 4.5 mg/dL 3.8   Alkaline Phosphatase Latest Ref Range: 55 - 135 U/L 31 (L)   PROTEIN TOTAL Latest Ref Range: 6.0 - 8.4 g/dL 7.0   Albumin Latest Ref Range: 3.5 - 5.2 g/dL 3.9   BILIRUBIN TOTAL Latest Ref Range: 0.1 - 1.0 mg/dL 0.4   AST Latest Ref Range: 10 - 40 U/L 25   ALT Latest Ref Range: 10 - 44 U/L 18   PTH Latest Ref Range: 9.0 - 77.0 pg/mL 79.0 (H)   C Telopeptide (CTX), Serum Latest Units: pg/mL 57   COVID-19 (SARS CoV-2) IgG Ab Latest Ref Range: Negative  Negative       ASSESSMENT/PLAN:  1. Osteopenia- No Fracture history. Had been on fosamax and now on Prolia. Tolerating well. Will continue current Tx. Discussed taking Ca + D. Discussed fall precautions. Discussed benefits of weight bearing exercise.     2. Primary hyperparathyroidism- based on history of hypercalcemia and high urine Ca. At this point Ca WNL. On Prolia for osteopenia. If Ca increases could consider sensipar    3. GERD- Unable to take PO bisphosphonates. Controlled with diet change    4. HTN-  Checking at home. BP controlled. Avoid use of thiazide diuretics.       FOLLOWUP  Coordinate Ezio Prolia with Son.   F/U 6 months prior to next prolia CMP, PTH, Phos

## 2020-07-17 ENCOUNTER — PATIENT MESSAGE (OUTPATIENT)
Dept: FAMILY MEDICINE | Facility: CLINIC | Age: 85
End: 2020-07-17

## 2020-07-20 ENCOUNTER — INFUSION (OUTPATIENT)
Dept: INFUSION THERAPY | Facility: HOSPITAL | Age: 85
End: 2020-07-20
Attending: INTERNAL MEDICINE
Payer: COMMERCIAL

## 2020-07-20 DIAGNOSIS — M85.80 OSTEOPENIA, UNSPECIFIED LOCATION: Primary | ICD-10-CM

## 2020-07-20 PROCEDURE — 96372 THER/PROPH/DIAG INJ SC/IM: CPT | Mod: PN

## 2020-07-20 PROCEDURE — 63600175 PHARM REV CODE 636 W HCPCS: Mod: JG,PN | Performed by: INTERNAL MEDICINE

## 2020-07-20 RX ADMIN — DENOSUMAB 60 MG: 60 INJECTION SUBCUTANEOUS at 03:07

## 2020-07-24 ENCOUNTER — PATIENT MESSAGE (OUTPATIENT)
Dept: FAMILY MEDICINE | Facility: CLINIC | Age: 85
End: 2020-07-24

## 2020-07-28 ENCOUNTER — DOCUMENTATION ONLY (OUTPATIENT)
Dept: OTHER | Facility: OTHER | Age: 85
End: 2020-07-28

## 2020-07-28 NOTE — PROGRESS NOTES
Patient's daughter Nafisa Lyles called inquiring about the Digital Medicine Program. Provided brief description and shared the patient would have to give constant to share personal  information. Daughter inquired about the cost of the program. Encouraged the daughter to have her mother reach out to billing department and insurance company to review potiental cost. Shared that patient would have to pay for BP cuff and that at this time their is not cost for patients to participate. Shared this may change in the future.     Daughter shared mother is interested in the program as long as there are no co-pays.

## 2020-08-03 ENCOUNTER — PATIENT OUTREACH (OUTPATIENT)
Dept: OTHER | Facility: OTHER | Age: 85
End: 2020-08-03

## 2020-08-03 NOTE — LETTER
August 3, 2020     aMrysol Lyles  [address] [place] [address]       Dear Marysol,    Welcome to [facility]! Our goal is to make care effective, proactive and convenient by using data you send us from home to better treat your chronic conditions.              My name is Sonja Sharp, and I am your dedicated Digital Medicine clinician. As an expert in medication management, I will help ensure that the medications you are taking continue to provide the intended benefits and help you reach your goals. You can reach me directly at [phone] or by sending me a message directly through your [facility] account.      I am Julieth Zhou and I will be your health . My job is to help you identify lifestyle changes to improve your disease control. We will talk about nutrition, exercise, and other ways you may be able to adjust your current habits to better your health. Additionally, we will help ensure you are completing the tests and screenings that are necessary to help manage your conditions. You can reach me directly at [phone] or by sending me a message directly through your [facility] account.    Most importantly, YOU are at the center of this team. Together, we will work to improve your overall health and encourage you to meet your goals for a healthier lifestyle.     What we expect from YOU:  · Please take frequent home blood pressure measurements. We ask that you take at least 1 blood pressure reading per week, but more information will better help us get you know you. Be sure you rest for a few minutes before taking the reading in a quiet, comfortable place.     Be available to receive phone calls or "[employer]t messages, when appropriate, from your care team. Please let us know if there are any specific days or times that work best for us to reach you via phone.     Complete routine tests and screenings. Dont worry, we will help keep you on track!           What you should expect  from your Digital Medicine Care Team:   We will work with you to create a personalized plan of care and provide you with encouragement and education, including regarding lifestyle changes, that could help you manage your disease states.     We will adjust your current medications, if needed, and continue to monitor your long-term progress.     We will provide you and your physician with monthly progress reports after you have been in the program for more than 30 days.     We will send you reminders through [employer] and text messages to help ensure you do not miss any testing deadlines to help manage your disease states.    You will be able to reach us by phone or through your [email or web address] account by clicking our names under Care Team on the right side of the home screen.    I look forward to working with you to achieve your blood pressure goals!    We look forward to working with you to help manage your health,    Sincerely,    Your Digital Medicine Team    Please visit our websites to learn more:   · Hypertension: [email or web address]      Remember, we are not available for emergencies. If you have an emergency, please contact your doctors office directly or call [facility] ([phone] or [phone]) or 911.

## 2020-08-03 NOTE — PROGRESS NOTES
Digital Medicine: Health  Introduction    Introduced Marysol Lyles to Digital Medicine. Discussed health  role and recommended lifestyle modifications.    The history is provided by the patient.           Additional Enrollment Details: Ms. Gregg reports that she's doing well. Patient gives permission for care team to contact either herself or her daughter Nafisa to discuss her health.     HYPERTENSION  Explained hypertension digital medicine goals including BP goal less than or equal to 130/80mmHg, improved convenience of BP management and reduced risk of heart attack, kidney failure, stroke, eye disease, dementia, and death.      Explained non-pharmacologic therapies like low salt diet and physical activity can reduce blood pressure. .      Explained that we expect patient to submit several blood pressure readings per week at random times of the day, but at least 30 minutes after taking blood pressure medications. Instructed patient not to allow anyone else to use their blood pressure monitor and phone as data submitted is directly entered into medical record. Reviewed and confirmed appropriate blood pressure monitoring technique.         Patient's BP goal is less than or equal to 130/80.Patient's BP average is 139/75 mmHg, which is above goal, per 2017 ACC/AHA Hypertension Guidelines.            Diet-Assessed    Patient reports eating or drinking the following: Low carb (almost diabetic) also had bariatric sleeve    Additional diet details:    Physical Activity-Assessed      She identified the following barriers to physical activity: HEP from PT        Additional physical activity details: Patient reports that she's had many joint surgeries (hip and knee) and that she also has back pain. She's stuck with a HEP from PT for the past year and it seems to help. Patient still has some pain in her back and knee, but normally evenings are better for pain than mornings.     Patient doesn't like exercising outside in  the heat. Instead, she sets a timer to try and get up every 30-60min and walk around during the day.      Medication Adherence-Medication adherence was assessed.  Patient continue taking medication as prescribed.          Substance, Sleep, Stress-Assessed  stress-not assessed  Details:  Intervention(s):    Sleep-not assessed  Details:  Intervention(s):    Alcohol -not assessed  Details:  Intervention(s):    Tobacco-Not Assessed  Details:  Intervention(s):    Additional details:Patient reports that she's happy and satisfied with her life even through the pandemic..       PLAN  Continue current diet/physical activity routine: continue healthy eating and increasing movement  Reviewed Device Techniques  Patient verbalizes understanding. Patient did not express questions or concerns and patient has contact information if needed.    Explained the importance of self-monitoring and medication adherence. Encouraged the patient to communicate with their health  for lifestyle modifications to help improve or maintain a healthy lifestyle.      Sent link to [facility] webpages and my contact information via [employer] for future questions.          There are no preventive care reminders to display for this patient.    Last 5 Patient Entered Readings                                      Current 30 Day Average: 139/75     Recent Readings 8/2/2020 8/2/2020 8/2/2020 8/1/2020 8/1/2020    SBP (mmHg) - 149 149 - 129    DBP (mmHg) - 78 78 - 71    Pulse 58 58 - 59 59

## 2020-08-14 ENCOUNTER — PATIENT OUTREACH (OUTPATIENT)
Dept: OTHER | Facility: OTHER | Age: 85
End: 2020-08-14

## 2020-08-14 NOTE — PROGRESS NOTES
Digital Medicine: Clinician Introduction    Marysol Lyles is a 84 y.o. female who is newly enrolled in the Digital Medicine Clinic.    2 Total knee replacements and 1 total hip replacement.   Was taking oxycodone QID, now taking 1 tablets a day, split in half to take BID. Pain is managed.   Continues her exercises learned in PT.     Is staying home routinely during the pandemic.     Denies hypertensive symptoms.     Left ankle swollen - she is elevated as she can. Injured foot years ago, sometimes aggravates her and swells.     Working on improving hydration.         The history is provided by the patient.      Review of patient's allergies indicates:   -- Formaldehyde -- Hives and Other (See Comments)    --  Other reaction(s): Other (See Comments)             Other reaction(s): Hives             Other reaction(s): Hives             Other reaction(s): Hives             Other reaction(s): Hives   -- Mercury -- Hives    --  Other reaction(s): Hives             Other reaction(s): Hives             Other reaction(s): Hives             Other reaction(s): Hives   -- Mercury (elemental) -- Hives   -- Penicillins -- Swelling    --  Other reaction(s): Swelling             Other reaction(s): Swelling   -- Adhesive tape-silicones    -- Dilaudid (hydromorphone (bulk))    -- Latex, natural rubber -- Hives   -- Adhesive -- Rash    --  Other reaction(s): Rash             Other reaction(s): Rash   -- Hydromorphone -- Itching  Completed Medication Reconciliation  Verified pharmacy information.    HYPERTENSION  Explained hypertension digital medicine goals including BP goal less than or equal to 130/80mmHg, improved convenience of BP management and reduced risk of heart attack, kidney failure, stroke, eye disease, dementia, and death.     Explained non-pharmacologic therapies like low salt diet and physical activity can reduce blood pressure.       Explained that we expect patient to submit several blood pressure readings per week at  random times of the day, but at least 30 minutes after taking blood pressure medications. Instructed patient not to allow anyone else to use their blood pressure monitor and phone as data submitted is directly entered into medical record. Reviewed and confirmed appropriate blood pressure monitoring technique.         Reviewed signs/symptoms of hypertension (headache, changes in vision, chest pain, shortness of breath)   Reviewed signs/symptoms of hypotension (lightheaded, dizziness, weakness)     Patient's BP goal is less than or equal to 130/80. Patients BP average is 134/73 mmHg, which is above goal, per 2017 ACC/AHA Hypertension Guidelines.       Last 5 Patient Entered Readings                                      Current 30 Day Average: 134/73     Recent Readings 8/9/2020 8/9/2020 8/9/2020 8/4/2020 8/4/2020    SBP (mmHg) - 130 130 - 126    DBP (mmHg) - 70 70 - 72    Pulse 59 59 - 56 56              Depression Screening  Marysol Lyles screened negative on the depression screening.     Sleep Apnea Screening  Patient not previously diagnosed with JONEL       Medication Affordability Screening  Patient did not answer the medication affordability questionnaires. Patient is currently not having problems affording medications    Medication Adherence-Medication adherence was assessed.  Patient continue taking medication as prescribed.            ASSESSMENT(S)  Patients BP average is 134/73 mmHg, which is above goal. Patient's BP goal is less than or equal to 130/80 per 2017 ACC/AHA Hypertension Guidelines.     Near goal, one high reading is swaying average.   No issues, symptoms, concerns.       Hypertension Plan  Continue current therapy.  Continue current diet/physical activity routine.  Provided patient education. Technique, sodium, hydration.        Addressed any questions or concerns and patient has my contact information if needed prior to next outreach. Patient verbalizes understanding.      Explained the  importance of self-monitoring and medication adherence. Encouraged the patient to communicate with their health  for lifestyle modifications to help improve or maintain a healthy lifestyle.        Explained to the patient that the Digital Medicine team is not available for emergencies. Advised patient call [facility] ([phone] or [phone]) or [phone] if needed.         There are no preventive care reminders to display for this patient.    Current Medication Regimen:  Hypertension Medications             metoprolol tartrate (LOPRESSOR) 25 MG tablet TAKE 1 TABLET(25 MG) BY MOUTH TWICE DAILY

## 2020-09-01 ENCOUNTER — PATIENT OUTREACH (OUTPATIENT)
Dept: OTHER | Facility: OTHER | Age: 85
End: 2020-09-01

## 2020-09-01 NOTE — PROGRESS NOTES
Digital Medicine: Health  Follow-Up    The history is provided by the patient.             Reason for review: Blood pressure not at goal        Topics Covered on Call: mental health    Additional Follow-up details: Ms. Gregg reports that she's doing great. She's been very happy with the program so far.     Patient did mention that her last reading was a little higher than normal. She said that she was rushing around and getting her house ready for Sunday dinner with her children. Shortly after her daughter helped her to take her BP reading. We discussed trying to retest if she needs to after an elevated reading.        Diet-Not assessed          Physical Activity-Not assessed    Medication Adherence-Medication Adherence not addressed.        Substance, Sleep, Stress-No change  stress-assessed  Details:  Intervention(s):    Sleep-not assessed  Details:  Intervention(s):    Alcohol -not assessed  Details:  Intervention(s):    Tobacco-Not Assessed  Details:  Intervention(s):    Additional details:Patient reports that she's still doing well mentally in the midst of the pandemic. Her children keep her safe and do most tasks for her. She's very happy with her life..            Addressed any questions or concerns and patient has my contact information if needed prior to next outreach. Patient verbalizes understanding.      Explained the importance of self-monitoring and medication adherence. Encouraged the patient to communicate with their health  for lifestyle modifications to help improve or maintain a healthy lifestyle.            There are no preventive care reminders to display for this patient.    Last 5 Patient Entered Readings                                      Current 30 Day Average: 140/75     Recent Readings 8/29/2020 8/29/2020 8/29/2020 8/21/2020 8/21/2020    SBP (mmHg) - 151 151 - 144    DBP (mmHg) - 82 82 - 75    Pulse 55 55 - 58 58

## 2020-09-21 ENCOUNTER — PATIENT MESSAGE (OUTPATIENT)
Dept: FAMILY MEDICINE | Facility: CLINIC | Age: 85
End: 2020-09-21

## 2020-09-22 ENCOUNTER — PATIENT OUTREACH (OUTPATIENT)
Dept: OTHER | Facility: OTHER | Age: 85
End: 2020-09-22

## 2020-09-22 NOTE — PROGRESS NOTES
BP elevated above goal.     LVM to discuss BP readings and medications.     Will discuss technique and timing.     Last 5 Patient Entered Readings                                      Current 30 Day Average: 148/80     Recent Readings 9/17/2020 9/17/2020 9/17/2020 9/13/2020 9/13/2020    SBP (mmHg) - 142 142 - 151    DBP (mmHg) - 77 77 - 78    Pulse 59 59 - 50 50        Hypertension Medications             metoprolol tartrate (LOPRESSOR) 25 MG tablet TAKE 1 TABLET(25 MG) BY MOUTH TWICE DAILY

## 2020-09-22 NOTE — PROGRESS NOTES
Digital Medicine: Clinician Follow-Up    Patient's daughter Nafisa returned my call.     Doing well. No complaints. No hypertensive signs or symptoms.     Discussed BP being elevated above goal. Nafisa thinks it is due to her mother not truly resting, and they typically take her pressure after eating. Advised they attempt to get 2 readings per week not influenced by food, drink, exercise, talking, or medication timing.     The history is provided by the patient.   Follow-up reason(s): routine follow up.     Hypertension    Patient readings are stable   Patient is not experiencing signs/symptoms of hypotension.  Patient is not experiencing signs/symptoms of hypertension.          Last 5 Patient Entered Readings                                      Current 30 Day Average: 148/80     Recent Readings 9/17/2020 9/17/2020 9/17/2020 9/13/2020 9/13/2020    SBP (mmHg) - 142 142 - 151    DBP (mmHg) - 77 77 - 78    Pulse 59 59 - 50 50                 Depression Screening  Did not address depression screening.    Sleep Apnea Screening    Did not address sleep apnea screening.     Medication Affordability Screening  Did not address medication affordability screening.     Medication Adherence-Medication adherence was assessed.          ASSESSMENT(S)  Patients BP average is 148/80 mmHg, which is above goal. Patient's BP goal is less than or equal to 130/80 per 2017 ACC/AHA Hypertension Guidelines.     Patient/daughter to work on proper timing and technique. If BP remains uncontrolled, consider medication adjustment - amlodipine 2.5 mg daily.     Also consider BP goal adjustment to <140/90 pending patient's ability to reduce BP without hypotensive symptoms.       Hypertension Plan  Continue current therapy. Consider low dose amlodipine with next outreach if goal not met.   Continue current diet/physical activity routine.       Addressed patient questions and patient has my contact information if needed prior to next outreach. Patient  verbalizes understanding.      Explained the importance of self-monitoring and medication adherence. Encouraged the patient to communicate with their health  for lifestyle modifications to help improve or maintain a healthy lifestyle.              There are no preventive care reminders to display for this patient.  There are no preventive care reminders to display for this patient.    Hypertension Medications             metoprolol tartrate (LOPRESSOR) 25 MG tablet TAKE 1 TABLET(25 MG) BY MOUTH TWICE DAILY

## 2020-09-24 ENCOUNTER — CLINICAL SUPPORT (OUTPATIENT)
Dept: FAMILY MEDICINE | Facility: CLINIC | Age: 85
End: 2020-09-24
Payer: COMMERCIAL

## 2020-09-24 DIAGNOSIS — Z23 IMMUNIZATION DUE: Primary | ICD-10-CM

## 2020-09-24 PROCEDURE — 90694 VACC AIIV4 NO PRSRV 0.5ML IM: CPT | Mod: S$GLB,,, | Performed by: FAMILY MEDICINE

## 2020-09-24 PROCEDURE — 99999 PR PBB SHADOW E&M-EST. PATIENT-LVL I: CPT | Mod: PBBFAC,,,

## 2020-09-24 PROCEDURE — 90471 PR IMMUNIZ ADMIN,1 SINGLE/COMB VAC/TOXOID: ICD-10-PCS | Mod: S$GLB,,, | Performed by: FAMILY MEDICINE

## 2020-09-24 PROCEDURE — 90471 IMMUNIZATION ADMIN: CPT | Mod: S$GLB,,, | Performed by: FAMILY MEDICINE

## 2020-09-24 PROCEDURE — 99999 PR PBB SHADOW E&M-EST. PATIENT-LVL I: ICD-10-PCS | Mod: PBBFAC,,,

## 2020-09-24 PROCEDURE — 90694 FLU VACCINE - QUADRIVALENT - ADJUVANTED: ICD-10-PCS | Mod: S$GLB,,, | Performed by: FAMILY MEDICINE

## 2020-09-24 NOTE — PROGRESS NOTES
Patient received high dose flu shot instructed to wait 15 minutes after injection patient verbalized understanding.

## 2020-09-28 ENCOUNTER — PATIENT OUTREACH (OUTPATIENT)
Dept: ADMINISTRATIVE | Facility: OTHER | Age: 85
End: 2020-09-28

## 2020-09-30 ENCOUNTER — OFFICE VISIT (OUTPATIENT)
Dept: OPTOMETRY | Facility: CLINIC | Age: 85
End: 2020-09-30
Payer: COMMERCIAL

## 2020-09-30 DIAGNOSIS — H40.031 NARROW ANGLE OF ANTERIOR CHAMBER OF RIGHT EYE: ICD-10-CM

## 2020-09-30 DIAGNOSIS — H25.11 NUCLEAR SCLEROTIC CATARACT OF RIGHT EYE: Primary | ICD-10-CM

## 2020-09-30 DIAGNOSIS — H26.9 CORTICAL CATARACT OF RIGHT EYE: ICD-10-CM

## 2020-09-30 DIAGNOSIS — Z98.42 S/P CATARACT SURGERY, LEFT: ICD-10-CM

## 2020-09-30 DIAGNOSIS — Z96.1 PSEUDOPHAKIA OF LEFT EYE: ICD-10-CM

## 2020-09-30 PROCEDURE — 99999 PR PBB SHADOW E&M-EST. PATIENT-LVL V: CPT | Mod: PBBFAC,,, | Performed by: OPTOMETRIST

## 2020-09-30 PROCEDURE — 92012 INTRM OPH EXAM EST PATIENT: CPT | Mod: S$GLB,,, | Performed by: OPTOMETRIST

## 2020-09-30 PROCEDURE — 99999 PR PBB SHADOW E&M-EST. PATIENT-LVL V: ICD-10-PCS | Mod: PBBFAC,,, | Performed by: OPTOMETRIST

## 2020-09-30 PROCEDURE — 92012 PR EYE EXAM, EST PATIENT,INTERMED: ICD-10-PCS | Mod: S$GLB,,, | Performed by: OPTOMETRIST

## 2020-09-30 NOTE — PROGRESS NOTES
HPI     Annual Exam      Additional comments: History of controlled HBP.  Annual general eye examination and refraction.  No acute ocular/vision problems  Uses OTC reading glasses only    Pt states vision is stable               Comments     Patient's age: 85 y.o. WF  Occupation: Retired   Approximate date of last eye examination:  02/13/2019  Name of last eye doctor seen:   [facility]  Wears glasses?  OTC readers     If yes, wears  Full-time or part-time?  No   Present glasses are: Bifocal, SV Distance, SV Reading?  SV  Approximate age of present glasses:     Got new glasses following last exam, or subsequently?:     Any problem with VA with glasses?  No problems  Wears CLs?:     Headaches?  No   Eye pain/discomfort?  No                                                                                      Flashes?  No   Floaters?  Yes, OU  Diplopia/Double vision?  No  Patient's Ocular History:         Any eye surgeries? S/p phaco w/IOL OS.         Any eye injury?  No          Any treatment for eye disease?  No   Family history of eye disease?  No   Significant patient medical history:         1. Diabetes?  No        If yes, IDDM or NIDDM? n/a   2. HBP?  Yes, controlled with medication               ! OTC eyedrops currently using:  No    ! Prescription eye meds currently using:  No    ! Any history of allergy/adverse reaction to any eye meds used   previously?  No     ! Any history of allergy/adverse reaction to eyedrops used during prior   eye exam(s)? No     ! Any history of allergy/adverse reaction to Novacaine or similar meds?   No    ! Any history of allergy/adverse reaction to Epinephrine or similar meds?   No     ! Patient okay with use of anesthetic eyedrops to check eye pressure?    Yes         ! Patient okay with use of eyedrops to dilate pupils today?  Yes    !  Allergies/Medications/Medical History/Family History reviewed today?    Yes       PD =   65/61  Desired reading distance =   "15.25"                                                                      Last edited by Asa Mccarthy, OD on 9/30/2020  1:25 PM. (History)            Assessment /Plan     For exam results, see Encounter Report.    1. Nuclear sclerotic cataract of right eye  Ambulatory referral/consult to Ophthalmology   2. Cortical cataract of right eye  Ambulatory referral/consult to Ophthalmology   3. 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 S/P cataract surgery, left     5. Pseudophakia of left eye                  Nuclear sclerosis of lens and cortical cataract in the right eye.  S/P cataract surgery in the left eye, with posterior chamber intraocular lens.  Acquired narrow anterior chamber angle in the right eye, presumably secondary to advancing cataract in the right eye.   Intraocular pressure within normal range in each eye.    Defer spectacle lens Rx.  Suggest consult with Dr. Nava Tirado.  Refer to Dr. Tirado for evaluation of need for cataract surgery, or laser PI in the right eye, if cataract surgery not done.  Defer dilated fundus exam to Dr. Tirado, in view of very narrow anterior chamber angle noted in the right eye on slit lamp exam today.   Return to me (Dr. Mccarthy) for recheck of refraction when advised to so by Dr. Tirado following cataract surgery in the right eye, assuming cataract surgery is to be done.              "

## 2020-09-30 NOTE — PATIENT INSTRUCTIONS
Nuclear sclerosis of lens and cortical cataract in the right eye.  S/P cataract surgery in the left eye, with posterior chamber intraocular lens.  Acquired narrow anterior chamber angle in the right eye, presumably secondary to advancing cataract in the right eye.   Intraocular pressure within normal range in each eye.    Defer spectacle lens Rx.  Suggest consult with Dr. Nava Tirado.  Refer to Dr. Tirado for evaluation of need for cataract surgery, or laser PI in the right eye, if cataract surgery not done.  Defer dilated fundus exam to Dr. Tirado, in view of very narrow anterior chamber angle noted in the right eye on slit lamp exam today.   Return to me (Dr. Mccarthy) for recheck of refraction when advised to so by Dr. Tirado following cataract surgery in the right eye, assuming cataract surgery is to be done.

## 2020-10-01 ENCOUNTER — TELEPHONE (OUTPATIENT)
Dept: FAMILY MEDICINE | Facility: CLINIC | Age: 85
End: 2020-10-01

## 2020-10-01 NOTE — TELEPHONE ENCOUNTER
----- Message from Tonia Owens sent at 10/1/2020  9:50 AM CDT -----  Regarding: blood pressure  Contact: patient  Patient requesting a nurse visit to calibrate her blood pressure machine, please call her back at [phone]

## 2020-10-02 ENCOUNTER — CLINICAL SUPPORT (OUTPATIENT)
Dept: FAMILY MEDICINE | Facility: CLINIC | Age: 85
End: 2020-10-02
Payer: COMMERCIAL

## 2020-10-02 VITALS — SYSTOLIC BLOOD PRESSURE: 133 MMHG | DIASTOLIC BLOOD PRESSURE: 62 MMHG | HEART RATE: 58 BPM

## 2020-10-02 DIAGNOSIS — Z01.30 BP CHECK: Primary | ICD-10-CM

## 2020-10-02 PROCEDURE — 99999 PR PBB SHADOW E&M-EST. PATIENT-LVL III: CPT | Mod: PBBFAC,,,

## 2020-10-02 PROCEDURE — 99999 PR PBB SHADOW E&M-EST. PATIENT-LVL III: ICD-10-PCS | Mod: PBBFAC,,,

## 2020-10-02 NOTE — PROGRESS NOTES
Pt in clinic for BP check and to compare home machine to clinic machine. Clinic machine /62 HR 58. Home machine /77 HR 58. PCP notified by Epic message.

## 2020-10-05 ENCOUNTER — PATIENT OUTREACH (OUTPATIENT)
Dept: OTHER | Facility: OTHER | Age: 85
End: 2020-10-05

## 2020-10-05 NOTE — PROGRESS NOTES
Digital Medicine: Health  Follow-Up    The history is provided by other. The person identified by the patient is: daughter. The patient has granted authorization to speak to this person.                     Topics Covered on Call: inaccuracy of device    Additional Follow-up details: Ms. Gregg's daughter, Ms. Skelton, called today concerned with large variance between in home and in office readings. Patient had a check done in office and the in office cuff read 133/62 while the ihealth read 157/77.    Ms. Skelton is concerned that patient is taking more medications than needed based on at home readings. Patient hasn't charged device in a while. Patient will charge device, and I will check in again in ~2weeks. Will make clinician aware. Will place CRM if necessary.          Diet-Not assessed          Physical Activity-Not assessed    Medication Adherence-Medication Adherence not addressed.      Substance, Sleep, Stress-Not assessed      Additional monitoring needed. Checking for downward trend in readigns  Instructed to charge device. If <25%       Addressed patient questions and patient has my contact information if needed prior to next outreach. Patient verbalizes understanding.          There are no preventive care reminders to display for this patient.    Last 5 Patient Entered Readings                                      Current 30 Day Average: 151/81     Recent Readings 10/2/2020 10/2/2020 10/2/2020 9/27/2020 9/27/2020    SBP (mmHg) - 157 157 - 144    DBP (mmHg) - 77 77 - 77    Pulse 58 58 - 54 54

## 2020-10-12 ENCOUNTER — PATIENT MESSAGE (OUTPATIENT)
Dept: FAMILY MEDICINE | Facility: CLINIC | Age: 85
End: 2020-10-12

## 2020-10-16 ENCOUNTER — TELEPHONE (OUTPATIENT)
Dept: FAMILY MEDICINE | Facility: CLINIC | Age: 85
End: 2020-10-16

## 2020-10-16 RX ORDER — AMLODIPINE BESYLATE 5 MG/1
5 TABLET ORAL DAILY
Qty: 30 TABLET | Refills: 11 | Status: SHIPPED | OUTPATIENT
Start: 2020-10-16 | End: 2020-10-16

## 2020-10-19 ENCOUNTER — PATIENT OUTREACH (OUTPATIENT)
Dept: OTHER | Facility: OTHER | Age: 85
End: 2020-10-19

## 2020-10-19 DIAGNOSIS — R39.15 URINARY URGENCY: ICD-10-CM

## 2020-10-19 NOTE — PROGRESS NOTES
Digital Medicine: Clinician Follow-Up    Ms. Skelton returned my call.     High alert BP readings are discrepancies. She states she checked Ms. Skelton's BP in clinic on 10/1 and 10/16 against her iHealth cuff and noted significant difference (40 point elevation on iHealth compared to in clinic in the same time frame)    Cuff is charged. Patient is asymptomatic.     The history is provided by caregiver. The patient has granted authorization to speak to this person.      Review of patient's allergies indicates:   -- Formaldehyde -- Hives and Other (See Comments)    --  Other reaction(s): Other (See Comments)             Other reaction(s): Hives             Other reaction(s): Hives             Other reaction(s): Hives             Other reaction(s): Hives   -- Mercury -- Hives    --  Other reaction(s): Hives             Other reaction(s): Hives             Other reaction(s): Hives             Other reaction(s): Hives   -- Mercury (elemental) -- Hives   -- Penicillins -- Swelling    --  Other reaction(s): Swelling             Other reaction(s): Swelling   -- Adhesive tape-silicones    -- Dilaudid (hydromorphone (bulk))    -- Latex, natural rubber -- Hives   -- Adhesive -- Rash    --  Other reaction(s): Rash             Other reaction(s): Rash   -- Hydromorphone -- Itching  Follow-up reason(s): Alert received.   Care Team received high BP alert.      Hypertension    Readings are trending up   Patient is not experiencing signs/symptoms of hypotension.  Patient is not experiencing signs/symptoms of hypertension.            Last 5 Patient Entered Readings                                      Current 30 Day Average: 157/82     Recent Readings 10/16/2020 10/16/2020 10/16/2020 10/16/2020 10/16/2020    SBP (mmHg) - 183 183  - 190    DBP (mmHg) - 90 90 - 97    Pulse 51 51 - 51 51                 Depression Screening  Did not address depression screening.    Sleep Apnea Screening    Did not address sleep apnea screening.     Medication  Affordability Screening  Patient did not answer the medication affordability questionnaires. Patient is currently not having problems affording medications    Medication Adherence-Medication adherence was assessed.          ASSESSMENT(S)  Patients BP average is 157/82 mmHg, which is above goal. Patient's BP goal is less than or equal to 130/80.     Of note, patient and her son's BP are both largely differing at home vs cliinc. Ms. Gregg's daughter Nafisa, takes both her mother's BP and her brother's BP using 2 different iHealth machines. While there are discrepancies seen with use of the iHealth cuff, it is typically resolved with charging the cuff, replacement of the cuff, or work on technique.     I requested patient keep me informed about getting the cuff exchanged. Requested they have the O  measure the circumference of the patient's arm to ensure proper cuff sizing is used.       Hypertension Plan  Continue current therapy.  Continue current diet/physical activity routine.  Provided patient education.  No changes made until cuff is exchanged and home readings are shown to be accurate.      Addressed patient questions and patient has my contact information if needed prior to next outreach. Patient verbalizes understanding.      Explained the importance of self-monitoring and medication adherence. Encouraged the patient to communicate with their health  for lifestyle modifications to help improve or maintain a healthy lifestyle.               There are no preventive care reminders to display for this patient.  There are no preventive care reminders to display for this patient.      Hypertension Medications             metoprolol tartrate (LOPRESSOR) 25 MG tablet TAKE 1 TABLET(25 MG) BY MOUTH TWICE DAILY

## 2020-10-19 NOTE — PROGRESS NOTES
High alert BP received.   Noted that PCP triaged, prescribing amlodipine 5 mg daily, but patient's dtr states BP is controlled on alternative device. MD withdrew RX.     Called to inquire if they were able to get Ms. Gregg a new device and check on symptoms.   Ms. Skelton is on the phone and requests to call me back at a later time.     Last 5 Patient Entered Readings                                      Current 30 Day Average: 157/82     Recent Readings 10/16/2020 10/16/2020 10/16/2020 10/16/2020 10/16/2020    SBP (mmHg) - 183 183  - 190    DBP (mmHg) - 90 90 - 97    Pulse 51 51 - 51 51        Hypertension Medications             metoprolol tartrate (LOPRESSOR) 25 MG tablet TAKE 1 TABLET(25 MG) BY MOUTH TWICE DAILY

## 2020-10-20 RX ORDER — TOLTERODINE 4 MG/1
4 CAPSULE, EXTENDED RELEASE ORAL DAILY
Qty: 30 CAPSULE | Refills: 11 | Status: SHIPPED | OUTPATIENT
Start: 2020-10-20 | End: 2021-04-19 | Stop reason: SDUPTHER

## 2020-10-22 ENCOUNTER — OFFICE VISIT (OUTPATIENT)
Dept: OPHTHALMOLOGY | Facility: CLINIC | Age: 85
End: 2020-10-22
Payer: COMMERCIAL

## 2020-10-22 DIAGNOSIS — H40.031 BORDERLINE GLAUCOMA WITH ANATOMICAL NARROW ANGLE, RIGHT: Primary | ICD-10-CM

## 2020-10-22 DIAGNOSIS — H35.3121 EARLY DRY STAGE NONEXUDATIVE AGE-RELATED MACULAR DEGENERATION OF LEFT EYE: ICD-10-CM

## 2020-10-22 DIAGNOSIS — H25.011 CORTICAL SENILE CATARACT, RIGHT: ICD-10-CM

## 2020-10-22 DIAGNOSIS — H25.11 SENILE NUCLEAR SCLEROSIS, RIGHT: ICD-10-CM

## 2020-10-22 DIAGNOSIS — Z96.1 PSEUDOPHAKIA OF LEFT EYE: ICD-10-CM

## 2020-10-22 PROCEDURE — 99999 PR PBB SHADOW E&M-EST. PATIENT-LVL IV: CPT | Mod: PBBFAC,,, | Performed by: OPHTHALMOLOGY

## 2020-10-22 PROCEDURE — 99999 PR PBB SHADOW E&M-EST. PATIENT-LVL IV: ICD-10-PCS | Mod: PBBFAC,,, | Performed by: OPHTHALMOLOGY

## 2020-10-22 PROCEDURE — 92014 COMPRE OPH EXAM EST PT 1/>: CPT | Mod: S$GLB,,, | Performed by: OPHTHALMOLOGY

## 2020-10-22 PROCEDURE — 92133 CPTRZD OPH DX IMG PST SGM ON: CPT | Mod: S$GLB,,, | Performed by: OPHTHALMOLOGY

## 2020-10-22 PROCEDURE — 92014 PR EYE EXAM, EST PATIENT,COMPREHESV: ICD-10-PCS | Mod: S$GLB,,, | Performed by: OPHTHALMOLOGY

## 2020-10-22 PROCEDURE — 92020 PR SPECIAL EYE EVAL,GONIOSCOPY: ICD-10-PCS | Mod: S$GLB,,, | Performed by: OPHTHALMOLOGY

## 2020-10-22 PROCEDURE — 92133 POSTERIOR SEGMENT OCT OPTIC NERVE(OCULAR COHERENCE TOMOGRAPHY) - OU - BOTH EYES: ICD-10-PCS | Mod: S$GLB,,, | Performed by: OPHTHALMOLOGY

## 2020-10-22 PROCEDURE — 92020 GONIOSCOPY: CPT | Mod: S$GLB,,, | Performed by: OPHTHALMOLOGY

## 2020-10-22 NOTE — Clinical Note
complex phaco/IOL od - trypan blue / possible cassandra ring / shallow AC   They are from the [facility] cell phone [phone] - her name is Nafisa Lyles   Still needs to come for a pre-op visit -   They prefer to be last or close to last on their surgery day

## 2020-10-22 NOTE — LETTER
October 22, 2020      Asa Mccarthy, OD  [address]  [facility] [address]           [facility] [address]  [address]  [facility] [address]  Phone: [phone]  Fax: [phone]          Patient: Marysol Lyles   MR Number: [id number]   YOB: 1935   Date of Visit: 10/22/2020       Dear Dr. Asa Mccarthy:    Thank you for referring Marysol Lyles to me for evaluation. Attached you will find relevant portions of my assessment and plan of care.    If you have questions, please do not hesitate to call me. I look forward to following Marysol Lyles along with you.    Sincerely,    Nava Tirado MD    Enclosure  CC:  No Recipients    If you would like to receive this communication electronically, please contact [email or web address] or [phone] to request more information on [employer] access.    For providers and/or their staff who would like to refer a patient to [facility], please contact us through our one-stop-shop provider referral line, [facility], at [phone].    If you feel you have received this communication in error or would no longer like to receive these types of communications, please e-mail [email or web address]

## 2020-10-22 NOTE — PROGRESS NOTES
HPI     DLS: With Dr. Mccarthy 9/30/20  DLS: With Dr. Haywood 6/26/18    Pt here for Glaucoma/Cataract Consult per Dr. Mccarthy;    Meds;   No GTTS    1. Nuclear sclerotic cataract   2. Cortical cataract of right eye  3. Narrow angle of anterior chamber of right eye  4. S/P cataract surgery, left   5. Pseudophakia, left eye     Last edited by Yajaira Price on 10/22/2020  2:09 PM. (History)            Assessment /Plan     For exam results, see Encounter Report.    Borderline glaucoma with anatomical narrow angle, right  -     Posterior Segment OCT Optic Nerve- Both eyes    Senile nuclear sclerosis, right    Cortical senile cataract, right    Early dry stage nonexudative age-related macular degeneration of left eye    Pseudophakia of left eye        Pt lives on the [facility]     From [facility]   Cataract od    Narrow angle od     S/p PC IOL os - Y[facility]   SN60WF - 20.5 (aimed for -0.5 (( got -0.75 sp eq)     IOL cacl od - per dr anderson - IOL calc - 5/11/2018   sn60wf - 21.5 (aiming for -0.50 )          Nuclear sclerosis of lens and cortical cataract in the right eye.  S/P cataract surgery in the left eye, with posterior chamber intraocular lens.  Acquired narrow anterior chamber angle in the right eye, presumably secondary to advancing cataract in the right eye.   Intraocular pressure within normal range in each eye.    rec phaco/IOL od - trypan blue / possible cassandra ring depending on dilation     Prefer to be latter in the day - on the day of surgery - they have to come from the [facility]     F/U for pre-op phaco/IOL od - trypan blue / possible cassandra ring - IOL calc - [facility] -   H&P and orders

## 2020-10-23 ENCOUNTER — TELEPHONE (OUTPATIENT)
Dept: FAMILY MEDICINE | Facility: CLINIC | Age: 85
End: 2020-10-23

## 2020-10-23 ENCOUNTER — PATIENT OUTREACH (OUTPATIENT)
Dept: OTHER | Facility: OTHER | Age: 85
End: 2020-10-23

## 2020-10-23 NOTE — TELEPHONE ENCOUNTER
The blood pressures are running high so please ask the patient to increase the metoprolol to 50 mg twice a day and schedule for appointment with Christiana in two weeks to recheck the blood pressure.    10/23/2020 10/22/2020 10/22/2020 10/22/2020 10/16/2020 10/16/2020 10/12/2020   Time  2:15 PM  6:53 PM  6:51 PM  6:50 PM  2:40 PM  2:38 PM  5:58 PM   Systolic Blood Pressure 163(A) 164(A) 144 173(A) 183(A) 190(A) 133   Diastolic Blood Pressure 80 76 64 81 90 97 67

## 2020-10-23 NOTE — PROGRESS NOTES
Ms. Gregg's daughter called the main line with concerns about the inconsistency in both her and her brother's BP monitor. She states she is taking readings on each arm, from left to right and back to the left arm. She states all readings are different and she is not waiting between readings. All are taken back to back. She is doing this for both her brother, Mr. Cook, and her mother, Ms. Gregg.   Ms. Skelton states that Clinician, Sonja, advised her to take devices to [facility] to get them looked at. She wanted to confirm that this is the next step to take. I confirmed that yes, the devices should be taken to the [facility] to get looked at. I advised Ms. Skelton to make sure both monitors were charged before taking them in. She states they were charged last night. I explained the [facility] will test devices and take readings and, if needed, they will exchange the devices.  Ms. Skelton states they are going to the [facility] today. She is hoping to visit before 2:00pm. I will route chart to HCJulieth, for follow up call.

## 2020-10-26 NOTE — TELEPHONE ENCOUNTER
Spoke with pt's daughter and gave medication change. Scheduled appointment with Enedina Herorn for f/u. Med changed on med card.

## 2020-10-27 ENCOUNTER — TELEPHONE (OUTPATIENT)
Dept: OPHTHALMOLOGY | Facility: CLINIC | Age: 85
End: 2020-10-27

## 2020-10-27 ENCOUNTER — TELEPHONE (OUTPATIENT)
Dept: ENDOSCOPY | Facility: HOSPITAL | Age: 85
End: 2020-10-27

## 2020-10-27 DIAGNOSIS — H25.11 NUCLEAR SCLEROTIC CATARACT OF RIGHT EYE: Primary | ICD-10-CM

## 2020-10-27 DIAGNOSIS — Z13.9 SCREENING PROCEDURE: Primary | ICD-10-CM

## 2020-10-27 NOTE — TELEPHONE ENCOUNTER
----- Message from Evan Downey sent at 10/27/2020 11:12 AM CDT -----  Ms. Lyles is scheduled for cataract with Dr. Bennett on December 2, 2020  Pt will need medical clearance for this procedure.  Surgery is scheduled with local/Mac anesthesia.  Daughter, Nafisa Lyles,  can be reached at: [phone]. Please call if you have any questions.    Thanks,   Evan Downey  Surgery Coordinator  Ophthalmology  [facility] [[id number]]  [id number]

## 2020-11-05 ENCOUNTER — PATIENT MESSAGE (OUTPATIENT)
Dept: FAMILY MEDICINE | Facility: CLINIC | Age: 85
End: 2020-11-05

## 2020-11-09 ENCOUNTER — OFFICE VISIT (OUTPATIENT)
Dept: FAMILY MEDICINE | Facility: CLINIC | Age: 85
End: 2020-11-09
Payer: COMMERCIAL

## 2020-11-09 ENCOUNTER — LAB VISIT (OUTPATIENT)
Dept: LAB | Facility: HOSPITAL | Age: 85
End: 2020-11-09
Attending: NURSE PRACTITIONER
Payer: COMMERCIAL

## 2020-11-09 VITALS
BODY MASS INDEX: 32.98 KG/M2 | TEMPERATURE: 97 F | HEIGHT: 64 IN | SYSTOLIC BLOOD PRESSURE: 132 MMHG | WEIGHT: 193.19 LBS | DIASTOLIC BLOOD PRESSURE: 76 MMHG | HEART RATE: 49 BPM

## 2020-11-09 DIAGNOSIS — Z01.818 PREOPERATIVE CLEARANCE: Primary | ICD-10-CM

## 2020-11-09 DIAGNOSIS — Z01.818 PREOPERATIVE CLEARANCE: ICD-10-CM

## 2020-11-09 DIAGNOSIS — I10 ESSENTIAL HYPERTENSION: ICD-10-CM

## 2020-11-09 DIAGNOSIS — N95.2 VAGINAL ATROPHY: ICD-10-CM

## 2020-11-09 PROCEDURE — 1101F PR PT FALLS ASSESS DOC 0-1 FALLS W/OUT INJ PAST YR: ICD-10-PCS | Mod: CPTII,S$GLB,, | Performed by: NURSE PRACTITIONER

## 2020-11-09 PROCEDURE — 1101F PT FALLS ASSESS-DOCD LE1/YR: CPT | Mod: CPTII,S$GLB,, | Performed by: NURSE PRACTITIONER

## 2020-11-09 PROCEDURE — 3288F FALL RISK ASSESSMENT DOCD: CPT | Mod: CPTII,S$GLB,, | Performed by: NURSE PRACTITIONER

## 2020-11-09 PROCEDURE — 1159F MED LIST DOCD IN RCRD: CPT | Mod: S$GLB,,, | Performed by: NURSE PRACTITIONER

## 2020-11-09 PROCEDURE — 3075F SYST BP GE 130 - 139MM HG: CPT | Mod: CPTII,S$GLB,, | Performed by: NURSE PRACTITIONER

## 2020-11-09 PROCEDURE — 1159F PR MEDICATION LIST DOCUMENTED IN MEDICAL RECORD: ICD-10-PCS | Mod: S$GLB,,, | Performed by: NURSE PRACTITIONER

## 2020-11-09 PROCEDURE — 99999 PR PBB SHADOW E&M-EST. PATIENT-LVL V: ICD-10-PCS | Mod: PBBFAC,,, | Performed by: NURSE PRACTITIONER

## 2020-11-09 PROCEDURE — 99214 OFFICE O/P EST MOD 30 MIN: CPT | Mod: S$GLB,,, | Performed by: NURSE PRACTITIONER

## 2020-11-09 PROCEDURE — 85025 COMPLETE CBC W/AUTO DIFF WBC: CPT

## 2020-11-09 PROCEDURE — 3288F PR FALLS RISK ASSESSMENT DOCUMENTED: ICD-10-PCS | Mod: CPTII,S$GLB,, | Performed by: NURSE PRACTITIONER

## 2020-11-09 PROCEDURE — 93010 ELECTROCARDIOGRAM REPORT: CPT | Mod: S$GLB,,, | Performed by: INTERNAL MEDICINE

## 2020-11-09 PROCEDURE — 3075F PR MOST RECENT SYSTOLIC BLOOD PRESS GE 130-139MM HG: ICD-10-PCS | Mod: CPTII,S$GLB,, | Performed by: NURSE PRACTITIONER

## 2020-11-09 PROCEDURE — 99999 PR PBB SHADOW E&M-EST. PATIENT-LVL V: CPT | Mod: PBBFAC,,, | Performed by: NURSE PRACTITIONER

## 2020-11-09 PROCEDURE — 93010 EKG 12-LEAD: ICD-10-PCS | Mod: S$GLB,,, | Performed by: INTERNAL MEDICINE

## 2020-11-09 PROCEDURE — 99214 PR OFFICE/OUTPT VISIT, EST, LEVL IV, 30-39 MIN: ICD-10-PCS | Mod: S$GLB,,, | Performed by: NURSE PRACTITIONER

## 2020-11-09 PROCEDURE — 1126F AMNT PAIN NOTED NONE PRSNT: CPT | Mod: S$GLB,,, | Performed by: NURSE PRACTITIONER

## 2020-11-09 PROCEDURE — 3078F DIAST BP <80 MM HG: CPT | Mod: CPTII,S$GLB,, | Performed by: NURSE PRACTITIONER

## 2020-11-09 PROCEDURE — 93005 ELECTROCARDIOGRAM TRACING: CPT | Mod: S$GLB,,, | Performed by: NURSE PRACTITIONER

## 2020-11-09 PROCEDURE — 93005 EKG 12-LEAD: ICD-10-PCS | Mod: S$GLB,,, | Performed by: NURSE PRACTITIONER

## 2020-11-09 PROCEDURE — 1126F PR PAIN SEVERITY QUANTIFIED, NO PAIN PRESENT: ICD-10-PCS | Mod: S$GLB,,, | Performed by: NURSE PRACTITIONER

## 2020-11-09 PROCEDURE — 3078F PR MOST RECENT DIASTOLIC BLOOD PRESSURE < 80 MM HG: ICD-10-PCS | Mod: CPTII,S$GLB,, | Performed by: NURSE PRACTITIONER

## 2020-11-09 PROCEDURE — 36415 COLL VENOUS BLD VENIPUNCTURE: CPT | Mod: PO

## 2020-11-09 RX ORDER — METOPROLOL TARTRATE 50 MG/1
50 TABLET ORAL 2 TIMES DAILY
Qty: 180 TABLET | Refills: 3 | Status: SHIPPED | OUTPATIENT
Start: 2020-11-09 | End: 2021-03-08

## 2020-11-09 NOTE — PROGRESS NOTES
Subjective:       Patient ID: Marysol Lyles is a 85 y.o. female.    Chief Complaint: Follow-up    she comes in for preop clearance. she is scheduled to have cataract surgery done by Dr. Tirado on 12/2/2020.  Medical history includes hypertension, hyperlipidemia hyperparathyroidism, OA, osteopenia, degenerative disc disease. Preop testing includes EKG and CBC. she denies shortness of breath or chest pain, no dizziness or syncope, no headaches or blurry vision. Denies fever. sheis not currently on blood thinners.       Hypertension  This is a chronic problem. The current episode started more than 1 year ago. The problem is controlled. Pertinent negatives include no chest pain, headaches, palpitations or shortness of breath. Past treatments include beta blockers. The current treatment provides significant improvement.   Medication Refill  This is a chronic (Premarin cream) problem. The current episode started more than 1 year ago. The problem occurs intermittently. The problem has been unchanged. Pertinent negatives include no abdominal pain, arthralgias, chest pain, coughing, fatigue, fever, headaches, myalgias, rash, sore throat or vomiting. The treatment provided significant relief.       Review of Systems   Constitutional: Negative for fatigue, fever and unexpected weight change.   HENT: Negative for ear pain and sore throat.    Eyes: Negative for pain and visual disturbance.   Respiratory: Negative for cough and shortness of breath.    Cardiovascular: Negative for chest pain and palpitations.   Gastrointestinal: Negative for abdominal pain, diarrhea and vomiting.   Musculoskeletal: Negative for arthralgias and myalgias.   Skin: Negative for color change and rash.   Neurological: Negative for dizziness and headaches.   Psychiatric/Behavioral: Negative for dysphoric mood and sleep disturbance. The patient is not nervous/anxious.        Vitals:    11/09/20 1407   BP: 132/76   Pulse: (!) 49   Temp: 97 °F (36.1 °C)        Objective:     Current Outpatient Medications   Medication Sig Dispense Refill    ACETAMINOPHEN (TYLENOL EXTRA STRENGTH ORAL) 500 mg. Every 4-6 hours PRN      BIOTIN ORAL Take by mouth once daily.      blood pressure test kit-large Kit Use once daily as directed. 1 each 0    C,E,zinc,copper 11/xtcns4c/lut (OCUVITE ADULT 50 PLUS ORAL) Take by mouth.      calcium citrate-vitamin D3 315-200 mg (CITRACAL+D) 315-200 mg-unit per tablet Take 1 tablet by mouth 2 (two) times daily.      cetirizine (ZYRTEC) 10 MG tablet Take 1 tablet (10 mg total) by mouth once daily. 30 tablet 11    CYANOCOBALAMIN, VITAMIN B-12, (VITAMIN B-12 ORAL) As directed      cyclobenzaprine (FLEXERIL) 10 MG tablet   1    DENOSUMAB (PROLIA SUBQ) Inject into the skin.      DULoxetine (CYMBALTA) 60 MG capsule TAKE 1 CAPSULE(60 MG) BY MOUTH EVERY DAY 90 capsule 0    ferrous gluconate (FERGON) 324 MG tablet Take 324 mg by mouth daily with breakfast.      flu vacc [id number] 65yr up,PF, 180 mcg/0.5 mL Syrg Fluzone High-Dose [id number] (PF) 180 mcg/0.5 mL intramuscular syringe   ADM 0.5ML IM UTD      fluticasone propionate (FLONASE) 50 mcg/actuation nasal spray 1 spray (50 mcg total) by Each Nostril route once daily. 16 g 0    gabapentin (NEURONTIN) 300 MG capsule Take 1 capsule (300 mg total) by mouth 3 (three) times daily. 90 capsule 11    GLUCOSAMINE HCL/CHONDRO KIDD A (GLUCOSAMINE-CHONDROITIN ORAL) Every day      glucosamine sulfate 500 mg Tab Take 1 tablet by mouth once daily.      LACTOBACILLUS ACIDOPHILUS (PROBIOTIC ORAL) Take by mouth.      metoprolol tartrate (LOPRESSOR) 50 MG tablet Take 1 tablet (50 mg total) by mouth 2 (two) times daily. 180 tablet 3    MULTIVITAMIN ORAL 2 (two) times daily. Every day      nystatin (NYSTOP) powder Apply topically 3 (three) times daily as needed. 60 g 11    oxyCODONE-acetaminophen (PERCOCET)  mg per tablet       tolterodine (DETROL LA) 4 MG 24 hr capsule Take 1 capsule (4 mg total) by  mouth once daily. 30 capsule 11    UROGESIC-BLUE 81.6-40.8-0.12 mg Tab TAKE 1 TABLET BY MOUTH TWICE A WEEK 60 tablet 1    vitamin D 1000 units Tab Take 185 mg by mouth once daily.      conjugated estrogens (PREMARIN) vaginal cream Place 0.5 g vaginally twice a week. 30 g 1     No current facility-administered medications for this visit.        Physical Exam  Vitals signs and nursing note reviewed.   Constitutional:       General: She is not in acute distress.     Appearance: She is well-developed.   HENT:      Head: Normocephalic and atraumatic.   Eyes:      Pupils: Pupils are equal, round, and reactive to light.   Neck:      Musculoskeletal: Normal range of motion and neck supple.   Cardiovascular:      Rate and Rhythm: Normal rate and regular rhythm.   Pulmonary:      Effort: Pulmonary effort is normal.      Breath sounds: Normal breath sounds.   Musculoskeletal: Normal range of motion.   Skin:     General: Skin is warm and dry.      Findings: No rash.   Neurological:      Mental Status: She is alert and oriented to person, place, and time.   Psychiatric:         Judgment: Judgment normal.         EKG shows SB, HR 49. No change when compared to EKG from 5/9/2018.    Assessment:       1. Preoperative clearance    2. Vaginal atrophy    3. Essential hypertension        Plan:   Preoperative clearance  -     EKG 12-lead  -     CBC Auto Differential; Future; Expected date: 11/09/2020    Vaginal atrophy  -     conjugated estrogens (PREMARIN) vaginal cream; Place 0.5 g vaginally twice a week.  Dispense: 30 g; Refill: 1    Essential hypertension    Other orders  -     metoprolol tartrate (LOPRESSOR) 50 MG tablet; Take 1 tablet (50 mg total) by mouth 2 (two) times daily.  Dispense: 180 tablet; Refill: 3     I have reviewed her EKG and CBC. Pt is cleared for surgery    No follow-ups on file.    There are no Patient Instructions on file for this visit.

## 2020-11-10 LAB
BASOPHILS # BLD AUTO: 0.03 K/UL (ref 0–0.2)
BASOPHILS NFR BLD: 0.5 % (ref 0–1.9)
DIFFERENTIAL METHOD: ABNORMAL
EOSINOPHIL # BLD AUTO: 0.1 K/UL (ref 0–0.5)
EOSINOPHIL NFR BLD: 1.9 % (ref 0–8)
ERYTHROCYTE [DISTWIDTH] IN BLOOD BY AUTOMATED COUNT: 12.8 % (ref 11.5–14.5)
HCT VFR BLD AUTO: 45.1 % (ref 37–48.5)
HGB BLD-MCNC: 13.8 G/DL (ref 12–16)
IMM GRANULOCYTES # BLD AUTO: 0.01 K/UL (ref 0–0.04)
IMM GRANULOCYTES NFR BLD AUTO: 0.2 % (ref 0–0.5)
LYMPHOCYTES # BLD AUTO: 2.2 K/UL (ref 1–4.8)
LYMPHOCYTES NFR BLD: 38.1 % (ref 18–48)
MCH RBC QN AUTO: 33.4 PG (ref 27–31)
MCHC RBC AUTO-ENTMCNC: 30.6 G/DL (ref 32–36)
MCV RBC AUTO: 109 FL (ref 82–98)
MONOCYTES # BLD AUTO: 0.4 K/UL (ref 0.3–1)
MONOCYTES NFR BLD: 7.4 % (ref 4–15)
NEUTROPHILS # BLD AUTO: 3 K/UL (ref 1.8–7.7)
NEUTROPHILS NFR BLD: 51.9 % (ref 38–73)
NRBC BLD-RTO: 0 /100 WBC
PLATELET # BLD AUTO: 226 K/UL (ref 150–350)
PMV BLD AUTO: 10 FL (ref 9.2–12.9)
RBC # BLD AUTO: 4.13 M/UL (ref 4–5.4)
WBC # BLD AUTO: 5.83 K/UL (ref 3.9–12.7)

## 2020-11-16 ENCOUNTER — PATIENT OUTREACH (OUTPATIENT)
Dept: OTHER | Facility: OTHER | Age: 85
End: 2020-11-16

## 2020-11-18 ENCOUNTER — PATIENT OUTREACH (OUTPATIENT)
Dept: ADMINISTRATIVE | Facility: OTHER | Age: 85
End: 2020-11-18

## 2020-11-19 ENCOUNTER — OFFICE VISIT (OUTPATIENT)
Dept: OPHTHALMOLOGY | Facility: CLINIC | Age: 85
End: 2020-11-19
Payer: COMMERCIAL

## 2020-11-19 DIAGNOSIS — Z96.1 PSEUDOPHAKIA OF LEFT EYE: ICD-10-CM

## 2020-11-19 DIAGNOSIS — H25.11 NUCLEAR SCLEROTIC CATARACT OF RIGHT EYE: Primary | ICD-10-CM

## 2020-11-19 DIAGNOSIS — H40.031 BORDERLINE GLAUCOMA WITH ANATOMICAL NARROW ANGLE, RIGHT: ICD-10-CM

## 2020-11-19 DIAGNOSIS — H35.3121 EARLY DRY STAGE NONEXUDATIVE AGE-RELATED MACULAR DEGENERATION OF LEFT EYE: ICD-10-CM

## 2020-11-19 DIAGNOSIS — H25.011 CORTICAL SENILE CATARACT, RIGHT: ICD-10-CM

## 2020-11-19 PROCEDURE — 99499 UNLISTED E&M SERVICE: CPT | Mod: S$GLB,,, | Performed by: OPHTHALMOLOGY

## 2020-11-19 PROCEDURE — 1101F PR PT FALLS ASSESS DOC 0-1 FALLS W/OUT INJ PAST YR: ICD-10-PCS | Mod: CPTII,S$GLB,, | Performed by: OPHTHALMOLOGY

## 2020-11-19 PROCEDURE — 99999 PR PBB SHADOW E&M-EST. PATIENT-LVL IV: ICD-10-PCS | Mod: PBBFAC,,, | Performed by: OPHTHALMOLOGY

## 2020-11-19 PROCEDURE — 3288F PR FALLS RISK ASSESSMENT DOCUMENTED: ICD-10-PCS | Mod: CPTII,S$GLB,, | Performed by: OPHTHALMOLOGY

## 2020-11-19 PROCEDURE — 92136 OPHTHALMIC BIOMETRY: CPT | Mod: RT,S$GLB,, | Performed by: OPHTHALMOLOGY

## 2020-11-19 PROCEDURE — 1126F PR PAIN SEVERITY QUANTIFIED, NO PAIN PRESENT: ICD-10-PCS | Mod: S$GLB,,, | Performed by: OPHTHALMOLOGY

## 2020-11-19 PROCEDURE — 99999 PR PBB SHADOW E&M-EST. PATIENT-LVL IV: CPT | Mod: PBBFAC,,, | Performed by: OPHTHALMOLOGY

## 2020-11-19 PROCEDURE — 99499 NO LOS: ICD-10-PCS | Mod: S$GLB,,, | Performed by: OPHTHALMOLOGY

## 2020-11-19 PROCEDURE — 1126F AMNT PAIN NOTED NONE PRSNT: CPT | Mod: S$GLB,,, | Performed by: OPHTHALMOLOGY

## 2020-11-19 PROCEDURE — 1101F PT FALLS ASSESS-DOCD LE1/YR: CPT | Mod: CPTII,S$GLB,, | Performed by: OPHTHALMOLOGY

## 2020-11-19 PROCEDURE — 92136 IOL MASTER - OD - RIGHT EYE: ICD-10-PCS | Mod: RT,S$GLB,, | Performed by: OPHTHALMOLOGY

## 2020-11-19 PROCEDURE — 3288F FALL RISK ASSESSMENT DOCD: CPT | Mod: CPTII,S$GLB,, | Performed by: OPHTHALMOLOGY

## 2020-11-19 NOTE — PROGRESS NOTES
HPI     Blurred Vision      Additional comments: Preop Cat Sx OD              Comments       Pt here for Glaucoma/Cataract Consult per Dr. Mccarthy;    Meds;   No GTTS    1. Nuclear sclerotic cataract   2. Cortical cataract of right eye  3. Narrow angle of anterior chamber of right eye  4. S/P cataract surgery, left   5. Pseudophakia, left eye           Last edited by Ricardo Cisse on 11/19/2020  2:57 PM. (History)            Assessment /Plan     For exam results, see Encounter Report.    Nuclear sclerotic cataract of right eye    Cortical senile cataract, right    Borderline glaucoma with anatomical narrow angle, right    Early dry stage nonexudative age-related macular degeneration of left eye    Pseudophakia of left eye          Pt lives on the [facility]     From [place]   Cataract od    Narrow angle od     S/p PC IOL os - Yobany   SN60WF - 20.5 (aimed for -0.5 (( got -0.75 sp eq)     IOL cacl od - per dr anderson - IOL calc - 5/11/2018   sn60wf - 21.5 (aiming for -0.50 )          Nuclear sclerosis of lens and cortical cataract in the right eye.  S/P cataract surgery in the left eye, with posterior chamber intraocular lens.  Acquired narrow anterior chamber angle in the right eye, presumably secondary to advancing cataract in the right eye.   Intraocular pressure within normal range in each eye.    Pre-op  phaco/IOL od - trypan blue / possible cassandra ring depending on dilation - 2nd eye  First eye done with [facility] - [facility] - also a pt of [facility]     Prefer to be latter in the day - on the day of surgery - they have to come from the [facility]     IOL calc OD   SN60WF - 21.5  AC IOL 18.0    Risks and benefits discussed and consent signed.

## 2020-11-24 ENCOUNTER — TELEPHONE (OUTPATIENT)
Dept: FAMILY MEDICINE | Facility: CLINIC | Age: 85
End: 2020-11-24

## 2020-11-24 NOTE — TELEPHONE ENCOUNTER
----- Message from Enedina Herron NP sent at 11/23/2020  5:29 PM CST -----  Please send my note along with copy of CBC and EKG for preop clearance to Dr Tirado, Ophthalmology

## 2020-11-25 NOTE — H&P (VIEW-ONLY)
Subjective:       Patient ID: Marysol Lyles is a 85 y.o. female.    Chief Complaint: Blurred Vision (Preop Cat Sx OD)    HPI  Review of Systems   Constitutional: Negative.    HENT: Negative.    Eyes: Positive for visual disturbance.   Respiratory: Negative.    Cardiovascular: Negative.    Neurological: Negative.    Psychiatric/Behavioral: Negative.          Objective:      Physical Exam  Constitutional:       Appearance: She is well-developed.   HENT:      Head: Normocephalic.   Eyes:      Comments: See eye exam   Cardiovascular:      Rate and Rhythm: Normal rate and regular rhythm.   Pulmonary:      Effort: Pulmonary effort is normal.   Neurological:      Mental Status: She is oriented to person, place, and time.         Assessment:       1. Nuclear sclerotic cataract of right eye    2. Cortical senile cataract, right    3. Borderline glaucoma with anatomical narrow angle, right    4. Early dry stage nonexudative age-related macular degeneration of left eye    5. Pseudophakia of left eye        Plan:       Complex phaco/IOL od - trypan / ? Cher ring -

## 2020-11-30 ENCOUNTER — LAB VISIT (OUTPATIENT)
Dept: FAMILY MEDICINE | Facility: CLINIC | Age: 85
End: 2020-11-30
Payer: COMMERCIAL

## 2020-11-30 DIAGNOSIS — Z13.9 SCREENING PROCEDURE: ICD-10-CM

## 2020-11-30 LAB — SARS-COV-2 RNA RESP QL NAA+PROBE: NOT DETECTED

## 2020-11-30 PROCEDURE — U0003 INFECTIOUS AGENT DETECTION BY NUCLEIC ACID (DNA OR RNA); SEVERE ACUTE RESPIRATORY SYNDROME CORONAVIRUS 2 (SARS-COV-2) (CORONAVIRUS DISEASE [COVID-19]), AMPLIFIED PROBE TECHNIQUE, MAKING USE OF HIGH THROUGHPUT TECHNOLOGIES AS DESCRIBED BY CMS-2020-01-R: HCPCS

## 2020-12-01 ENCOUNTER — TELEPHONE (OUTPATIENT)
Dept: OPTOMETRY | Facility: CLINIC | Age: 85
End: 2020-12-01

## 2020-12-01 RX ORDER — DULOXETIN HYDROCHLORIDE 60 MG/1
CAPSULE, DELAYED RELEASE ORAL
Qty: 90 CAPSULE | Refills: 0 | Status: SHIPPED | OUTPATIENT
Start: 2020-12-01 | End: 2021-04-19 | Stop reason: SDUPTHER

## 2020-12-01 NOTE — TELEPHONE ENCOUNTER
The patient requested medications be refilled and I have refilled them for 3 months.  Health Maintenance Due   Topic Date Due    Shingles Vaccine (2 of 3) 12/04/2014    TETANUS VACCINE  07/28/2018

## 2020-12-01 NOTE — PRE-PROCEDURE INSTRUCTIONS
PREOP INSTRUCTIONS:No solid food ,milk or milk products for 8 hours prior to procedure.Clear liquids are allowed up to 2 hours before procedure.Clear liquids are:water,apple juice,gatorade & powerade.Instructed to follow the surgeon's instructions if they differ from these.Shower instructions as well as directions to the Surgery Center were given.Encouraged to wear loose fitting,comfortable clothing.Medication instructions for pm prior to and am of procedure reviewed.Instructed to avoid taking vitamins,supplements,aspirin and ibuprofen the morning of surgery. Patient's questions and concerns addressed .Patient informed of the current visitor policy and advised patient that one visitor may accompany each patient into the hospital and wait (socially distanced) until a member of the medical team provides an update at the conclusion of the procedure.When they enter the hospital both patient and visitor will have their temperature checked.All visitors are asked to arrive with a mask and to keep their mask on throughout the visit.      Patient denies any side effects or issues with anesthesia or sedation.      Patient does not know arrival time.Explained that this information comes from the surgeon's office and if they haven't heard from them by 2 or 3 pm to call the office.Patient stated an understanding.     DAUGHTER - MERRILL OSUNA WILL BE PROVIDING TRANSPORTATION HOME UPON DISCHARGE.

## 2020-12-02 ENCOUNTER — ANESTHESIA EVENT (OUTPATIENT)
Dept: SURGERY | Facility: HOSPITAL | Age: 85
End: 2020-12-02
Payer: COMMERCIAL

## 2020-12-02 ENCOUNTER — ANESTHESIA (OUTPATIENT)
Dept: SURGERY | Facility: HOSPITAL | Age: 85
End: 2020-12-02
Payer: COMMERCIAL

## 2020-12-02 ENCOUNTER — HOSPITAL ENCOUNTER (OUTPATIENT)
Facility: HOSPITAL | Age: 85
Discharge: HOME | End: 2020-12-02
Attending: OPHTHALMOLOGY | Admitting: OPHTHALMOLOGY
Payer: COMMERCIAL

## 2020-12-02 VITALS
SYSTOLIC BLOOD PRESSURE: 149 MMHG | HEIGHT: 64 IN | BODY MASS INDEX: 32.44 KG/M2 | TEMPERATURE: 98 F | DIASTOLIC BLOOD PRESSURE: 66 MMHG | OXYGEN SATURATION: 96 % | RESPIRATION RATE: 20 BRPM | WEIGHT: 190 LBS | HEART RATE: 56 BPM

## 2020-12-02 DIAGNOSIS — H25.811 COMBINED FORM OF AGE-RELATED CATARACT, RIGHT EYE: ICD-10-CM

## 2020-12-02 PROCEDURE — 66982 XCAPSL CTRC RMVL CPLX WO ECP: CPT | Mod: RT,,, | Performed by: OPHTHALMOLOGY

## 2020-12-02 PROCEDURE — 25000003 PHARM REV CODE 250: Performed by: OPHTHALMOLOGY

## 2020-12-02 PROCEDURE — D9220A PRA ANESTHESIA: Mod: CRNA,,, | Performed by: STUDENT IN AN ORGANIZED HEALTH CARE EDUCATION/TRAINING PROGRAM

## 2020-12-02 PROCEDURE — 25000003 PHARM REV CODE 250: Performed by: STUDENT IN AN ORGANIZED HEALTH CARE EDUCATION/TRAINING PROGRAM

## 2020-12-02 PROCEDURE — 63600175 PHARM REV CODE 636 W HCPCS: Performed by: STUDENT IN AN ORGANIZED HEALTH CARE EDUCATION/TRAINING PROGRAM

## 2020-12-02 PROCEDURE — 71000015 HC POSTOP RECOV 1ST HR: Performed by: OPHTHALMOLOGY

## 2020-12-02 PROCEDURE — 25000003 PHARM REV CODE 250

## 2020-12-02 PROCEDURE — 66982 PR REMOVAL, CATARACT, W/INSRT INTRAOC LENS, W/O ENDO CYCLO, CMPLX: ICD-10-PCS | Mod: RT,,, | Performed by: OPHTHALMOLOGY

## 2020-12-02 PROCEDURE — 36000706: Performed by: OPHTHALMOLOGY

## 2020-12-02 PROCEDURE — D9220A PRA ANESTHESIA: Mod: ANES,,, | Performed by: ANESTHESIOLOGY

## 2020-12-02 PROCEDURE — D9220A PRA ANESTHESIA: ICD-10-PCS | Mod: ANES,,, | Performed by: ANESTHESIOLOGY

## 2020-12-02 PROCEDURE — V2632 POST CHMBR INTRAOCULAR LENS: HCPCS | Performed by: OPHTHALMOLOGY

## 2020-12-02 PROCEDURE — 37000008 HC ANESTHESIA 1ST 15 MINUTES: Performed by: OPHTHALMOLOGY

## 2020-12-02 PROCEDURE — 36000707: Performed by: OPHTHALMOLOGY

## 2020-12-02 PROCEDURE — 71000044 HC DOSC ROUTINE RECOVERY FIRST HOUR: Performed by: OPHTHALMOLOGY

## 2020-12-02 PROCEDURE — D9220A PRA ANESTHESIA: ICD-10-PCS | Mod: CRNA,,, | Performed by: STUDENT IN AN ORGANIZED HEALTH CARE EDUCATION/TRAINING PROGRAM

## 2020-12-02 PROCEDURE — 63600175 PHARM REV CODE 636 W HCPCS: Performed by: OPHTHALMOLOGY

## 2020-12-02 PROCEDURE — 37000009 HC ANESTHESIA EA ADD 15 MINS: Performed by: OPHTHALMOLOGY

## 2020-12-02 DEVICE — LENS 21.5 ACRYSOF: Type: IMPLANTABLE DEVICE | Site: EYE | Status: FUNCTIONAL

## 2020-12-02 RX ORDER — MOXIFLOXACIN 5 MG/ML
SOLUTION/ DROPS OPHTHALMIC
Status: DISCONTINUED | OUTPATIENT
Start: 2020-12-02 | End: 2020-12-02 | Stop reason: HOSPADM

## 2020-12-02 RX ORDER — PHENYLEPHRINE HYDROCHLORIDE 25 MG/ML
1 SOLUTION/ DROPS OPHTHALMIC
Status: DISCONTINUED | OUTPATIENT
Start: 2020-12-02 | End: 2020-12-02 | Stop reason: HOSPADM

## 2020-12-02 RX ORDER — SODIUM CHLORIDE 9 MG/ML
INJECTION, SOLUTION INTRAVENOUS CONTINUOUS PRN
Status: DISCONTINUED | OUTPATIENT
Start: 2020-12-02 | End: 2020-12-02

## 2020-12-02 RX ORDER — LIDOCAINE HYDROCHLORIDE 40 MG/ML
INJECTION, SOLUTION RETROBULBAR
Status: DISCONTINUED
Start: 2020-12-02 | End: 2020-12-02 | Stop reason: HOSPADM

## 2020-12-02 RX ORDER — ACETAZOLAMIDE 500 MG/1
500 CAPSULE, EXTENDED RELEASE ORAL ONCE
Status: DISCONTINUED | OUTPATIENT
Start: 2020-12-02 | End: 2020-12-02 | Stop reason: HOSPADM

## 2020-12-02 RX ORDER — LIDOCAINE HYDROCHLORIDE 20 MG/ML
JELLY TOPICAL
Status: DISCONTINUED
Start: 2020-12-02 | End: 2020-12-02 | Stop reason: HOSPADM

## 2020-12-02 RX ORDER — MIDAZOLAM HYDROCHLORIDE 1 MG/ML
INJECTION, SOLUTION INTRAMUSCULAR; INTRAVENOUS
Status: DISCONTINUED | OUTPATIENT
Start: 2020-12-02 | End: 2020-12-02

## 2020-12-02 RX ORDER — DEXMEDETOMIDINE HYDROCHLORIDE 100 UG/ML
INJECTION, SOLUTION INTRAVENOUS
Status: DISCONTINUED | OUTPATIENT
Start: 2020-12-02 | End: 2020-12-02

## 2020-12-02 RX ORDER — CYCLOPENTOLATE HYDROCHLORIDE 20 MG/ML
1 SOLUTION/ DROPS OPHTHALMIC
Status: DISCONTINUED | OUTPATIENT
Start: 2020-12-02 | End: 2020-12-02 | Stop reason: HOSPADM

## 2020-12-02 RX ORDER — PREDNISOLONE ACETATE 10 MG/ML
SUSPENSION/ DROPS OPHTHALMIC
Status: DISCONTINUED | OUTPATIENT
Start: 2020-12-02 | End: 2020-12-02 | Stop reason: HOSPADM

## 2020-12-02 RX ORDER — LIDOCAINE HYDROCHLORIDE 10 MG/ML
INJECTION, SOLUTION EPIDURAL; INFILTRATION; INTRACAUDAL; PERINEURAL
Status: DISCONTINUED
Start: 2020-12-02 | End: 2020-12-02 | Stop reason: HOSPADM

## 2020-12-02 RX ORDER — SODIUM CHLORIDE 0.9 % (FLUSH) 0.9 %
10 SYRINGE (ML) INJECTION
Status: CANCELLED | OUTPATIENT
Start: 2020-12-02

## 2020-12-02 RX ORDER — PREDNISOLONE ACETATE 10 MG/ML
SUSPENSION/ DROPS OPHTHALMIC
Status: DISCONTINUED
Start: 2020-12-02 | End: 2020-12-02 | Stop reason: HOSPADM

## 2020-12-02 RX ORDER — LIDOCAINE HYDROCHLORIDE 20 MG/ML
JELLY TOPICAL
Status: DISCONTINUED | OUTPATIENT
Start: 2020-12-02 | End: 2020-12-02 | Stop reason: HOSPADM

## 2020-12-02 RX ORDER — LIDOCAINE HYDROCHLORIDE 10 MG/ML
INJECTION, SOLUTION EPIDURAL; INFILTRATION; INTRACAUDAL; PERINEURAL
Status: DISCONTINUED | OUTPATIENT
Start: 2020-12-02 | End: 2020-12-02 | Stop reason: HOSPADM

## 2020-12-02 RX ORDER — MOXIFLOXACIN 5 MG/ML
1 SOLUTION/ DROPS OPHTHALMIC
Status: DISCONTINUED | OUTPATIENT
Start: 2020-12-02 | End: 2020-12-02 | Stop reason: HOSPADM

## 2020-12-02 RX ORDER — TROPICAMIDE 10 MG/ML
1 SOLUTION/ DROPS OPHTHALMIC
Status: DISCONTINUED | OUTPATIENT
Start: 2020-12-02 | End: 2020-12-02 | Stop reason: HOSPADM

## 2020-12-02 RX ORDER — ACETAMINOPHEN 325 MG/1
650 TABLET ORAL EVERY 6 HOURS PRN
Status: DISCONTINUED | OUTPATIENT
Start: 2020-12-02 | End: 2020-12-02 | Stop reason: HOSPADM

## 2020-12-02 RX ORDER — KETOROLAC TROMETHAMINE 5 MG/ML
1 SOLUTION OPHTHALMIC
Status: DISCONTINUED | OUTPATIENT
Start: 2020-12-02 | End: 2020-12-02 | Stop reason: HOSPADM

## 2020-12-02 RX ORDER — LIDOCAINE HYDROCHLORIDE 40 MG/ML
INJECTION, SOLUTION RETROBULBAR
Status: DISCONTINUED | OUTPATIENT
Start: 2020-12-02 | End: 2020-12-02 | Stop reason: HOSPADM

## 2020-12-02 RX ADMIN — PHENYLEPHRINE HYDROCHLORIDE 1 DROP: 25 SOLUTION/ DROPS OPHTHALMIC at 10:12

## 2020-12-02 RX ADMIN — KETOROLAC TROMETHAMINE 1 DROP: 5 SOLUTION/ DROPS OPHTHALMIC at 10:12

## 2020-12-02 RX ADMIN — MOXIFLOXACIN 1 DROP: 5 SOLUTION/ DROPS OPHTHALMIC at 11:12

## 2020-12-02 RX ADMIN — MOXIFLOXACIN 1 DROP: 5 SOLUTION/ DROPS OPHTHALMIC at 10:12

## 2020-12-02 RX ADMIN — TROPICAMIDE 1 DROP: 10 SOLUTION/ DROPS OPHTHALMIC at 11:12

## 2020-12-02 RX ADMIN — CYCLOPENTOLATE HYDROCHLORIDE 1 DROP: 20 SOLUTION/ DROPS OPHTHALMIC at 11:12

## 2020-12-02 RX ADMIN — CYCLOPENTOLATE HYDROCHLORIDE 1 DROP: 20 SOLUTION/ DROPS OPHTHALMIC at 10:12

## 2020-12-02 RX ADMIN — SODIUM CHLORIDE: 9 INJECTION, SOLUTION INTRAVENOUS at 11:12

## 2020-12-02 RX ADMIN — KETOROLAC TROMETHAMINE 1 DROP: 5 SOLUTION/ DROPS OPHTHALMIC at 11:12

## 2020-12-02 RX ADMIN — TROPICAMIDE 1 DROP: 10 SOLUTION/ DROPS OPHTHALMIC at 10:12

## 2020-12-02 RX ADMIN — MIDAZOLAM HYDROCHLORIDE 1 MG: 1 INJECTION, SOLUTION INTRAMUSCULAR; INTRAVENOUS at 12:12

## 2020-12-02 RX ADMIN — DEXMEDETOMIDINE HYDROCHLORIDE 4 MCG: 100 INJECTION, SOLUTION, CONCENTRATE INTRAVENOUS at 12:12

## 2020-12-02 NOTE — TRANSFER OF CARE
"Anesthesia Transfer of Care Note    Patient: Marysol Lyles    Procedure(s) Performed: Procedure(s) (LRB):  PHACOEMULSIFICATION, CATARACT (Right)  INSERTION, IOL PROSTHESIS (Right)    Patient location: PACU    Anesthesia Type: MAC    Transport from OR: Transported from OR on room air with adequate spontaneous ventilation    Post pain: adequate analgesia    Post assessment: no apparent anesthetic complications and tolerated procedure well    Post vital signs: stable    Level of consciousness: awake, alert and oriented    Nausea/Vomiting: no nausea/vomiting    Complications: none    Transfer of care protocol was followed      Last vitals:   Visit Vitals  BP (!) 164/71 (BP Location: Right arm, Patient Position: Lying)   Pulse (!) 52   Temp 36.3 °C (97.3 °F) (Oral)   Resp 18   Ht 5' 4" (1.626 m)   Wt 86.2 kg (190 lb)   LMP 05/06/1994   SpO2 95%   Breastfeeding No   BMI 32.61 kg/m²     "

## 2020-12-02 NOTE — ANESTHESIA POSTPROCEDURE EVALUATION
Anesthesia Post Evaluation    Patient: Marysol Lyles    Procedure(s) Performed: Procedure(s) (LRB):  PHACOEMULSIFICATION, CATARACT (Right)  INSERTION, IOL PROSTHESIS (Right)    Final Anesthesia Type: MAC    Patient location during evaluation: PACU  Patient participation: Yes- Able to Participate  Level of consciousness: awake and alert  Post-procedure vital signs: reviewed and stable  Pain management: adequate  Airway patency: patent    PONV status at discharge: No PONV  Anesthetic complications: no      Cardiovascular status: stable  Respiratory status: unassisted and spontaneous ventilation  Hydration status: euvolemic  Follow-up not needed.          Vitals Value Taken Time   /66 12/02/20 1330   Temp 36.7 °C (98.1 °F) 12/02/20 1330   Pulse 56 12/02/20 1330   Resp 20 12/02/20 1330   SpO2 96 % 12/02/20 1330         No case tracking events are documented in the log.      Pain/Froylan Score: Froylan Score: 10 (12/2/2020  1:05 PM)

## 2020-12-02 NOTE — DISCHARGE SUMMARY
[facility]  Discharge Note  Short Stay    Procedure(s) (LRB):  PHACOEMULSIFICATION, CATARACT (Right)  INSERTION, IOL PROSTHESIS (Right)    OUTCOME: Patient tolerated treatment/procedure well without complication and is now ready for discharge.    DISPOSITION: Home or Self Care    FINAL DIAGNOSIS:  Combined form of age-related cataract, right eye    FOLLOWUP: In clinic    DISCHARGE INSTRUCTIONS:  No discharge procedures on file.

## 2020-12-02 NOTE — PLAN OF CARE
Discharge instructions given and explained to patient and family with verbalization of understanding all instructions. Patients v/s stable, denies n/v and tolerating po, denies pain, IV removed, and family at bedside for patient discharge home.

## 2020-12-02 NOTE — PLAN OF CARE
Patient prepared for surgery.    I called into the OR to notify Dr. Landrum that the patient did not have any pre-op orders

## 2020-12-02 NOTE — OP NOTE
DATE OF PROCEDURE: 12/2/2020    PREOPERATIVE DIAGNOSIS: Cataract// Combined form of age-related cataract, right eye OD.     POSTOPERATIVE DIAGNOSIS: Cataract// Combined form of age-related cataract, right eyeOD, status post procedure.     PROCEDURE PERFORMED: complex Cataract extraction with trypan blue and phacoemulsification, posterior   chamber intraocular lens placement.     SURGEON: Nava Tirado M.D.     ASSISTANT: Olimpia lester MD      COMPLICATIONS: None.     BLOOD LOSS: Less than 5 mL.     ANESTHESIA: Local MAC    IMPLANT DATA:   1. Michi model SN60WF, power 21.5 diopters, serial #[id number]    PROCEDURE IN DETAIL: After informed consent including risks, benefits,   alternatives, the patient was brought to the operating room table, placed in   supine position. Monitored anesthesia care was used throughout the entire   procedure. Once adequate anesthesia was confirmed, the patient was then prepped   and draped in usual sterile fashion for intraocular surgery. Wire speculum was   used to hold the eyelids apart and the procedure was initiated by making   a partial thickness corneal wound with a willis blade temporally.   A supersharp blade was then used to make a second entry into the eye via a paracentesis incision.  Intracameral lidocaine followed by trypan blue, followed by Viscoat were placed in the anterior chamber.   A 2.4 mm blade was then used to make to complete the clear corneal   incision temporally. A cystotome was used to make a tear in   the anterior capsular flap, which was continued around with Utrata forceps for   continuous curvilinear capsulorrhexis. BSS on a Conley cannula was then used for   hydrodissection and hydrodelineation of lens. The lens was noted to spin   freely in the bag. Phacoemulsification handpiece was then used to remove the   lens in a divide-and-conquer manner. Irrigation aspiration handpiece removed   the remaining cortical material. Provisc was placed in the eye  followed by the   lens as mentioned above without any complications. Once the lens was in proper   position, irrigation aspiration handpiece was used to remove the remaining Provisc. The   wounds were then hydrated with BSS and noted to be watertight. The eye had a   good physiological pressure and the lid speculum was removed under the   microscope without any shallowing. The eye was then covered with a collagen   shield soaked in Pred Forte and Vigamox and turned over to Anesthesia in stable   condition after placement of patch and shield.

## 2020-12-02 NOTE — ANESTHESIA PREPROCEDURE EVALUATION
12/02/2020  Marysol Lyles is a 85 y.o., female.  Patient Active Problem List   Diagnosis    Hypertension    Arthritis    Bariatric surgery status    Senile nuclear sclerosis - Both Eyes    Hyperlipidemia    DDD (degenerative disc disease)    Chronic pain    Parathyroid abnormality    Primary hyperparathyroidism    Diverticulosis    Osteopenia    Hyperparathyroidism    Nuclear sclerosis, bilateral    Acute cystitis without hematuria    Combined form of age-related cataract, right eye     Past Surgical History:   Procedure Laterality Date    APPENDECTOMY      CATARACT EXTRACTION W/  INTRAOCULAR LENS IMPLANT Left 05/16/2018    Dr Haywood    COLONOSCOPY W/ BIOPSIES  2/2011    repeat in 3 years    LUMBAR EPIDURAL INJECTION N/A 12/2019    Dr. Tejada     PARTIAL KNEE ARTHROPLASTY Right     Right    SLEEVE GASTROPLASTY  2/8/12    TONSILLECTOMY      TOTAL HIP ARTHROPLASTY Right     TOTAL KNEE ARTHROPLASTY Left        Anesthesia Evaluation    I have reviewed the Patient Summary Reports.    I have reviewed the Nursing Notes. I have reviewed the NPO Status.      Review of Systems      Physical Exam  General:  Well nourished    Airway/Jaw/Neck:  Airway Findings: Mouth Opening: Normal General Airway Assessment: Adult  Mallampati: II  Improves to II with phonation.  Jaw/Neck Findings:  Limited Ability to Prognath  Neck ROM: Normal ROM     Eyes/Ears/Nose:  Eyes/Ears/Nose Findings:    Dental:  Dental Findings: In tact   Chest/Lungs:  Chest/Lungs Findings: Clear to auscultation, Normal Respiratory Rate     Heart/Vascular:  Heart Findings: Rate: Normal  Rhythm: Regular Rhythm  Sounds: Normal     Abdomen:  Abdomen Findings:  Normal     Musculoskeletal:  Musculoskeletal Findings:    Skin:  Skin Findings:     Mental Status:  Mental Status Findings:  Cooperative, Alert and Oriented         Anesthesia  Plan  Type of Anesthesia, risks & benefits discussed:  Anesthesia Type:  general, MAC  Patient's Preference:   Intra-op Monitoring Plan:   Intra-op Monitoring Plan Comments:   Post Op Pain Control Plan:   Post Op Pain Control Plan Comments:   Induction:   IV  Beta Blocker:  Patient is not currently on a Beta-Blocker (No further documentation required).       Informed Consent: Patient understands risks and agrees with Anesthesia plan.  Questions answered. Anesthesia consent signed with patient.  ASA Score: 2     Day of Surgery Review of History & Physical:    H&P update referred to the surgeon.         Ready For Surgery From Anesthesia Perspective.

## 2020-12-03 ENCOUNTER — OFFICE VISIT (OUTPATIENT)
Dept: OPHTHALMOLOGY | Facility: CLINIC | Age: 85
End: 2020-12-03
Payer: COMMERCIAL

## 2020-12-03 DIAGNOSIS — Z48.810 POSTOPERATIVE CARE FOR CATARACT: Primary | ICD-10-CM

## 2020-12-03 DIAGNOSIS — Z98.49 POSTOPERATIVE CARE FOR CATARACT: Primary | ICD-10-CM

## 2020-12-03 PROCEDURE — 1126F PR PAIN SEVERITY QUANTIFIED, NO PAIN PRESENT: ICD-10-PCS | Mod: S$GLB,,, | Performed by: OPHTHALMOLOGY

## 2020-12-03 PROCEDURE — 3288F FALL RISK ASSESSMENT DOCD: CPT | Mod: CPTII,S$GLB,, | Performed by: OPHTHALMOLOGY

## 2020-12-03 PROCEDURE — 99024 PR POST-OP FOLLOW-UP VISIT: ICD-10-PCS | Mod: S$GLB,,, | Performed by: OPHTHALMOLOGY

## 2020-12-03 PROCEDURE — 1126F AMNT PAIN NOTED NONE PRSNT: CPT | Mod: S$GLB,,, | Performed by: OPHTHALMOLOGY

## 2020-12-03 PROCEDURE — 1101F PT FALLS ASSESS-DOCD LE1/YR: CPT | Mod: CPTII,S$GLB,, | Performed by: OPHTHALMOLOGY

## 2020-12-03 PROCEDURE — 99999 PR PBB SHADOW E&M-EST. PATIENT-LVL IV: ICD-10-PCS | Mod: PBBFAC,,, | Performed by: OPHTHALMOLOGY

## 2020-12-03 PROCEDURE — 1101F PR PT FALLS ASSESS DOC 0-1 FALLS W/OUT INJ PAST YR: ICD-10-PCS | Mod: CPTII,S$GLB,, | Performed by: OPHTHALMOLOGY

## 2020-12-03 PROCEDURE — 99999 PR PBB SHADOW E&M-EST. PATIENT-LVL IV: CPT | Mod: PBBFAC,,, | Performed by: OPHTHALMOLOGY

## 2020-12-03 PROCEDURE — 3288F PR FALLS RISK ASSESSMENT DOCUMENTED: ICD-10-PCS | Mod: CPTII,S$GLB,, | Performed by: OPHTHALMOLOGY

## 2020-12-03 PROCEDURE — 99024 POSTOP FOLLOW-UP VISIT: CPT | Mod: S$GLB,,, | Performed by: OPHTHALMOLOGY

## 2020-12-03 RX ORDER — MOXIFLOXACIN 5 MG/ML
1 SOLUTION/ DROPS OPHTHALMIC 4 TIMES DAILY
COMMUNITY
Start: 2020-12-03 | End: 2020-12-11

## 2020-12-03 RX ORDER — PREDNISOLONE ACETATE 10 MG/ML
1 SUSPENSION/ DROPS OPHTHALMIC 4 TIMES DAILY
COMMUNITY
Start: 2020-12-03 | End: 2020-12-11 | Stop reason: SDUPTHER

## 2020-12-03 NOTE — PROGRESS NOTES
HPI     DLS: 11/19/20    Pt here for 1 day post phaco w/IOL OD- 12/02/20  Pt states no eye pain or discomfort. Pt states she broke out in a rash due   to the tape that was used.         Last edited by Yajaira Price on 12/3/2020 10:07 AM. (History)            Assessment /Plan     For exam results, see Encounter Report.    Postoperative care for cataract            Pt lives on the [facility]     From [facility]   Cataract od    Narrow angle od     S/p PC IOL os - Yobany   SN60WF - 20.5 (aimed for -0.5 (( got -0.75 sp eq)     IOL cacl od - per dr anderson - IOL calc - 5/11/2018   sn60wf - 21.5 (aiming for -0.50 )          Nuclear sclerosis of lens and cortical cataract in the right eye.  S/P cataract surgery in the left eye, with posterior chamber intraocular lens.  Acquired narrow anterior chamber angle in the right eye, presumably secondary to advancing cataract in the right eye.   Intraocular pressure within normal range in each eye.    Pre-op  phaco/IOL od - trypan blue / possible cassandra ring depending on dilation - 2nd eye  First eye done with [facility] - [facility] - also a pt of [facility]     Prefer to be latter in the day - on the day of surgery - they have to come from the [facility]     IOL calc OD   SN60WF - 21.5  AC IOL 18.0    Phaco / IOL OD Date: 12/2/2020  POD # 1 - phaco/IOL   Doing well  Begin Pred Acetate QID   Begin vigamox QID  Shield at night  Glasses day  No lifting, no bending, no eye rubbing  F/U 1 week, AR/MR ou

## 2020-12-11 ENCOUNTER — OFFICE VISIT (OUTPATIENT)
Dept: OPHTHALMOLOGY | Facility: CLINIC | Age: 85
End: 2020-12-11
Payer: COMMERCIAL

## 2020-12-11 DIAGNOSIS — H40.031 BORDERLINE GLAUCOMA WITH ANATOMICAL NARROW ANGLE, RIGHT: ICD-10-CM

## 2020-12-11 DIAGNOSIS — Z48.810 POSTOPERATIVE CARE FOR CATARACT: Primary | ICD-10-CM

## 2020-12-11 DIAGNOSIS — Z96.1 PSEUDOPHAKIA OF LEFT EYE: ICD-10-CM

## 2020-12-11 DIAGNOSIS — H35.3121 EARLY DRY STAGE NONEXUDATIVE AGE-RELATED MACULAR DEGENERATION OF LEFT EYE: ICD-10-CM

## 2020-12-11 DIAGNOSIS — H25.11 NUCLEAR SCLEROTIC CATARACT OF RIGHT EYE: ICD-10-CM

## 2020-12-11 DIAGNOSIS — H25.011 CORTICAL SENILE CATARACT, RIGHT: ICD-10-CM

## 2020-12-11 DIAGNOSIS — Z98.49 POSTOPERATIVE CARE FOR CATARACT: Primary | ICD-10-CM

## 2020-12-11 PROCEDURE — 99999 PR PBB SHADOW E&M-EST. PATIENT-LVL III: CPT | Mod: PBBFAC,,, | Performed by: OPHTHALMOLOGY

## 2020-12-11 PROCEDURE — 99024 POSTOP FOLLOW-UP VISIT: CPT | Mod: S$GLB,,, | Performed by: OPHTHALMOLOGY

## 2020-12-11 PROCEDURE — 1101F PR PT FALLS ASSESS DOC 0-1 FALLS W/OUT INJ PAST YR: ICD-10-PCS | Mod: CPTII,S$GLB,, | Performed by: OPHTHALMOLOGY

## 2020-12-11 PROCEDURE — 3288F FALL RISK ASSESSMENT DOCD: CPT | Mod: CPTII,S$GLB,, | Performed by: OPHTHALMOLOGY

## 2020-12-11 PROCEDURE — 99999 PR PBB SHADOW E&M-EST. PATIENT-LVL III: ICD-10-PCS | Mod: PBBFAC,,, | Performed by: OPHTHALMOLOGY

## 2020-12-11 PROCEDURE — 1126F AMNT PAIN NOTED NONE PRSNT: CPT | Mod: S$GLB,,, | Performed by: OPHTHALMOLOGY

## 2020-12-11 PROCEDURE — 1126F PR PAIN SEVERITY QUANTIFIED, NO PAIN PRESENT: ICD-10-PCS | Mod: S$GLB,,, | Performed by: OPHTHALMOLOGY

## 2020-12-11 PROCEDURE — 3288F PR FALLS RISK ASSESSMENT DOCUMENTED: ICD-10-PCS | Mod: CPTII,S$GLB,, | Performed by: OPHTHALMOLOGY

## 2020-12-11 PROCEDURE — 99024 PR POST-OP FOLLOW-UP VISIT: ICD-10-PCS | Mod: S$GLB,,, | Performed by: OPHTHALMOLOGY

## 2020-12-11 PROCEDURE — 1101F PT FALLS ASSESS-DOCD LE1/YR: CPT | Mod: CPTII,S$GLB,, | Performed by: OPHTHALMOLOGY

## 2020-12-11 RX ORDER — DENOSUMAB 60 MG/ML
60 INJECTION SUBCUTANEOUS
COMMUNITY

## 2020-12-11 RX ORDER — MELATONIN 5 MG
1 CAPSULE ORAL NIGHTLY PRN
COMMUNITY

## 2020-12-11 RX ORDER — PREDNISOLONE ACETATE 10 MG/ML
1 SUSPENSION/ DROPS OPHTHALMIC 3 TIMES DAILY
Qty: 1 BOTTLE | Refills: 0 | Status: SHIPPED | OUTPATIENT
Start: 2020-12-11 | End: 2021-01-04 | Stop reason: SDUPTHER

## 2020-12-11 NOTE — PROGRESS NOTES
"HPI     Post-op Evaluation      Additional comments: Pt states her vision is blurry when she watches TV                 Comments     S/p Complex Phaco IOL OD 12/2/20    Pt states that her vision is blurry when she watches television. However,   if she holds her head up and look through the "bottom of eyes" then her   vision is clear.    MEDS:  PA QID OD  Vigamox QID OD          Last edited by Nhi Hoang MA on 12/11/2020  2:00 PM. (History)              Assessment /Plan     For exam results, see Encounter Report.    Postoperative care for cataract    Nuclear sclerotic cataract of right eye    Cortical senile cataract, right    Borderline glaucoma with anatomical narrow angle, right    Early dry stage nonexudative age-related macular degeneration of left eye    Pseudophakia of left eye            Pt lives on the [facility]     From [place]   Cataract od    Narrow angle od     S/p PC IOL os - Yobany   SN60WF - 20.5 (aimed for -0.5 (( got -0.75 sp eq)     IOL cacl od - per dr anderson - IOL calc - 5/11/2018   sn60wf - 21.5 (aiming for -0.50 )          Nuclear sclerosis of lens and cortical cataract in the right eye.  S/P cataract surgery in the left eye, with posterior chamber intraocular lens.  Acquired narrow anterior chamber angle in the right eye, presumably secondary to advancing cataract in the right eye.   Intraocular pressure within normal range in each eye.    Pre-op  phaco/IOL od - trypan blue / possible cassandra ring depending on dilation - 2nd eye  First eye done with [facility] - [facility] - also a pt of [facility]     Prefer to be latter in the day - on the day of surgery - they have to come from the [facility]     IOL calc OD   SN60WF - 21.5  AC IOL 18.0    Phaco / IOL OD Date: 12/2/2020  POW  # 1 - phaco/IOL   Doing well - but BCVA only 20/40   Begin Pred Acetate QID - begin taper weekly  4/3/2/1/  Begin vigamox QID - STOP   Shield at night - 1 more week   Glasses day  No lifting, no bending, no eye " rubbing    F/U 4-6 week, AR/MR ou and OCT macula

## 2020-12-28 NOTE — PROGRESS NOTES
Digital Medicine: Health  Follow-Up    The history is provided by caregiver and other. The person identified by the patient is: daughter. The patient has granted authorization to speak to this person.             Reason for review: Blood pressure not at goal        Topics Covered on Call: readings    Additional Follow-up details: Ms. Gregg is doing well.     Spoke with daughter, Ms. Skelton, today to check in on patient's readings. She spoke with a representative on the main line a while ago stating that they were having issues with their BP cuff and that it wasn't working correctly. Since then, patient has had a replacement cuff which is working much better.     Ms. Skelton apologized for not submitting as many readings. She states that it's been difficult with the pandemic to get frequent readings. They do meet up every Sunday as a family and that's when Ms. Skelton will take patient's readings. She said it is difficult and she does try to get the most accurate reading, but sometimes the reading in close to dinner or it's chaotic in the house.             Diet-Not assessed          Physical Activity-Not assessed    Medication Adherence-Medication Adherence not addressed.      Substance, Sleep, Stress-Not assessed      Additional monitoring needed. Submitting at least 1 reading a week       Addressed patient questions and patient has my contact information if needed prior to next outreach. Patient verbalizes understanding.      Explained the importance of self-monitoring and medication adherence. Encouraged the patient to communicate with their health  for lifestyle modifications to help improve or maintain a healthy lifestyle.               There are no preventive care reminders to display for this patient.      Last 5 Patient Entered Readings                                      Current 30 Day Average: 138/72     Recent Readings 12/15/2020 12/15/2020 12/15/2020 12/14/2020 12/14/2020    SBP (mmHg) - 145 145 - 143     DBP (mmHg) - 79 79 - 73    Pulse 48 48 - 50 50

## 2021-01-04 ENCOUNTER — LAB VISIT (OUTPATIENT)
Dept: LAB | Facility: HOSPITAL | Age: 86
End: 2021-01-04
Attending: FAMILY MEDICINE
Payer: COMMERCIAL

## 2021-01-04 ENCOUNTER — TELEPHONE (OUTPATIENT)
Dept: FAMILY MEDICINE | Facility: CLINIC | Age: 86
End: 2021-01-04

## 2021-01-04 ENCOUNTER — OFFICE VISIT (OUTPATIENT)
Dept: OPHTHALMOLOGY | Facility: CLINIC | Age: 86
End: 2021-01-04
Payer: COMMERCIAL

## 2021-01-04 DIAGNOSIS — Z96.1 PSEUDOPHAKIA OF LEFT EYE: ICD-10-CM

## 2021-01-04 DIAGNOSIS — Z48.810 POSTOPERATIVE CARE FOR CATARACT: Primary | ICD-10-CM

## 2021-01-04 DIAGNOSIS — H25.011 CORTICAL SENILE CATARACT, RIGHT: ICD-10-CM

## 2021-01-04 DIAGNOSIS — Z98.49 POSTOPERATIVE CARE FOR CATARACT: Primary | ICD-10-CM

## 2021-01-04 DIAGNOSIS — H25.11 NUCLEAR SCLEROTIC CATARACT OF RIGHT EYE: ICD-10-CM

## 2021-01-04 DIAGNOSIS — H35.351 CME (CYSTOID MACULAR EDEMA), RIGHT: ICD-10-CM

## 2021-01-04 DIAGNOSIS — R39.15 URINARY URGENCY: Primary | ICD-10-CM

## 2021-01-04 DIAGNOSIS — R39.15 URINARY URGENCY: ICD-10-CM

## 2021-01-04 DIAGNOSIS — H40.031 BORDERLINE GLAUCOMA WITH ANATOMICAL NARROW ANGLE, RIGHT: ICD-10-CM

## 2021-01-04 PROCEDURE — 3288F FALL RISK ASSESSMENT DOCD: CPT | Mod: CPTII,S$GLB,, | Performed by: OPHTHALMOLOGY

## 2021-01-04 PROCEDURE — 99024 POSTOP FOLLOW-UP VISIT: CPT | Mod: S$GLB,,, | Performed by: OPHTHALMOLOGY

## 2021-01-04 PROCEDURE — 3288F PR FALLS RISK ASSESSMENT DOCUMENTED: ICD-10-PCS | Mod: CPTII,S$GLB,, | Performed by: OPHTHALMOLOGY

## 2021-01-04 PROCEDURE — 1101F PT FALLS ASSESS-DOCD LE1/YR: CPT | Mod: CPTII,S$GLB,, | Performed by: OPHTHALMOLOGY

## 2021-01-04 PROCEDURE — 99999 PR PBB SHADOW E&M-EST. PATIENT-LVL III: CPT | Mod: PBBFAC,,, | Performed by: OPHTHALMOLOGY

## 2021-01-04 PROCEDURE — 81001 URINALYSIS AUTO W/SCOPE: CPT

## 2021-01-04 PROCEDURE — 99999 PR PBB SHADOW E&M-EST. PATIENT-LVL III: ICD-10-PCS | Mod: PBBFAC,,, | Performed by: OPHTHALMOLOGY

## 2021-01-04 PROCEDURE — 1101F PR PT FALLS ASSESS DOC 0-1 FALLS W/OUT INJ PAST YR: ICD-10-PCS | Mod: CPTII,S$GLB,, | Performed by: OPHTHALMOLOGY

## 2021-01-04 PROCEDURE — 1126F PR PAIN SEVERITY QUANTIFIED, NO PAIN PRESENT: ICD-10-PCS | Mod: S$GLB,,, | Performed by: OPHTHALMOLOGY

## 2021-01-04 PROCEDURE — 92134 CPTRZ OPH DX IMG PST SGM RTA: CPT | Mod: S$GLB,,, | Performed by: OPHTHALMOLOGY

## 2021-01-04 PROCEDURE — 1126F AMNT PAIN NOTED NONE PRSNT: CPT | Mod: S$GLB,,, | Performed by: OPHTHALMOLOGY

## 2021-01-04 PROCEDURE — 92134 POSTERIOR SEGMENT OCT RETINA (OCULAR COHERENCE TOMOGRAPHY)-BOTH EYES: ICD-10-PCS | Mod: S$GLB,,, | Performed by: OPHTHALMOLOGY

## 2021-01-04 PROCEDURE — 99024 PR POST-OP FOLLOW-UP VISIT: ICD-10-PCS | Mod: S$GLB,,, | Performed by: OPHTHALMOLOGY

## 2021-01-04 RX ORDER — PREDNISOLONE ACETATE 10 MG/ML
1 SUSPENSION/ DROPS OPHTHALMIC 4 TIMES DAILY
Qty: 1 BOTTLE | Refills: 2 | Status: SHIPPED | OUTPATIENT
Start: 2021-01-04 | End: 2021-04-19

## 2021-01-04 RX ORDER — KETOROLAC TROMETHAMINE 5 MG/ML
1 SOLUTION OPHTHALMIC 4 TIMES DAILY
Qty: 5 ML | Refills: 2 | Status: SHIPPED | OUTPATIENT
Start: 2021-01-04 | End: 2021-04-12 | Stop reason: ALTCHOICE

## 2021-01-05 LAB
BACTERIA #/AREA URNS AUTO: ABNORMAL /HPF
BILIRUB UR QL STRIP: NEGATIVE
CLARITY UR REFRACT.AUTO: ABNORMAL
COLOR UR AUTO: ABNORMAL
GLUCOSE UR QL STRIP: NEGATIVE
HGB UR QL STRIP: NEGATIVE
KETONES UR QL STRIP: NEGATIVE
LEUKOCYTE ESTERASE UR QL STRIP: ABNORMAL
MICROSCOPIC COMMENT: ABNORMAL
NITRITE UR QL STRIP: POSITIVE
PH UR STRIP: 7 [PH] (ref 5–8)
PROT UR QL STRIP: NEGATIVE
SP GR UR STRIP: 1.01 (ref 1–1.03)
SQUAMOUS #/AREA URNS AUTO: 1 /HPF
URN SPEC COLLECT METH UR: ABNORMAL
WBC #/AREA URNS AUTO: >100 /HPF (ref 0–5)
WBC CLUMPS UR QL AUTO: ABNORMAL

## 2021-01-05 RX ORDER — SULFAMETHOXAZOLE AND TRIMETHOPRIM 800; 160 MG/1; MG/1
1 TABLET ORAL 2 TIMES DAILY
Qty: 20 TABLET | Refills: 0 | Status: SHIPPED | OUTPATIENT
Start: 2021-01-05 | End: 2021-01-15

## 2021-01-08 ENCOUNTER — LAB VISIT (OUTPATIENT)
Dept: LAB | Facility: HOSPITAL | Age: 86
End: 2021-01-08
Attending: INTERNAL MEDICINE
Payer: COMMERCIAL

## 2021-01-08 DIAGNOSIS — M85.80 OSTEOPENIA, UNSPECIFIED LOCATION: ICD-10-CM

## 2021-01-08 DIAGNOSIS — E21.3 HYPERPARATHYROIDISM: ICD-10-CM

## 2021-01-08 LAB
ALBUMIN SERPL BCP-MCNC: 3.9 G/DL (ref 3.5–5.2)
ALP SERPL-CCNC: 39 U/L (ref 55–135)
ALT SERPL W/O P-5'-P-CCNC: 27 U/L (ref 10–44)
ANION GAP SERPL CALC-SCNC: 7 MMOL/L (ref 8–16)
AST SERPL-CCNC: 36 U/L (ref 10–40)
BILIRUB SERPL-MCNC: 0.5 MG/DL (ref 0.1–1)
BUN SERPL-MCNC: 28 MG/DL (ref 8–23)
CALCIUM SERPL-MCNC: 10.3 MG/DL (ref 8.7–10.5)
CHLORIDE SERPL-SCNC: 101 MMOL/L (ref 95–110)
CO2 SERPL-SCNC: 30 MMOL/L (ref 23–29)
CREAT SERPL-MCNC: 1.1 MG/DL (ref 0.5–1.4)
EST. GFR  (AFRICAN AMERICAN): 53.7 ML/MIN/1.73 M^2
EST. GFR  (NON AFRICAN AMERICAN): 46.5 ML/MIN/1.73 M^2
GLUCOSE SERPL-MCNC: 86 MG/DL (ref 70–110)
PHOSPHATE SERPL-MCNC: 3.5 MG/DL (ref 2.7–4.5)
POTASSIUM SERPL-SCNC: 4.5 MMOL/L (ref 3.5–5.1)
PROT SERPL-MCNC: 7.2 G/DL (ref 6–8.4)
SODIUM SERPL-SCNC: 138 MMOL/L (ref 136–145)

## 2021-01-08 PROCEDURE — 83970 ASSAY OF PARATHORMONE: CPT

## 2021-01-08 PROCEDURE — 84100 ASSAY OF PHOSPHORUS: CPT

## 2021-01-08 PROCEDURE — 80053 COMPREHEN METABOLIC PANEL: CPT

## 2021-01-08 PROCEDURE — 36415 COLL VENOUS BLD VENIPUNCTURE: CPT | Mod: PO

## 2021-01-09 LAB — PTH-INTACT SERPL-MCNC: 63 PG/ML (ref 9–77)

## 2021-01-10 ENCOUNTER — IMMUNIZATION (OUTPATIENT)
Dept: FAMILY MEDICINE | Facility: CLINIC | Age: 86
End: 2021-01-10
Payer: COMMERCIAL

## 2021-01-10 DIAGNOSIS — Z23 NEED FOR VACCINATION: ICD-10-CM

## 2021-01-10 PROCEDURE — 91300 COVID-19, MRNA, LNP-S, PF, 30 MCG/0.3 ML DOSE VACCINE: CPT | Mod: PBBFAC | Performed by: FAMILY MEDICINE

## 2021-01-12 ENCOUNTER — PATIENT OUTREACH (OUTPATIENT)
Dept: ADMINISTRATIVE | Facility: OTHER | Age: 86
End: 2021-01-12

## 2021-01-14 ENCOUNTER — OFFICE VISIT (OUTPATIENT)
Dept: DERMATOLOGY | Facility: CLINIC | Age: 86
End: 2021-01-14
Payer: COMMERCIAL

## 2021-01-14 VITALS — BODY MASS INDEX: 32.61 KG/M2 | HEIGHT: 64 IN | RESPIRATION RATE: 20 BRPM

## 2021-01-14 DIAGNOSIS — L73.8 SEBACEOUS HYPERPLASIA: ICD-10-CM

## 2021-01-14 DIAGNOSIS — D18.01 ANGIOMA OF SKIN: ICD-10-CM

## 2021-01-14 DIAGNOSIS — L72.0 MILIA: ICD-10-CM

## 2021-01-14 DIAGNOSIS — L82.0 INFLAMED SEBORRHEIC KERATOSIS: ICD-10-CM

## 2021-01-14 DIAGNOSIS — D22.9 MULTIPLE BENIGN NEVI: ICD-10-CM

## 2021-01-14 DIAGNOSIS — L29.9 SCALP PRURITUS: ICD-10-CM

## 2021-01-14 DIAGNOSIS — L82.1 SEBORRHEIC KERATOSES: Primary | ICD-10-CM

## 2021-01-14 PROCEDURE — 1126F AMNT PAIN NOTED NONE PRSNT: CPT | Mod: S$GLB,,, | Performed by: DERMATOLOGY

## 2021-01-14 PROCEDURE — 17110 DESTRUCTION B9 LES UP TO 14: CPT | Mod: S$GLB,,, | Performed by: DERMATOLOGY

## 2021-01-14 PROCEDURE — 3288F PR FALLS RISK ASSESSMENT DOCUMENTED: ICD-10-PCS | Mod: CPTII,S$GLB,, | Performed by: DERMATOLOGY

## 2021-01-14 PROCEDURE — 99204 PR OFFICE/OUTPT VISIT, NEW, LEVL IV, 45-59 MIN: ICD-10-PCS | Mod: 25,S$GLB,, | Performed by: DERMATOLOGY

## 2021-01-14 PROCEDURE — 99999 PR PBB SHADOW E&M-EST. PATIENT-LVL IV: ICD-10-PCS | Mod: PBBFAC,,, | Performed by: DERMATOLOGY

## 2021-01-14 PROCEDURE — 1159F MED LIST DOCD IN RCRD: CPT | Mod: S$GLB,,, | Performed by: DERMATOLOGY

## 2021-01-14 PROCEDURE — 1101F PT FALLS ASSESS-DOCD LE1/YR: CPT | Mod: CPTII,S$GLB,, | Performed by: DERMATOLOGY

## 2021-01-14 PROCEDURE — 99999 PR PBB SHADOW E&M-EST. PATIENT-LVL IV: CPT | Mod: PBBFAC,,, | Performed by: DERMATOLOGY

## 2021-01-14 PROCEDURE — 99204 OFFICE O/P NEW MOD 45 MIN: CPT | Mod: 25,S$GLB,, | Performed by: DERMATOLOGY

## 2021-01-14 PROCEDURE — 3288F FALL RISK ASSESSMENT DOCD: CPT | Mod: CPTII,S$GLB,, | Performed by: DERMATOLOGY

## 2021-01-14 PROCEDURE — 1159F PR MEDICATION LIST DOCUMENTED IN MEDICAL RECORD: ICD-10-PCS | Mod: S$GLB,,, | Performed by: DERMATOLOGY

## 2021-01-14 PROCEDURE — 1101F PR PT FALLS ASSESS DOC 0-1 FALLS W/OUT INJ PAST YR: ICD-10-PCS | Mod: CPTII,S$GLB,, | Performed by: DERMATOLOGY

## 2021-01-14 PROCEDURE — 1126F PR PAIN SEVERITY QUANTIFIED, NO PAIN PRESENT: ICD-10-PCS | Mod: S$GLB,,, | Performed by: DERMATOLOGY

## 2021-01-14 PROCEDURE — 17110 PR DESTRUCTION BENIGN LESIONS UP TO 14: ICD-10-PCS | Mod: S$GLB,,, | Performed by: DERMATOLOGY

## 2021-01-14 RX ORDER — FLUOCINOLONE ACETONIDE 0.1 MG/ML
SOLUTION TOPICAL 2 TIMES DAILY
Qty: 60 ML | Refills: 1 | Status: SHIPPED | OUTPATIENT
Start: 2021-01-14 | End: 2021-04-19

## 2021-01-15 ENCOUNTER — OFFICE VISIT (OUTPATIENT)
Dept: ENDOCRINOLOGY | Facility: CLINIC | Age: 86
End: 2021-01-15
Payer: COMMERCIAL

## 2021-01-15 DIAGNOSIS — E21.3 HYPERPARATHYROIDISM: ICD-10-CM

## 2021-01-15 DIAGNOSIS — M85.80 OSTEOPENIA, UNSPECIFIED LOCATION: Primary | ICD-10-CM

## 2021-01-15 PROCEDURE — 1159F MED LIST DOCD IN RCRD: CPT | Mod: ,,, | Performed by: INTERNAL MEDICINE

## 2021-01-15 PROCEDURE — 1159F PR MEDICATION LIST DOCUMENTED IN MEDICAL RECORD: ICD-10-PCS | Mod: ,,, | Performed by: INTERNAL MEDICINE

## 2021-01-15 PROCEDURE — 99213 PR OFFICE/OUTPT VISIT, EST, LEVL III, 20-29 MIN: ICD-10-PCS | Mod: 95,,, | Performed by: INTERNAL MEDICINE

## 2021-01-15 PROCEDURE — 99213 OFFICE O/P EST LOW 20 MIN: CPT | Mod: 95,,, | Performed by: INTERNAL MEDICINE

## 2021-01-19 ENCOUNTER — CLINICAL SUPPORT (OUTPATIENT)
Dept: URGENT CARE | Facility: CLINIC | Age: 86
End: 2021-01-19
Payer: COMMERCIAL

## 2021-01-19 DIAGNOSIS — Z11.52 ENCOUNTER FOR SCREENING FOR COVID-19: Primary | ICD-10-CM

## 2021-01-19 LAB
CTP QC/QA: YES
SARS-COV-2 RDRP RESP QL NAA+PROBE: NEGATIVE

## 2021-01-19 PROCEDURE — U0002: ICD-10-PCS | Mod: QW,S$GLB,, | Performed by: FAMILY MEDICINE

## 2021-01-19 PROCEDURE — U0002 COVID-19 LAB TEST NON-CDC: HCPCS | Mod: QW,S$GLB,, | Performed by: FAMILY MEDICINE

## 2021-01-21 ENCOUNTER — INFUSION (OUTPATIENT)
Dept: INFUSION THERAPY | Facility: HOSPITAL | Age: 86
End: 2021-01-21
Attending: INTERNAL MEDICINE
Payer: COMMERCIAL

## 2021-01-21 VITALS
RESPIRATION RATE: 16 BRPM | TEMPERATURE: 98 F | HEART RATE: 55 BPM | DIASTOLIC BLOOD PRESSURE: 73 MMHG | SYSTOLIC BLOOD PRESSURE: 144 MMHG

## 2021-01-21 DIAGNOSIS — M85.80 OSTEOPENIA, UNSPECIFIED LOCATION: Primary | ICD-10-CM

## 2021-01-21 PROCEDURE — 96372 THER/PROPH/DIAG INJ SC/IM: CPT | Mod: PN

## 2021-01-21 PROCEDURE — 63600175 PHARM REV CODE 636 W HCPCS: Mod: JG,PN | Performed by: INTERNAL MEDICINE

## 2021-01-21 RX ADMIN — DENOSUMAB 60 MG: 60 INJECTION SUBCUTANEOUS at 01:01

## 2021-01-31 ENCOUNTER — IMMUNIZATION (OUTPATIENT)
Dept: FAMILY MEDICINE | Facility: CLINIC | Age: 86
End: 2021-01-31
Payer: COMMERCIAL

## 2021-01-31 DIAGNOSIS — Z23 NEED FOR VACCINATION: Primary | ICD-10-CM

## 2021-01-31 PROCEDURE — 0002A COVID-19, MRNA, LNP-S, PF, 30 MCG/0.3 ML DOSE VACCINE: CPT | Mod: PBBFAC | Performed by: INTERNAL MEDICINE

## 2021-01-31 PROCEDURE — 91300 COVID-19, MRNA, LNP-S, PF, 30 MCG/0.3 ML DOSE VACCINE: CPT | Mod: PBBFAC | Performed by: INTERNAL MEDICINE

## 2021-02-01 ENCOUNTER — OFFICE VISIT (OUTPATIENT)
Dept: OPHTHALMOLOGY | Facility: CLINIC | Age: 86
End: 2021-02-01
Payer: COMMERCIAL

## 2021-02-01 DIAGNOSIS — H35.351 CYSTOID MACULAR EDEMA, RIGHT: Primary | ICD-10-CM

## 2021-02-01 DIAGNOSIS — H43.813 POSTERIOR VITREOUS DETACHMENT, BILATERAL: ICD-10-CM

## 2021-02-01 PROCEDURE — 1101F PT FALLS ASSESS-DOCD LE1/YR: CPT | Mod: CPTII,S$GLB,, | Performed by: OPHTHALMOLOGY

## 2021-02-01 PROCEDURE — 92201 OPSCPY EXTND RTA DRAW UNI/BI: CPT | Mod: S$GLB,,, | Performed by: OPHTHALMOLOGY

## 2021-02-01 PROCEDURE — 99024 PR POST-OP FOLLOW-UP VISIT: ICD-10-PCS | Mod: S$GLB,,, | Performed by: OPHTHALMOLOGY

## 2021-02-01 PROCEDURE — 1126F PR PAIN SEVERITY QUANTIFIED, NO PAIN PRESENT: ICD-10-PCS | Mod: S$GLB,,, | Performed by: OPHTHALMOLOGY

## 2021-02-01 PROCEDURE — 3288F FALL RISK ASSESSMENT DOCD: CPT | Mod: CPTII,S$GLB,, | Performed by: OPHTHALMOLOGY

## 2021-02-01 PROCEDURE — 99999 PR PBB SHADOW E&M-EST. PATIENT-LVL IV: CPT | Mod: PBBFAC,,, | Performed by: OPHTHALMOLOGY

## 2021-02-01 PROCEDURE — 92134 POSTERIOR SEGMENT OCT RETINA (OCULAR COHERENCE TOMOGRAPHY)-BOTH EYES: ICD-10-PCS | Mod: S$GLB,,, | Performed by: OPHTHALMOLOGY

## 2021-02-01 PROCEDURE — 1101F PR PT FALLS ASSESS DOC 0-1 FALLS W/OUT INJ PAST YR: ICD-10-PCS | Mod: CPTII,S$GLB,, | Performed by: OPHTHALMOLOGY

## 2021-02-01 PROCEDURE — 1159F PR MEDICATION LIST DOCUMENTED IN MEDICAL RECORD: ICD-10-PCS | Mod: S$GLB,,, | Performed by: OPHTHALMOLOGY

## 2021-02-01 PROCEDURE — 92201 PR OPHTHALMOSCOPY, EXT, W/RET DRAW/SCLERAL DEPR, I&R, UNI/BI: ICD-10-PCS | Mod: S$GLB,,, | Performed by: OPHTHALMOLOGY

## 2021-02-01 PROCEDURE — 99999 PR PBB SHADOW E&M-EST. PATIENT-LVL IV: ICD-10-PCS | Mod: PBBFAC,,, | Performed by: OPHTHALMOLOGY

## 2021-02-01 PROCEDURE — 3288F PR FALLS RISK ASSESSMENT DOCUMENTED: ICD-10-PCS | Mod: CPTII,S$GLB,, | Performed by: OPHTHALMOLOGY

## 2021-02-01 PROCEDURE — 1126F AMNT PAIN NOTED NONE PRSNT: CPT | Mod: S$GLB,,, | Performed by: OPHTHALMOLOGY

## 2021-02-01 PROCEDURE — 99024 POSTOP FOLLOW-UP VISIT: CPT | Mod: S$GLB,,, | Performed by: OPHTHALMOLOGY

## 2021-02-01 PROCEDURE — 1159F MED LIST DOCD IN RCRD: CPT | Mod: S$GLB,,, | Performed by: OPHTHALMOLOGY

## 2021-02-01 PROCEDURE — 92134 CPTRZ OPH DX IMG PST SGM RTA: CPT | Mod: S$GLB,,, | Performed by: OPHTHALMOLOGY

## 2021-02-04 ENCOUNTER — OFFICE VISIT (OUTPATIENT)
Dept: OPHTHALMOLOGY | Facility: CLINIC | Age: 86
End: 2021-02-04
Payer: COMMERCIAL

## 2021-02-04 DIAGNOSIS — Z96.1 PSEUDOPHAKIA OF LEFT EYE: ICD-10-CM

## 2021-02-04 DIAGNOSIS — Z48.810 POSTOPERATIVE CARE FOR CATARACT: Primary | ICD-10-CM

## 2021-02-04 DIAGNOSIS — H25.011 CORTICAL SENILE CATARACT, RIGHT: ICD-10-CM

## 2021-02-04 DIAGNOSIS — H35.3121 EARLY DRY STAGE NONEXUDATIVE AGE-RELATED MACULAR DEGENERATION OF LEFT EYE: ICD-10-CM

## 2021-02-04 DIAGNOSIS — H35.351 CME (CYSTOID MACULAR EDEMA), RIGHT: ICD-10-CM

## 2021-02-04 DIAGNOSIS — H40.031 BORDERLINE GLAUCOMA WITH ANATOMICAL NARROW ANGLE, RIGHT: ICD-10-CM

## 2021-02-04 DIAGNOSIS — Z98.49 POSTOPERATIVE CARE FOR CATARACT: Primary | ICD-10-CM

## 2021-02-04 DIAGNOSIS — H25.11 NUCLEAR SCLEROTIC CATARACT OF RIGHT EYE: ICD-10-CM

## 2021-02-04 PROCEDURE — 3288F FALL RISK ASSESSMENT DOCD: CPT | Mod: CPTII,S$GLB,, | Performed by: OPHTHALMOLOGY

## 2021-02-04 PROCEDURE — 1101F PR PT FALLS ASSESS DOC 0-1 FALLS W/OUT INJ PAST YR: ICD-10-PCS | Mod: CPTII,S$GLB,, | Performed by: OPHTHALMOLOGY

## 2021-02-04 PROCEDURE — 99999 PR PBB SHADOW E&M-EST. PATIENT-LVL IV: CPT | Mod: PBBFAC,,, | Performed by: OPHTHALMOLOGY

## 2021-02-04 PROCEDURE — 99999 PR PBB SHADOW E&M-EST. PATIENT-LVL IV: ICD-10-PCS | Mod: PBBFAC,,, | Performed by: OPHTHALMOLOGY

## 2021-02-04 PROCEDURE — 1126F PR PAIN SEVERITY QUANTIFIED, NO PAIN PRESENT: ICD-10-PCS | Mod: S$GLB,,, | Performed by: OPHTHALMOLOGY

## 2021-02-04 PROCEDURE — 3288F PR FALLS RISK ASSESSMENT DOCUMENTED: ICD-10-PCS | Mod: CPTII,S$GLB,, | Performed by: OPHTHALMOLOGY

## 2021-02-04 PROCEDURE — 99024 POSTOP FOLLOW-UP VISIT: CPT | Mod: S$GLB,,, | Performed by: OPHTHALMOLOGY

## 2021-02-04 PROCEDURE — 99024 PR POST-OP FOLLOW-UP VISIT: ICD-10-PCS | Mod: S$GLB,,, | Performed by: OPHTHALMOLOGY

## 2021-02-04 PROCEDURE — 1126F AMNT PAIN NOTED NONE PRSNT: CPT | Mod: S$GLB,,, | Performed by: OPHTHALMOLOGY

## 2021-02-04 PROCEDURE — 92134 POSTERIOR SEGMENT OCT RETINA (OCULAR COHERENCE TOMOGRAPHY)-BOTH EYES: ICD-10-PCS | Mod: S$GLB,,, | Performed by: OPHTHALMOLOGY

## 2021-02-04 PROCEDURE — 92134 CPTRZ OPH DX IMG PST SGM RTA: CPT | Mod: S$GLB,,, | Performed by: OPHTHALMOLOGY

## 2021-02-04 PROCEDURE — 1101F PT FALLS ASSESS-DOCD LE1/YR: CPT | Mod: CPTII,S$GLB,, | Performed by: OPHTHALMOLOGY

## 2021-02-19 ENCOUNTER — TELEPHONE (OUTPATIENT)
Dept: OBSTETRICS AND GYNECOLOGY | Facility: CLINIC | Age: 86
End: 2021-02-19

## 2021-02-19 DIAGNOSIS — R39.15 URINARY URGENCY: ICD-10-CM

## 2021-02-19 RX ORDER — URINARY ANTISEPTIC ANTISPASMODIC 81.6; 40.8; 10.8; .12 MG/1; MG/1; MG/1; MG/1
1 TABLET ORAL
Qty: 60 TABLET | Refills: 1 | Status: SHIPPED | OUTPATIENT
Start: 2021-02-22 | End: 2021-04-19 | Stop reason: SDUPTHER

## 2021-03-08 ENCOUNTER — PATIENT MESSAGE (OUTPATIENT)
Dept: FAMILY MEDICINE | Facility: CLINIC | Age: 86
End: 2021-03-08

## 2021-03-08 RX ORDER — METOPROLOL TARTRATE 25 MG/1
50 TABLET, FILM COATED ORAL 2 TIMES DAILY
Qty: 180 TABLET | Refills: 1 | Status: SHIPPED | OUTPATIENT
Start: 2021-03-08 | End: 2021-04-19

## 2021-03-09 LAB
CHOLEST SERPL-MSCNC: 220 MG/DL (ref 0–200)
HDLC SERPL-MCNC: 72 MG/DL (ref 35–70)
LDL/HDL RATIO: 3.1 (ref 0–5)
LDLC SERPL CALC-MCNC: 123 MG/DL (ref 0–130)
TRIGL SERPL-MCNC: 130 MG/DL (ref 0–150)
VLDL CHOLESTEROL: 26 MG/DL (ref 5–40)

## 2021-03-11 ENCOUNTER — PATIENT OUTREACH (OUTPATIENT)
Dept: ADMINISTRATIVE | Facility: OTHER | Age: 86
End: 2021-03-11

## 2021-03-15 ENCOUNTER — PATIENT OUTREACH (OUTPATIENT)
Dept: ADMINISTRATIVE | Facility: HOSPITAL | Age: 86
End: 2021-03-15

## 2021-03-15 ENCOUNTER — OFFICE VISIT (OUTPATIENT)
Dept: OPHTHALMOLOGY | Facility: CLINIC | Age: 86
End: 2021-03-15
Payer: COMMERCIAL

## 2021-03-15 DIAGNOSIS — H35.351 CYSTOID MACULAR EDEMA, RIGHT: Primary | ICD-10-CM

## 2021-03-15 DIAGNOSIS — H43.813 POSTERIOR VITREOUS DETACHMENT, BILATERAL: ICD-10-CM

## 2021-03-15 PROCEDURE — 1126F AMNT PAIN NOTED NONE PRSNT: CPT | Mod: S$GLB,,, | Performed by: OPHTHALMOLOGY

## 2021-03-15 PROCEDURE — 92014 COMPRE OPH EXAM EST PT 1/>: CPT | Mod: S$GLB,,, | Performed by: OPHTHALMOLOGY

## 2021-03-15 PROCEDURE — 1101F PT FALLS ASSESS-DOCD LE1/YR: CPT | Mod: CPTII,S$GLB,, | Performed by: OPHTHALMOLOGY

## 2021-03-15 PROCEDURE — 92202 OPSCPY EXTND ON/MAC DRAW: CPT | Mod: S$GLB,,, | Performed by: OPHTHALMOLOGY

## 2021-03-15 PROCEDURE — 92134 CPTRZ OPH DX IMG PST SGM RTA: CPT | Mod: S$GLB,,, | Performed by: OPHTHALMOLOGY

## 2021-03-15 PROCEDURE — 92202 PR OPHTHALMOSCOPY, EXT, W/DRAW OPTIC NERVE/MACULA, I&R, UNI/BI: ICD-10-PCS | Mod: S$GLB,,, | Performed by: OPHTHALMOLOGY

## 2021-03-15 PROCEDURE — 3288F FALL RISK ASSESSMENT DOCD: CPT | Mod: CPTII,S$GLB,, | Performed by: OPHTHALMOLOGY

## 2021-03-15 PROCEDURE — 99999 PR PBB SHADOW E&M-EST. PATIENT-LVL V: ICD-10-PCS | Mod: PBBFAC,,, | Performed by: OPHTHALMOLOGY

## 2021-03-15 PROCEDURE — 1101F PR PT FALLS ASSESS DOC 0-1 FALLS W/OUT INJ PAST YR: ICD-10-PCS | Mod: CPTII,S$GLB,, | Performed by: OPHTHALMOLOGY

## 2021-03-15 PROCEDURE — 3288F PR FALLS RISK ASSESSMENT DOCUMENTED: ICD-10-PCS | Mod: CPTII,S$GLB,, | Performed by: OPHTHALMOLOGY

## 2021-03-15 PROCEDURE — 99999 PR PBB SHADOW E&M-EST. PATIENT-LVL V: CPT | Mod: PBBFAC,,, | Performed by: OPHTHALMOLOGY

## 2021-03-15 PROCEDURE — 1126F PR PAIN SEVERITY QUANTIFIED, NO PAIN PRESENT: ICD-10-PCS | Mod: S$GLB,,, | Performed by: OPHTHALMOLOGY

## 2021-03-15 PROCEDURE — 92014 PR EYE EXAM, EST PATIENT,COMPREHESV: ICD-10-PCS | Mod: S$GLB,,, | Performed by: OPHTHALMOLOGY

## 2021-03-15 PROCEDURE — 92134 POSTERIOR SEGMENT OCT RETINA (OCULAR COHERENCE TOMOGRAPHY)-BOTH EYES: ICD-10-PCS | Mod: S$GLB,,, | Performed by: OPHTHALMOLOGY

## 2021-04-12 ENCOUNTER — OFFICE VISIT (OUTPATIENT)
Dept: OPHTHALMOLOGY | Facility: CLINIC | Age: 86
End: 2021-04-12
Payer: COMMERCIAL

## 2021-04-12 ENCOUNTER — PATIENT OUTREACH (OUTPATIENT)
Dept: ADMINISTRATIVE | Facility: OTHER | Age: 86
End: 2021-04-12

## 2021-04-12 DIAGNOSIS — H25.11 NUCLEAR SCLEROTIC CATARACT OF RIGHT EYE: ICD-10-CM

## 2021-04-12 DIAGNOSIS — H40.031 BORDERLINE GLAUCOMA WITH ANATOMICAL NARROW ANGLE, RIGHT: Primary | ICD-10-CM

## 2021-04-12 DIAGNOSIS — Z96.1 PSEUDOPHAKIA OF LEFT EYE: ICD-10-CM

## 2021-04-12 PROCEDURE — 1101F PR PT FALLS ASSESS DOC 0-1 FALLS W/OUT INJ PAST YR: ICD-10-PCS | Mod: CPTII,S$GLB,, | Performed by: OPHTHALMOLOGY

## 2021-04-12 PROCEDURE — 3288F FALL RISK ASSESSMENT DOCD: CPT | Mod: CPTII,S$GLB,, | Performed by: OPHTHALMOLOGY

## 2021-04-12 PROCEDURE — 99999 PR PBB SHADOW E&M-EST. PATIENT-LVL IV: CPT | Mod: PBBFAC,,, | Performed by: OPHTHALMOLOGY

## 2021-04-12 PROCEDURE — 92133 POSTERIOR SEGMENT OCT OPTIC NERVE(OCULAR COHERENCE TOMOGRAPHY) - OU - BOTH EYES: ICD-10-PCS | Mod: S$GLB,,, | Performed by: OPHTHALMOLOGY

## 2021-04-12 PROCEDURE — 99999 PR PBB SHADOW E&M-EST. PATIENT-LVL IV: ICD-10-PCS | Mod: PBBFAC,,, | Performed by: OPHTHALMOLOGY

## 2021-04-12 PROCEDURE — 1126F PR PAIN SEVERITY QUANTIFIED, NO PAIN PRESENT: ICD-10-PCS | Mod: S$GLB,,, | Performed by: OPHTHALMOLOGY

## 2021-04-12 PROCEDURE — 99024 PR POST-OP FOLLOW-UP VISIT: ICD-10-PCS | Mod: S$GLB,,, | Performed by: OPHTHALMOLOGY

## 2021-04-12 PROCEDURE — 99024 POSTOP FOLLOW-UP VISIT: CPT | Mod: S$GLB,,, | Performed by: OPHTHALMOLOGY

## 2021-04-12 PROCEDURE — 92133 CPTRZD OPH DX IMG PST SGM ON: CPT | Mod: S$GLB,,, | Performed by: OPHTHALMOLOGY

## 2021-04-12 PROCEDURE — 3288F PR FALLS RISK ASSESSMENT DOCUMENTED: ICD-10-PCS | Mod: CPTII,S$GLB,, | Performed by: OPHTHALMOLOGY

## 2021-04-12 PROCEDURE — 1126F AMNT PAIN NOTED NONE PRSNT: CPT | Mod: S$GLB,,, | Performed by: OPHTHALMOLOGY

## 2021-04-12 PROCEDURE — 1101F PT FALLS ASSESS-DOCD LE1/YR: CPT | Mod: CPTII,S$GLB,, | Performed by: OPHTHALMOLOGY

## 2021-04-12 RX ORDER — VIT C/E/ZN/COPPR/LUTEIN/ZEAXAN 250MG-90MG
1 CAPSULE ORAL 2 TIMES DAILY
COMMUNITY

## 2021-04-19 ENCOUNTER — OFFICE VISIT (OUTPATIENT)
Dept: FAMILY MEDICINE | Facility: CLINIC | Age: 86
End: 2021-04-19
Payer: COMMERCIAL

## 2021-04-19 VITALS
BODY MASS INDEX: 33.12 KG/M2 | RESPIRATION RATE: 18 BRPM | DIASTOLIC BLOOD PRESSURE: 75 MMHG | HEIGHT: 64 IN | HEART RATE: 46 BPM | WEIGHT: 194 LBS | SYSTOLIC BLOOD PRESSURE: 152 MMHG

## 2021-04-19 DIAGNOSIS — I10 ESSENTIAL HYPERTENSION: Primary | ICD-10-CM

## 2021-04-19 DIAGNOSIS — N95.2 VAGINAL ATROPHY: ICD-10-CM

## 2021-04-19 DIAGNOSIS — E21.3 HYPERPARATHYROIDISM: ICD-10-CM

## 2021-04-19 DIAGNOSIS — N39.3 STRESS INCONTINENCE OF URINE: ICD-10-CM

## 2021-04-19 DIAGNOSIS — M51.36 DDD (DEGENERATIVE DISC DISEASE), LUMBAR: ICD-10-CM

## 2021-04-19 DIAGNOSIS — R39.15 URINARY URGENCY: ICD-10-CM

## 2021-04-19 DIAGNOSIS — G62.9 NEUROPATHY: ICD-10-CM

## 2021-04-19 PROBLEM — R32 URINARY INCONTINENCE: Status: ACTIVE | Noted: 2021-04-19

## 2021-04-19 PROCEDURE — 1125F PR PAIN SEVERITY QUANTIFIED, PAIN PRESENT: ICD-10-PCS | Mod: S$GLB,,, | Performed by: FAMILY MEDICINE

## 2021-04-19 PROCEDURE — 1159F MED LIST DOCD IN RCRD: CPT | Mod: S$GLB,,, | Performed by: FAMILY MEDICINE

## 2021-04-19 PROCEDURE — 99214 PR OFFICE/OUTPT VISIT, EST, LEVL IV, 30-39 MIN: ICD-10-PCS | Mod: S$GLB,,, | Performed by: FAMILY MEDICINE

## 2021-04-19 PROCEDURE — 1159F PR MEDICATION LIST DOCUMENTED IN MEDICAL RECORD: ICD-10-PCS | Mod: S$GLB,,, | Performed by: FAMILY MEDICINE

## 2021-04-19 PROCEDURE — 1101F PT FALLS ASSESS-DOCD LE1/YR: CPT | Mod: CPTII,S$GLB,, | Performed by: FAMILY MEDICINE

## 2021-04-19 PROCEDURE — 3288F PR FALLS RISK ASSESSMENT DOCUMENTED: ICD-10-PCS | Mod: CPTII,S$GLB,, | Performed by: FAMILY MEDICINE

## 2021-04-19 PROCEDURE — 3288F FALL RISK ASSESSMENT DOCD: CPT | Mod: CPTII,S$GLB,, | Performed by: FAMILY MEDICINE

## 2021-04-19 PROCEDURE — 1101F PR PT FALLS ASSESS DOC 0-1 FALLS W/OUT INJ PAST YR: ICD-10-PCS | Mod: CPTII,S$GLB,, | Performed by: FAMILY MEDICINE

## 2021-04-19 PROCEDURE — 99999 PR PBB SHADOW E&M-EST. PATIENT-LVL IV: CPT | Mod: PBBFAC,,, | Performed by: FAMILY MEDICINE

## 2021-04-19 PROCEDURE — 99999 PR PBB SHADOW E&M-EST. PATIENT-LVL IV: ICD-10-PCS | Mod: PBBFAC,,, | Performed by: FAMILY MEDICINE

## 2021-04-19 PROCEDURE — 99214 OFFICE O/P EST MOD 30 MIN: CPT | Mod: S$GLB,,, | Performed by: FAMILY MEDICINE

## 2021-04-19 PROCEDURE — 1125F AMNT PAIN NOTED PAIN PRSNT: CPT | Mod: S$GLB,,, | Performed by: FAMILY MEDICINE

## 2021-04-19 RX ORDER — GABAPENTIN 300 MG/1
300 CAPSULE ORAL 3 TIMES DAILY
Qty: 270 CAPSULE | Refills: 3 | Status: SHIPPED | OUTPATIENT
Start: 2021-04-19 | End: 2021-10-27

## 2021-04-19 RX ORDER — TOLTERODINE 4 MG/1
4 CAPSULE, EXTENDED RELEASE ORAL DAILY
Qty: 90 CAPSULE | Refills: 3 | Status: SHIPPED | OUTPATIENT
Start: 2021-04-19 | End: 2022-01-05 | Stop reason: SDUPTHER

## 2021-04-19 RX ORDER — DULOXETIN HYDROCHLORIDE 60 MG/1
CAPSULE, DELAYED RELEASE ORAL
Qty: 90 CAPSULE | Refills: 3 | Status: SHIPPED | OUTPATIENT
Start: 2021-04-19 | End: 2021-09-16 | Stop reason: SDUPTHER

## 2021-04-19 RX ORDER — METOPROLOL TARTRATE 50 MG/1
50 TABLET ORAL 2 TIMES DAILY
Qty: 180 TABLET | Refills: 3 | Status: SHIPPED | OUTPATIENT
Start: 2021-04-19 | End: 2022-01-05 | Stop reason: SDUPTHER

## 2021-04-19 RX ORDER — URINARY ANTISEPTIC ANTISPASMODIC 81.6; 40.8; 10.8; .12 MG/1; MG/1; MG/1; MG/1
1 TABLET ORAL
Qty: 180 TABLET | Refills: 3 | Status: SHIPPED | OUTPATIENT
Start: 2021-04-19 | End: 2022-01-05 | Stop reason: SDUPTHER

## 2021-04-19 RX ORDER — CYCLOBENZAPRINE HCL 10 MG
10 TABLET ORAL DAILY PRN
Qty: 90 TABLET | Refills: 3 | Status: SHIPPED | OUTPATIENT
Start: 2021-04-19 | End: 2022-04-04 | Stop reason: SDUPTHER

## 2021-04-23 NOTE — PROGRESS NOTES
It is noted that patient has an ALLERGY to latex.  She reports that when she would see the dentist she will get a rash around her mouth and she is not sure if it was the latex gloves or mercury that might have been used in the past.  Latex is not an ingredient appropriately but it is contained in the grey needle that is supplied with the medication and therefore patient did not come in contact with latex  
Prolia Injection visit. Injection administered at 1030. Pt monitored until 1118.Pt stated that she felt fine. Pt had no signs of pain or any adverse effects. MD spoke with pt and two daughters, in clinic.    Prolia 60 mg/mL  NDC: 80155-110-11  Lot: [id number]  Exp: 08/2019  
None

## 2021-05-10 ENCOUNTER — OFFICE VISIT (OUTPATIENT)
Dept: OPHTHALMOLOGY | Facility: CLINIC | Age: 86
End: 2021-05-10
Payer: COMMERCIAL

## 2021-05-10 DIAGNOSIS — H43.821 VITREOMACULAR ADHESION, RIGHT: ICD-10-CM

## 2021-05-10 DIAGNOSIS — H43.813 POSTERIOR VITREOUS DETACHMENT, BILATERAL: ICD-10-CM

## 2021-05-10 DIAGNOSIS — H35.351 CYSTOID MACULAR EDEMA, RIGHT: Primary | ICD-10-CM

## 2021-05-10 PROCEDURE — 99999 PR PBB SHADOW E&M-EST. PATIENT-LVL IV: CPT | Mod: PBBFAC,,, | Performed by: OPHTHALMOLOGY

## 2021-05-10 PROCEDURE — 92134 CPTRZ OPH DX IMG PST SGM RTA: CPT | Mod: S$GLB,,, | Performed by: OPHTHALMOLOGY

## 2021-05-10 PROCEDURE — 1101F PT FALLS ASSESS-DOCD LE1/YR: CPT | Mod: CPTII,S$GLB,, | Performed by: OPHTHALMOLOGY

## 2021-05-10 PROCEDURE — 3288F FALL RISK ASSESSMENT DOCD: CPT | Mod: CPTII,S$GLB,, | Performed by: OPHTHALMOLOGY

## 2021-05-10 PROCEDURE — 1126F PR PAIN SEVERITY QUANTIFIED, NO PAIN PRESENT: ICD-10-PCS | Mod: S$GLB,,, | Performed by: OPHTHALMOLOGY

## 2021-05-10 PROCEDURE — 92201 OPSCPY EXTND RTA DRAW UNI/BI: CPT | Mod: S$GLB,,, | Performed by: OPHTHALMOLOGY

## 2021-05-10 PROCEDURE — 1101F PR PT FALLS ASSESS DOC 0-1 FALLS W/OUT INJ PAST YR: ICD-10-PCS | Mod: CPTII,S$GLB,, | Performed by: OPHTHALMOLOGY

## 2021-05-10 PROCEDURE — 92014 COMPRE OPH EXAM EST PT 1/>: CPT | Mod: S$GLB,,, | Performed by: OPHTHALMOLOGY

## 2021-05-10 PROCEDURE — 3288F PR FALLS RISK ASSESSMENT DOCUMENTED: ICD-10-PCS | Mod: CPTII,S$GLB,, | Performed by: OPHTHALMOLOGY

## 2021-05-10 PROCEDURE — 99999 PR PBB SHADOW E&M-EST. PATIENT-LVL IV: ICD-10-PCS | Mod: PBBFAC,,, | Performed by: OPHTHALMOLOGY

## 2021-05-10 PROCEDURE — 92134 POSTERIOR SEGMENT OCT RETINA (OCULAR COHERENCE TOMOGRAPHY)-BOTH EYES: ICD-10-PCS | Mod: S$GLB,,, | Performed by: OPHTHALMOLOGY

## 2021-05-10 PROCEDURE — 1126F AMNT PAIN NOTED NONE PRSNT: CPT | Mod: S$GLB,,, | Performed by: OPHTHALMOLOGY

## 2021-05-10 PROCEDURE — 92014 PR EYE EXAM, EST PATIENT,COMPREHESV: ICD-10-PCS | Mod: S$GLB,,, | Performed by: OPHTHALMOLOGY

## 2021-05-10 PROCEDURE — 92201 PR OPHTHALMOSCOPY, EXT, W/RET DRAW/SCLERAL DEPR, I&R, UNI/BI: ICD-10-PCS | Mod: S$GLB,,, | Performed by: OPHTHALMOLOGY

## 2021-05-10 NOTE — TELEPHONE ENCOUNTER
----- Message from Moy Morel sent at 7/10/2018 10:34 AM CDT -----  Please call pt regarding her rx she was given only 24 pills and she need 30 [phone]   oral

## 2021-05-18 ENCOUNTER — PATIENT MESSAGE (OUTPATIENT)
Dept: FAMILY MEDICINE | Facility: CLINIC | Age: 86
End: 2021-05-18

## 2021-06-07 ENCOUNTER — TELEPHONE (OUTPATIENT)
Dept: FAMILY MEDICINE | Facility: CLINIC | Age: 86
End: 2021-06-07

## 2021-06-07 ENCOUNTER — OFFICE VISIT (OUTPATIENT)
Dept: FAMILY MEDICINE | Facility: CLINIC | Age: 86
End: 2021-06-07
Payer: COMMERCIAL

## 2021-06-07 VITALS
OXYGEN SATURATION: 94 % | WEIGHT: 194 LBS | TEMPERATURE: 98 F | HEART RATE: 47 BPM | HEIGHT: 64 IN | DIASTOLIC BLOOD PRESSURE: 76 MMHG | SYSTOLIC BLOOD PRESSURE: 136 MMHG | BODY MASS INDEX: 33.12 KG/M2

## 2021-06-07 DIAGNOSIS — Z23 IMMUNIZATION DUE: ICD-10-CM

## 2021-06-07 DIAGNOSIS — I10 ESSENTIAL HYPERTENSION: Primary | ICD-10-CM

## 2021-06-07 PROCEDURE — 3075F SYST BP GE 130 - 139MM HG: CPT | Mod: CPTII,S$GLB,, | Performed by: NURSE PRACTITIONER

## 2021-06-07 PROCEDURE — 90472 IMMUNIZATION ADMIN EACH ADD: CPT | Mod: S$GLB,,, | Performed by: NURSE PRACTITIONER

## 2021-06-07 PROCEDURE — 3288F FALL RISK ASSESSMENT DOCD: CPT | Mod: CPTII,S$GLB,, | Performed by: NURSE PRACTITIONER

## 2021-06-07 PROCEDURE — 99999 PR PBB SHADOW E&M-EST. PATIENT-LVL V: ICD-10-PCS | Mod: PBBFAC,,, | Performed by: NURSE PRACTITIONER

## 2021-06-07 PROCEDURE — 99999 PR PBB SHADOW E&M-EST. PATIENT-LVL V: CPT | Mod: PBBFAC,,, | Performed by: NURSE PRACTITIONER

## 2021-06-07 PROCEDURE — 99214 PR OFFICE/OUTPT VISIT, EST, LEVL IV, 30-39 MIN: ICD-10-PCS | Mod: 25,S$GLB,, | Performed by: NURSE PRACTITIONER

## 2021-06-07 PROCEDURE — 90471 ZOSTER RECOMBINANT VACCINE: ICD-10-PCS | Mod: S$GLB,,, | Performed by: NURSE PRACTITIONER

## 2021-06-07 PROCEDURE — 3075F PR MOST RECENT SYSTOLIC BLOOD PRESS GE 130-139MM HG: ICD-10-PCS | Mod: CPTII,S$GLB,, | Performed by: NURSE PRACTITIONER

## 2021-06-07 PROCEDURE — 90471 IMMUNIZATION ADMIN: CPT | Mod: S$GLB,,, | Performed by: NURSE PRACTITIONER

## 2021-06-07 PROCEDURE — 3078F DIAST BP <80 MM HG: CPT | Mod: CPTII,S$GLB,, | Performed by: NURSE PRACTITIONER

## 2021-06-07 PROCEDURE — 90715 TDAP VACCINE GREATER THAN OR EQUAL TO 7YO IM: ICD-10-PCS | Mod: S$GLB,,, | Performed by: NURSE PRACTITIONER

## 2021-06-07 PROCEDURE — 1126F AMNT PAIN NOTED NONE PRSNT: CPT | Mod: S$GLB,,, | Performed by: NURSE PRACTITIONER

## 2021-06-07 PROCEDURE — 90472 TDAP VACCINE GREATER THAN OR EQUAL TO 7YO IM: ICD-10-PCS | Mod: S$GLB,,, | Performed by: NURSE PRACTITIONER

## 2021-06-07 PROCEDURE — 1159F MED LIST DOCD IN RCRD: CPT | Mod: S$GLB,,, | Performed by: NURSE PRACTITIONER

## 2021-06-07 PROCEDURE — 1159F PR MEDICATION LIST DOCUMENTED IN MEDICAL RECORD: ICD-10-PCS | Mod: S$GLB,,, | Performed by: NURSE PRACTITIONER

## 2021-06-07 PROCEDURE — 1101F PT FALLS ASSESS-DOCD LE1/YR: CPT | Mod: CPTII,S$GLB,, | Performed by: NURSE PRACTITIONER

## 2021-06-07 PROCEDURE — 90750 ZOSTER RECOMBINANT VACCINE: ICD-10-PCS | Mod: S$GLB,,, | Performed by: NURSE PRACTITIONER

## 2021-06-07 PROCEDURE — 1101F PR PT FALLS ASSESS DOC 0-1 FALLS W/OUT INJ PAST YR: ICD-10-PCS | Mod: CPTII,S$GLB,, | Performed by: NURSE PRACTITIONER

## 2021-06-07 PROCEDURE — 99214 OFFICE O/P EST MOD 30 MIN: CPT | Mod: 25,S$GLB,, | Performed by: NURSE PRACTITIONER

## 2021-06-07 PROCEDURE — 3078F PR MOST RECENT DIASTOLIC BLOOD PRESSURE < 80 MM HG: ICD-10-PCS | Mod: CPTII,S$GLB,, | Performed by: NURSE PRACTITIONER

## 2021-06-07 PROCEDURE — 1126F PR PAIN SEVERITY QUANTIFIED, NO PAIN PRESENT: ICD-10-PCS | Mod: S$GLB,,, | Performed by: NURSE PRACTITIONER

## 2021-06-07 PROCEDURE — 90750 HZV VACC RECOMBINANT IM: CPT | Mod: S$GLB,,, | Performed by: NURSE PRACTITIONER

## 2021-06-07 PROCEDURE — 90715 TDAP VACCINE 7 YRS/> IM: CPT | Mod: S$GLB,,, | Performed by: NURSE PRACTITIONER

## 2021-06-07 PROCEDURE — 3288F PR FALLS RISK ASSESSMENT DOCUMENTED: ICD-10-PCS | Mod: CPTII,S$GLB,, | Performed by: NURSE PRACTITIONER

## 2021-06-14 ENCOUNTER — OFFICE VISIT (OUTPATIENT)
Dept: OPTOMETRY | Facility: CLINIC | Age: 86
End: 2021-06-14
Payer: COMMERCIAL

## 2021-06-14 DIAGNOSIS — Z96.1 PSEUDOPHAKIA OF BOTH EYES: ICD-10-CM

## 2021-06-14 DIAGNOSIS — Z98.890 POST-OPERATIVE STATE: Primary | ICD-10-CM

## 2021-06-14 DIAGNOSIS — Z98.42 S/P BILATERAL CATARACT EXTRACTION: ICD-10-CM

## 2021-06-14 DIAGNOSIS — Z98.41 S/P BILATERAL CATARACT EXTRACTION: ICD-10-CM

## 2021-06-14 DIAGNOSIS — H52.203 ASTIGMATISM OF BOTH EYES, UNSPECIFIED TYPE: ICD-10-CM

## 2021-06-14 PROCEDURE — 99999 PR PBB SHADOW E&M-EST. PATIENT-LVL III: ICD-10-PCS | Mod: PBBFAC,,, | Performed by: OPTOMETRIST

## 2021-06-14 PROCEDURE — 3288F PR FALLS RISK ASSESSMENT DOCUMENTED: ICD-10-PCS | Mod: CPTII,S$GLB,, | Performed by: OPTOMETRIST

## 2021-06-14 PROCEDURE — 99024 PR POST-OP FOLLOW-UP VISIT: ICD-10-PCS | Mod: S$GLB,,, | Performed by: OPTOMETRIST

## 2021-06-14 PROCEDURE — 92015 DETERMINE REFRACTIVE STATE: CPT | Mod: S$GLB,,, | Performed by: OPTOMETRIST

## 2021-06-14 PROCEDURE — 92015 PR REFRACTION: ICD-10-PCS | Mod: S$GLB,,, | Performed by: OPTOMETRIST

## 2021-06-14 PROCEDURE — 1126F AMNT PAIN NOTED NONE PRSNT: CPT | Mod: S$GLB,,, | Performed by: OPTOMETRIST

## 2021-06-14 PROCEDURE — 1101F PT FALLS ASSESS-DOCD LE1/YR: CPT | Mod: CPTII,S$GLB,, | Performed by: OPTOMETRIST

## 2021-06-14 PROCEDURE — 99999 PR PBB SHADOW E&M-EST. PATIENT-LVL III: CPT | Mod: PBBFAC,,, | Performed by: OPTOMETRIST

## 2021-06-14 PROCEDURE — 3288F FALL RISK ASSESSMENT DOCD: CPT | Mod: CPTII,S$GLB,, | Performed by: OPTOMETRIST

## 2021-06-14 PROCEDURE — 99024 POSTOP FOLLOW-UP VISIT: CPT | Mod: S$GLB,,, | Performed by: OPTOMETRIST

## 2021-06-14 PROCEDURE — 1101F PR PT FALLS ASSESS DOC 0-1 FALLS W/OUT INJ PAST YR: ICD-10-PCS | Mod: CPTII,S$GLB,, | Performed by: OPTOMETRIST

## 2021-06-14 PROCEDURE — 1126F PR PAIN SEVERITY QUANTIFIED, NO PAIN PRESENT: ICD-10-PCS | Mod: S$GLB,,, | Performed by: OPTOMETRIST

## 2021-06-29 ENCOUNTER — TELEPHONE (OUTPATIENT)
Dept: FAMILY MEDICINE | Facility: CLINIC | Age: 86
End: 2021-06-29

## 2021-06-29 ENCOUNTER — LAB VISIT (OUTPATIENT)
Dept: LAB | Facility: HOSPITAL | Age: 86
End: 2021-06-29
Attending: FAMILY MEDICINE
Payer: COMMERCIAL

## 2021-06-29 DIAGNOSIS — R30.0 DYSURIA: Primary | ICD-10-CM

## 2021-06-29 DIAGNOSIS — N30.00 ACUTE CYSTITIS WITHOUT HEMATURIA: Primary | ICD-10-CM

## 2021-06-29 DIAGNOSIS — R30.0 DYSURIA: ICD-10-CM

## 2021-06-29 DIAGNOSIS — N30.00 ACUTE CYSTITIS WITHOUT HEMATURIA: ICD-10-CM

## 2021-06-29 LAB
BACTERIA #/AREA URNS HPF: ABNORMAL /HPF
BILIRUB UR QL STRIP: NEGATIVE
CLARITY UR: ABNORMAL
COLOR UR: YELLOW
GLUCOSE UR QL STRIP: NEGATIVE
HGB UR QL STRIP: ABNORMAL
HYALINE CASTS #/AREA URNS LPF: 0 /LPF
KETONES UR QL STRIP: NEGATIVE
LEUKOCYTE ESTERASE UR QL STRIP: ABNORMAL
MICROSCOPIC COMMENT: ABNORMAL
NITRITE UR QL STRIP: NEGATIVE
PH UR STRIP: 7 [PH] (ref 5–8)
PROT UR QL STRIP: ABNORMAL
RBC #/AREA URNS HPF: 3 /HPF (ref 0–4)
SP GR UR STRIP: 1.01 (ref 1–1.03)
SQUAMOUS #/AREA URNS HPF: 3 /HPF
URN SPEC COLLECT METH UR: ABNORMAL
WBC #/AREA URNS HPF: >100 /HPF (ref 0–5)

## 2021-06-29 PROCEDURE — 87186 SC STD MICRODIL/AGAR DIL: CPT | Performed by: FAMILY MEDICINE

## 2021-06-29 PROCEDURE — 81000 URINALYSIS NONAUTO W/SCOPE: CPT | Mod: PO | Performed by: FAMILY MEDICINE

## 2021-06-29 PROCEDURE — 87088 URINE BACTERIA CULTURE: CPT | Performed by: FAMILY MEDICINE

## 2021-06-29 PROCEDURE — 87077 CULTURE AEROBIC IDENTIFY: CPT | Performed by: FAMILY MEDICINE

## 2021-06-29 PROCEDURE — 87086 URINE CULTURE/COLONY COUNT: CPT | Performed by: FAMILY MEDICINE

## 2021-06-29 RX ORDER — SULFAMETHOXAZOLE AND TRIMETHOPRIM 800; 160 MG/1; MG/1
1 TABLET ORAL 2 TIMES DAILY
Qty: 20 TABLET | Refills: 0 | Status: SHIPPED | OUTPATIENT
Start: 2021-06-29 | End: 2021-07-09

## 2021-07-02 LAB — BACTERIA UR CULT: ABNORMAL

## 2021-07-14 ENCOUNTER — LAB VISIT (OUTPATIENT)
Dept: LAB | Facility: HOSPITAL | Age: 86
End: 2021-07-14
Payer: COMMERCIAL

## 2021-07-14 DIAGNOSIS — E21.3 HYPERPARATHYROIDISM: ICD-10-CM

## 2021-07-14 DIAGNOSIS — M85.80 OSTEOPENIA, UNSPECIFIED LOCATION: ICD-10-CM

## 2021-07-14 PROCEDURE — 36415 COLL VENOUS BLD VENIPUNCTURE: CPT | Mod: PO | Performed by: INTERNAL MEDICINE

## 2021-07-14 PROCEDURE — 83970 ASSAY OF PARATHORMONE: CPT | Performed by: INTERNAL MEDICINE

## 2021-07-14 PROCEDURE — 80053 COMPREHEN METABOLIC PANEL: CPT | Performed by: INTERNAL MEDICINE

## 2021-07-14 PROCEDURE — 84100 ASSAY OF PHOSPHORUS: CPT | Performed by: INTERNAL MEDICINE

## 2021-07-15 LAB
ALBUMIN SERPL BCP-MCNC: 4 G/DL (ref 3.5–5.2)
ALP SERPL-CCNC: 31 U/L (ref 55–135)
ALT SERPL W/O P-5'-P-CCNC: 20 U/L (ref 10–44)
ANION GAP SERPL CALC-SCNC: 10 MMOL/L (ref 8–16)
AST SERPL-CCNC: 32 U/L (ref 10–40)
BILIRUB SERPL-MCNC: 0.3 MG/DL (ref 0.1–1)
BUN SERPL-MCNC: 21 MG/DL (ref 8–23)
CALCIUM SERPL-MCNC: 10.5 MG/DL (ref 8.7–10.5)
CHLORIDE SERPL-SCNC: 102 MMOL/L (ref 95–110)
CO2 SERPL-SCNC: 31 MMOL/L (ref 23–29)
CREAT SERPL-MCNC: 0.8 MG/DL (ref 0.5–1.4)
EST. GFR  (AFRICAN AMERICAN): >60 ML/MIN/1.73 M^2
EST. GFR  (NON AFRICAN AMERICAN): >60 ML/MIN/1.73 M^2
GLUCOSE SERPL-MCNC: 71 MG/DL (ref 70–110)
PHOSPHATE SERPL-MCNC: 4 MG/DL (ref 2.7–4.5)
POTASSIUM SERPL-SCNC: 4.4 MMOL/L (ref 3.5–5.1)
PROT SERPL-MCNC: 7.2 G/DL (ref 6–8.4)
PTH-INTACT SERPL-MCNC: 84 PG/ML (ref 9–77)
SODIUM SERPL-SCNC: 143 MMOL/L (ref 136–145)

## 2021-07-21 ENCOUNTER — OFFICE VISIT (OUTPATIENT)
Dept: ENDOCRINOLOGY | Facility: CLINIC | Age: 86
End: 2021-07-21
Payer: COMMERCIAL

## 2021-07-21 DIAGNOSIS — E21.3 HYPERPARATHYROIDISM: ICD-10-CM

## 2021-07-21 DIAGNOSIS — M85.80 OSTEOPENIA, UNSPECIFIED LOCATION: Primary | ICD-10-CM

## 2021-07-21 DIAGNOSIS — I10 BENIGN ESSENTIAL HTN: ICD-10-CM

## 2021-07-21 PROCEDURE — 99214 PR OFFICE/OUTPT VISIT, EST, LEVL IV, 30-39 MIN: ICD-10-PCS | Mod: 95,,, | Performed by: INTERNAL MEDICINE

## 2021-07-21 PROCEDURE — 99214 OFFICE O/P EST MOD 30 MIN: CPT | Mod: 95,,, | Performed by: INTERNAL MEDICINE

## 2021-07-21 PROCEDURE — 1159F PR MEDICATION LIST DOCUMENTED IN MEDICAL RECORD: ICD-10-PCS | Mod: CPTII,,, | Performed by: INTERNAL MEDICINE

## 2021-07-21 PROCEDURE — 1159F MED LIST DOCD IN RCRD: CPT | Mod: CPTII,,, | Performed by: INTERNAL MEDICINE

## 2021-07-23 ENCOUNTER — TELEPHONE (OUTPATIENT)
Dept: ENDOCRINOLOGY | Facility: CLINIC | Age: 86
End: 2021-07-23

## 2021-07-27 ENCOUNTER — INFUSION (OUTPATIENT)
Dept: INFUSION THERAPY | Facility: HOSPITAL | Age: 86
End: 2021-07-27
Attending: INTERNAL MEDICINE
Payer: COMMERCIAL

## 2021-07-27 VITALS — SYSTOLIC BLOOD PRESSURE: 140 MMHG | HEART RATE: 55 BPM | DIASTOLIC BLOOD PRESSURE: 77 MMHG | TEMPERATURE: 97 F

## 2021-07-27 DIAGNOSIS — M85.80 OSTEOPENIA, UNSPECIFIED LOCATION: Primary | ICD-10-CM

## 2021-07-27 PROCEDURE — 63600175 PHARM REV CODE 636 W HCPCS: Mod: JG,PN | Performed by: INTERNAL MEDICINE

## 2021-07-27 PROCEDURE — 96372 THER/PROPH/DIAG INJ SC/IM: CPT | Mod: PN

## 2021-07-27 RX ADMIN — DENOSUMAB 60 MG: 60 INJECTION SUBCUTANEOUS at 11:07

## 2021-08-02 ENCOUNTER — PATIENT MESSAGE (OUTPATIENT)
Dept: ENDOCRINOLOGY | Facility: CLINIC | Age: 86
End: 2021-08-02

## 2021-08-17 ENCOUNTER — PATIENT MESSAGE (OUTPATIENT)
Dept: FAMILY MEDICINE | Facility: CLINIC | Age: 86
End: 2021-08-17

## 2021-08-30 ENCOUNTER — PATIENT MESSAGE (OUTPATIENT)
Dept: INFUSION THERAPY | Facility: HOSPITAL | Age: 86
End: 2021-08-30

## 2021-09-16 DIAGNOSIS — G62.9 NEUROPATHY: ICD-10-CM

## 2021-09-16 RX ORDER — DULOXETIN HYDROCHLORIDE 60 MG/1
CAPSULE, DELAYED RELEASE ORAL
Qty: 90 CAPSULE | Refills: 3 | Status: SHIPPED | OUTPATIENT
Start: 2021-09-16 | End: 2021-09-30 | Stop reason: SDUPTHER

## 2021-09-17 ENCOUNTER — TELEPHONE (OUTPATIENT)
Dept: FAMILY MEDICINE | Facility: CLINIC | Age: 86
End: 2021-09-17

## 2021-09-30 ENCOUNTER — PATIENT MESSAGE (OUTPATIENT)
Dept: FAMILY MEDICINE | Facility: CLINIC | Age: 86
End: 2021-09-30

## 2021-09-30 DIAGNOSIS — G62.9 NEUROPATHY: ICD-10-CM

## 2021-09-30 RX ORDER — DULOXETIN HYDROCHLORIDE 60 MG/1
CAPSULE, DELAYED RELEASE ORAL
Qty: 90 CAPSULE | Refills: 3 | Status: SHIPPED | OUTPATIENT
Start: 2021-09-30 | End: 2022-01-05 | Stop reason: SDUPTHER

## 2021-10-01 ENCOUNTER — PATIENT MESSAGE (OUTPATIENT)
Dept: FAMILY MEDICINE | Facility: CLINIC | Age: 86
End: 2021-10-01

## 2021-10-05 ENCOUNTER — PATIENT MESSAGE (OUTPATIENT)
Dept: FAMILY MEDICINE | Facility: CLINIC | Age: 86
End: 2021-10-05

## 2021-10-05 DIAGNOSIS — Z12.31 VISIT FOR SCREENING MAMMOGRAM: Primary | ICD-10-CM

## 2021-10-27 DIAGNOSIS — G62.9 NEUROPATHY: ICD-10-CM

## 2021-10-27 RX ORDER — GABAPENTIN 300 MG/1
CAPSULE ORAL
Qty: 90 CAPSULE | Refills: 11 | Status: SHIPPED | OUTPATIENT
Start: 2021-10-27 | End: 2022-01-05 | Stop reason: SDUPTHER

## 2021-11-16 ENCOUNTER — HOSPITAL ENCOUNTER (OUTPATIENT)
Dept: RADIOLOGY | Facility: HOSPITAL | Age: 86
Discharge: HOME | End: 2021-11-16
Attending: FAMILY MEDICINE
Payer: COMMERCIAL

## 2021-11-16 VITALS — HEIGHT: 64 IN | WEIGHT: 188 LBS | BODY MASS INDEX: 32.1 KG/M2

## 2021-11-16 DIAGNOSIS — Z12.31 VISIT FOR SCREENING MAMMOGRAM: ICD-10-CM

## 2021-11-16 PROCEDURE — 77067 SCR MAMMO BI INCL CAD: CPT | Mod: 26,,, | Performed by: RADIOLOGY

## 2021-11-16 PROCEDURE — 77067 MAMMO DIGITAL SCREENING BILAT WITH TOMO: ICD-10-PCS | Mod: 26,,, | Performed by: RADIOLOGY

## 2021-11-16 PROCEDURE — 77067 SCR MAMMO BI INCL CAD: CPT | Mod: TC

## 2021-11-16 PROCEDURE — 77063 BREAST TOMOSYNTHESIS BI: CPT | Mod: 26,,, | Performed by: RADIOLOGY

## 2021-11-16 PROCEDURE — 77063 MAMMO DIGITAL SCREENING BILAT WITH TOMO: ICD-10-PCS | Mod: 26,,, | Performed by: RADIOLOGY

## 2021-11-18 ENCOUNTER — OFFICE VISIT (OUTPATIENT)
Dept: OBSTETRICS AND GYNECOLOGY | Facility: CLINIC | Age: 86
End: 2021-11-18
Payer: COMMERCIAL

## 2021-11-18 VITALS — BODY MASS INDEX: 33.31 KG/M2 | HEIGHT: 64 IN | WEIGHT: 195.13 LBS

## 2021-11-18 DIAGNOSIS — Z12.4 SCREENING FOR MALIGNANT NEOPLASM OF CERVIX: Primary | ICD-10-CM

## 2021-11-18 PROCEDURE — 3288F FALL RISK ASSESSMENT DOCD: CPT | Mod: CPTII,S$GLB,, | Performed by: OBSTETRICS & GYNECOLOGY

## 2021-11-18 PROCEDURE — 99397 PR PREVENTIVE VISIT,EST,65 & OVER: ICD-10-PCS | Mod: S$GLB,,, | Performed by: OBSTETRICS & GYNECOLOGY

## 2021-11-18 PROCEDURE — 1159F MED LIST DOCD IN RCRD: CPT | Mod: CPTII,S$GLB,, | Performed by: OBSTETRICS & GYNECOLOGY

## 2021-11-18 PROCEDURE — 99999 PR PBB SHADOW E&M-EST. PATIENT-LVL III: ICD-10-PCS | Mod: PBBFAC,,, | Performed by: OBSTETRICS & GYNECOLOGY

## 2021-11-18 PROCEDURE — 99397 PER PM REEVAL EST PAT 65+ YR: CPT | Mod: S$GLB,,, | Performed by: OBSTETRICS & GYNECOLOGY

## 2021-11-18 PROCEDURE — 1101F PR PT FALLS ASSESS DOC 0-1 FALLS W/OUT INJ PAST YR: ICD-10-PCS | Mod: CPTII,S$GLB,, | Performed by: OBSTETRICS & GYNECOLOGY

## 2021-11-18 PROCEDURE — 1101F PT FALLS ASSESS-DOCD LE1/YR: CPT | Mod: CPTII,S$GLB,, | Performed by: OBSTETRICS & GYNECOLOGY

## 2021-11-18 PROCEDURE — 1126F PR PAIN SEVERITY QUANTIFIED, NO PAIN PRESENT: ICD-10-PCS | Mod: CPTII,S$GLB,, | Performed by: OBSTETRICS & GYNECOLOGY

## 2021-11-18 PROCEDURE — 99999 PR PBB SHADOW E&M-EST. PATIENT-LVL III: CPT | Mod: PBBFAC,,, | Performed by: OBSTETRICS & GYNECOLOGY

## 2021-11-18 PROCEDURE — 88175 CYTOPATH C/V AUTO FLUID REDO: CPT | Performed by: OBSTETRICS & GYNECOLOGY

## 2021-11-18 PROCEDURE — 3288F PR FALLS RISK ASSESSMENT DOCUMENTED: ICD-10-PCS | Mod: CPTII,S$GLB,, | Performed by: OBSTETRICS & GYNECOLOGY

## 2021-11-18 PROCEDURE — 1126F AMNT PAIN NOTED NONE PRSNT: CPT | Mod: CPTII,S$GLB,, | Performed by: OBSTETRICS & GYNECOLOGY

## 2021-11-18 PROCEDURE — 1159F PR MEDICATION LIST DOCUMENTED IN MEDICAL RECORD: ICD-10-PCS | Mod: CPTII,S$GLB,, | Performed by: OBSTETRICS & GYNECOLOGY

## 2021-11-18 PROCEDURE — 87624 HPV HI-RISK TYP POOLED RSLT: CPT | Performed by: OBSTETRICS & GYNECOLOGY

## 2021-11-24 LAB
FINAL PATHOLOGIC DIAGNOSIS: NORMAL
Lab: NORMAL

## 2021-11-29 LAB
HPV HR 12 DNA SPEC QL NAA+PROBE: NEGATIVE
HPV16 AG SPEC QL: NEGATIVE
HPV18 DNA SPEC QL NAA+PROBE: NEGATIVE

## 2021-12-03 ENCOUNTER — PATIENT MESSAGE (OUTPATIENT)
Dept: FAMILY MEDICINE | Facility: CLINIC | Age: 86
End: 2021-12-03
Payer: COMMERCIAL

## 2021-12-05 RX ORDER — LOSARTAN POTASSIUM 50 MG/1
50 TABLET ORAL DAILY
Qty: 30 TABLET | Refills: 11 | Status: SHIPPED | OUTPATIENT
Start: 2021-12-05 | End: 2022-01-05 | Stop reason: SDUPTHER

## 2021-12-07 ENCOUNTER — PATIENT MESSAGE (OUTPATIENT)
Dept: FAMILY MEDICINE | Facility: CLINIC | Age: 86
End: 2021-12-07
Payer: COMMERCIAL

## 2022-01-05 ENCOUNTER — PATIENT MESSAGE (OUTPATIENT)
Dept: FAMILY MEDICINE | Facility: CLINIC | Age: 87
End: 2022-01-05
Payer: COMMERCIAL

## 2022-01-06 ENCOUNTER — PATIENT MESSAGE (OUTPATIENT)
Dept: GASTROENTEROLOGY | Facility: CLINIC | Age: 87
End: 2022-01-06
Payer: COMMERCIAL

## 2022-01-07 ENCOUNTER — PATIENT MESSAGE (OUTPATIENT)
Dept: GASTROENTEROLOGY | Facility: CLINIC | Age: 87
End: 2022-01-07
Payer: COMMERCIAL

## 2022-01-07 ENCOUNTER — TELEPHONE (OUTPATIENT)
Dept: FAMILY MEDICINE | Facility: CLINIC | Age: 87
End: 2022-01-07
Payer: COMMERCIAL

## 2022-01-07 NOTE — TELEPHONE ENCOUNTER
----- Message from Cee Dixon sent at 1/7/2022 11:46 AM CST -----  Contact: [facility]([place])  Would like to consult with nurse regarding medication clarification for current patient, please call [facility] at [phone]([place]). Thanks ah

## 2022-01-12 ENCOUNTER — TELEPHONE (OUTPATIENT)
Dept: FAMILY MEDICINE | Facility: CLINIC | Age: 87
End: 2022-01-12
Payer: COMMERCIAL

## 2022-01-12 DIAGNOSIS — R39.15 URINARY URGENCY: ICD-10-CM

## 2022-01-12 RX ORDER — URINARY ANTISEPTIC ANTISPASMODIC 81.6; 40.8; 10.8; .12 MG/1; MG/1; MG/1; MG/1
1 TABLET ORAL 2 TIMES DAILY
Qty: 180 TABLET | Refills: 3 | Status: SHIPPED | OUTPATIENT
Start: 2022-01-12 | End: 2022-02-21

## 2022-01-12 NOTE — TELEPHONE ENCOUNTER
That should have been twice a day.  I corrected it and sent it to the pharmacy.      I have signed for the following orders AND/OR meds.  Please call the patient and ask the patient to schedule the testing AND/OR inform about any medications that were sent.      No orders of the defined types were placed in this encounter.      Medications Ordered This Encounter   Medications    methen-sod phos-meth blue-hyos (UROGESIC-BLUE) 81.6-40.8-0.12 mg Tab     Sig: Take 1 tablet by mouth 2 (two) times daily.     Dispense:  180 tablet     Refill:  3

## 2022-01-12 NOTE — TELEPHONE ENCOUNTER
----- Message from Tracy Villanueva sent at 1/12/2022 10:31 AM CST -----  Contact: Tala/ Pharmacy tech  Tala would like a call back at   in regard to getting some clarification on the patient's medication [id number] - [place] - [address]  [address]  [place] [address]  Phone: [phone] Fax: [phone]    Thanks

## 2022-01-13 ENCOUNTER — PATIENT MESSAGE (OUTPATIENT)
Dept: OBSTETRICS AND GYNECOLOGY | Facility: CLINIC | Age: 87
End: 2022-01-13
Payer: COMMERCIAL

## 2022-01-14 ENCOUNTER — PATIENT MESSAGE (OUTPATIENT)
Dept: FAMILY MEDICINE | Facility: CLINIC | Age: 87
End: 2022-01-14
Payer: COMMERCIAL

## 2022-01-14 RX ORDER — URINARY ANTISEPTIC ANTISPASMODIC 81.6; 40.8; 10.8; .12 MG/1; MG/1; MG/1; MG/1
1 TABLET ORAL DAILY
Qty: 30 TABLET | Refills: 3 | Status: SHIPPED | OUTPATIENT
Start: 2022-01-14 | End: 2022-04-04

## 2022-01-15 ENCOUNTER — PATIENT MESSAGE (OUTPATIENT)
Dept: OBSTETRICS AND GYNECOLOGY | Facility: CLINIC | Age: 87
End: 2022-01-15
Payer: COMMERCIAL

## 2022-01-18 ENCOUNTER — PATIENT MESSAGE (OUTPATIENT)
Dept: OBSTETRICS AND GYNECOLOGY | Facility: CLINIC | Age: 87
End: 2022-01-18
Payer: COMMERCIAL

## 2022-01-28 ENCOUNTER — LAB VISIT (OUTPATIENT)
Dept: PRIMARY CARE CLINIC | Facility: OTHER | Age: 87
End: 2022-01-28
Payer: COMMERCIAL

## 2022-01-28 DIAGNOSIS — Z20.822 ENCOUNTER FOR LABORATORY TESTING FOR COVID-19 VIRUS: ICD-10-CM

## 2022-01-28 PROCEDURE — U0003 INFECTIOUS AGENT DETECTION BY NUCLEIC ACID (DNA OR RNA); SEVERE ACUTE RESPIRATORY SYNDROME CORONAVIRUS 2 (SARS-COV-2) (CORONAVIRUS DISEASE [COVID-19]), AMPLIFIED PROBE TECHNIQUE, MAKING USE OF HIGH THROUGHPUT TECHNOLOGIES AS DESCRIBED BY CMS-2020-01-R: HCPCS

## 2022-01-29 LAB
SARS-COV-2 RNA RESP QL NAA+PROBE: NOT DETECTED
SARS-COV-2- CYCLE NUMBER: NORMAL

## 2022-02-02 ENCOUNTER — HOSPITAL ENCOUNTER (OUTPATIENT)
Dept: RADIOLOGY | Facility: HOSPITAL | Age: 87
Discharge: HOME | End: 2022-02-02
Attending: INTERNAL MEDICINE
Payer: COMMERCIAL

## 2022-02-02 ENCOUNTER — PATIENT MESSAGE (OUTPATIENT)
Dept: ENDOCRINOLOGY | Facility: CLINIC | Age: 87
End: 2022-02-02
Payer: COMMERCIAL

## 2022-02-02 DIAGNOSIS — M85.80 OSTEOPENIA, UNSPECIFIED LOCATION: ICD-10-CM

## 2022-02-02 DIAGNOSIS — E21.3 HYPERPARATHYROIDISM: ICD-10-CM

## 2022-02-02 PROCEDURE — 77080 DXA BONE DENSITY AXIAL: CPT | Mod: TC,PO

## 2022-02-02 PROCEDURE — 77080 DEXA BONE DENSITY SPINE HIP: ICD-10-PCS | Mod: 26,,, | Performed by: RADIOLOGY

## 2022-02-02 PROCEDURE — 77080 DXA BONE DENSITY AXIAL: CPT | Mod: 26,,, | Performed by: RADIOLOGY

## 2022-02-09 ENCOUNTER — TELEPHONE (OUTPATIENT)
Dept: GASTROENTEROLOGY | Facility: CLINIC | Age: 87
End: 2022-02-09

## 2022-02-09 ENCOUNTER — OFFICE VISIT (OUTPATIENT)
Dept: GASTROENTEROLOGY | Facility: CLINIC | Age: 87
End: 2022-02-09
Payer: COMMERCIAL

## 2022-02-09 DIAGNOSIS — Z86.010 HISTORY OF COLON POLYPS: ICD-10-CM

## 2022-02-09 DIAGNOSIS — Z12.11 SCREENING FOR COLON CANCER: Primary | ICD-10-CM

## 2022-02-09 DIAGNOSIS — Z87.19 HISTORY OF GASTROESOPHAGEAL REFLUX (GERD): ICD-10-CM

## 2022-02-09 PROCEDURE — 99203 OFFICE O/P NEW LOW 30 MIN: CPT | Mod: 95,,, | Performed by: NURSE PRACTITIONER

## 2022-02-09 PROCEDURE — 99203 PR OFFICE/OUTPT VISIT, NEW, LEVL III, 30-44 MIN: ICD-10-PCS | Mod: 95,,, | Performed by: NURSE PRACTITIONER

## 2022-02-09 NOTE — TELEPHONE ENCOUNTER
Let patient know I was able to review her last colonoscopy done at [place] (11/16/16). She had a small polyp removed and it was tubular adenoma (precancerous type of polyp). I do recommend a colonoscopy for hx of colon polyps; please schedule if patient agreeable. Schedule her with Dr. Mobley or Dr. Aguirre since she has seen both of them in the past.

## 2022-02-09 NOTE — PROGRESS NOTES
Subjective:       Patient ID: Marysol Lyles is a 86 y.o. female, There is no height or weight on file to calculate BMI.    Chief Complaint: No chief complaint on file.      Patient is new to me. Established patient of Dr. Aguirre & Dr. Mobley.   Former patient of Dr. Vickers.     The patient location is: Home ([place])  The chief complaint leading to consultation is: Colonoscopy  Visit type: Audiovisual  Face to Face time with patient: 6 minutes  25 minutes of total time spent on the encounter, which includes face to face time and non-face to face time preparing to see the patient (eg, review of tests), Obtaining and/or reviewing separately obtained history, Documenting clinical information in the electronic or other health record, Independently interpreting results (not separately reported) and communicating results to the patient/family/caregiver, or Care coordination (not separately reported).   Each patient to whom he or she provides medical services by telemedicine is:  (1) informed of the relationship between the physician and patient and the respective role of any other health care provider with respect to management of the patient; and (2) notified that he or she may decline to receive medical services by telemedicine and may withdraw from such care at any time.    GI Problem  Primary symptoms do not include fever, weight loss, fatigue, abdominal pain, nausea, vomiting, diarrhea, melena, hematemesis, jaundice, hematochezia, dysuria or rash.   The illness does not include chills, anorexia, dysphagia, odynophagia, bloating or constipation. Associated symptoms comments: Patient presents to virtual visit to schedule colonoscopy. Last c-scope done couple years ago by Dr. Vickers; unremarkable per pt report. Denies family history of colon cancer. Feeling well, no complaints. Significant associated medical issues include GERD (Hx of GERD; denies heartburn/dyspepsia; not taking any medication currently) and  hemorrhoids. Associated medical issues do not include inflammatory bowel disease, gallstones, liver disease, alcohol abuse, PUD, gastric bypass, bowel resection, irritable bowel syndrome or diverticulitis.     Review of Systems   Constitutional: Negative for appetite change, chills, fatigue, fever, unexpected weight change and weight loss.   HENT: Negative for trouble swallowing.    Respiratory: Negative for cough and shortness of breath.    Cardiovascular: Negative for chest pain.   Gastrointestinal: Negative for abdominal distention, abdominal pain, anal bleeding, anorexia, bloating, blood in stool, constipation, diarrhea, dysphagia, hematemesis, hematochezia, jaundice, melena, nausea, rectal pain and vomiting.   Genitourinary: Negative for difficulty urinating and dysuria.   Musculoskeletal: Negative for gait problem.   Skin: Negative for rash.   Neurological: Negative for speech difficulty.   Psychiatric/Behavioral: Negative for confusion.       Past Medical History:   Diagnosis Date    Arthritis     Chronic pain 8/14/2013    Colon polyp     DDD (degenerative disc disease)     DDD (degenerative disc disease)     GERD (gastroesophageal reflux disease)     Hyperlipidemia     Hypertension     Osteopenia 3/2/2016    Primary hyperparathyroidism     Urinary incontinence 4/19/2021    She has urinary incontinence and she has used urogesic and detrol and premarin cream to help with this and it has. She will continue them.      Past Surgical History:   Procedure Laterality Date    APPENDECTOMY      CATARACT EXTRACTION W/  INTRAOCULAR LENS IMPLANT Left 05/16/2018    Dr Haywood    CATARACT EXTRACTION W/  INTRAOCULAR LENS IMPLANT Right 12/02/2020    Dr. Tirado    COLONOSCOPY      COLONOSCOPY W/ BIOPSIES  2/2011    repeat in 3 years    INTRAOCULAR PROSTHESES INSERTION Right 12/2/2020    Procedure: INSERTION, IOL PROSTHESIS;  Surgeon: Nava Tirado MD;  Location: [facility] OR [address] [place] [address];  Service:  Ophthalmology;  Laterality: Right;    LUMBAR EPIDURAL INJECTION N/A 2019    Dr. Tejada     PARTIAL KNEE ARTHROPLASTY Right     Right    PHACOEMULSIFICATION OF CATARACT Right 2020    Procedure: PHACOEMULSIFICATION, CATARACT;  Surgeon: Nava Tirado MD;  Location: [facility] OR [address] [place] [address];  Service: Ophthalmology;  Laterality: Right;    SLEEVE GASTROPLASTY  12    TONSILLECTOMY      TOTAL HIP ARTHROPLASTY Right     TOTAL KNEE ARTHROPLASTY Left     UPPER GASTROINTESTINAL ENDOSCOPY        Family History   Problem Relation Age of Onset    Arthritis Mother     Cancer Mother              Breast cancer Mother     Arthritis Father     Heart disease Father     Hyperlipidemia Father     Heart attack Father     Hypertension Father              Cancer Sister     Depression Sister     Cataracts Sister     Cancer Brother     Depression Brother     Diabetes Brother     Heart disease Brother     Hyperlipidemia Brother     Blindness Brother         dm    Retinal detachment Brother     Alcohol abuse Son     Birth defects Son     Diabetes Son     Mental retardation Son     Arthritis Son     Hyperlipidemia Son     Hypertension Son     Learning disabilities Son     Mental illness Son     Cancer Maternal Aunt     Alcohol abuse Maternal Uncle     Cancer Maternal Uncle     Depression Maternal Uncle     Cancer Paternal Aunt     Cancer Paternal Uncle     Arthritis Maternal Grandmother     Hypertension Maternal Grandmother     Alcohol abuse Maternal Grandfather     Arthritis Maternal Grandfather     Depression Maternal Grandfather     Hypertension Maternal Grandfather     Arthritis Paternal Grandmother     Arthritis Paternal Grandfather     HIV Son     Glaucoma Son     Alcohol abuse Son     Arthritis Son     Breast cancer Paternal Cousin     Arthritis Daughter     Hearing loss Daughter     Cancer Maternal Aunt              Cancer Maternal  Uncle         4 , 1 living    Cancer Paternal Aunt              Cancer Paternal Uncle              Cancer Sister          , living     Cancer Brother          , living     COPD Son     Diabetes Brother              Hearing loss Daughter     Amblyopia Neg Hx     Macular degeneration Neg Hx     Strabismus Neg Hx     Stroke Neg Hx     Thyroid disease Neg Hx     Colon cancer Neg Hx       Wt Readings from Last 10 Encounters:   21 88.5 kg (195 lb 1.7 oz)   21 85.3 kg (188 lb)   21 88 kg (194 lb 0.1 oz)   21 88 kg (194 lb)   20 86.2 kg (190 lb)   20 87.6 kg (193 lb 3.2 oz)   20 86.1 kg (189 lb 12.8 oz)   19 86.2 kg (190 lb 0.6 oz)   19 86.2 kg (190 lb)   10/25/19 85.6 kg (188 lb 11.4 oz)     Lab Results   Component Value Date    WBC 5.83 2020    HGB 13.8 2020    HCT 45.1 2020     (H) 2020     2020     CMP  Sodium   Date Value Ref Range Status   2022 137 136 - 145 mmol/L Final     Potassium   Date Value Ref Range Status   2022 4.7 3.5 - 5.1 mmol/L Final     Chloride   Date Value Ref Range Status   2022 99 95 - 110 mmol/L Final     CO2   Date Value Ref Range Status   2022 32 (H) 23 - 29 mmol/L Final     Glucose   Date Value Ref Range Status   2022 87 70 - 110 mg/dL Final     BUN   Date Value Ref Range Status   2022 21 8 - 23 mg/dL Final     Creatinine   Date Value Ref Range Status   2022 0.8 0.5 - 1.4 mg/dL Final     Calcium   Date Value Ref Range Status   2022 10.8 (H) 8.7 - 10.5 mg/dL Final     Total Protein   Date Value Ref Range Status   2022 7.0 6.0 - 8.4 g/dL Final     Albumin   Date Value Ref Range Status   2022 3.4 (L) 3.5 - 5.2 g/dL Final     Total Bilirubin   Date Value Ref Range Status   2022 0.4 0.1 - 1.0 mg/dL Final     Comment:     For infants and newborns,  interpretation of results should be based  on gestational age, weight and in agreement with clinical  observations.    Premature Infant recommended reference ranges:  Up to 24 hours.............<8.0 mg/dL  Up to 48 hours............<12.0 mg/dL  3-5 days..................<15.0 mg/dL  6-29 days.................<15.0 mg/dL       Alkaline Phosphatase   Date Value Ref Range Status   02/02/2022 37 (L) 55 - 135 U/L Final     AST   Date Value Ref Range Status   02/02/2022 22 10 - 40 U/L Final     ALT   Date Value Ref Range Status   02/02/2022 13 10 - 44 U/L Final     Anion Gap   Date Value Ref Range Status   02/02/2022 6 (L) 8 - 16 mmol/L Final     eGFR if    Date Value Ref Range Status   02/02/2022 >60.0 >60 mL/min/1.73 m^2 Final     eGFR if non    Date Value Ref Range Status   02/02/2022 >60.0 >60 mL/min/1.73 m^2 Final     Comment:     Calculation used to obtain the estimated glomerular filtration  rate (eGFR) is the CKD-EPI equation.                 Reviewed prior medical records including endoscopy history (see surgical history).     Objective:      Physical Exam  Constitutional:       General: She is not in acute distress.     Appearance: She is well-developed and well-nourished.      Comments: Limited physical examination due to virtual visit   Pulmonary:      Effort: Pulmonary effort is normal. No respiratory distress.   Neurological:      Mental Status: She is alert and oriented to person, place, and time.   Psychiatric:         Mood and Affect: Mood and affect normal.         Speech: Speech normal.         Behavior: Behavior is cooperative.           Assessment:       1. Screening for colon cancer    2. History of colon polyps    3. History of gastroesophageal reflux (GERD)           Plan:   Patient agreed to sign medical release form to obtain records from Dr. Vickers's office.     All diagnoses and orders for this visit:    Screening for colon cancer & History of colon polyps   -  Will determine next surveillance colonoscopy after records are obtained   - Discussed scheduling colonoscopy now but patient would like to hold off at this time    History of gastroesophageal reflux (GERD)   - Recommend to avoid large meals, avoid eating within 3 hours of bedtime, elevate head of bed if nocturnal symptoms are present, smoking cessation (if current smoker), & weight loss (if overweight).    - Recommend minimize/avoid high-fat foods, chocolate, caffeine, citrus, alcohol, & tomato products.   - Advised to avoid/limit use of NSAID's, since they can cause GI upset, bleeding, and/or ulcers. If needed, take with food.     If no improvement in symptoms or symptoms worsen, call/follow-up at clinic or go to ER

## 2022-02-10 ENCOUNTER — OFFICE VISIT (OUTPATIENT)
Dept: UROLOGY | Facility: CLINIC | Age: 87
End: 2022-02-10
Payer: COMMERCIAL

## 2022-02-10 VITALS
BODY MASS INDEX: 33.91 KG/M2 | HEIGHT: 64 IN | WEIGHT: 198.63 LBS | HEART RATE: 52 BPM | SYSTOLIC BLOOD PRESSURE: 171 MMHG | DIASTOLIC BLOOD PRESSURE: 74 MMHG

## 2022-02-10 DIAGNOSIS — R39.15 URINARY URGENCY: ICD-10-CM

## 2022-02-10 DIAGNOSIS — N95.2 VAGINAL ATROPHY: ICD-10-CM

## 2022-02-10 DIAGNOSIS — N28.89 RENAL MASS: Primary | ICD-10-CM

## 2022-02-10 PROCEDURE — 99215 OFFICE O/P EST HI 40 MIN: CPT | Mod: PBBFAC | Performed by: NURSE PRACTITIONER

## 2022-02-10 PROCEDURE — 99204 PR OFFICE/OUTPT VISIT, NEW, LEVL IV, 45-59 MIN: ICD-10-PCS | Mod: S$GLB,,, | Performed by: NURSE PRACTITIONER

## 2022-02-10 PROCEDURE — 99204 OFFICE O/P NEW MOD 45 MIN: CPT | Mod: S$GLB,,, | Performed by: NURSE PRACTITIONER

## 2022-02-10 PROCEDURE — 99999 PR PBB SHADOW E&M-EST. PATIENT-LVL V: ICD-10-PCS | Mod: PBBFAC,,, | Performed by: NURSE PRACTITIONER

## 2022-02-10 PROCEDURE — 99999 PR PBB SHADOW E&M-EST. PATIENT-LVL V: CPT | Mod: PBBFAC,,, | Performed by: NURSE PRACTITIONER

## 2022-02-10 RX ORDER — TOLTERODINE 4 MG/1
4 CAPSULE, EXTENDED RELEASE ORAL DAILY
Qty: 90 CAPSULE | Refills: 3 | Status: SHIPPED | OUTPATIENT
Start: 2022-02-10 | End: 2022-04-04 | Stop reason: SDUPTHER

## 2022-02-10 NOTE — PATIENT INSTRUCTIONS
UTI precautions:  Wipe front to back and avoid constipation.  Avoid caffeine.  Drink 1 liter of water daily  Void every 3-4 hrs  Expel urine from vagina post void  Void soon after urge arises  No dryer sheets or harsh detergents with the undergarments  No bubble baths  Void before and after intercourse  Avoid hot tub use  Avoid tight fitting clothes and panty hose  D-Mannose 2 grams- helps to block bacteria from attaching to the bacteria wall  Cranberry supplement w/ 36 mg of PAC-helps to block bacteria from attaching to the bacteria wall  Probiotic- [employer] Ultra 25 Billion CFU Probiotic Complex, Multi Strain.  The Multi Strain is specifically the one that is important as the greater variety of strains is better.    Estrace apply a pea sized amount to urethra 3 x weekly at night

## 2022-02-10 NOTE — PROGRESS NOTES
CHIEF COMPLAINT:    Mrs. Lyles is a 86 y.o. female presenting for urinary frequency and urgency.    .  PRESENTING ILLNESS:    Marysol Lyles is a 86 y.o. female with a PMH of neuropathy, DDD, htn, hld, vaginal atrophy, renal mass who presents for urinary frequency and urgency.    Previous patient of Dr. Bryant last seen 2018. She is a new patient to me.  Presents today for medication refills for urinary frequency and urgency.  Currently tolterodine, urogesic blue (generic), and premarin.  Reports medications are working well for her.  Wearing one pad per day.  She also practices timed voiding which alleviates urinary leakage/urgency.  Had history of recurrent UTIs, last 2021.  Reminded of preventive practices and medications for UTIs.  Information given.      Of note patient had renal ultrasound  with angiomyolipoma 7mm.  Will repeat ultrasound to monitor progression.    REVIEW OF SYSTEMS:    Review of Systems   Constitutional: Negative for chills and fever.   Respiratory: Negative for shortness of breath.    Cardiovascular: Negative for chest pain.   Gastrointestinal: Negative for constipation and diarrhea.   Genitourinary: Positive for urgency. Negative for dysuria, flank pain, frequency and hematuria.   Neurological: Negative for dizziness and weakness.        PATIENT HISTORY:    Past Medical History:   Diagnosis Date    Arthritis     Chronic pain 2013    Colon polyp     DDD (degenerative disc disease)     DDD (degenerative disc disease)     GERD (gastroesophageal reflux disease)     Hyperlipidemia     Hypertension     Osteopenia 3/2/2016    Primary hyperparathyroidism     Urinary incontinence 2021    She has urinary incontinence and she has used urogesic and detrol and premarin cream to help with this and it has. She will continue them.       Family History   Problem Relation Age of Onset    Arthritis Mother     Cancer Mother              Breast cancer Mother      Arthritis Father     Heart disease Father     Hyperlipidemia Father     Heart attack Father     Hypertension Father              Cancer Sister     Depression Sister     Cataracts Sister     Cancer Brother     Depression Brother     Diabetes Brother     Heart disease Brother     Hyperlipidemia Brother     Blindness Brother         dm    Retinal detachment Brother     Alcohol abuse Son     Birth defects Son     Diabetes Son     Mental retardation Son     Arthritis Son     Hyperlipidemia Son     Hypertension Son     Learning disabilities Son     Mental illness Son     Cancer Maternal Aunt     Alcohol abuse Maternal Uncle     Cancer Maternal Uncle     Depression Maternal Uncle     Cancer Paternal Aunt     Cancer Paternal Uncle     Arthritis Maternal Grandmother     Hypertension Maternal Grandmother     Alcohol abuse Maternal Grandfather     Arthritis Maternal Grandfather     Depression Maternal Grandfather     Hypertension Maternal Grandfather     Arthritis Paternal Grandmother     Arthritis Paternal Grandfather     HIV Son     Glaucoma Son     Alcohol abuse Son     Arthritis Son     Breast cancer Paternal Cousin     Arthritis Daughter     Hearing loss Daughter     Cancer Maternal Aunt              Cancer Maternal Uncle         4 , 1 living    Cancer Paternal Aunt              Cancer Paternal Uncle              Cancer Sister          , living     Cancer Brother          , living     COPD Son     Diabetes Brother              Hearing loss Daughter     Amblyopia Neg Hx     Macular degeneration Neg Hx     Strabismus Neg Hx     Stroke Neg Hx     Thyroid disease Neg Hx     Colon cancer Neg Hx        Allergies:  Penicillins, Formaldehyde, Mercury, Adhesive tape-silicones, and Dilaudid [hydromorphone (bulk)]    Medications:    Current Outpatient Medications:      ACETAMINOPHEN (TYLENOL EXTRA STRENGTH ORAL), Take 500 mg by mouth every 4 to 6 hours as needed. , Disp: , Rfl:     blood pressure test kit-large Kit, Use once daily as directed., Disp: 1 each, Rfl: 0    calcium citrate-vitamin D3 315-200 mg (CITRACAL+D) 315-200 mg-unit per tablet, Take 1 tablet by mouth 2 (two) times daily., Disp: , Rfl:     cetirizine (ZYRTEC) 10 MG tablet, Take 1 tablet (10 mg total) by mouth once daily., Disp: 30 tablet, Rfl: 11    conjugated estrogens (PREMARIN) vaginal cream, Place 0.5 g vaginally twice a week., Disp: 30 g, Rfl: 1    CYANOCOBALAMIN, VITAMIN B-12, (VITAMIN B-12 ORAL), Take 1 tablet by mouth once daily. As directed, Disp: , Rfl:     cyclobenzaprine (FLEXERIL) 10 MG tablet, Take 1 tablet (10 mg total) by mouth daily as needed., Disp: 90 tablet, Rfl: 3    denosumab (PROLIA) 60 mg/mL Syrg, Inject 60 mg into the skin every 6 (six) months., Disp: , Rfl:     DULoxetine (CYMBALTA) 60 MG capsule, TAKE 1 CAPSULE(60 MG) BY MOUTH EVERY DAY, Disp: 90 capsule, Rfl: 3    ferrous gluconate (FERGON) 324 MG tablet, Take 324 mg by mouth daily with breakfast., Disp: , Rfl:     fluticasone propionate (FLONASE) 50 mcg/actuation nasal spray, 1 spray (50 mcg total) by Each Nostril route once daily., Disp: 16 g, Rfl: 0    gabapentin (NEURONTIN) 300 MG capsule, Take 1 capsule (300 mg total) by mouth 3 (three) times daily., Disp: 90 capsule, Rfl: 11    glucosamine sulfate 500 mg Tab, Take 1 tablet by mouth once daily., Disp: , Rfl:     LACTOBACILLUS ACIDOPHILUS (PROBIOTIC ORAL), Take 1 tablet by mouth once daily. , Disp: , Rfl:     losartan (COZAAR) 50 MG tablet, Take 1 tablet (50 mg total) by mouth once daily., Disp: 30 tablet, Rfl: 11    melatonin 5 mg Cap, Take 1 capsule by mouth nightly as needed., Disp: , Rfl:     methen-sod phos-meth blue-hyos (UROGESIC-BLUE) 81.6-40.8-0.12 mg Tab, Take 1 tablet by mouth 2 (two) times daily., Disp: 180 tablet, Rfl: 3    methen-sod phos-meth  blue-hyos (UROGESIC-BLUE/URYL) 81.6-40.8-0.12 mg Tab, Take 1 tablet by mouth once daily., Disp: 30 tablet, Rfl: 3    metoprolol tartrate (LOPRESSOR) 50 MG tablet, Take 1 tablet (50 mg total) by mouth 2 (two) times daily., Disp: 180 tablet, Rfl: 3    MULTIVITAMIN ORAL, Take 1 tablet by mouth once daily. , Disp: , Rfl:     nystatin (NYSTOP) powder, Apply topically 3 (three) times daily as needed., Disp: 60 g, Rfl: 11    tolterodine (DETROL LA) 4 MG 24 hr capsule, Take 1 capsule (4 mg total) by mouth once daily., Disp: 90 capsule, Rfl: 3    vit C,T-Sv-nbbor-lutein-zeaxan (PRESERVISION AREDS-2) 901-657-79-1 mg-unit-mg-mg Cap, Take 1 capsule by mouth 2 (two) times a day., Disp: , Rfl:     vitamin D 1000 units Tab, Take 185 mg by mouth once daily., Disp: , Rfl:     PHYSICAL EXAMINATION:    Physical Exam  Vitals and nursing note reviewed.   Constitutional:       Appearance: Normal appearance. She is well-developed.   HENT:      Head: Normocephalic and atraumatic.   Eyes:      Pupils: Pupils are equal, round, and reactive to light.   Pulmonary:      Effort: Pulmonary effort is normal.   Musculoskeletal:         General: Normal range of motion.      Cervical back: Normal range of motion.   Skin:     General: Skin is warm and dry.   Neurological:      Mental Status: She is alert and oriented to person, place, and time.   Psychiatric:         Mood and Affect: Mood and affect normal.         Behavior: Behavior normal.         LABS:    U/a dipstick performed in office today: not indicative of infection     IMPRESSION:    Kidney ultrasound 12/14/2021:  Impression:     1. Stable appearance of a presumed angiomyolipoma within the interpolar region of the left kidney.    No diagnosis found.    PLAN:    Problem List Items Addressed This Visit    None         1. Urinary frequency   Continue tolterodine, urogesic blue(generic)  2. Recurrent uti   UTI precautions:  Wipe front to back and avoid constipation.  Avoid caffeine.  Drink  1 liter of water daily  Void every 3-4 hrs  Expel urine from vagina post void  Void soon after urge arises  No dryer sheets or harsh detergents with the undergarments  No bubble baths  Void before and after intercourse  Avoid hot tub use  Avoid tight fitting clothes and panty hose  D-Mannose 2 grams- helps to block bacteria from attaching to the bacteria wall  Cranberry supplement w/ 36 mg of PAC-helps to block bacteria from attaching to the bacteria wall  Probiotic- [employer] Ultra 25 Billion CFU Probiotic Complex, Multi Strain.  The Multi Strain is specifically the one that is important as the greater variety of strains is better.    3. Vaginal atrophy/dryness   Estrace apply a pea sized amount to urethra 3 x weekly at night  4. Renal mass/AML   Repeat renal ultrasound  5. RTC based on image results otherwise 1 yr      Rani Meléndez NP

## 2022-02-11 ENCOUNTER — PATIENT MESSAGE (OUTPATIENT)
Dept: GASTROENTEROLOGY | Facility: CLINIC | Age: 87
End: 2022-02-11
Payer: COMMERCIAL

## 2022-02-11 ENCOUNTER — OFFICE VISIT (OUTPATIENT)
Dept: ENDOCRINOLOGY | Facility: CLINIC | Age: 87
End: 2022-02-11
Payer: MEDICARE

## 2022-02-11 DIAGNOSIS — E21.3 HYPERPARATHYROIDISM: ICD-10-CM

## 2022-02-11 DIAGNOSIS — M85.80 OSTEOPENIA, UNSPECIFIED LOCATION: Primary | ICD-10-CM

## 2022-02-11 DIAGNOSIS — I10 BENIGN ESSENTIAL HTN: ICD-10-CM

## 2022-02-11 PROCEDURE — 99214 PR OFFICE/OUTPT VISIT, EST, LEVL IV, 30-39 MIN: ICD-10-PCS | Mod: 95,,, | Performed by: INTERNAL MEDICINE

## 2022-02-11 PROCEDURE — 99214 OFFICE O/P EST MOD 30 MIN: CPT | Mod: 95,,, | Performed by: INTERNAL MEDICINE

## 2022-02-11 NOTE — TELEPHONE ENCOUNTER
Attempted to reach the patient, left message, clinic number provided.  My chart message sent to patient as it shows patient is active on patient portal, see below.    Per Amairani-Let patient know I was able to review her last colonoscopy done at [facility] (11/16/16). She had a small polyp removed and it was tubular adenoma (precancerous type of polyp). I do recommend a colonoscopy for hx of colon polyps; please schedule if patient agreeable. Schedule her with Dr. Mobley or Dr. Aguirre since she has seen both of them in the past.

## 2022-02-11 NOTE — PROGRESS NOTES
The patient location is: [place]    Visit type: audiovisual    Face to Face time with patient: 18  21 minutes of total time spent on the encounter, which includes face to face time and non-face to face time preparing to see the patient (eg, review of tests), Obtaining and/or reviewing separately obtained history, Documenting clinical information in the electronic or other health record, Independently interpreting results (not separately reported) and communicating results to the patient/family/caregiver, or Care coordination (not separately reported).         Each patient to whom he or she provides medical services by telemedicine is:  (1) informed of the relationship between the physician and patient and the respective role of any other health care provider with respect to management of the patient; and (2) notified that he or she may decline to receive medical services by telemedicine and may withdraw from such care at any time.      CHIEF COMPLAINT: Osteopenia/Primary Hyperpara  86 y.o.  being seen as a f/u. Daughters with her. Had been seeing DR. Diaz and last saw her in 2016 and then saw DR. Perez. Diagnosed in 2015. Had been on Fosamax. Then switched to Prolia. Tolerating Prolia. Scheduled for Prolia Mon. No falls. No fractures. She exercising at home. Completed PT, but doing recommended exercises at home . Taking Ca + D.       PAST MEDICAL HISTORY/PAST SURGICAL HISTORY:  Reviewed in [facility]    SOCIAL HISTORY: No T/A    FAMILY HISTORY:  + Osteoporosis. No hip fracture    MEDICATIONS/ALLERGIES: The patient's MedCard has been updated and reviewed.      ROS:   Constitutional: weight stable.   Cardiovascular: Denies current anginal symptoms  Respiratory: Denies current respiratory difficulty  Remainder ROS negative        PE:  Virtual Visit      Results for MELISSA OSUNA (MRN [id number]) as of 2/11/2022 10:37   Ref. Range 2/2/2022 10:24   Sodium Latest Ref Range: 136 - 145 mmol/L 137   Potassium Latest Ref Range: 3.5 - 5.1  mmol/L 4.7   Chloride Latest Ref Range: 95 - 110 mmol/L 99   CO2 Latest Ref Range: 23 - 29 mmol/L 32 (H)   Anion Gap Latest Ref Range: 8 - 16 mmol/L 6 (L)   BUN Latest Ref Range: 8 - 23 mg/dL 21   Creatinine Latest Ref Range: 0.5 - 1.4 mg/dL 0.8   eGFR if non African American Latest Ref Range: >60 mL/min/1.73 m^2 >60.0   eGFR if African American Latest Ref Range: >60 mL/min/1.73 m^2 >60.0   Glucose Latest Ref Range: 70 - 110 mg/dL 87   Calcium Latest Ref Range: 8.7 - 10.5 mg/dL 10.8 (H)   Phosphorus Latest Ref Range: 2.7 - 4.5 mg/dL 4.4   Alkaline Phosphatase Latest Ref Range: 55 - 135 U/L 37 (L)   PROTEIN TOTAL Latest Ref Range: 6.0 - 8.4 g/dL 7.0   Albumin Latest Ref Range: 3.5 - 5.2 g/dL 3.4 (L)   BILIRUBIN TOTAL Latest Ref Range: 0.1 - 1.0 mg/dL 0.4   AST Latest Ref Range: 10 - 40 U/L 22   ALT Latest Ref Range: 10 - 44 U/L 13   PTH Latest Ref Range: 9.0 - 77.0 pg/mL 77.7 (H)     DEXA BONE DENSITY SPINE HIP     CLINICAL HISTORY:  Other specified disorders of bone density and structure, unspecified site     TECHNIQUE:  DXA scanning was performed over the left hip and lumbar spine.  Review of the images confirms satisfactory positioning and technique.     COMPARISON:  None     FINDINGS:  The L1 to L4 vertebral bone mineral density is equal to 1.422 g/cm squared with a T score of 2.0.  There has been a positive 7.0% statistically significant change relative to the prior study.     The left femoral neck bone mineral density is equal to 0.950 g/cm squared with a T score of -0.6.  There has been  a positive 9.2% statistically significant change relative to the prior study.     Impression:     No evidence of significant bone density loss      ASSESSMENT/PLAN:  1. Osteopenia- No Fracture history. Had been on fosamax and now on Prolia. Tolerating well. DEXA shows fem neck now out of osteopenic area. At this point would continue current therapy due to # 2. Therapy plan placed    2. Primary hyperparathyroidism- based on  history of hypercalcemia and high urine Ca. At this point Ca mildly elevated. On Prolia for osteopenia. If Ca increases could consider sensipar    3. GERD- Unable to take PO bisphosphonates. Controlled with diet    4. HTN-  In digital HTN program. Reviewed BP in episodes of care. Avoid use of thiazide diuretics. Due to hypercalcemia.     FOLLOWUP  F/U 6 months prior to next prolia renal panel PTH- [facility]

## 2022-02-12 ENCOUNTER — PATIENT MESSAGE (OUTPATIENT)
Dept: UROLOGY | Facility: CLINIC | Age: 87
End: 2022-02-12
Payer: COMMERCIAL

## 2022-02-14 ENCOUNTER — INFUSION (OUTPATIENT)
Dept: INFUSION THERAPY | Facility: HOSPITAL | Age: 87
End: 2022-02-14
Attending: INTERNAL MEDICINE
Payer: COMMERCIAL

## 2022-02-14 ENCOUNTER — PATIENT MESSAGE (OUTPATIENT)
Dept: GASTROENTEROLOGY | Facility: CLINIC | Age: 87
End: 2022-02-14
Payer: COMMERCIAL

## 2022-02-14 VITALS
RESPIRATION RATE: 18 BRPM | WEIGHT: 198.63 LBS | DIASTOLIC BLOOD PRESSURE: 74 MMHG | BODY MASS INDEX: 33.91 KG/M2 | SYSTOLIC BLOOD PRESSURE: 140 MMHG | HEIGHT: 64 IN | HEART RATE: 54 BPM | TEMPERATURE: 98 F

## 2022-02-14 DIAGNOSIS — M85.80 OSTEOPENIA, UNSPECIFIED LOCATION: Primary | ICD-10-CM

## 2022-02-14 PROCEDURE — 96372 THER/PROPH/DIAG INJ SC/IM: CPT | Mod: PN

## 2022-02-14 PROCEDURE — 63600175 PHARM REV CODE 636 W HCPCS: Mod: JG,PN | Performed by: INTERNAL MEDICINE

## 2022-02-14 RX ADMIN — DENOSUMAB 60 MG: 60 INJECTION SUBCUTANEOUS at 01:02

## 2022-02-14 NOTE — PLAN OF CARE
Problem: Adult Inpatient Plan of Care  Goal: Plan of Care Review  Outcome: Ongoing, Progressing  Flowsheets (Taken 2/14/2022 1315)  Plan of Care Reviewed With: patient     Pt tolerated prolia injection well. VSS. Pt denied the need for a printed calendar.

## 2022-02-15 ENCOUNTER — PATIENT MESSAGE (OUTPATIENT)
Dept: GASTROENTEROLOGY | Facility: CLINIC | Age: 87
End: 2022-02-15
Payer: COMMERCIAL

## 2022-02-16 ENCOUNTER — HOSPITAL ENCOUNTER (OUTPATIENT)
Dept: RADIOLOGY | Facility: HOSPITAL | Age: 87
Discharge: HOME | End: 2022-02-16
Attending: NURSE PRACTITIONER
Payer: COMMERCIAL

## 2022-02-16 ENCOUNTER — TELEPHONE (OUTPATIENT)
Dept: GASTROENTEROLOGY | Facility: CLINIC | Age: 87
End: 2022-02-16
Payer: COMMERCIAL

## 2022-02-16 DIAGNOSIS — Z01.818 PRE-OP TESTING: ICD-10-CM

## 2022-02-16 DIAGNOSIS — N28.89 RENAL MASS: ICD-10-CM

## 2022-02-16 PROCEDURE — 76770 US RETROPERITONEAL COMPLETE: ICD-10-PCS | Mod: 26,,, | Performed by: RADIOLOGY

## 2022-02-16 PROCEDURE — 76770 US EXAM ABDO BACK WALL COMP: CPT | Mod: TC,PO

## 2022-02-16 PROCEDURE — 76770 US EXAM ABDO BACK WALL COMP: CPT | Mod: 26,,, | Performed by: RADIOLOGY

## 2022-02-16 NOTE — TELEPHONE ENCOUNTER
----- Message from Kecia Padilla LPN sent at 2/16/2022 10:23 AM CST -----  Regarding: FW: returning call  Contact: pt    ----- Message -----  From: Lyric Perla, Patient Care Assistant  Sent: 2/16/2022  10:10 AM CST  To: [facility] Staff  Subject: returning call                                   Type:  Patient Returning Call    Who Called:  pt   Who Left Message for Patient:  not sure   Does the patient know what this is regarding?:  yes   Best Call Back Number:  [phone] (home)     Additional Information:  please call pt to advise. Thanks!

## 2022-02-16 NOTE — TELEPHONE ENCOUNTER
Returned call to patients daughter Nafisa. Went over prep instructions with Nafisa and pre procedure covid date. Nafisa verbalized understanding. [place] message with prep instructions also sent

## 2022-02-21 DIAGNOSIS — R39.15 URINARY URGENCY: ICD-10-CM

## 2022-02-21 DIAGNOSIS — R30.0 DYSURIA: Primary | ICD-10-CM

## 2022-02-21 RX ORDER — URINARY ANTISEPTIC ANTISPASMODIC 81.6; 40.8; 10.8; .12 MG/1; MG/1; MG/1; MG/1
1 TABLET ORAL 2 TIMES DAILY
Qty: 180 TABLET | Refills: 3 | Status: SHIPPED | OUTPATIENT
Start: 2022-02-21 | End: 2022-04-04

## 2022-04-04 ENCOUNTER — OFFICE VISIT (OUTPATIENT)
Dept: FAMILY MEDICINE | Facility: CLINIC | Age: 87
End: 2022-04-04
Payer: COMMERCIAL

## 2022-04-04 VITALS
HEART RATE: 49 BPM | TEMPERATURE: 98 F | BODY MASS INDEX: 33.46 KG/M2 | DIASTOLIC BLOOD PRESSURE: 70 MMHG | HEIGHT: 64 IN | SYSTOLIC BLOOD PRESSURE: 139 MMHG | WEIGHT: 196 LBS

## 2022-04-04 DIAGNOSIS — N95.2 VAGINAL ATROPHY: ICD-10-CM

## 2022-04-04 DIAGNOSIS — R30.0 DYSURIA: ICD-10-CM

## 2022-04-04 DIAGNOSIS — M51.36 DDD (DEGENERATIVE DISC DISEASE), LUMBAR: Primary | ICD-10-CM

## 2022-04-04 DIAGNOSIS — I10 ESSENTIAL HYPERTENSION: ICD-10-CM

## 2022-04-04 DIAGNOSIS — G62.9 NEUROPATHY: ICD-10-CM

## 2022-04-04 DIAGNOSIS — E78.5 HYPERLIPIDEMIA, UNSPECIFIED HYPERLIPIDEMIA TYPE: ICD-10-CM

## 2022-04-04 DIAGNOSIS — R39.15 URINARY URGENCY: ICD-10-CM

## 2022-04-04 PROCEDURE — 1126F PR PAIN SEVERITY QUANTIFIED, NO PAIN PRESENT: ICD-10-PCS | Mod: CPTII,S$GLB,, | Performed by: FAMILY MEDICINE

## 2022-04-04 PROCEDURE — 99999 PR PBB SHADOW E&M-EST. PATIENT-LVL IV: CPT | Mod: PBBFAC,,, | Performed by: FAMILY MEDICINE

## 2022-04-04 PROCEDURE — 1159F PR MEDICATION LIST DOCUMENTED IN MEDICAL RECORD: ICD-10-PCS | Mod: CPTII,S$GLB,, | Performed by: FAMILY MEDICINE

## 2022-04-04 PROCEDURE — 3078F DIAST BP <80 MM HG: CPT | Mod: CPTII,S$GLB,, | Performed by: FAMILY MEDICINE

## 2022-04-04 PROCEDURE — 3288F FALL RISK ASSESSMENT DOCD: CPT | Mod: CPTII,S$GLB,, | Performed by: FAMILY MEDICINE

## 2022-04-04 PROCEDURE — 1159F MED LIST DOCD IN RCRD: CPT | Mod: CPTII,S$GLB,, | Performed by: FAMILY MEDICINE

## 2022-04-04 PROCEDURE — 1101F PT FALLS ASSESS-DOCD LE1/YR: CPT | Mod: CPTII,S$GLB,, | Performed by: FAMILY MEDICINE

## 2022-04-04 PROCEDURE — 3078F PR MOST RECENT DIASTOLIC BLOOD PRESSURE < 80 MM HG: ICD-10-PCS | Mod: CPTII,S$GLB,, | Performed by: FAMILY MEDICINE

## 2022-04-04 PROCEDURE — 3075F SYST BP GE 130 - 139MM HG: CPT | Mod: CPTII,S$GLB,, | Performed by: FAMILY MEDICINE

## 2022-04-04 PROCEDURE — 1101F PR PT FALLS ASSESS DOC 0-1 FALLS W/OUT INJ PAST YR: ICD-10-PCS | Mod: CPTII,S$GLB,, | Performed by: FAMILY MEDICINE

## 2022-04-04 PROCEDURE — 99214 PR OFFICE/OUTPT VISIT, EST, LEVL IV, 30-39 MIN: ICD-10-PCS | Mod: S$GLB,,, | Performed by: FAMILY MEDICINE

## 2022-04-04 PROCEDURE — 3288F PR FALLS RISK ASSESSMENT DOCUMENTED: ICD-10-PCS | Mod: CPTII,S$GLB,, | Performed by: FAMILY MEDICINE

## 2022-04-04 PROCEDURE — 3075F PR MOST RECENT SYSTOLIC BLOOD PRESS GE 130-139MM HG: ICD-10-PCS | Mod: CPTII,S$GLB,, | Performed by: FAMILY MEDICINE

## 2022-04-04 PROCEDURE — 99214 OFFICE O/P EST MOD 30 MIN: CPT | Mod: S$GLB,,, | Performed by: FAMILY MEDICINE

## 2022-04-04 PROCEDURE — 99999 PR PBB SHADOW E&M-EST. PATIENT-LVL IV: ICD-10-PCS | Mod: PBBFAC,,, | Performed by: FAMILY MEDICINE

## 2022-04-04 PROCEDURE — 1126F AMNT PAIN NOTED NONE PRSNT: CPT | Mod: CPTII,S$GLB,, | Performed by: FAMILY MEDICINE

## 2022-04-04 RX ORDER — GABAPENTIN 300 MG/1
300 CAPSULE ORAL 3 TIMES DAILY
Qty: 270 CAPSULE | Refills: 3 | Status: SHIPPED | OUTPATIENT
Start: 2022-04-04 | End: 2022-04-25

## 2022-04-04 RX ORDER — DULOXETIN HYDROCHLORIDE 60 MG/1
CAPSULE, DELAYED RELEASE ORAL
Qty: 90 CAPSULE | Refills: 3 | Status: SHIPPED | OUTPATIENT
Start: 2022-04-04 | End: 2023-03-15 | Stop reason: SDUPTHER

## 2022-04-04 RX ORDER — TOLTERODINE 4 MG/1
4 CAPSULE, EXTENDED RELEASE ORAL DAILY
Qty: 90 CAPSULE | Refills: 3 | Status: SHIPPED | OUTPATIENT
Start: 2022-04-04 | End: 2023-01-27

## 2022-04-04 RX ORDER — URINARY ANTISEPTIC ANTISPASMODIC 81.6; 40.8; 10.8; .12 MG/1; MG/1; MG/1; MG/1
1 TABLET ORAL 2 TIMES DAILY
Qty: 180 TABLET | Refills: 3 | Status: SHIPPED | OUTPATIENT
Start: 2022-04-04 | End: 2022-05-30

## 2022-04-04 RX ORDER — CYCLOBENZAPRINE HCL 10 MG
10 TABLET ORAL DAILY PRN
Qty: 90 TABLET | Refills: 3 | Status: SHIPPED | OUTPATIENT
Start: 2022-04-04 | End: 2023-03-15

## 2022-04-04 RX ORDER — LOSARTAN POTASSIUM 50 MG/1
50 TABLET ORAL DAILY
Qty: 90 TABLET | Refills: 3 | Status: SHIPPED | OUTPATIENT
Start: 2022-04-04 | End: 2023-02-14

## 2022-04-04 RX ORDER — METOPROLOL TARTRATE 50 MG/1
50 TABLET ORAL 2 TIMES DAILY
Qty: 180 TABLET | Refills: 3 | Status: SHIPPED | OUTPATIENT
Start: 2022-04-04 | End: 2023-01-17

## 2022-04-04 NOTE — PROGRESS NOTES
Subjective:      Patient ID: Marysol Lyles is a 86 y.o. female.    Chief Complaint: Annual Exam  she has been seen by Dr. Ferguson for tight hip pain. It is increased by walking. She was seen by Dr. Ferguson and he wanted her to have PT and she was afraid due to the pandemic.  She is willing to go to PT now.   Problem List Items Addressed This Visit     DDD (degenerative disc disease), lumbar - Primary    Overview     She has lumbar DDD and she has to use flexeril at night due to the spasm.  It helps her sleep.            Relevant Medications    cyclobenzaprine (FLEXERIL) 10 MG tablet    Other Relevant Orders    Ambulatory referral/consult to Physical/Occupational Therapy    Hyperlipidemia    Overview     She has hyperlipidemia and this is tracked.    Lab Results   Component Value Date    CHOL 220 (H) 03/09/2021    CHOL 229 (H) 02/19/2020    CHOL 205 (H) 01/17/2019     Lab Results   Component Value Date    HDL 72 (H) 03/09/2021    HDL 77 (H) 02/19/2020    HDL 67 01/17/2019     Lab Results   Component Value Date    LDLCALC 123 03/09/2021    LDLCALC 138.8 02/19/2020    LDLCALC 115.6 01/17/2019     Lab Results   Component Value Date    TRIG 130 03/09/2021    TRIG 66 02/19/2020    TRIG 112 01/17/2019     Lab Results   Component Value Date    CHOLHDL 33.6 02/19/2020    CHOLHDL 32.7 01/17/2019    CHOLHDL 32.3 11/09/2017                  Neuropathy    Overview     She has neuropathy and has been on cymbalta for this along with gabapentin and they help with this.             Relevant Medications    gabapentin (NEURONTIN) 300 MG capsule    DULoxetine (CYMBALTA) 60 MG capsule      Other Visit Diagnoses     Essential hypertension        Relevant Medications    metoprolol tartrate (LOPRESSOR) 50 MG tablet    losartan (COZAAR) 50 MG tablet    Urinary urgency        Relevant Medications    tolterodine (DETROL LA) 4 MG 24 hr capsule    methen-sod phos-meth blue-hyos (UROGESIC-BLUE) 81.6-40.8-0.12 mg Tab    Vaginal atrophy        Relevant  Medications    conjugated estrogens (PREMARIN) vaginal cream    Dysuria        Relevant Medications    methen-sod phos-meth blue-hyos (UROGESIC-BLUE) 81.6-40.8-0.12 mg Tab          The patient's Health Maintenance was reviewed and the following appears to be due:   Health Maintenance Due   Topic Date Due    Colonoscopy  11/16/2021       Past Medical History:  Past Medical History:   Diagnosis Date    Arthritis     Chronic pain 8/14/2013    Colon polyp     DDD (degenerative disc disease)     DDD (degenerative disc disease)     GERD (gastroesophageal reflux disease)     Hyperlipidemia     Hypertension     Osteopenia 3/2/2016    Primary hyperparathyroidism     Urinary incontinence 4/19/2021    She has urinary incontinence and she has used urogesic and detrol and premarin cream to help with this and it has. She will continue them.     Past Surgical History:   Procedure Laterality Date    APPENDECTOMY      CATARACT EXTRACTION W/  INTRAOCULAR LENS IMPLANT Left 05/16/2018    Dr Haywood    CATARACT EXTRACTION W/  INTRAOCULAR LENS IMPLANT Right 12/02/2020    Dr. Tirado    COLONOSCOPY  11/16/2016    Dr. Vickers; polyp removed and small ileocecal valve ulceration; Bx: tubular adenoma; benign ileal mucosa    COLONOSCOPY W/ BIOPSIES  2/2011    repeat in 3 years    INTRAOCULAR PROSTHESES INSERTION Right 12/2/2020    Procedure: INSERTION, IOL PROSTHESIS;  Surgeon: Nava Tirado MD;  Location: [facility] OR [address] [place] [address];  Service: Ophthalmology;  Laterality: Right;    LUMBAR EPIDURAL INJECTION N/A 12/2019    Dr. Tejada     PARTIAL KNEE ARTHROPLASTY Right     Right    PHACOEMULSIFICATION OF CATARACT Right 12/2/2020    Procedure: PHACOEMULSIFICATION, CATARACT;  Surgeon: Naav Tirado MD;  Location: [facility] OR [address] [place] [address];  Service: Ophthalmology;  Laterality: Right;    SLEEVE GASTROPLASTY  2/8/12    TONSILLECTOMY      TOTAL HIP ARTHROPLASTY Right     TOTAL KNEE ARTHROPLASTY Left     UPPER  GASTROINTESTINAL ENDOSCOPY       Review of patient's allergies indicates:   Allergen Reactions    Penicillins Swelling    Formaldehyde Hives    Mercury Hives    Adhesive tape-silicones Rash    Dilaudid [hydromorphone (bulk)] Itching     Current Outpatient Medications on File Prior to Visit   Medication Sig Dispense Refill    ACETAMINOPHEN (TYLENOL EXTRA STRENGTH ORAL) Take 500 mg by mouth every 4 to 6 hours as needed.       calcium citrate-vitamin D3 315-200 mg (CITRACAL+D) 315-200 mg-unit per tablet Take 1 tablet by mouth 2 (two) times daily.      cetirizine (ZYRTEC) 10 MG tablet Take 1 tablet (10 mg total) by mouth once daily. 30 tablet 11    CYANOCOBALAMIN, VITAMIN B-12, (VITAMIN B-12 ORAL) Take 1 tablet by mouth once daily. As directed      denosumab (PROLIA) 60 mg/mL Syrg Inject 60 mg into the skin every 6 (six) months.      ferrous gluconate (FERGON) 324 MG tablet Take 324 mg by mouth daily with breakfast.      fluticasone propionate (FLONASE) 50 mcg/actuation nasal spray 1 spray (50 mcg total) by Each Nostril route once daily. 16 g 0    glucosamine sulfate 500 mg Tab Take 1 tablet by mouth once daily.      LACTOBACILLUS ACIDOPHILUS (PROBIOTIC ORAL) Take 1 tablet by mouth once daily.       melatonin 5 mg Cap Take 1 capsule by mouth nightly as needed.      MULTIVITAMIN ORAL Take 1 tablet by mouth once daily.       nystatin (NYSTOP) powder Apply topically 3 (three) times daily as needed. 60 g 11    vit C,Q-Tw-gtwhl-lutein-zeaxan (PRESERVISION AREDS-2) 337-774-86-1 mg-unit-mg-mg Cap Take 1 capsule by mouth 2 (two) times a day.      vitamin D 1000 units Tab Take 185 mg by mouth once daily.      [DISCONTINUED] cyclobenzaprine (FLEXERIL) 10 MG tablet Take 1 tablet (10 mg total) by mouth daily as needed. 90 tablet 3    [DISCONTINUED] DULoxetine (CYMBALTA) 60 MG capsule TAKE 1 CAPSULE(60 MG) BY MOUTH EVERY DAY 90 capsule 3    [DISCONTINUED] gabapentin (NEURONTIN) 300 MG capsule Take 1 capsule  (300 mg total) by mouth 3 (three) times daily. 90 capsule 11    [DISCONTINUED] losartan (COZAAR) 50 MG tablet Take 1 tablet (50 mg total) by mouth once daily. 30 tablet 11    [DISCONTINUED] methen-sod phos-meth blue-hyos (UROGESIC-BLUE) 81.6-40.8-0.12 mg Tab Take 1 tablet by mouth 2 (two) times daily. 180 tablet 3    [DISCONTINUED] methen-sod phos-meth blue-hyos (UROGESIC-BLUE/URYL) 81.6-40.8-0.12 mg Tab Take 1 tablet by mouth once daily. 30 tablet 3    [DISCONTINUED] metoprolol tartrate (LOPRESSOR) 50 MG tablet Take 1 tablet (50 mg total) by mouth 2 (two) times daily. 180 tablet 3    [DISCONTINUED] tolterodine (DETROL LA) 4 MG 24 hr capsule Take 1 capsule (4 mg total) by mouth once daily. 90 capsule 3    blood pressure test kit-large Kit Use once daily as directed. 1 each 0    [DISCONTINUED] conjugated estrogens (PREMARIN) vaginal cream Place 0.5 g vaginally twice a week. 30 g 1     No current facility-administered medications on file prior to visit.     Social History     Socioeconomic History    Marital status:    Tobacco Use    Smoking status: Former Smoker     Packs/day: 0.00     Years: 20.00     Pack years: 0.00     Start date: 1954     Quit date: 1971     Years since quittin.2    Smokeless tobacco: Never Used    Tobacco comment: less than 2-3 cigarettes per day   Substance and Sexual Activity    Alcohol use: Yes     Comment: occasionally    Drug use: No    Sexual activity: Not Currently     Partners: Male     Birth control/protection: None     Comment: 85 years old   Social History Narrative    Wears a seatbelt.     Family History   Problem Relation Age of Onset    Arthritis Mother     Cancer Mother              Breast cancer Mother     Arthritis Father     Heart disease Father     Hyperlipidemia Father     Heart attack Father     Hypertension Father              Cancer Sister     Depression Sister     Cataracts Sister     Cancer Brother      Depression Brother     Diabetes Brother     Heart disease Brother     Hyperlipidemia Brother     Blindness Brother         dm    Retinal detachment Brother     Alcohol abuse Son     Birth defects Son     Diabetes Son     Mental retardation Son     Arthritis Son     Hyperlipidemia Son     Hypertension Son     Learning disabilities Son     Mental illness Son     Cancer Maternal Aunt     Alcohol abuse Maternal Uncle     Cancer Maternal Uncle     Depression Maternal Uncle     Cancer Paternal Aunt     Cancer Paternal Uncle     Arthritis Maternal Grandmother     Hypertension Maternal Grandmother     Alcohol abuse Maternal Grandfather     Arthritis Maternal Grandfather     Depression Maternal Grandfather     Hypertension Maternal Grandfather     Arthritis Paternal Grandmother     Arthritis Paternal Grandfather     HIV Son     Glaucoma Son     Alcohol abuse Son     Arthritis Son     Breast cancer Paternal Cousin     Arthritis Daughter     Hearing loss Daughter     Cancer Maternal Aunt              Cancer Maternal Uncle         4 , 1 living    Cancer Paternal Aunt              Cancer Paternal Uncle              Cancer Sister          , living     Cancer Brother          , living     COPD Son     Diabetes Brother              Hearing loss Daughter     Amblyopia Neg Hx     Macular degeneration Neg Hx     Strabismus Neg Hx     Stroke Neg Hx     Thyroid disease Neg Hx     Colon cancer Neg Hx        Review of Systems   Constitutional: Negative for activity change and unexpected weight change.   HENT: Positive for rhinorrhea. Negative for hearing loss and trouble swallowing.    Eyes: Negative for discharge and visual disturbance.   Respiratory: Negative for chest tightness and wheezing.    Cardiovascular: Negative for chest pain and palpitations.   Gastrointestinal: Negative for blood in  "stool, constipation, diarrhea and vomiting.   Endocrine: Negative for polydipsia and polyuria.   Genitourinary: Negative for difficulty urinating, dysuria, hematuria and menstrual problem.   Musculoskeletal: Positive for arthralgias, joint swelling and neck pain.   Neurological: Negative for weakness and headaches.   Psychiatric/Behavioral: Negative for confusion and dysphoric mood.       Objective:   /70   Pulse (!) 49   Temp 97.9 °F (36.6 °C)   Ht 5' 4" (1.626 m)   Wt 88.9 kg (196 lb)   LMP 05/06/1994   BMI 33.64 kg/m²     Physical Exam  Constitutional:       Appearance: Normal appearance. She is well-developed.   HENT:      Head: Normocephalic and atraumatic.      Right Ear: Tympanic membrane, ear canal and external ear normal.      Left Ear: Tympanic membrane, ear canal and external ear normal.      Nose: Nose normal.      Mouth/Throat:      Mouth: No oral lesions.      Pharynx: Uvula midline. No oropharyngeal exudate or posterior oropharyngeal erythema.   Eyes:      General: Lids are normal. No scleral icterus.        Right eye: No discharge.         Left eye: No discharge.      Extraocular Movements:      Right eye: No nystagmus.      Left eye: No nystagmus.      Conjunctiva/sclera:      Right eye: Right conjunctiva is not injected. No hemorrhage.     Left eye: Left conjunctiva is not injected. No hemorrhage.     Pupils: Pupils are equal, round, and reactive to light.   Neck:      Thyroid: No thyroid mass or thyromegaly.      Vascular: No carotid bruit or JVD.      Trachea: No tracheal tenderness or tracheal deviation.   Cardiovascular:      Rate and Rhythm: Normal rate and regular rhythm.      Pulses:           Carotid pulses are 2+ on the right side and 2+ on the left side.       Radial pulses are 2+ on the right side and 2+ on the left side.        Posterior tibial pulses are 2+ on the right side and 2+ on the left side.      Heart sounds: S1 normal and S2 normal. No murmur heard.  Pulmonary:    "   Effort: Pulmonary effort is normal. No respiratory distress.      Breath sounds: Normal breath sounds. No wheezing, rhonchi or rales.   Abdominal:      General: Bowel sounds are normal. There is no distension or abdominal bruit.      Palpations: Abdomen is soft. There is no mass or pulsatile mass.      Tenderness: There is no abdominal tenderness. There is no rebound.   Musculoskeletal:      Cervical back: Full passive range of motion without pain, normal range of motion and neck supple.      Right knee: No swelling. No tenderness.      Left knee: No swelling. No tenderness.   Lymphadenopathy:      Head:      Right side of head: No submental or submandibular adenopathy.      Left side of head: No submental or submandibular adenopathy.      Cervical: No cervical adenopathy.      Comments: Varicosities are noted in the legs.   Skin:     General: Skin is warm and dry.      Findings: No rash.      Nails: There is no clubbing.   Neurological:      Mental Status: She is alert.      Cranial Nerves: No cranial nerve deficit.      Sensory: No sensory deficit.   Psychiatric:         Speech: Speech normal.         Behavior: Behavior normal. Behavior is cooperative.         Thought Content: Thought content normal.       Assessment:     1. DDD (degenerative disc disease), lumbar    2. Essential hypertension    3. Neuropathy    4. Hyperlipidemia, unspecified hyperlipidemia type    5. Urinary urgency    6. Vaginal atrophy    7. Dysuria      Plan:   I am having Marysol Lyles maintain her MULTIVITAMIN ORAL, ACETAMINOPHEN (TYLENOL EXTRA STRENGTH ORAL), (CYANOCOBALAMIN, VITAMIN B-12, (VITAMIN B-12 ORAL)), vitamin D, LACTOBACILLUS ACIDOPHILUS (PROBIOTIC ORAL), calcium citrate-vitamin D3 315-200 mg, ferrous gluconate, glucosamine sulfate, nystatin, blood pressure test kit-large, fluticasone propionate, cetirizine, PROLIA, melatonin, PRESERVISION AREDS-2, metoprolol tartrate, gabapentin, DULoxetine, losartan, tolterodine, conjugated  estrogens, methen-sod phos-meth blue-hyos, and cyclobenzaprine.  Problem List Items Addressed This Visit     DDD (degenerative disc disease), lumbar - Primary    Relevant Medications    cyclobenzaprine (FLEXERIL) 10 MG tablet    Other Relevant Orders    Ambulatory referral/consult to Physical/Occupational Therapy    Hyperlipidemia    Neuropathy    Relevant Medications    gabapentin (NEURONTIN) 300 MG capsule    DULoxetine (CYMBALTA) 60 MG capsule      Other Visit Diagnoses     Essential hypertension        Relevant Medications    metoprolol tartrate (LOPRESSOR) 50 MG tablet    losartan (COZAAR) 50 MG tablet    Urinary urgency        Relevant Medications    tolterodine (DETROL LA) 4 MG 24 hr capsule    methen-sod phos-meth blue-hyos (UROGESIC-BLUE) 81.6-40.8-0.12 mg Tab    Vaginal atrophy        Relevant Medications    conjugated estrogens (PREMARIN) vaginal cream    Dysuria        Relevant Medications    methen-sod phos-meth blue-hyos (UROGESIC-BLUE) 81.6-40.8-0.12 mg Tab        No follow-ups on file.    Marysol was seen today for annual exam.    Diagnoses and all orders for this visit:    DDD (degenerative disc disease), lumbar  -     cyclobenzaprine (FLEXERIL) 10 MG tablet; Take 1 tablet (10 mg total) by mouth daily as needed.  -     Ambulatory referral/consult to Physical/Occupational Therapy; Future    Essential hypertension  -     metoprolol tartrate (LOPRESSOR) 50 MG tablet; Take 1 tablet (50 mg total) by mouth 2 (two) times daily.  -     losartan (COZAAR) 50 MG tablet; Take 1 tablet (50 mg total) by mouth once daily.    Neuropathy  -     gabapentin (NEURONTIN) 300 MG capsule; Take 1 capsule (300 mg total) by mouth 3 (three) times daily.  -     DULoxetine (CYMBALTA) 60 MG capsule; TAKE 1 CAPSULE(60 MG) BY MOUTH EVERY DAY    Hyperlipidemia, unspecified hyperlipidemia type    Urinary urgency  -     tolterodine (DETROL LA) 4 MG 24 hr capsule; Take 1 capsule (4 mg total) by mouth once daily.  -     methen-sod  phos-meth blue-hyos (UROGESIC-BLUE) 81.6-40.8-0.12 mg Tab; Take 1 tablet by mouth 2 (two) times daily.    Vaginal atrophy  -     conjugated estrogens (PREMARIN) vaginal cream; Place 0.5 g vaginally twice a week.    Dysuria  -     methen-sod phos-meth blue-hyos (UROGESIC-BLUE) 81.6-40.8-0.12 mg Tab; Take 1 tablet by mouth 2 (two) times daily.      Medications Ordered This Encounter   Medications    conjugated estrogens (PREMARIN) vaginal cream     Sig: Place 0.5 g vaginally twice a week.     Dispense:  30 g     Refill:  1    cyclobenzaprine (FLEXERIL) 10 MG tablet     Sig: Take 1 tablet (10 mg total) by mouth daily as needed.     Dispense:  90 tablet     Refill:  3    DULoxetine (CYMBALTA) 60 MG capsule     Sig: TAKE 1 CAPSULE(60 MG) BY MOUTH EVERY DAY     Dispense:  90 capsule     Refill:  3    gabapentin (NEURONTIN) 300 MG capsule     Sig: Take 1 capsule (300 mg total) by mouth 3 (three) times daily.     Dispense:  270 capsule     Refill:  3     ZERO refills remain on this prescription. Your patient is requesting advance approval of refills for this medication to PREVENT ANY MISSED DOSES    losartan (COZAAR) 50 MG tablet     Sig: Take 1 tablet (50 mg total) by mouth once daily.     Dispense:  90 tablet     Refill:  3     .    methen-sod phos-meth blue-hyos (UROGESIC-BLUE) 81.6-40.8-0.12 mg Tab     Sig: Take 1 tablet by mouth 2 (two) times daily.     Dispense:  180 tablet     Refill:  3    metoprolol tartrate (LOPRESSOR) 50 MG tablet     Sig: Take 1 tablet (50 mg total) by mouth 2 (two) times daily.     Dispense:  180 tablet     Refill:  3     .    tolterodine (DETROL LA) 4 MG 24 hr capsule     Sig: Take 1 capsule (4 mg total) by mouth once daily.     Dispense:  90 capsule     Refill:  3     Patient needs an appointment     The patient was instructed to stop the following meds:  Medications Discontinued During This Encounter   Medication Reason    methen-sod phos-meth blue-hyos (UROGESIC-BLUE/URYL)  81.6-40.8-0.12 mg Tab     cyclobenzaprine (FLEXERIL) 10 MG tablet Reorder    metoprolol tartrate (LOPRESSOR) 50 MG tablet Reorder    DULoxetine (CYMBALTA) 60 MG capsule Reorder    gabapentin (NEURONTIN) 300 MG capsule Reorder    losartan (COZAAR) 50 MG tablet Reorder    conjugated estrogens (PREMARIN) vaginal cream Reorder    tolterodine (DETROL LA) 4 MG 24 hr capsule Reorder    methen-sod phos-meth blue-hyos (UROGESIC-BLUE) 81.6-40.8-0.12 mg Tab      Orders Placed This Encounter   Procedures    Ambulatory referral/consult to Physical/Occupational Therapy     Standing Status:   Future     Standing Expiration Date:   5/4/2023     Referral Priority:   Routine     Referral Type:   Physical Medicine     Referral Reason:   Specialty Services Required     Number of Visits Requested:   1       Medication List with Changes/Refills   Current Medications    ACETAMINOPHEN (TYLENOL EXTRA STRENGTH ORAL)    Take 500 mg by mouth every 4 to 6 hours as needed.     BLOOD PRESSURE TEST KIT-LARGE KIT    Use once daily as directed.    CALCIUM CITRATE-VITAMIN D3 315-200 MG (CITRACAL+D) 315-200 MG-UNIT PER TABLET    Take 1 tablet by mouth 2 (two) times daily.    CETIRIZINE (ZYRTEC) 10 MG TABLET    Take 1 tablet (10 mg total) by mouth once daily.    CYANOCOBALAMIN, VITAMIN B-12, (VITAMIN B-12 ORAL)    Take 1 tablet by mouth once daily. As directed    DENOSUMAB (PROLIA) 60 MG/ML SYRG    Inject 60 mg into the skin every 6 (six) months.    FERROUS GLUCONATE (FERGON) 324 MG TABLET    Take 324 mg by mouth daily with breakfast.    FLUTICASONE PROPIONATE (FLONASE) 50 MCG/ACTUATION NASAL SPRAY    1 spray (50 mcg total) by Each Nostril route once daily.    GLUCOSAMINE SULFATE 500 MG TAB    Take 1 tablet by mouth once daily.    LACTOBACILLUS ACIDOPHILUS (PROBIOTIC ORAL)    Take 1 tablet by mouth once daily.     MELATONIN 5 MG CAP    Take 1 capsule by mouth nightly as needed.    MULTIVITAMIN ORAL    Take 1 tablet by mouth once daily.      NYSTATIN (NYSTOP) POWDER    Apply topically 3 (three) times daily as needed.    VIT C,V-XN-KNHID-LUTEIN-ZEAXAN (PRESERVISION AREDS-2) 011-823-55-1 MG-UNIT-MG-MG CAP    Take 1 capsule by mouth 2 (two) times a day.    VITAMIN D 1000 UNITS TAB    Take 185 mg by mouth once daily.   Changed and/or Refilled Medications    Modified Medication Previous Medication    CONJUGATED ESTROGENS (PREMARIN) VAGINAL CREAM conjugated estrogens (PREMARIN) vaginal cream       Place 0.5 g vaginally twice a week.    Place 0.5 g vaginally twice a week.    CYCLOBENZAPRINE (FLEXERIL) 10 MG TABLET cyclobenzaprine (FLEXERIL) 10 MG tablet       Take 1 tablet (10 mg total) by mouth daily as needed.    Take 1 tablet (10 mg total) by mouth daily as needed.    DULOXETINE (CYMBALTA) 60 MG CAPSULE DULoxetine (CYMBALTA) 60 MG capsule       TAKE 1 CAPSULE(60 MG) BY MOUTH EVERY DAY    TAKE 1 CAPSULE(60 MG) BY MOUTH EVERY DAY    GABAPENTIN (NEURONTIN) 300 MG CAPSULE gabapentin (NEURONTIN) 300 MG capsule       Take 1 capsule (300 mg total) by mouth 3 (three) times daily.    Take 1 capsule (300 mg total) by mouth 3 (three) times daily.    LOSARTAN (COZAAR) 50 MG TABLET losartan (COZAAR) 50 MG tablet       Take 1 tablet (50 mg total) by mouth once daily.    Take 1 tablet (50 mg total) by mouth once daily.    METHEN-SOD PHOS-METH BLUE-HYOS (UROGESIC-BLUE) 81.6-40.8-0.12 MG TAB methen-sod phos-meth blue-hyos (UROGESIC-BLUE) 81.6-40.8-0.12 mg Tab       Take 1 tablet by mouth 2 (two) times daily.    Take 1 tablet by mouth 2 (two) times daily.    METOPROLOL TARTRATE (LOPRESSOR) 50 MG TABLET metoprolol tartrate (LOPRESSOR) 50 MG tablet       Take 1 tablet (50 mg total) by mouth 2 (two) times daily.    Take 1 tablet (50 mg total) by mouth 2 (two) times daily.    TOLTERODINE (DETROL LA) 4 MG 24 HR CAPSULE tolterodine (DETROL LA) 4 MG 24 hr capsule       Take 1 capsule (4 mg total) by mouth once daily.    Take 1 capsule (4 mg total) by mouth once daily.    Discontinued Medications    METHEN-SOD PHOS-METH BLUE-HYOS (UROGESIC-BLUE/URYL) 81.6-40.8-0.12 MG TAB    Take 1 tablet by mouth once daily.      Medication List with Changes/Refills   Current Medications    ACETAMINOPHEN (TYLENOL EXTRA STRENGTH ORAL)    Take 500 mg by mouth every 4 to 6 hours as needed.        Start Date: 12/16/2011End Date: --    BLOOD PRESSURE TEST KIT-LARGE KIT    Use once daily as directed.       Start Date: 4/6/2020  End Date: --    CALCIUM CITRATE-VITAMIN D3 315-200 MG (CITRACAL+D) 315-200 MG-UNIT PER TABLET    Take 1 tablet by mouth 2 (two) times daily.       Start Date: --        End Date: --    CETIRIZINE (ZYRTEC) 10 MG TABLET    Take 1 tablet (10 mg total) by mouth once daily.       Start Date: 7/14/2020 End Date: --    CYANOCOBALAMIN, VITAMIN B-12, (VITAMIN B-12 ORAL)    Take 1 tablet by mouth once daily. As directed       Start Date: 12/16/2011End Date: --    DENOSUMAB (PROLIA) 60 MG/ML SYRG    Inject 60 mg into the skin every 6 (six) months.       Start Date: --        End Date: --    FERROUS GLUCONATE (FERGON) 324 MG TABLET    Take 324 mg by mouth daily with breakfast.       Start Date: --        End Date: --    FLUTICASONE PROPIONATE (FLONASE) 50 MCG/ACTUATION NASAL SPRAY    1 spray (50 mcg total) by Each Nostril route once daily.       Start Date: 5/8/2020  End Date: --    GLUCOSAMINE SULFATE 500 MG TAB    Take 1 tablet by mouth once daily.       Start Date: --        End Date: --    LACTOBACILLUS ACIDOPHILUS (PROBIOTIC ORAL)    Take 1 tablet by mouth once daily.        Start Date: --        End Date: --    MELATONIN 5 MG CAP    Take 1 capsule by mouth nightly as needed.       Start Date: --        End Date: --    MULTIVITAMIN ORAL    Take 1 tablet by mouth once daily.        Start Date: 12/16/2011End Date: --    NYSTATIN (NYSTOP) POWDER    Apply topically 3 (three) times daily as needed.       Start Date: 4/6/2020  End Date: --    VIT C,H-PB-DYUIA-LUTEIN-ZEAXAN (PRESERVISION  AREDS-2) 187-564-01-1 MG-UNIT-MG-MG CAP    Take 1 capsule by mouth 2 (two) times a day.       Start Date: --        End Date: --    VITAMIN D 1000 UNITS TAB    Take 185 mg by mouth once daily.       Start Date: --        End Date: --   Changed and/or Refilled Medications    Modified Medication Previous Medication    CONJUGATED ESTROGENS (PREMARIN) VAGINAL CREAM conjugated estrogens (PREMARIN) vaginal cream       Place 0.5 g vaginally twice a week.    Place 0.5 g vaginally twice a week.       Start Date: 4/4/2022  End Date: 5/4/2022    Start Date: 2/10/2022 End Date: 4/4/2022    CYCLOBENZAPRINE (FLEXERIL) 10 MG TABLET cyclobenzaprine (FLEXERIL) 10 MG tablet       Take 1 tablet (10 mg total) by mouth daily as needed.    Take 1 tablet (10 mg total) by mouth daily as needed.       Start Date: 4/4/2022  End Date: --    Start Date: 4/19/2021 End Date: 4/4/2022    DULOXETINE (CYMBALTA) 60 MG CAPSULE DULoxetine (CYMBALTA) 60 MG capsule       TAKE 1 CAPSULE(60 MG) BY MOUTH EVERY DAY    TAKE 1 CAPSULE(60 MG) BY MOUTH EVERY DAY       Start Date: 4/4/2022  End Date: --    Start Date: 1/5/2022  End Date: 4/4/2022    GABAPENTIN (NEURONTIN) 300 MG CAPSULE gabapentin (NEURONTIN) 300 MG capsule       Take 1 capsule (300 mg total) by mouth 3 (three) times daily.    Take 1 capsule (300 mg total) by mouth 3 (three) times daily.       Start Date: 4/4/2022  End Date: --    Start Date: 1/5/2022  End Date: 4/4/2022    LOSARTAN (COZAAR) 50 MG TABLET losartan (COZAAR) 50 MG tablet       Take 1 tablet (50 mg total) by mouth once daily.    Take 1 tablet (50 mg total) by mouth once daily.       Start Date: 4/4/2022  End Date: 4/4/2023    Start Date: 1/5/2022  End Date: 4/4/2022    METHEN-SOD PHOS-METH BLUE-HYOS (UROGESIC-BLUE) 81.6-40.8-0.12 MG TAB methen-sod phos-meth blue-hyos (UROGESIC-BLUE) 81.6-40.8-0.12 mg Tab       Take 1 tablet by mouth 2 (two) times daily.    Take 1 tablet by mouth 2 (two) times daily.       Start Date: 4/4/2022  End  Date: --    Start Date: 2/21/2022 End Date: 4/4/2022    METOPROLOL TARTRATE (LOPRESSOR) 50 MG TABLET metoprolol tartrate (LOPRESSOR) 50 MG tablet       Take 1 tablet (50 mg total) by mouth 2 (two) times daily.    Take 1 tablet (50 mg total) by mouth 2 (two) times daily.       Start Date: 4/4/2022  End Date: 4/4/2023    Start Date: 1/5/2022  End Date: 4/4/2022    TOLTERODINE (DETROL LA) 4 MG 24 HR CAPSULE tolterodine (DETROL LA) 4 MG 24 hr capsule       Take 1 capsule (4 mg total) by mouth once daily.    Take 1 capsule (4 mg total) by mouth once daily.       Start Date: 4/4/2022  End Date: --    Start Date: 2/10/2022 End Date: 4/4/2022   Discontinued Medications    METHEN-SOD PHOS-METH BLUE-HYOS (UROGESIC-BLUE/URYL) 81.6-40.8-0.12 MG TAB    Take 1 tablet by mouth once daily.       Start Date: 1/14/2022 End Date: 4/4/2022        She does not need to check her lipids any more due to age.  Refill all of her meds above. Use tylenol prn pain in the joints.

## 2022-04-05 ENCOUNTER — IMMUNIZATION (OUTPATIENT)
Dept: FAMILY MEDICINE | Facility: CLINIC | Age: 87
End: 2022-04-05
Payer: COMMERCIAL

## 2022-04-05 DIAGNOSIS — Z23 NEED FOR VACCINATION: Primary | ICD-10-CM

## 2022-04-05 PROCEDURE — 91305 COVID-19, MRNA, LNP-S, PF, 30 MCG/0.3 ML DOSE VACCINE (PFIZER): CPT | Mod: PBBFAC | Performed by: FAMILY MEDICINE

## 2022-04-06 ENCOUNTER — PATIENT MESSAGE (OUTPATIENT)
Dept: OTHER | Facility: OTHER | Age: 87
End: 2022-04-06
Payer: COMMERCIAL

## 2022-04-06 ENCOUNTER — PATIENT MESSAGE (OUTPATIENT)
Dept: DERMATOLOGY | Facility: CLINIC | Age: 87
End: 2022-04-06
Payer: COMMERCIAL

## 2022-04-07 ENCOUNTER — TELEPHONE (OUTPATIENT)
Dept: GASTROENTEROLOGY | Facility: CLINIC | Age: 87
End: 2022-04-07
Payer: COMMERCIAL

## 2022-04-07 NOTE — TELEPHONE ENCOUNTER
Spoke with 'pt's daughter, Nafisa. Informed of canceling covid test due to update in testing policy. Made sure pt/Nafisa had copy of prep instructions - sent again to pt's [facility]. Informed Nafisa pt was a seen previously by Dr. Mobley but her procedure was scheduled with Dr. Aguirre. Nafisa stated they wanted to change providers.

## 2022-04-14 ENCOUNTER — ANESTHESIA EVENT (OUTPATIENT)
Dept: ENDOSCOPY | Facility: HOSPITAL | Age: 87
End: 2022-04-14
Payer: COMMERCIAL

## 2022-04-14 ENCOUNTER — HOSPITAL ENCOUNTER (OUTPATIENT)
Facility: HOSPITAL | Age: 87
Discharge: HOME | End: 2022-04-14
Attending: INTERNAL MEDICINE | Admitting: INTERNAL MEDICINE
Payer: COMMERCIAL

## 2022-04-14 ENCOUNTER — ANESTHESIA (OUTPATIENT)
Dept: ENDOSCOPY | Facility: HOSPITAL | Age: 87
End: 2022-04-14
Payer: COMMERCIAL

## 2022-04-14 DIAGNOSIS — Z86.010 HX OF COLONIC POLYPS: ICD-10-CM

## 2022-04-14 PROCEDURE — G0105 COLORECTAL SCRN; HI RISK IND: ICD-10-PCS | Mod: ,,, | Performed by: INTERNAL MEDICINE

## 2022-04-14 PROCEDURE — 63600175 PHARM REV CODE 636 W HCPCS: Mod: PO | Performed by: NURSE ANESTHETIST, CERTIFIED REGISTERED

## 2022-04-14 PROCEDURE — D9220A PRA ANESTHESIA: Mod: ,,, | Performed by: NURSE ANESTHETIST, CERTIFIED REGISTERED

## 2022-04-14 PROCEDURE — D9220A PRA ANESTHESIA: ICD-10-PCS | Mod: ,,, | Performed by: NURSE ANESTHETIST, CERTIFIED REGISTERED

## 2022-04-14 PROCEDURE — G0105 COLORECTAL SCRN; HI RISK IND: HCPCS | Mod: PO | Performed by: INTERNAL MEDICINE

## 2022-04-14 PROCEDURE — 25000003 PHARM REV CODE 250: Mod: PO | Performed by: ANESTHESIOLOGY

## 2022-04-14 PROCEDURE — 63600175 PHARM REV CODE 636 W HCPCS: Mod: PO | Performed by: INTERNAL MEDICINE

## 2022-04-14 PROCEDURE — G0105 COLORECTAL SCRN; HI RISK IND: HCPCS | Mod: ,,, | Performed by: INTERNAL MEDICINE

## 2022-04-14 PROCEDURE — 37000009 HC ANESTHESIA EA ADD 15 MINS: Mod: PO | Performed by: INTERNAL MEDICINE

## 2022-04-14 PROCEDURE — 25000003 PHARM REV CODE 250: Mod: PO | Performed by: NURSE ANESTHETIST, CERTIFIED REGISTERED

## 2022-04-14 PROCEDURE — D9220A PRA ANESTHESIA: ICD-10-PCS | Mod: ,,, | Performed by: ANESTHESIOLOGY

## 2022-04-14 PROCEDURE — D9220A PRA ANESTHESIA: Mod: ,,, | Performed by: ANESTHESIOLOGY

## 2022-04-14 PROCEDURE — 37000008 HC ANESTHESIA 1ST 15 MINUTES: Mod: PO | Performed by: INTERNAL MEDICINE

## 2022-04-14 RX ORDER — SODIUM CHLORIDE 0.9 % (FLUSH) 0.9 %
10 SYRINGE (ML) INJECTION
Status: DISCONTINUED | OUTPATIENT
Start: 2022-04-14 | End: 2022-04-14 | Stop reason: HOSPADM

## 2022-04-14 RX ORDER — SODIUM CHLORIDE, SODIUM LACTATE, POTASSIUM CHLORIDE, CALCIUM CHLORIDE 600; 310; 30; 20 MG/100ML; MG/100ML; MG/100ML; MG/100ML
INJECTION, SOLUTION INTRAVENOUS CONTINUOUS
Status: DISCONTINUED | OUTPATIENT
Start: 2022-04-14 | End: 2022-04-14 | Stop reason: HOSPADM

## 2022-04-14 RX ORDER — PROPOFOL 10 MG/ML
VIAL (ML) INTRAVENOUS
Status: DISCONTINUED | OUTPATIENT
Start: 2022-04-14 | End: 2022-04-14

## 2022-04-14 RX ORDER — LIDOCAINE HCL/PF 100 MG/5ML
SYRINGE (ML) INTRAVENOUS
Status: DISCONTINUED | OUTPATIENT
Start: 2022-04-14 | End: 2022-04-14

## 2022-04-14 RX ORDER — LIDOCAINE HYDROCHLORIDE 10 MG/ML
1 INJECTION, SOLUTION EPIDURAL; INFILTRATION; INTRACAUDAL; PERINEURAL ONCE
Status: COMPLETED | OUTPATIENT
Start: 2022-04-14 | End: 2022-04-14

## 2022-04-14 RX ADMIN — PROPOFOL 25 MG: 10 INJECTION, EMULSION INTRAVENOUS at 01:04

## 2022-04-14 RX ADMIN — PROPOFOL 150 MG: 10 INJECTION, EMULSION INTRAVENOUS at 01:04

## 2022-04-14 RX ADMIN — PROPOFOL 50 MG: 10 INJECTION, EMULSION INTRAVENOUS at 01:04

## 2022-04-14 RX ADMIN — LIDOCAINE HYDROCHLORIDE 100 MG: 20 INJECTION, SOLUTION INTRAVENOUS at 01:04

## 2022-04-14 RX ADMIN — LIDOCAINE HYDROCHLORIDE 10 MG: 10 INJECTION, SOLUTION EPIDURAL; INFILTRATION; INTRACAUDAL; PERINEURAL at 12:04

## 2022-04-14 RX ADMIN — SODIUM CHLORIDE, SODIUM LACTATE, POTASSIUM CHLORIDE, AND CALCIUM CHLORIDE: .6; .31; .03; .02 INJECTION, SOLUTION INTRAVENOUS at 12:04

## 2022-04-14 NOTE — TRANSFER OF CARE
"Anesthesia Transfer of Care Note    Patient: Marysol Lyles    Procedure(s) Performed: Procedure(s) (LRB):  COLONOSCOPY (N/A)    Patient location: PACU    Anesthesia Type: general    Transport from OR: Transported from OR on room air with adequate spontaneous ventilation    Post pain: adequate analgesia    Post assessment: no apparent anesthetic complications and tolerated procedure well    Post vital signs: stable    Level of consciousness: sedated and awake    Nausea/Vomiting: no nausea/vomiting    Complications: none    Transfer of care protocol was followed      Last vitals:   Visit Vitals  BP (!) 144/66 (BP Location: Right arm, Patient Position: Sitting)   Pulse 62   Temp 36.8 °C (98.2 °F) (Skin)   Resp 16   Ht 5' 4" (1.626 m)   Wt 85.3 kg (188 lb)   LMP 05/06/1994   SpO2 96%   Breastfeeding No   BMI 32.27 kg/m²     "

## 2022-04-14 NOTE — H&P
History & Physical - Short Stay  Gastroenterology      SUBJECTIVE:     Procedure: Colonoscopy    Chief Complaint/Indication for Procedure: Previous Polyps    PTA Medications   Medication Sig    ACETAMINOPHEN (TYLENOL EXTRA STRENGTH ORAL) Take 500 mg by mouth every 4 to 6 hours as needed.     blood pressure test kit-large Kit Use once daily as directed.    calcium citrate-vitamin D3 315-200 mg (CITRACAL+D) 315-200 mg-unit per tablet Take 1 tablet by mouth 2 (two) times daily.    cetirizine (ZYRTEC) 10 MG tablet Take 1 tablet (10 mg total) by mouth once daily.    CYANOCOBALAMIN, VITAMIN B-12, (VITAMIN B-12 ORAL) Take 1 tablet by mouth once daily. As directed    DULoxetine (CYMBALTA) 60 MG capsule TAKE 1 CAPSULE(60 MG) BY MOUTH EVERY DAY    ferrous gluconate (FERGON) 324 MG tablet Take 324 mg by mouth daily with breakfast.    fluticasone propionate (FLONASE) 50 mcg/actuation nasal spray 1 spray (50 mcg total) by Each Nostril route once daily.    gabapentin (NEURONTIN) 300 MG capsule Take 1 capsule (300 mg total) by mouth 3 (three) times daily.    glucosamine sulfate 500 mg Tab Take 1 tablet by mouth once daily.    LACTOBACILLUS ACIDOPHILUS (PROBIOTIC ORAL) Take 1 tablet by mouth once daily.     losartan (COZAAR) 50 MG tablet Take 1 tablet (50 mg total) by mouth once daily.    melatonin 5 mg Cap Take 1 capsule by mouth nightly as needed.    methen-sod phos-meth blue-hyos (UROGESIC-BLUE) 81.6-40.8-0.12 mg Tab Take 1 tablet by mouth 2 (two) times daily.    metoprolol tartrate (LOPRESSOR) 50 MG tablet Take 1 tablet (50 mg total) by mouth 2 (two) times daily.    MULTIVITAMIN ORAL Take 1 tablet by mouth once daily.     nystatin (NYSTOP) powder Apply topically 3 (three) times daily as needed.    tolterodine (DETROL LA) 4 MG 24 hr capsule Take 1 capsule (4 mg total) by mouth once daily.    vit C,B-Rr-kkfrz-lutein-zeaxan (PRESERVISION AREDS-2) 349-818-44-1 mg-unit-mg-mg Cap Take 1 capsule by mouth 2 (two)  times a day.    vitamin D 1000 units Tab Take 185 mg by mouth once daily.    conjugated estrogens (PREMARIN) vaginal cream Place 0.5 g vaginally twice a week.    cyclobenzaprine (FLEXERIL) 10 MG tablet Take 1 tablet (10 mg total) by mouth daily as needed. (Patient not taking: Reported on 4/13/2022)    denosumab (PROLIA) 60 mg/mL Syrg Inject 60 mg into the skin every 6 (six) months.       Review of patient's allergies indicates:   Allergen Reactions    Penicillins Swelling    Formaldehyde Hives    Mercury Hives    Adhesive tape-silicones Rash    Dilaudid [hydromorphone (bulk)] Itching        Past Medical History:   Diagnosis Date    Arthritis     Chronic pain 8/14/2013    Colon polyp     DDD (degenerative disc disease)     DDD (degenerative disc disease)     GERD (gastroesophageal reflux disease)     Hyperlipidemia     Hypertension     Osteopenia 3/2/2016    Primary hyperparathyroidism     Urinary incontinence 4/19/2021    She has urinary incontinence and she has used urogesic and detrol and premarin cream to help with this and it has. She will continue them.     Past Surgical History:   Procedure Laterality Date    APPENDECTOMY      CATARACT EXTRACTION W/  INTRAOCULAR LENS IMPLANT Left 05/16/2018    Dr Haywood    CATARACT EXTRACTION W/  INTRAOCULAR LENS IMPLANT Right 12/02/2020    Dr. Tirado    COLONOSCOPY  11/16/2016    Dr. Vickers; polyp removed and small ileocecal valve ulceration; Bx: tubular adenoma; benign ileal mucosa    COLONOSCOPY W/ BIOPSIES  2/2011    repeat in 3 years    INTRAOCULAR PROSTHESES INSERTION Right 12/2/2020    Procedure: INSERTION, IOL PROSTHESIS;  Surgeon: Nava Tirado MD;  Location: [facility] OR [address] [place] [address];  Service: Ophthalmology;  Laterality: Right;    LUMBAR EPIDURAL INJECTION N/A 12/2019    Dr. Tejada     PARTIAL KNEE ARTHROPLASTY Right     Right    PHACOEMULSIFICATION OF CATARACT Right 12/2/2020    Procedure: PHACOEMULSIFICATION, CATARACT;  Surgeon:  Nava Tirado MD;  Location: [facility] OR [address] [place] [address];  Service: Ophthalmology;  Laterality: Right;    SLEEVE GASTROPLASTY  12    TONSILLECTOMY      TOTAL HIP ARTHROPLASTY Right     TOTAL KNEE ARTHROPLASTY Left     UPPER GASTROINTESTINAL ENDOSCOPY       Family History   Problem Relation Age of Onset    Arthritis Mother     Cancer Mother              Breast cancer Mother     Arthritis Father     Heart disease Father     Hyperlipidemia Father     Heart attack Father     Hypertension Father              Cancer Sister     Depression Sister     Cataracts Sister     Cancer Brother     Depression Brother     Diabetes Brother     Heart disease Brother     Hyperlipidemia Brother     Blindness Brother         dm    Retinal detachment Brother     Alcohol abuse Son     Birth defects Son     Diabetes Son     Mental retardation Son     Arthritis Son     Hyperlipidemia Son     Hypertension Son     Learning disabilities Son     Mental illness Son     Cancer Maternal Aunt     Alcohol abuse Maternal Uncle     Cancer Maternal Uncle     Depression Maternal Uncle     Cancer Paternal Aunt     Cancer Paternal Uncle     Arthritis Maternal Grandmother     Hypertension Maternal Grandmother     Alcohol abuse Maternal Grandfather     Arthritis Maternal Grandfather     Depression Maternal Grandfather     Hypertension Maternal Grandfather     Arthritis Paternal Grandmother     Arthritis Paternal Grandfather     HIV Son     Glaucoma Son     Alcohol abuse Son     Arthritis Son     Breast cancer Paternal Cousin     Arthritis Daughter     Hearing loss Daughter     Cancer Maternal Aunt              Cancer Maternal Uncle         4 , 1 living    Cancer Paternal Aunt              Cancer Paternal Uncle              Cancer Sister          , living     Cancer Brother          , living     COPD Son      Diabetes Brother              Hearing loss Daughter     Amblyopia Neg Hx     Macular degeneration Neg Hx     Strabismus Neg Hx     Stroke Neg Hx     Thyroid disease Neg Hx     Colon cancer Neg Hx      Social History     Tobacco Use    Smoking status: Former Smoker     Packs/day: 0.00     Years: 20.00     Pack years: 0.00     Start date: 1954     Quit date: 1971     Years since quittin.3    Smokeless tobacco: Never Used    Tobacco comment: less than 2-3 cigarettes per day   Substance Use Topics    Alcohol use: Yes     Comment: occasionally    Drug use: No         OBJECTIVE:     Vital Signs (Most Recent)  Temp: 98.2 °F (36.8 °C) (22 1251)  Pulse: 62 (22 1251)  Resp: 16 (22 125)  BP: (!) 144/66 (22 1251)  SpO2: 96 % (22 125)    Physical Exam:                                                       GENERAL:  Comfortable, in no acute distress.                                 HEENT EXAM:  Nonicteric.  No adenopathy.  Oropharynx is clear.               NECK:  Supple.                                                               LUNGS:  Clear.                                                               CARDIAC:  Regular rate and rhythm.  S1, S2.  No murmur.                      ABDOMEN:  Soft, positive bowel sounds, nontender.  No hepatosplenomegaly or masses.  No rebound or guarding.                                             EXTREMITIES:  No edema.     MENTAL STATUS:  Normal, alert and oriented.      ASSESSMENT/PLAN:     Assessment: Previous Polyps    Plan: Colonoscopy    Anesthesia Plan: General    ASA Grade: ASA 2 - Patient with mild systemic disease with no functional limitations    MALLAMPATI SCORE:  I (soft palate, uvula, fauces, and tonsillar pillars visible)

## 2022-04-14 NOTE — DISCHARGE SUMMARY
[place] - Endoscopy  Discharge Note  Short Stay    Discharge Note  Short Stay      SUMMARY     Admit Date: 4/14/2022    Attending Physician: Jagjit Aguirre MD     Discharge Physician: Jagjit Aguirre MD    Discharge Date: 4/14/2022 1:42 PM    Final Diagnosis: History of colon polyps [Z86.010]    Disposition: HOME OR SELF CARE    Patient Instructions:   Current Discharge Medication List      CONTINUE these medications which have NOT CHANGED    Details   ACETAMINOPHEN (TYLENOL EXTRA STRENGTH ORAL) Take 500 mg by mouth every 4 to 6 hours as needed.       blood pressure test kit-large Kit Use once daily as directed.  Qty: 1 each, Refills: 0      calcium citrate-vitamin D3 315-200 mg (CITRACAL+D) 315-200 mg-unit per tablet Take 1 tablet by mouth 2 (two) times daily.      cetirizine (ZYRTEC) 10 MG tablet Take 1 tablet (10 mg total) by mouth once daily.  Qty: 30 tablet, Refills: 11      CYANOCOBALAMIN, VITAMIN B-12, (VITAMIN B-12 ORAL) Take 1 tablet by mouth once daily. As directed      DULoxetine (CYMBALTA) 60 MG capsule TAKE 1 CAPSULE(60 MG) BY MOUTH EVERY DAY  Qty: 90 capsule, Refills: 3    Associated Diagnoses: Neuropathy      ferrous gluconate (FERGON) 324 MG tablet Take 324 mg by mouth daily with breakfast.      fluticasone propionate (FLONASE) 50 mcg/actuation nasal spray 1 spray (50 mcg total) by Each Nostril route once daily.  Qty: 16 g, Refills: 0      gabapentin (NEURONTIN) 300 MG capsule Take 1 capsule (300 mg total) by mouth 3 (three) times daily.  Qty: 270 capsule, Refills: 3    Comments: ZERO refills remain on this prescription. Your patient is requesting advance approval of refills for this medication to PREVENT ANY MISSED DOSES  Associated Diagnoses: Neuropathy      glucosamine sulfate 500 mg Tab Take 1 tablet by mouth once daily.      LACTOBACILLUS ACIDOPHILUS (PROBIOTIC ORAL) Take 1 tablet by mouth once daily.       losartan (COZAAR) 50 MG tablet Take 1 tablet (50 mg total) by mouth once  daily.  Qty: 90 tablet, Refills: 3    Comments: .  Associated Diagnoses: Essential hypertension      melatonin 5 mg Cap Take 1 capsule by mouth nightly as needed.      methen-sod phos-meth blue-hyos (UROGESIC-BLUE) 81.6-40.8-0.12 mg Tab Take 1 tablet by mouth 2 (two) times daily.  Qty: 180 tablet, Refills: 3    Associated Diagnoses: Urinary urgency; Dysuria      metoprolol tartrate (LOPRESSOR) 50 MG tablet Take 1 tablet (50 mg total) by mouth 2 (two) times daily.  Qty: 180 tablet, Refills: 3    Comments: .  Associated Diagnoses: Essential hypertension      MULTIVITAMIN ORAL Take 1 tablet by mouth once daily.       nystatin (NYSTOP) powder Apply topically 3 (three) times daily as needed.  Qty: 60 g, Refills: 11      tolterodine (DETROL LA) 4 MG 24 hr capsule Take 1 capsule (4 mg total) by mouth once daily.  Qty: 90 capsule, Refills: 3    Comments: Patient needs an appointment  Associated Diagnoses: Urinary urgency      vit C,F-Ws-zwxgb-lutein-zeaxan (PRESERVISION AREDS-2) 869-189-41-1 mg-unit-mg-mg Cap Take 1 capsule by mouth 2 (two) times a day.      conjugated estrogens (PREMARIN) vaginal cream Place 0.5 g vaginally twice a week.  Qty: 30 g, Refills: 1    Associated Diagnoses: Vaginal atrophy      cyclobenzaprine (FLEXERIL) 10 MG tablet Take 1 tablet (10 mg total) by mouth daily as needed.  Qty: 90 tablet, Refills: 3    Associated Diagnoses: DDD (degenerative disc disease), lumbar      denosumab (PROLIA) 60 mg/mL Syrg Inject 60 mg into the skin every 6 (six) months.         STOP taking these medications       vitamin D 1000 units Tab Comments:   Reason for Stopping:               Discharge Procedure Orders (must include Diet, Follow-up, Activity)    Follow Up:  Follow up with PCP as previously scheduled  Resume routine diet.  Activity as tolerated.    No driving day of procedure.

## 2022-04-14 NOTE — [employer]ATION PATIENT INSTRUCTIONS
Discharge Summary/Instructions after an Endoscopic Procedure  Patient Name: Marysol Lyles  Patient MRN: [id number]  Patient YOB: 1935 Thursday, April 14, 2022  Jagjit Aguirre MD  Dear patient,  As a result of recent federal legislation (The Federal Cures Act), you may   receive lab or pathology results from your procedure in your [facility]   account before your physician is able to contact you. Your physician or   their representative will relay the results to you with their   recommendations at their soonest availability.  Thank you,  RESTRICTIONS:  During your procedure today, you received medications for sedation.  These   medications may affect your judgment, balance and coordination.  Therefore,   for 24 hours, you have the following restrictions:   - DO NOT drive a car, operate machinery, make legal/financial decisions,   sign important papers or drink alcohol.    ACTIVITY:  Today: no heavy lifting, straining or running due to procedural   sedation/anesthesia.  The following day: return to full activity including work.  DIET:  Eat and drink normally unless instructed otherwise.     TREATMENT FOR COMMON SIDE EFFECTS:  - Mild abdominal pain, nausea, belching, bloating or excessive gas:  rest,   eat lightly and use a heating pad.  - Sore Throat: treat with throat lozenges and/or gargle with warm salt   water.  - Because air was used during the procedure, expelling large amounts of air   from your rectum or belching is normal.  - If a bowel prep was taken, you may not have a bowel movement for 1-3 days.    This is normal.  SYMPTOMS TO WATCH FOR AND REPORT TO YOUR PHYSICIAN:  1. Abdominal pain or bloating, other than gas cramps.  2. Chest pain.  3. Back pain.  4. Signs of infection such as: chills or fever occurring within 24 hours   after the procedure.  5. Rectal bleeding, which would show as bright red, maroon, or black stools.   (A tablespoon of blood from the rectum is not serious, especially if    hemorrhoids are present.)  6. Vomiting.  7. Weakness or dizziness.  GO DIRECTLY TO THE NEAREST EMERGENCY ROOM IF YOU HAVE ANY OF THE FOLLOWING:      Difficulty breathing              Chills and/or fever over 101 F   Persistent vomiting and/or vomiting blood   Severe abdominal pain   Severe chest pain   Black, tarry stools   Bleeding- more than one tablespoon   Any other symptom or condition that you feel may need urgent attention  Your doctor recommends these additional instructions:  If any biopsies were taken, your doctors clinic will contact you in 1 to 2   weeks with any results.  Your physician has indicated that a repeat colonoscopy is not recommended   for surveillance.   You are being discharged to home.  For questions, problems or results please call your physician - Jagjit Aguirre MD at Work:  [phone].  EMERGENCY PHONE NUMBER: [phone], LAB RESULTS: [phone]  IF A COMPLICATION OR EMERGENCY SITUATION ARISES AND YOU ARE UNABLE TO REACH   YOUR PHYSICIAN - GO DIRECTLY TO THE EMERGENCY ROOM.  ___________________________________________  Nurse Signature  ___________________________________________  Patient/Designated Responsible Party Signature  Jagjit Aguirre MD  4/14/2022 1:40:46 PM  This report has been verified and signed electronically.  Dear patient,  As a result of recent federal legislation (The Federal Cures Act), you may   receive lab or pathology results from your procedure in your [facility]   account before your physician is able to contact you. Your physician or   their representative will relay the results to you with their   recommendations at their soonest availability.  Thank you.  PROVATION

## 2022-04-14 NOTE — ANESTHESIA POSTPROCEDURE EVALUATION
Anesthesia Post Evaluation    Patient: Marysol Lyles    Procedure(s) Performed: Procedure(s) (LRB):  COLONOSCOPY (N/A)    Final Anesthesia Type: general      Patient location during evaluation: PACU  Patient participation: Yes- Able to Participate  Level of consciousness: awake and alert  Post-procedure vital signs: reviewed and stable  Pain management: adequate  Airway patency: patent    PONV status at discharge: No PONV  Anesthetic complications: no      Cardiovascular status: hemodynamically stable  Respiratory status: unassisted and room air  Hydration status: euvolemic  Follow-up not needed.          Vitals Value Taken Time   /64 04/14/22 1350   Temp 36.8 °C (98.2 °F) 04/14/22 1345   Pulse 58 04/14/22 1350   Resp 16 04/14/22 1350   SpO2 99 % 04/14/22 1350         No case tracking events are documented in the log.      Pain/Froylan Score: Froylan Score: 10 (4/14/2022  1:45 PM)

## 2022-04-14 NOTE — ANESTHESIA PREPROCEDURE EVALUATION
04/14/2022  Marysol Lyles is a 86 y.o., female.    Pre-op Assessment    I have reviewed the Patient Summary Reports.    I have reviewed the Nursing Notes. I have reviewed the NPO Status.   I have reviewed the Medications.     Review of Systems  Anesthesia Hx:  No problems with previous Anesthesia    Social:  Former Smoker    Hematology/Oncology:  Hematology Normal   Oncology Normal     EENT/Dental:EENT/Dental Normal   Cardiovascular:   Hypertension hyperlipidemia    Pulmonary:  Pulmonary Normal    Hepatic/GI:   Bowel Prep. GERD    Musculoskeletal:   Arthritis   Spine Disorders: Degenerative disease and Disc disease    Neurological:  Dementia        Physical Exam  General:  Obesity      Airway/Jaw/Neck:  Airway Findings: Mouth Opening: Normal   Tongue: Normal   General Airway Assessment: Adult Mallampati: II        Eyes/Ears/Nose:  EYES/EARS/NOSE FINDINGS: Normal   Dental:  DENTAL FINDINGS: Normal Intact     Chest/Lungs:  Chest/Lungs Findings: Clear to auscultation, Normal Respiratory Rate      Heart/Vascular:  Heart Findings: Rate: Normal  Rhythm: Regular Rhythm        Mental Status:  Mental Status Findings:  Cooperative, Alert and Oriented         Anesthesia Plan  Type of Anesthesia, risks & benefits discussed:  Anesthesia Type:  Gen Natural Airway    Patient's Preference:   Plan Factors:          Intra-op Monitoring Plan: standard ASA monitors  Intra-op Monitoring Plan Comments:   Post Op Pain Control Plan:   Post Op Pain Control Plan Comments:     Induction:   IV  Beta Blocker:  Patient is on a Beta-Blocker and has received one dose within the past 24 hours (No further documentation required).       Informed Consent: Informed consent signed with the Patient and all parties understand the risks and agree with anesthesia plan.  All questions answered.  Anesthesia consent signed with patient.  ASA Score: 2      Day of Surgery Review of History & Physical:  There are no significant changes.  H&P Update referred to the surgeon/provider.          Ready For Surgery From Anesthesia Perspective.           Physical Exam  General: Obesity    Airway:  Mallampati: II   Mouth Opening: Normal  Tongue: Normal    Dental:  Intact    Chest/Lungs:  Clear to auscultation, Normal Respiratory Rate    Heart:  Rate: Normal  Rhythm: Regular Rhythm          Anesthesia Plan  Type of Anesthesia, risks & benefits discussed:    Anesthesia Type: Gen Natural Airway  Intra-op Monitoring Plan: standard ASA monitors  Induction:  IV  Informed Consent: Informed consent signed with the Patient and all parties understand the risks and agree with anesthesia plan.  All questions answered.   ASA Score: 2  Day of Surgery Review of History & Physical: H&P Update referred to the surgeon/provider.    Ready For Surgery From Anesthesia Perspective.       .

## 2022-04-18 VITALS
TEMPERATURE: 98 F | BODY MASS INDEX: 32.1 KG/M2 | HEIGHT: 64 IN | WEIGHT: 188 LBS | SYSTOLIC BLOOD PRESSURE: 118 MMHG | HEART RATE: 58 BPM | DIASTOLIC BLOOD PRESSURE: 64 MMHG | RESPIRATION RATE: 16 BRPM | OXYGEN SATURATION: 99 %

## 2022-04-28 ENCOUNTER — OFFICE VISIT (OUTPATIENT)
Dept: DERMATOLOGY | Facility: CLINIC | Age: 87
End: 2022-04-28
Payer: COMMERCIAL

## 2022-04-28 DIAGNOSIS — L82.1 SEBORRHEIC KERATOSES: ICD-10-CM

## 2022-04-28 DIAGNOSIS — L72.0 MILIA: ICD-10-CM

## 2022-04-28 DIAGNOSIS — D22.5 MELANOCYTIC NEVI OF TRUNK: ICD-10-CM

## 2022-04-28 DIAGNOSIS — D22.30 MELANOCYTIC NEVI OF FACE: ICD-10-CM

## 2022-04-28 DIAGNOSIS — D48.5 NEOPLASM OF UNCERTAIN BEHAVIOR OF SKIN: Primary | ICD-10-CM

## 2022-04-28 DIAGNOSIS — L24.89 IRRITANT CONTACT DERMATITIS DUE TO OTHER AGENTS: ICD-10-CM

## 2022-04-28 DIAGNOSIS — Z12.83 SKIN CANCER SCREENING: ICD-10-CM

## 2022-04-28 DIAGNOSIS — L21.9 SEBORRHEIC DERMATITIS: ICD-10-CM

## 2022-04-28 PROCEDURE — 1159F MED LIST DOCD IN RCRD: CPT | Mod: CPTII,S$GLB,, | Performed by: DERMATOLOGY

## 2022-04-28 PROCEDURE — 88305 TISSUE EXAM BY PATHOLOGIST: ICD-10-PCS | Mod: 26,,, | Performed by: PATHOLOGY

## 2022-04-28 PROCEDURE — 11102 PR TANGENTIAL BIOPSY, SKIN, SINGLE LESION: ICD-10-PCS | Mod: S$GLB,,, | Performed by: DERMATOLOGY

## 2022-04-28 PROCEDURE — 11102 TANGNTL BX SKIN SINGLE LES: CPT | Mod: S$GLB,,, | Performed by: DERMATOLOGY

## 2022-04-28 PROCEDURE — 88305 TISSUE EXAM BY PATHOLOGIST: CPT | Performed by: PATHOLOGY

## 2022-04-28 PROCEDURE — 1160F PR REVIEW ALL MEDS BY PRESCRIBER/CLIN PHARMACIST DOCUMENTED: ICD-10-PCS | Mod: CPTII,S$GLB,, | Performed by: DERMATOLOGY

## 2022-04-28 PROCEDURE — 3288F FALL RISK ASSESSMENT DOCD: CPT | Mod: CPTII,S$GLB,, | Performed by: DERMATOLOGY

## 2022-04-28 PROCEDURE — 99214 OFFICE O/P EST MOD 30 MIN: CPT | Mod: 25,S$GLB,, | Performed by: DERMATOLOGY

## 2022-04-28 PROCEDURE — 1126F AMNT PAIN NOTED NONE PRSNT: CPT | Mod: CPTII,S$GLB,, | Performed by: DERMATOLOGY

## 2022-04-28 PROCEDURE — 1101F PR PT FALLS ASSESS DOC 0-1 FALLS W/OUT INJ PAST YR: ICD-10-PCS | Mod: CPTII,S$GLB,, | Performed by: DERMATOLOGY

## 2022-04-28 PROCEDURE — 11103 TANGNTL BX SKIN EA SEP/ADDL: CPT | Mod: S$GLB,,, | Performed by: DERMATOLOGY

## 2022-04-28 PROCEDURE — 1160F RVW MEDS BY RX/DR IN RCRD: CPT | Mod: CPTII,S$GLB,, | Performed by: DERMATOLOGY

## 2022-04-28 PROCEDURE — 1126F PR PAIN SEVERITY QUANTIFIED, NO PAIN PRESENT: ICD-10-PCS | Mod: CPTII,S$GLB,, | Performed by: DERMATOLOGY

## 2022-04-28 PROCEDURE — 1159F PR MEDICATION LIST DOCUMENTED IN MEDICAL RECORD: ICD-10-PCS | Mod: CPTII,S$GLB,, | Performed by: DERMATOLOGY

## 2022-04-28 PROCEDURE — 11103 PR TANGENTIAL BIOPSY, SKIN, EA ADDTL LESION: ICD-10-PCS | Mod: S$GLB,,, | Performed by: DERMATOLOGY

## 2022-04-28 PROCEDURE — 99214 PR OFFICE/OUTPT VISIT, EST, LEVL IV, 30-39 MIN: ICD-10-PCS | Mod: 25,S$GLB,, | Performed by: DERMATOLOGY

## 2022-04-28 PROCEDURE — 3288F PR FALLS RISK ASSESSMENT DOCUMENTED: ICD-10-PCS | Mod: CPTII,S$GLB,, | Performed by: DERMATOLOGY

## 2022-04-28 PROCEDURE — 88305 TISSUE EXAM BY PATHOLOGIST: CPT | Mod: 26,,, | Performed by: PATHOLOGY

## 2022-04-28 PROCEDURE — 1101F PT FALLS ASSESS-DOCD LE1/YR: CPT | Mod: CPTII,S$GLB,, | Performed by: DERMATOLOGY

## 2022-04-28 RX ORDER — KETOCONAZOLE 20 MG/ML
SHAMPOO, SUSPENSION TOPICAL
Qty: 240 ML | Refills: 5 | Status: SHIPPED | OUTPATIENT
Start: 2022-04-28

## 2022-04-28 RX ORDER — TRIAMCINOLONE ACETONIDE 0.25 MG/G
OINTMENT TOPICAL
Qty: 15 G | Refills: 0 | Status: SHIPPED | OUTPATIENT
Start: 2022-04-28 | End: 2023-12-18 | Stop reason: SDUPTHER

## 2022-04-28 NOTE — PROGRESS NOTES
"  Patient Information  Name: Marysol Lyles  : 1935  MRN: [id number]     Referring Physician:  Self   Primary Care Physician:  Aguilar Vickers MD   Date of Visit: 2022      Subjective:     History of Present lllness:    Marysol Lyles is a 86 y.o. female who presents with a chief complaint of moles and lesion.    Patient is here today for a "mole" check.     Pt has a history of moderate sun exposure in the past.   Pt recalls several blistering sunburns in the past: yes  Pt has history of tanning bed use: no  Pt has had moles removed in the past: yes-benign  Pt has personal history of skin cancer: no  Pt has family history of melanoma in first degree relatives: no    Today, patient complains of lesion(s):  Location: chest  Duration: months  Symptoms: irritated by clothing  Relieving factors/Previous treatments: cryo in past    Rash  Location: scalp and groin  Duration: a year or more  Signs/Symptoms: scaly, itchy, a sore on scalp  Relieving factors/Prior treatments: nystatin powder and cortisone-10 cream in groin, has improved since stopping feminine spray, uses dial soap    Clinical documentation obtained by nursing staff reviewed.    Review of Systems   Skin: Positive for itching and rash.       Objective:   Physical Exam   Constitutional: She appears well-developed and well-nourished. No distress.   Neurological: She is alert and oriented to person, place, and time. She is not disoriented.   Psychiatric: She has a normal mood and affect.   Skin:   Areas Examined (abnormalities noted in diagram):   Scalp / Hair Palpated and Inspected  Head / Face Inspection Performed  Neck Inspection Performed  Chest / Axilla Inspection Performed  Abdomen Inspection Performed  Genitals / Buttocks / Groin Inspection Performed  Back Inspection Performed  RUE Inspected  LUE Inspection Performed  RLE Inspected  LLE Inspection Performed                 Diagram Legend     Erythematous scaling macule/papule c/w actinic keratosis       " Vascular papule c/w angioma      Pigmented verrucoid papule/plaque c/w seborrheic keratosis      Yellow umbilicated papule c/w sebaceous hyperplasia      Irregularly shaped tan macule c/w lentigo     1-2 mm smooth white papules consistent with Milia      Movable subcutaneous cyst with punctum c/w epidermal inclusion cyst      Subcutaneous movable cyst c/w pilar cyst      Firm pink to brown papule c/w dermatofibroma      Pedunculated fleshy papule(s) c/w skin tag(s)      Evenly pigmented macule c/w junctional nevus     Mildly variegated pigmented, slightly irregular-bordered macule c/w mildly atypical nevus      Flesh colored to evenly pigmented papule c/w intradermal nevus       Pink pearly papule/plaque c/w basal cell carcinoma      Erythematous hyperkeratotic cursted plaque c/w SCC      Surgical scar with no sign of skin cancer recurrence      Open and closed comedones      Inflammatory papules and pustules      Verrucoid papule consistent consistent with wart     Erythematous eczematous patches and plaques     Dystrophic onycholytic nail with subungual debris c/w onychomycosis     Umbilicated papule    Erythematous-base heme-crusted tan verrucoid plaque consistent with inflamed seborrheic keratosis     Erythematous Silvery Scaling Plaque c/w Psoriasis     See annotation            [] Data reviewed  [] Prior external notes reviewed  [] Independent review of test  [] Management discussed with another provider  [] Independent historian    Assessment / Plan:      Pathology Orders:     Normal Orders This Visit    Specimen to Pathology, Dermatology     Comments:    Number of Specimens:->2  ------------------------->-------------------------  Spec 1 Procedure:->Biopsy  Spec 1 Clinical Impression:->r/o bcc  Spec 1 Source:->left clavicle  ------------------------->-------------------------  Spec 2 Procedure:->Biopsy  Spec 2 Clinical Impression:->r/o ISK vs SCC  Spec 2 Source:->left upper thigh    Questions:    Procedure  Type: Dermatology and skin neoplasms    Number of Specimens: 2    ------------------------: -------------------------    Spec 1 Procedure: Biopsy    Spec 1 Clinical Impression: r/o bcc    Spec 1 Source: left clavicle    ------------------------: -------------------------    Spec 2 Procedure: Biopsy    Spec 2 Clinical Impression: r/o ISK vs SCC    Spec 2 Source: left upper thigh    Release to patient:         Neoplasm of uncertain behavior of skin  -     Specimen to Pathology, Dermatology    Shave biopsy procedure note x 2:  Risk, benefits, and alternatives of biopsy are discussed with the patient, including risk of infection, scar, recurrence, and need for additional treatment of site. The patient agrees to the procedure by verbal consent. The area is marked and prepped with alcohol.  Approximately 1 mL of lidocaine 1% with epinephrine is used for local anesthesia. A sharp blade is used to remove a portion of the lesion. The specimen is sent for pathology. Hemostasis is obtained with aluminum chloride and/or monopolar hyfrecation if needed. The area is then dressed and bandaged. The patient tolerated the procedure well without adverse event. Written instructions on wound care were given and were reviewed with the patient, who is to call for any signs of bleeding or infection. The patient will be notified of the pathology results.    Seborrheic dermatitis  - chronic problem, not at treatment goal  Seborrheic dermatitis is a common skin condition that usually lasts for years. It may be caused by multiple factors, including the yeast that normally lives on our skin, our genes, a cold and dry climate, stress, and a persons overall health.  -     ketoconazole (NIZORAL) 2 % shampoo; Wash scalp with medicated shampoo at least 2x/week. Let sit on scalp at least 5 minutes prior to rinsing  Dispense: 240 mL; Refill: 5    Irritant contact dermatitis due to other agents  - acute, uncomplicated problem  Avoid using fragranced  products in groin area.  -     triamcinolone acetonide 0.025% (KENALOG) 0.025 % Oint; Apply to affected areas of groin BID prn irritation. Do not use for longer than 2 weeks in a row.  Dispense: 15 g; Refill: 0  Side effects from the overuse of topical steroids include thinning of skin, easy tearing/bruising of skin, stretch marks, spider veins, etc. Use the topical steroid no more than 2 days per week if used long-term and/or take breaks from the topical steroid, especially if any of the above side effects are noticed.    Seborrheic keratoses  These are benign, inherited growths without a malignant potential. Reassurance given to patient. No treatment is necessary.    Milia  This is a small, benign cyst. Reassurance provided. No treatment is necessary unless it is symptomatic. These may resolve on their own.    Melanocytic nevi of face  Melanocytic nevi of trunk  Multiple benign-appearing nevi present on exam today. Reassurance provided. Counseled patient to periodically examine moles and return to clinic if any changes in size, shape, or color are noted or if it becomes symptomatic (bleeding, itching, pain, etc).  Recommend using a broad-spectrum, water-resistant sunscreen with SPF of 30 or higher--reapply every 2 hours. Seek shade, wear sun-protective clothing, and perform regular skin self-exams.    Skin cancer screening  Total body skin examination performed today as noted in physical exam. Suspicious lesion(s) were noted and/or biopsied as above.  Recommend using a broad-spectrum, water-resistant sunscreen with SPF of 30 or higher--reapply every 2 hours. Seek shade, wear sun-protective clothing, and perform regular skin self-exams.    Follow up dependent on pathology results.      Gissel Humphrey MD, FAAD  [facility]

## 2022-04-28 NOTE — PATIENT INSTRUCTIONS
Biopsy Wound Care Instructions    Leave the bandage on for 24 hours without getting it wet.   Clean the area once a day with a gentle soap and water, then pat dry and apply Vaseline and a bandaid.  The site should be kept moist with Vaseline at all times to improve healing. Reapply a thick coating as needed. Do not let the site air out or form a scab, as this will delay healing and worsen scarring.  If any bleeding or oozing occurs once you return home, apply firm pressure to the area for 30 minutes straight without peeking. If bleeding continues, call the office immediately.  Please message us via [facility], call us at [phone], or return to the office at any sign of increasing redness, swelling, tenderness, pain, heat, yellow drainage/discharge, or continued bleeding.      Receiving Your Pathology Results    Your pathology results will be released to you on [facility] at the same time that Dr. Humphrey receives them.   Dr. Humphrey will then message you with her interpretation of the results and/or with the plan going forward.  If you do not use [facility] or if your pathology results require more of an explanation, you will receive your results via a phone call.  If 2 weeks go by and you have not received your results, please message us via [facility] or call us at [phone] to inform us.

## 2022-05-03 RX ORDER — NYSTATIN 100000 [USP'U]/G
POWDER TOPICAL 3 TIMES DAILY PRN
Qty: 60 G | Refills: 11 | Status: SHIPPED | OUTPATIENT
Start: 2022-05-03 | End: 2023-07-31 | Stop reason: SDUPTHER

## 2022-05-03 NOTE — TELEPHONE ENCOUNTER
----- Message from Marjorie Quentin sent at 5/3/2022  9:34 AM CDT -----  Regarding: refill  Contact: pt  1. What is the name of the medication you are requesting? Nystop  2. What is the dose? n/a  3. How do you take the medication? Orally, topically, etc? Topical powder  4. How often do you take this medication? n/a  5. Do you need a 30 day or 90 day supply? n/a  6. How many refills are you requesting? n/a  7. What is your preferred pharmacy and location of the pharmacy? [facility]  8. Who can we contact with further questions? The pt at [phone]

## 2022-05-09 LAB
FINAL PATHOLOGIC DIAGNOSIS: NORMAL
GROSS: NORMAL
Lab: NORMAL
MICROSCOPIC EXAM: NORMAL

## 2022-05-09 NOTE — PROGRESS NOTES
Final Pathologic Diagnosis   1. Skin, left clavicle, shave biopsy:   - NEUROFIBROMA.   2.  Skin, left upper thigh, shave biopsy:   - SUPERFICIAL PORTIONS OF A BENIGN VERRUCOUS KERATOSIS WITH FEATURES SUGGESTIVE OF VERRUCA VULGARIS.

## 2022-05-10 ENCOUNTER — CLINICAL SUPPORT (OUTPATIENT)
Dept: REHABILITATION | Facility: HOSPITAL | Age: 87
End: 2022-05-10
Attending: FAMILY MEDICINE
Payer: COMMERCIAL

## 2022-05-10 DIAGNOSIS — M51.36 DDD (DEGENERATIVE DISC DISEASE), LUMBAR: ICD-10-CM

## 2022-05-10 DIAGNOSIS — M25.651 DECREASED RANGE OF RIGHT HIP MOVEMENT: ICD-10-CM

## 2022-05-10 DIAGNOSIS — R29.898 DECREASED STRENGTH OF LOWER EXTREMITY: ICD-10-CM

## 2022-05-10 DIAGNOSIS — R68.89 DECREASED FUNCTIONAL ACTIVITY TOLERANCE: ICD-10-CM

## 2022-05-10 PROCEDURE — 97161 PT EVAL LOW COMPLEX 20 MIN: CPT | Mod: PN

## 2022-05-10 PROCEDURE — 97110 THERAPEUTIC EXERCISES: CPT | Mod: PN

## 2022-05-10 NOTE — PLAN OF CARE
[facility]   Physical Therapy Initial Evaluation     Date: 5/10/2022   Name: Marysol Lyles  Clinic Number: [id number]    Therapy Diagnosis:   Encounter Diagnoses   Name Primary?    DDD (degenerative disc disease), lumbar     Decreased strength of lower extremity     Decreased range of right hip movement     Decreased functional activity tolerance      Physician: Aguilar Vickers MD    Physician Orders: PT Eval and Treat  Medical Diagnosis from Referral: M51.36 (ICD-10-CM) - DDD (degenerative disc disease), lumbar  Evaluation Date: 5/10/2022  Authorization Period Expiration: 4/4/23  Plan of Care Expiration: 7/10/22  Progress Note Due: 6/10/22  Visit # / Visits authorized: (1 / 1 Eval) Need Auth   FOTO: 1 / 3 (5/10/22 - IE)    Precautions: Hx of DAVID (R), Hx of TKA (L), Hx of partial knee arthroplasty (R), HTN, Osteopenia     Time In: 10:45 AM  Time Out: 11:30 AM  Total Appointment Time (timed & untimed codes): 45 minutes    SUBJECTIVE     Date of onset: over the last 6 to 8 months    History of current condition - Marysol reports: that her back and right hip is bothering her but the back is not really bad, just the right hip. She had her right hip replaced about 3 years ago. About 1 year ago, her right hip started hurting more. She did have a fall but did not go to the doctor. She did receive an injection in the hip back in December of 2021 but did not have any relief. They MD told her that it is bursitis. She normally has some pain with walking but not with sitting. She typically uses a cane out in the community but does not use anything at home. She has a walk in shower with grab bars and a bench but normally does not have any difficulty with that. She has a bed rail that she uses to help get out of bed in the morning. She has dealt with back pain for several years and knows her back has arthritis and stenosis. She will typically have more difficulty getting up from a seated position after  several minutes of sitting. Her son lives with her and helps with anything she needs. She has not had a fall in almost 2 years. She does have high blood pressure but that is being managed via medication. She did go to see a PT for urinary incontinence and that has helped - about 3 years ago. She also had knee replacements bilaterally. No numbness or tingling down the legs. No other medical conditions to be concerned with at this time.    Falls: not within the last 2 years    Imaging: MRI studies: 1. There is extensive multilevel degenerative change including degenerative disc and facet disease.  All of the discs are desiccated.  There is a chronic compression deformity of T12.  There is no acute fracture.  There is some degree of disc space narrowing, disc bulge and/or protrusion in addition to facet joint arthropathy contributing to spinal canal, lateral recess and/or foraminal stenosis.  These findings are essentially unchanged when compared to the prior study with the exception of findings at the L3-4 level where there is moderate to marked spinal stenosis, severe right lateral recess stenosis as well as mild left and moderate right foraminal stenosis.  Degenerative changes have mildly progressed at this level.  Please see above complete discussion.     Electronically signed by: Deng Rao MD  Date: 02/14/2019  Time: 14:20    Prior Therapy: yes for her knees and hip as well as urinary incontinence  Social History: lives with their son  Occupation: Retired - likes to garden and use [employer]., likes to read and spend time with family  Prior Level of Function: not having any difficulty with her hip prior to about 1 year ago  Current Level of Function: increased difficulty and pain with walking and sitting for long symptoms, difficulty standing for long periods of time    Pain:  Current 6/10, worst 8/10, best 0/10 (when sitting)  Location: right hip  Description: Aching and grinding in nature  Aggravating  Factors: Sitting, Standing, Walking and Lifting  Easing Factors: rest and sitting    Patients goals: improve mobility, decrease pain, improve leg strength     Medical History:   Past Medical History:   Diagnosis Date    Arthritis     Chronic pain 8/14/2013    Colon polyp     DDD (degenerative disc disease)     DDD (degenerative disc disease)     GERD (gastroesophageal reflux disease)     Hyperlipidemia     Hypertension     Osteopenia 3/2/2016    Primary hyperparathyroidism     Urinary incontinence 4/19/2021    She has urinary incontinence and she has used urogesic and detrol and premarin cream to help with this and it has. She will continue them.       Surgical History:   Marysol Lyles  has a past surgical history that includes Tonsillectomy; Appendectomy; Total knee arthroplasty (Left); Total hip arthroplasty (Right); Colonoscopy w/ biopsies (2/2011); Sleeve Gastroplasty (2/8/12); Partial knee arthroplasty (Right); Lumbar epidural injection (N/A, 12/2019); Phacoemulsification of cataract (Right, 12/2/2020); Intraocular prosthesis insertion (Right, 12/2/2020); Cataract extraction w/  intraocular lens implant (Left, 05/16/2018); Cataract extraction w/  intraocular lens implant (Right, 12/02/2020); Colonoscopy (11/16/2016); Upper gastrointestinal endoscopy; and Colonoscopy (N/A, 4/14/2022).    Medications:   Marysol has a current medication list which includes the following prescription(s): acetaminophen, blood pressure test kit-large, calcium citrate-vitamin d3 315-200 mg, cetirizine, conjugated estrogens, cyanocobalamin (vitamin b-12), cyclobenzaprine, prolia, duloxetine, ferrous gluconate, fluticasone propionate, gabapentin, glucosamine sulfate, ketoconazole, lactobacillus acidophilus, losartan, melatonin, methen-sod phos-meth blue-hyos, metoprolol tartrate, multivitamin, nystatin, tolterodine, triamcinolone acetonide 0.025%, and preservision areds-2.    Allergies:   Review of patient's allergies indicates:    Allergen Reactions    Penicillins Swelling    Formaldehyde Hives    Mercury Hives    Mercury (elemental) Hives    Penicillamine     Adhesive Rash     Other reaction(s): Rash  Other reaction(s): Rash    Adhesive tape-silicones Rash    Dilaudid [hydromorphone (bulk)] Itching    Hydromorphone Itching    Latex, natural rubber Hives        OBJECTIVE     Observation/Posture: noted mild thoracolumbar curvature with convexity to the right. Fair posture with rounded shoulders and slightly forward head.    Gait: non-antalgic gait pattern observed with SPC utilized for balance and stability.     DTR:    Right Left Comment   Patellar (L3-4) 2+ 2+     Achilles (S1) 1+ 1+           Lumbar Range of Motion:     Degrees Pain   Flexion WNL -        Extension WNL    -        Left Side Bending Distal lateral thigh -         Right Side Bending Distal lateral  -         Left rotation    WNL -         Right Rotation    WNL -            Hip range of motion:   Flexion: R = 120 degrees ; L = 120 degrees  IR: R = 20 degrees ; L = 30 degrees  ER: R = 65 degrees ; L = 44 degrees     Lower Extremity Strength  Right LE   Left LE     Knee extension: 5/5 Knee extension: 5/5   Knee flexion: 5/5 Knee flexion: 5/5   Hip flexion: 4/5 Hip flexion: 4/5   Hip extension:  5/5 Hip extension: 5/5   Glute Med (side-lying) 3/5 (compensation) Glute Med: (side-lying) 3+/5 (compensation)   Hip abduction: (seated) 4+/5 Hip abduction: 4+/5   Hip adduction: 5/5 Hip adduction 5/5   Ankle dorsiflexion: 5/5 Ankle dorsiflexion: 5/5   Ankle plantarflexion: 5/5 Ankle plantarflexion: 5/5        Special Tests:  -Repeated Flexion: -  -Repeated Ext: -  -Log Roll Test: - B     Neuro Dynamic Testing:               Sciatic nerve:                                       SLR:    R = -                                      L = -    Joint Mobility: slight decrease in hip range of motion when moving into internal and external rotation.     Palpation: No TTP over the right  hip or the lumbar spine     Sensation: intact B to light touch     Flexibility:               Hamstring Test: R = 90 degrees ; L = 90 degrees      Endurance Assessment:   Evaluation   30 second Chair Rise  (adults > 59 y/o) 11 completed with no arms     <9 reps is indicative of a higher risk for falls in women age 80-84 years        Limitation/Restriction for FOTO Lumbar Spine Survey    Therapist reviewed FOTO scores for Marysol Lyles on 5/10/2022.   FOTO documents entered into [employer] - see Media section.    Limitation Score: 49%       TREATMENT     Total Treatment time (time-based codes) separate from Evaluation: 10 minutes      Marysol received the treatments listed below:      Therapeutic Exercises to develop strength, endurance, ROM, flexibility, posture and core stabilization for 10 minutes including:    Supine Hamstring Stretch 3x10s B + towel  Supine SKTC x10 reps B  Supine LTR x2 min  Supine Pelvic Tilts x10 reps (3 sec holds)  Side-Lying Clamshells x10 reps B  Side-Lying Reverse Clamshells x10 reps B    Possible for Next Session: seated hip IR/ER, shuttle press, recumbent bike, calf stretch    PATIENT EDUCATION AND HOME EXERCISES     Education provided:   - Home Exercise Program Administration and Review  - Post Exercise Soreness  - Maintaining a pain free range of motion with all activities  - Anatomy/physiology of the Hip and Lumbar Spine and the surrounding musculature    Written Home Exercises Provided: yes. Exercises were reviewed and Marysol was able to demonstrate them prior to the end of the session.  Marysol demonstrated good  understanding of the education provided. See EMR under Patient Instructions for exercises provided during therapy sessions.    ASSESSMENT     Marysol is a 86 y.o. female referred to outpatient Physical Therapy with a medical diagnosis of DDD (degenerative disc disease), lumbar. Patient presents with mild scoliosis, decreased hip range of motion, decreased LE strength, decreased  endurance/tolerance to activity, and decreased functional mobility overall. The patient displayed significant glute med weakness on the right when tested. Lumbar range of motion was within normal limits; however, flexion bias was observed, especially with activity. There was no TTP reported along the right hip or lumbar spine. Treatment will focus on improving glute med strength as well as global LE strength. Flexibility, hip mobility, balance, and endurance training will all be important components throughout the patient's course of treatment. Patient will benefit from skilled outpatient Physical Therapy to address the deficits stated above and in the chart below, provide patient /family education, and to maximize patientt's level of independence.     Patient prognosis is Good.     Plan of care discussed with patient: Yes  Patient's spiritual, cultural and educational needs considered and patient is agreeable to the plan of care and goals as stated below:     Anticipated Barriers for therapy: none stated    Medical Necessity is demonstrated by the following  History  Co-morbidities and personal factors that may impact the plan of care Co-morbidities:   Hx of DAVID (R), Hx of TKA (L), Hx of partial knee arthroplasty (R), HTN, Osteopenia    Personal Factors:   age     moderate   Examination  Body Structures and Functions, activity limitations and participation restrictions that may impact the plan of care Body Regions:   back  lower extremities    Body Systems:    gross symmetry  ROM  strength  balance  gait    Participation Restrictions:   Difficulty walking   Difficulty standing for long periods  Difficulty sitting for long periods then getting up    Activity limitations:   Learning and applying knowledge  no deficits    General Tasks and Commands  no deficits    Communication  no deficits    Mobility  lifting and carrying objects  walking    Self care  no deficits    Domestic Life  shopping  cooking  doing house  work (cleaning house, washing dishes, laundry)  assisting others    Interactions/Relationships  family relationships    Life Areas  no deficits    Community and Social Life  community life  recreation and leisure         moderate   Clinical Presentation stable and uncomplicated low   Decision Making/ Complexity Score: low     Goals:  Short Term Goals: 4 weeks   - Patient will demonstrate improved LE strength, especially into hip abduction (glute med) by at least 1/2 grade via MMT for increased stability and support with functional movements.   - Patient will demonstrate improved hip flexion strength by at least 1/2 grade via MTT for increased tolerance to activity.  - Patient will demonstrate improved right hip internal rotation by at least 5 degrees for increased mobility with daily activities.     Long Term Goals: 8 weeks   - Patient will demonstrate improved LE strength, especially into hip abduction (glute med) by at least 1 full grade via MMT for increased stability and support with functional movements.   - Patient will demonstrate improved hip flexion strength by at least 1 full grade via MTT for increased tolerance to activity.  - Patient will demonstrate improved right hip internal rotation by at least 10 degrees for increased mobility with daily activities.   - Patient will be able to get out of bed in the morning independently with minimal to no increase in right hip pain.  - Patient will be able to perform all gardening related activities with minimal to no increase in symptoms for increased ability to perform leisure based activities.  - Patient will demonstrate independence with Home Exercise Program for continued improvements outside the clinical setting.    PLAN   Plan of care Certification: 5/10/2022 to 7/10/22.    Outpatient Physical Therapy 2 times weekly for 8 weeks to include the following interventions: Cervical/Lumbar Traction, Electrical Stimulation IFC/TENS/PREMOD, Gait Training, Manual  Therapy, Moist Heat/ Ice, Neuromuscular Re-ed, Patient Education, Self Care, Therapeutic Activities, Therapeutic Exercise, Ultrasound and Dry Needling (by a certified therapist).     This patient CAN be treated by a PTA.    Possible for Next Session: seated hip IR/ER, shuttle press, recumbent bike, calf stretch    Cait Andrews PT, DPT, Cert. DN      I CERTIFY THE NEED FOR THESE SERVICES FURNISHED UNDER THIS PLAN OF TREATMENT AND WHILE UNDER MY CARE   Physician's comments:     Physician's Signature: ___________________________________________________

## 2022-05-10 NOTE — PROGRESS NOTES
[facility]   Physical Therapy Treatment Note     Name: Marysol Lyles  Clinic Number: [id number]    Therapy Diagnosis:   Encounter Diagnoses   Name Primary?    Decreased strength of lower extremity Yes    Decreased range of right hip movement     Decreased functional activity tolerance      Physician: Aguilar Vickers MD    Visit Date: 5/12/2022    Physician Orders: PT Eval and Treat  Medical Diagnosis from Referral: M51.36 (ICD-10-CM) - DDD (degenerative disc disease), lumbar  Evaluation Date: 5/10/2022  Authorization Period Expiration: 12/31/22  Plan of Care Expiration: 7/10/22  Progress Note Due: 6/10/22  Visit # / Visits authorized: 1 / 20 (1 / 1 Eval)  FOTO: 1 / 3 (5/10/22 - IE)     Precautions: Hx of DAVID (R), Hx of TKA (L), Hx of partial knee arthroplasty (R), HTN, Osteopenia     PTA Visit #: 0 / 5     Time In: 9:15 AM  Time Out: 9:55 AM  Total Billable Time: 40 minutes    SUBJECTIVE     Pt reports: that she was able to try her exercises and did not have any pain with them. Sitting at the computer continues to bother her the most.    She was compliant with home exercise program.  Response to previous treatment: too soon to tell  Functional change: no functional change at this time - first follow up    Pain: 3/10  Location: right hip      OBJECTIVE     Objective Measures updated at progress report unless specified.     Treatment     Marysol received the treatments listed below:      Therapeutic Exercises to develop strength, endurance, ROM, flexibility, posture and core stabilization for 40 minutes including:    Recumbent Bike x5 min (Seat: 4; Level: 1)     -Supine Hamstring Stretch 3x10s B + towel  -Supine SKTC x10 reps B  -Supine LTR x2 min  -Supine Pelvic Tilts x15 reps (3 sec holds)  -Supine Straight Leg Raise x10 reps B  -Supine Hip Bridge x10 reps + YTB around knees  -Side-Lying Clamshells x15 reps B  -Side-Lying Reverse Clamshells x15 reps LLE  -Seated RLE IR over edge of mat  x15  -Seated Hip Adduction with ball x15 reps (3 sec holds)      -Standing Heel and Toe Raises x15 reps B  -Tandem Stance Balance 2x30s B    Patient Education and Home Exercises     Home Exercises Provided and Patient Education Provided     Education provided:   - Home Exercise Program Review  - Post Exercise Soreness  - Maintaining a pain free range of motion with all activities  - Anatomy/physiology of the Hip and Lumbar Spine and the surrounding musculature    Written Home Exercises Provided: yes. Exercises were reviewed and Marysol was able to demonstrate them prior to the end of the session.  Marysol demonstrated good  understanding of the education provided. See EMR under Patient Instructions for exercises provided during therapy sessions    ASSESSMENT     Introduced balance training today - noted RLE to have increased weakness compared to the contralateral side, requiring UE support intermittently. Emphasis was also placed on global hip, glute, and core strengthening combined with flexibility and maintaining a pain free range of motion with all activities. Right hip internal rotators also demonstrated increased weakness with side-lying reverse clamshells - exercise was terminated and was re-introduced to the seated position which was tolerated well without pain. Will continue progress as able next session.    Marysol Is progressing well towards her goals.   Pt prognosis is Good.     Pt will continue to benefit from skilled outpatient physical therapy to address the deficits listed in the problem list box on initial evaluation, provide pt/family education and to maximize pt's level of independence in the home and community environment.     Pt's spiritual, cultural and educational needs considered and pt agreeable to plan of care and goals.     Anticipated barriers to physical therapy: none stated    Goals: Short Term Goals: 4 weeks   - Patient will demonstrate improved LE strength, especially into hip abduction (glute  med) by at least 1/2 grade via MMT for increased stability and support with functional movements. (progressing, not met)  - Patient will demonstrate improved hip flexion strength by at least 1/2 grade via MTT for increased tolerance to activity. (progressing, not met)  - Patient will demonstrate improved right hip internal rotation by at least 5 degrees for increased mobility with daily activities.  (progressing, not met)     Long Term Goals: 8 weeks   - Patient will demonstrate improved LE strength, especially into hip abduction (glute med) by at least 1 full grade via MMT for increased stability and support with functional movements. (progressing, not met)  - Patient will demonstrate improved hip flexion strength by at least 1 full grade via MTT for increased tolerance to activity. (progressing, not met)  - Patient will demonstrate improved right hip internal rotation by at least 10 degrees for increased mobility with daily activities.  (progressing, not met)  - Patient will be able to get out of bed in the morning independently with minimal to no increase in right hip pain. (progressing, not met)  - Patient will be able to perform all gardening related activities with minimal to no increase in symptoms for increased ability to perform leisure based activities. (progressing, not met)  - Patient will demonstrate independence with Home Exercise Program for continued improvements outside the clinical setting.    PLAN     Continue with established POC for improved functional mobility overall.    Cait Andrews, PT, DPT, Cert. DN

## 2022-05-12 ENCOUNTER — CLINICAL SUPPORT (OUTPATIENT)
Dept: REHABILITATION | Facility: HOSPITAL | Age: 87
End: 2022-05-12
Attending: FAMILY MEDICINE
Payer: COMMERCIAL

## 2022-05-12 ENCOUNTER — PATIENT MESSAGE (OUTPATIENT)
Dept: FAMILY MEDICINE | Facility: CLINIC | Age: 87
End: 2022-05-12
Payer: COMMERCIAL

## 2022-05-12 DIAGNOSIS — R29.898 DECREASED STRENGTH OF LOWER EXTREMITY: Primary | ICD-10-CM

## 2022-05-12 DIAGNOSIS — R68.89 DECREASED FUNCTIONAL ACTIVITY TOLERANCE: ICD-10-CM

## 2022-05-12 DIAGNOSIS — M25.651 DECREASED RANGE OF RIGHT HIP MOVEMENT: ICD-10-CM

## 2022-05-12 PROCEDURE — 97110 THERAPEUTIC EXERCISES: CPT | Mod: PN

## 2022-05-16 NOTE — PROGRESS NOTES
[facility]   Physical Therapy Treatment Note     Name: Marysol Lyles  Clinic Number: [id number]    Therapy Diagnosis:   Encounter Diagnoses   Name Primary?    Decreased strength of lower extremity Yes    Decreased range of right hip movement     Decreased functional activity tolerance      Physician: Aguilar Vickers MD    Visit Date: 5/17/2022    Physician Orders: PT Eval and Treat  Medical Diagnosis from Referral: M51.36 (ICD-10-CM) - DDD (degenerative disc disease), lumbar  Evaluation Date: 5/10/2022  Authorization Period Expiration: 12/31/22  Plan of Care Expiration: 7/10/22  Progress Note Due: 6/10/22  Visit # / Visits authorized: 2 / 20 (1 / 1 Eval)  FOTO: 1 / 3 (5/10/22 - IE)     Precautions: Hx of DAVID (R), Hx of TKA (L), Hx of partial knee arthroplasty (R), HTN, Osteopenia     PTA Visit #: 0 / 5     Time In: 1:30 PM  Time Out: 2:15 PM  Total Billable Time: 45 minutes    SUBJECTIVE     Pt reports: that she is doing okay this afternoon. Her hip is not really bothering her right now - she did take two tylenols earlier so that has helped. Only had a little bit of soreness after last session.    She was compliant with home exercise program.  Response to previous treatment: mild soreness that improved over time  Functional change: no functional change at this time    Pain: 0/10  Location: right hip      OBJECTIVE     Objective Measures updated at progress report unless specified.     Treatment     Marysol received the treatments listed below:      Therapeutic Exercises to develop strength, endurance, ROM, flexibility, posture and core stabilization for 45 minutes including:    Recumbent Bike x5 min (Seat: 4; Level: 1)   Shuttle Press x2 min Bilateral Lower Extremities (2 black cords)  Shuttle Press x1 min ULE (1 black cord)    Supine:  Supine Hamstring Stretch 3x10s B + towel  Supine SKTC x10 reps B  Supine LTR x2 min  Supine Pelvic Tilts x15 reps (3 sec holds)  Supine Straight Leg Raise  x10 reps B  Supine Hip Bridge x10 reps + YTB around knees    Side-Lying Clamshells x15 reps B + YTB around knees  Side-Lying Reverse Clamshells x15 reps (LLE only) - RLE avoided due to weakness and compensation    Seated:  Seated RLE IR over edge of mat 2x10 reps  Seated Hip Adduction with ball 2x10 reps (3 sec holds)    Standing:  Standing Heel and Toe Raises 2x10 reps B  Standing Gastroc Stretch 10x10s (Slant Board Level 3)  Tandem Stance Balance 2x30s B  Narrow Stance Balance 2x20s B + airex foam pad    Patient Education and Home Exercises     Home Exercises Provided and Patient Education Provided     Education provided:   - Home Exercise Program Review  - Post Exercise Soreness  - Maintaining a pain free range of motion with all activities  - Anatomy/physiology of the Hip and Lumbar Spine and the surrounding musculature    Written Home Exercises Provided: Patient instructed to cont prior HEP. Exercises were reviewed and Marysol was able to demonstrate them prior to the end of the session.  Marysol demonstrated good  understanding of the education provided. See EMR under Patient Instructions for exercises provided during therapy sessions    ASSESSMENT     Introduced shuttle press this date for increased LE strengthening - specifically involving the quads - no increase in right hip pain reported and no signs of knee valgus was observed. Improved tolerance to seated hip internal rotation; however, fatigue was evident towards end of prescribed reps. Able to increase reps and or intensity of some exercises with good overall tolerance. Balance training will continue to be incorporated as well for increased LE stability.    Marysol Is progressing well towards her goals.   Pt prognosis is Good.     Pt will continue to benefit from skilled outpatient physical therapy to address the deficits listed in the problem list box on initial evaluation, provide pt/family education and to maximize pt's level of independence in the home and  community environment.     Pt's spiritual, cultural and educational needs considered and pt agreeable to plan of care and goals.     Anticipated barriers to physical therapy: none stated    Goals: Short Term Goals: 4 weeks   - Patient will demonstrate improved LE strength, especially into hip abduction (glute med) by at least 1/2 grade via MMT for increased stability and support with functional movements. (progressing, not met)  - Patient will demonstrate improved hip flexion strength by at least 1/2 grade via MTT for increased tolerance to activity. (progressing, not met)  - Patient will demonstrate improved right hip internal rotation by at least 5 degrees for increased mobility with daily activities.  (progressing, not met)     Long Term Goals: 8 weeks   - Patient will demonstrate improved LE strength, especially into hip abduction (glute med) by at least 1 full grade via MMT for increased stability and support with functional movements. (progressing, not met)  - Patient will demonstrate improved hip flexion strength by at least 1 full grade via MTT for increased tolerance to activity. (progressing, not met)  - Patient will demonstrate improved right hip internal rotation by at least 10 degrees for increased mobility with daily activities.  (progressing, not met)  - Patient will be able to get out of bed in the morning independently with minimal to no increase in right hip pain. (progressing, not met)  - Patient will be able to perform all gardening related activities with minimal to no increase in symptoms for increased ability to perform leisure based activities. (progressing, not met)  - Patient will demonstrate independence with Home Exercise Program for continued improvements outside the clinical setting.    PLAN     Continue with established POC for improved functional mobility overall.    Cait Andrews, PT, DPT, Cert. DN

## 2022-05-17 ENCOUNTER — CLINICAL SUPPORT (OUTPATIENT)
Dept: REHABILITATION | Facility: HOSPITAL | Age: 87
End: 2022-05-17
Attending: FAMILY MEDICINE
Payer: COMMERCIAL

## 2022-05-17 DIAGNOSIS — R29.898 DECREASED STRENGTH OF LOWER EXTREMITY: Primary | ICD-10-CM

## 2022-05-17 DIAGNOSIS — M25.651 DECREASED RANGE OF RIGHT HIP MOVEMENT: ICD-10-CM

## 2022-05-17 DIAGNOSIS — R68.89 DECREASED FUNCTIONAL ACTIVITY TOLERANCE: ICD-10-CM

## 2022-05-17 PROCEDURE — 97110 THERAPEUTIC EXERCISES: CPT | Mod: PN

## 2022-05-17 NOTE — PROGRESS NOTES
[facility]   Physical Therapy Treatment Note     Name: Marysol Lyles  Clinic Number: [id number]    Therapy Diagnosis:   Encounter Diagnoses   Name Primary?    Decreased strength of lower extremity Yes    Decreased range of right hip movement     Decreased functional activity tolerance      Physician: Aguilar Vickers MD    Visit Date: 5/19/2022    Physician Orders: PT Eval and Treat  Medical Diagnosis from Referral: M51.36 (ICD-10-CM) - DDD (degenerative disc disease), lumbar  Evaluation Date: 5/10/2022  Authorization Period Expiration: 12/31/22  Plan of Care Expiration: 7/10/22  Progress Note Due: 6/10/22  Visit # / Visits authorized: 3 / 20 (1 / 1 Eval)  FOTO: 1 / 3 (5/10/22 - IE)     Precautions: Hx of DAVID (R), Hx of TKA (L), Hx of partial knee arthroplasty (R), HTN, Osteopenia     PTA Visit #: 0 / 5     Time In: 11:30 AM  Time Out: 12:15 PM  Total Billable Time: 45 minutes    SUBJECTIVE     Pt reports: she did a lot over the last two days and is really hurting today. She didn't take Tylenol today. She felt fine after last visit    She was compliant with home exercise program.  Response to previous treatment: mild soreness that improved over time  Functional change: no functional change at this time    Pain: 8/10  Location: right hip      OBJECTIVE     Objective Measures updated at progress report unless specified.     Treatment     Marysol received the treatments listed below:      Therapeutic Exercises to develop strength, endurance, ROM, flexibility, posture and core stabilization for 45 minutes including:    Recumbent Bike x5 min (Seat: 4; Level: 1)   Shuttle Press x2 min Bilateral Lower Extremities (2 black cords)  Shuttle Press x1 min ULE (1 black cord)    Supine:  Supine Hamstring Stretch 3x10s B + towel  Supine SKTC x10 reps B  Supine LTR x2 min (NP today, time)  Supine Pelvic Tilts x15 reps (3 sec holds) (NP today, time)  Supine Straight Leg Raise x10 reps B-Supine Hip Bridge x10  reps + YTB around knees  Side-Lying Clamshells x15 reps B + YTB around knees  Side-Lying Reverse Clamshells x15 reps (LLE only)     Seated:  Seated RLE IR over edge of mat 2x10 reps  Seated Hip Adduction with ball 2x10 reps (3 sec holds)    Standing:  Standing Heel and Toe Raises 2x10 reps B  Standing Gastroc Stretch 10x10s (Slant Board Level 3)  Tandem Stance Balance 2x30s B  Narrow Stance Balance 2x20s B + airex foam pad    Patient Education and Home Exercises     Home Exercises Provided and Patient Education Provided     Education provided:   - Home Exercise Program Review  - Post Exercise Soreness  - Maintaining a pain free range of motion with all activities  - Anatomy/physiology of the Hip and Lumbar Spine and the surrounding musculature    Written Home Exercises Provided: Patient instructed to cont prior HEP. Exercises were reviewed and Marysol was able to demonstrate them prior to the end of the session.  Marysol demonstrated good  understanding of the education provided. See EMR under Patient Instructions for exercises provided during therapy sessions    ASSESSMENT     Pt tolerated exercises well. Noted slight discomfort with R Hip IR but was able to complete the exercise as prescribed. Pt demonstrated slight improvement of active range of motion in R hip with side-lying IR. Will continue to progress exercises to strengthen LE and improve balance.     Marysol Is progressing well towards her goals.   Pt prognosis is Good.     Pt will continue to benefit from skilled outpatient physical therapy to address the deficits listed in the problem list box on initial evaluation, provide pt/family education and to maximize pt's level of independence in the home and community environment.     Pt's spiritual, cultural and educational needs considered and pt agreeable to plan of care and goals.     Anticipated barriers to physical therapy: none stated    Goals: Short Term Goals: 4 weeks   - Patient will demonstrate improved LE  strength, especially into hip abduction (glute med) by at least 1/2 grade via MMT for increased stability and support with functional movements. (progressing, not met)  - Patient will demonstrate improved hip flexion strength by at least 1/2 grade via MTT for increased tolerance to activity. (progressing, not met)  - Patient will demonstrate improved right hip internal rotation by at least 5 degrees for increased mobility with daily activities.  (progressing, not met)     Long Term Goals: 8 weeks   - Patient will demonstrate improved LE strength, especially into hip abduction (glute med) by at least 1 full grade via MMT for increased stability and support with functional movements. (progressing, not met)  - Patient will demonstrate improved hip flexion strength by at least 1 full grade via MTT for increased tolerance to activity. (progressing, not met)  - Patient will demonstrate improved right hip internal rotation by at least 10 degrees for increased mobility with daily activities.  (progressing, not met)  - Patient will be able to get out of bed in the morning independently with minimal to no increase in right hip pain. (progressing, not met)  - Patient will be able to perform all gardening related activities with minimal to no increase in symptoms for increased ability to perform leisure based activities. (progressing, not met)  - Patient will demonstrate independence with Home Exercise Program for continued improvements outside the clinical setting.    PLAN     Continue with established POC for improved functional mobility overall.    Cait Andrews, PT, DPT, Cert. DN

## 2022-05-19 ENCOUNTER — CLINICAL SUPPORT (OUTPATIENT)
Dept: REHABILITATION | Facility: HOSPITAL | Age: 87
End: 2022-05-19
Attending: FAMILY MEDICINE
Payer: COMMERCIAL

## 2022-05-19 DIAGNOSIS — M25.651 DECREASED RANGE OF RIGHT HIP MOVEMENT: ICD-10-CM

## 2022-05-19 DIAGNOSIS — R68.89 DECREASED FUNCTIONAL ACTIVITY TOLERANCE: ICD-10-CM

## 2022-05-19 DIAGNOSIS — R29.898 DECREASED STRENGTH OF LOWER EXTREMITY: Primary | ICD-10-CM

## 2022-05-19 PROCEDURE — 97110 THERAPEUTIC EXERCISES: CPT | Mod: PN

## 2022-05-20 NOTE — PROGRESS NOTES
"[facility]   Physical Therapy Treatment Note     Name: Marysol Lyles  Clinic Number: [id number]    Therapy Diagnosis:   Encounter Diagnoses   Name Primary?    Decreased strength of lower extremity Yes    Decreased range of right hip movement     Decreased functional activity tolerance      Physician: Aguilar Vickers MD    Visit Date: 5/24/2022    Physician Orders: PT Eval and Treat  Medical Diagnosis from Referral: M51.36 (ICD-10-CM) - DDD (degenerative disc disease), lumbar  Evaluation Date: 5/10/2022  Authorization Period Expiration: 12/31/22  Plan of Care Expiration: 7/10/22  Progress Note Due: 6/10/22  Visit # / Visits authorized: 4 / 20 (1 / 1 Eval)  FOTO: 1 / 3 (5/10/22 - IE)     Precautions: Hx of DAVID (R), Hx of TKA (L), Hx of partial knee arthroplasty (R), HTN, Osteopenia     PTA Visit #: 0 / 5     Time In: 1:30 PM  Time Out: 2:15 PM  Total Billable Time: 45 minutes    SUBJECTIVE     Pt reports: she did too much after the last session. She had a lot of errands to run yesterday and "just over did it."     She was compliant with home exercise program.  Response to previous treatment: mild soreness that improved over time  Functional change: no functional change at this time    Pain: 3/10  Location: right hip      OBJECTIVE     Objective Measures updated at progress report unless specified.     Treatment     Marysol received the treatments listed below:      Therapeutic Exercises to develop strength, endurance, ROM, flexibility, posture and core stabilization for 45 minutes including:    -Recumbent Bike x5 min (Seat: 4; Level: 1)   Shuttle Press x2 min Bilateral Lower Extremities (2 black cords) (NP today, time)  Shuttle Press x1 min ULE (1 black cord) (NP today, time)    Supine:  -Hamstring Stretch 3x10s B + towel  -SKTC x10 reps B  -LTR x2 min  -Pelvic Tilts x15 reps (3 sec holds)   -Straight Leg Raise x10 reps B  -Hip Bridge x15 reps + YTB around knees    Side-Lying:  -Clamshells x15 " "reps B + YTB around knees  -Reverse Clamshells x15 reps (LLE only)     Seated:  -IR over edge of mat 2x10 reps (RLE only)  -Hip Adduction with ball 2x10 reps (3 sec holds)    Standing:  -Standing Heel and Toe Raises 2x10 reps B  Hip abduction B (NP today, inc sxs)  -Gastroc Stretch 10x10s (Slant Board Level 3)  -Tandem Stance Balance 2x30s B +airex foam pad  -Narrow Stance Balance 2x30s B + airex foam pad  -Step ups 6" x10 each B    Patient Education and Home Exercises     Home Exercises Provided and Patient Education Provided     Education provided:   - Home Exercise Program Review  - Post Exercise Soreness  - Maintaining a pain free range of motion with all activities  - Anatomy/physiology of the Hip and Lumbar Spine and the surrounding musculature    Written Home Exercises Provided: Patient instructed to cont prior HEP. Exercises were reviewed and Marysol was able to demonstrate them prior to the end of the session.  Marysol demonstrated good  understanding of the education provided. See EMR under Patient Instructions for exercises provided during therapy sessions    ASSESSMENT     Standing hip abduction was attempted today but was limited by pain in the right hip - ultimately the exercise was terminated. Balance exercises were progressed to further challenge ankle and hip strategy. Step ups were also introduced to address functional hip mobility and LE strength. Focus will remain on hip and LE strengthening with intermittent balance training.    Marysol Is progressing well towards her goals.   Pt prognosis is Good.     Pt will continue to benefit from skilled outpatient physical therapy to address the deficits listed in the problem list box on initial evaluation, provide pt/family education and to maximize pt's level of independence in the home and community environment.     Pt's spiritual, cultural and educational needs considered and pt agreeable to plan of care and goals.     Anticipated barriers to physical therapy: " none stated    Goals: Short Term Goals: 4 weeks   - Patient will demonstrate improved LE strength, especially into hip abduction (glute med) by at least 1/2 grade via MMT for increased stability and support with functional movements. (progressing, not met)  - Patient will demonstrate improved hip flexion strength by at least 1/2 grade via MTT for increased tolerance to activity. (progressing, not met)  - Patient will demonstrate improved right hip internal rotation by at least 5 degrees for increased mobility with daily activities.  (progressing, not met)     Long Term Goals: 8 weeks   - Patient will demonstrate improved LE strength, especially into hip abduction (glute med) by at least 1 full grade via MMT for increased stability and support with functional movements. (progressing, not met)  - Patient will demonstrate improved hip flexion strength by at least 1 full grade via MTT for increased tolerance to activity. (progressing, not met)  - Patient will demonstrate improved right hip internal rotation by at least 10 degrees for increased mobility with daily activities.  (progressing, not met)  - Patient will be able to get out of bed in the morning independently with minimal to no increase in right hip pain. (progressing, not met)  - Patient will be able to perform all gardening related activities with minimal to no increase in symptoms for increased ability to perform leisure based activities. (progressing, not met)  - Patient will demonstrate independence with Home Exercise Program for continued improvements outside the clinical setting.    PLAN     Continue with established POC for improved functional mobility overall.    Cait Andrews, PT, DPT, Cert. DN

## 2022-05-24 ENCOUNTER — CLINICAL SUPPORT (OUTPATIENT)
Dept: REHABILITATION | Facility: HOSPITAL | Age: 87
End: 2022-05-24
Attending: FAMILY MEDICINE
Payer: COMMERCIAL

## 2022-05-24 DIAGNOSIS — R68.89 DECREASED FUNCTIONAL ACTIVITY TOLERANCE: ICD-10-CM

## 2022-05-24 DIAGNOSIS — R29.898 DECREASED STRENGTH OF LOWER EXTREMITY: Primary | ICD-10-CM

## 2022-05-24 DIAGNOSIS — M25.651 DECREASED RANGE OF RIGHT HIP MOVEMENT: ICD-10-CM

## 2022-05-24 PROCEDURE — 97110 THERAPEUTIC EXERCISES: CPT | Mod: PN

## 2022-05-25 NOTE — PROGRESS NOTES
[facility]   Physical Therapy Treatment Note     Name: Marysol Lyles  Clinic Number: [id number]    Therapy Diagnosis:   Encounter Diagnoses   Name Primary?    Decreased strength of lower extremity Yes    Decreased range of right hip movement     Decreased functional activity tolerance      Physician: Aguilar Vickers MD    Visit Date: 5/26/2022    Physician Orders: PT Eval and Treat  Medical Diagnosis from Referral: M51.36 (ICD-10-CM) - DDD (degenerative disc disease), lumbar  Evaluation Date: 5/10/2022  Authorization Period Expiration: 12/31/22  Plan of Care Expiration: 7/10/22  Progress Note Due: 6/10/22  Visit # / Visits authorized: 5 / 20 (1 / 1 Eval)  FOTO: 2 / 3 (5/10/22 - IE, 5/26/22)     Precautions: Hx of DAVID (R), Hx of TKA (L), Hx of partial knee arthroplasty (R), HTN, Osteopenia     PTA Visit #: 0 / 5     Time In: 1:00 PM (pt arrived late)  Time Out: 1:30 PM  Total Billable Time: 30 minutes    SUBJECTIVE     Pt reports: that she took a shower this morning and started hurting in the right hip. She was okay after last session with only a little bit of soreness.    She was compliant with home exercise program.  Response to previous treatment: mild soreness that improved over time  Functional change: no functional change at this time    Pain: 5/10  Location: right hip      OBJECTIVE     Objective Measures updated at progress report unless specified.     Limitation/Restriction for FOTO Lumbar Spine Survey     Therapist reviewed FOTO scores for Marysol Lyles on 5/26/2022.   FOTO documents entered into [employer] - see Media section.     Limitation Score: 48%      Treatment     Marysol received the treatments listed below:      Therapeutic Exercises to develop strength, endurance, ROM, flexibility, posture and core stabilization for 30 minutes including:    Recumbent Bike x5 min (Seat: 4; Level: 1) (NP today, time)  Shuttle Press x2 min Bilateral Lower Extremities (2 black cords) (NP today,  "time)  Shuttle Press x1 min ULE (1 black cord) (NP today, time)    Supine:  Hamstring Stretch 3x10s B + towel (NP today, time)  DKTC x10 reps with stability ball   SKTC x10 reps B (NP today, time)  LTR x2 min (NP today, time)  Pelvic Tilts x15 reps (3 sec holds) + BKFO with YTB around knees  Straight Leg Raise x10 reps B  Hip Bridge x15 reps + YTB around knees    Side-Lying:  Clamshells 2x10 reps B + YTB around knees  Reverse Clamshells 2x10 reps (LLE only)     Seated:  IR over edge of mat 2x10 reps (RLE only)  Hip Adduction with ball 2x10 reps (3 sec holds)    Standing:  Standing Heel and Toe Raises 2x10 reps B  (NP today, inc sxs)  Gastroc Stretch 10x10s (Slant Board Level 3)  Tandem Stance Balance 2x30s B +airex foam pad  Narrow Stance Balance 2x30s B + airex foam pad + head turns/nods  Step ups 6" x10 each B (NP today)    Patient Education and Home Exercises     Home Exercises Provided and Patient Education Provided     Education provided:   - Home Exercise Program Review  - Post Exercise Soreness  - Maintaining a pain free range of motion with all activities  - Anatomy/physiology of the Hip and Lumbar Spine and the surrounding musculature    Written Home Exercises Provided: Patient instructed to cont prior HEP. Exercises were reviewed and Marysol was able to demonstrate them prior to the end of the session.  Marysol demonstrated good  understanding of the education provided. See EMR under Patient Instructions for exercises provided during therapy sessions    ASSESSMENT     Treatment limited this date due to time constraints and increased right hip pain. Intermittent UE assist required in order to maintain stability during balance training. Emphasis also placed on proper TA activation with all activities to help protect and stabilize the lumbar spine and pelvic girdle. Able to increase reps or intensity of some exercises with good tolerance. Encouraged patient to continue performing Home Exercise Program.    Marysol Is " progressing well towards her goals.   Pt prognosis is Good.     Pt will continue to benefit from skilled outpatient physical therapy to address the deficits listed in the problem list box on initial evaluation, provide pt/family education and to maximize pt's level of independence in the home and community environment.     Pt's spiritual, cultural and educational needs considered and pt agreeable to plan of care and goals.     Anticipated barriers to physical therapy: none stated    Goals: Short Term Goals: 4 weeks   - Patient will demonstrate improved LE strength, especially into hip abduction (glute med) by at least 1/2 grade via MMT for increased stability and support with functional movements. (progressing, not met)  - Patient will demonstrate improved hip flexion strength by at least 1/2 grade via MTT for increased tolerance to activity. (progressing, not met)  - Patient will demonstrate improved right hip internal rotation by at least 5 degrees for increased mobility with daily activities.  (progressing, not met)     Long Term Goals: 8 weeks   - Patient will demonstrate improved LE strength, especially into hip abduction (glute med) by at least 1 full grade via MMT for increased stability and support with functional movements. (progressing, not met)  - Patient will demonstrate improved hip flexion strength by at least 1 full grade via MTT for increased tolerance to activity. (progressing, not met)  - Patient will demonstrate improved right hip internal rotation by at least 10 degrees for increased mobility with daily activities.  (progressing, not met)  - Patient will be able to get out of bed in the morning independently with minimal to no increase in right hip pain. (progressing, not met)  - Patient will be able to perform all gardening related activities with minimal to no increase in symptoms for increased ability to perform leisure based activities. (progressing, not met)  - Patient will demonstrate  independence with Home Exercise Program for continued improvements outside the clinical setting.    PLAN     Continue with established POC for improved functional mobility overall.    Cait Andrews, PT, DPT, Cert. DN

## 2022-05-26 ENCOUNTER — CLINICAL SUPPORT (OUTPATIENT)
Dept: REHABILITATION | Facility: HOSPITAL | Age: 87
End: 2022-05-26
Attending: FAMILY MEDICINE
Payer: COMMERCIAL

## 2022-05-26 ENCOUNTER — PATIENT MESSAGE (OUTPATIENT)
Dept: FAMILY MEDICINE | Facility: CLINIC | Age: 87
End: 2022-05-26
Payer: COMMERCIAL

## 2022-05-26 DIAGNOSIS — M25.651 DECREASED RANGE OF RIGHT HIP MOVEMENT: ICD-10-CM

## 2022-05-26 DIAGNOSIS — R29.898 DECREASED STRENGTH OF LOWER EXTREMITY: Primary | ICD-10-CM

## 2022-05-26 DIAGNOSIS — R68.89 DECREASED FUNCTIONAL ACTIVITY TOLERANCE: ICD-10-CM

## 2022-05-26 PROCEDURE — 97110 THERAPEUTIC EXERCISES: CPT | Mod: PN

## 2022-05-27 NOTE — PROGRESS NOTES
"[facility]   Physical Therapy Treatment Note     Name: Marysol Lyles  Clinic Number: [id number]    Therapy Diagnosis:   Encounter Diagnoses   Name Primary?    Decreased strength of lower extremity Yes    Decreased range of right hip movement     Decreased functional activity tolerance      Physician: Aguilar Vickers MD    Visit Date: 5/31/2022    Physician Orders: PT Eval and Treat  Medical Diagnosis from Referral: M51.36 (ICD-10-CM) - DDD (degenerative disc disease), lumbar  Evaluation Date: 5/10/2022  Authorization Period Expiration: 12/31/22  Plan of Care Expiration: 7/10/22  Progress Note Due: 6/10/22  Visit # / Visits authorized: 6 / 20 (1 / 1 Eval)  FOTO: 2 / 3 (5/10/22 - IE, 5/26/22)     Precautions: Hx of DAVID (R), Hx of TKA (L), Hx of partial knee arthroplasty (R), HTN, Osteopenia     PTA Visit #: 0 / 5     Time In: 11:35 AM (pt arrived late)  Time Out: 12:15 PM  Total Billable Time: 40 minutes    SUBJECTIVE     Pt reports: she's "a little hurty today." Her hip is bothering her. But she can tell that things are better compared to when she first started therapy.    She was compliant with home exercise program.  Response to previous treatment: mild soreness that improved over time  Functional change: no functional change at this time    Pain: 3/10  Location: right hip      OBJECTIVE     Objective Measures updated at progress report unless specified.     Treatment     Marysol received the treatments listed below:      Therapeutic Exercises to develop strength, endurance, ROM, flexibility, posture and core stabilization for 40 minutes including:    Recumbent Bike x5 min (Seat: 4; Level: 1)   Shuttle Press x2 min Bilateral Lower Extremities (2 black cords) (NP today, time)  Shuttle Press x1 min ULE (1 black cord) (NP today, time)    Supine:  Hamstring Stretch 3x10s B + towel   DKTC x10 reps with stability ball   SKTC x10 reps B  -LTR x2 min   Pelvic Tilts x15 reps (3 sec holds) + BKFO " "with YTB around knees  -Straight Leg Raise 2x10 reps B  -Hip Bridge x15 reps + YTB around knees    Side-Lying:  -Clamshells 2x10 reps B + YTB around knees  -Reverse Clamshells 2x10 reps (LLE only)     Seated:  -IR over edge of mat 2x10 reps YTB (RLE only)  -Hip Adduction with ball 2x10 reps (3 sec holds)    Standing:  -Standing Heel and Toe Raises 2x10 reps B  -Gastroc Stretch 10x10s (Slant Board Level 3)  -Tandem Stance Balance 2x30s B +airex foam pad  -Narrow Stance Balance 2x30s B + airex foam pad + head turns/nods  Step ups 6" x10 each B (NP today)   -Side steps on cross walk no UE support x1 lap     Patient Education and Home Exercises     Home Exercises Provided and Patient Education Provided     Education provided:   - Home Exercise Program Review  - Post Exercise Soreness  - Maintaining a pain free range of motion with all activities  - Anatomy/physiology of the Hip and Lumbar Spine and the surrounding musculature    Written Home Exercises Provided: Patient instructed to cont prior HEP. Exercises were reviewed and Marysol was able to demonstrate them prior to the end of the session.  Marysol demonstrated good  understanding of the education provided. See EMR under Patient Instructions for exercises provided during therapy sessions    ASSESSMENT     Focus today was on progressing hip strengthening exercises. Side-lying Reverse Clamshells were attempted on the R however, significant weakness was evident; therefore the exercise was terminated and performed in the seated position. Resistance was added to seated hip IR to improve strength and active range of motion. Side steps in the cross walk were introduced - fatigue was evident but appropriate on the right more than the left. Verbal cuing required to correct hip flexor compensation. Will continue to progress as tolerated.     Marysol Is progressing well towards her goals.   Pt prognosis is Good.     Pt will continue to benefit from skilled outpatient physical therapy " to address the deficits listed in the problem list box on initial evaluation, provide pt/family education and to maximize pt's level of independence in the home and community environment.     Pt's spiritual, cultural and educational needs considered and pt agreeable to plan of care and goals.     Anticipated barriers to physical therapy: none stated    Goals: Short Term Goals: 4 weeks   - Patient will demonstrate improved LE strength, especially into hip abduction (glute med) by at least 1/2 grade via MMT for increased stability and support with functional movements. (progressing, not met)  - Patient will demonstrate improved hip flexion strength by at least 1/2 grade via MTT for increased tolerance to activity. (progressing, not met)  - Patient will demonstrate improved right hip internal rotation by at least 5 degrees for increased mobility with daily activities.  (progressing, not met)     Long Term Goals: 8 weeks   - Patient will demonstrate improved LE strength, especially into hip abduction (glute med) by at least 1 full grade via MMT for increased stability and support with functional movements. (progressing, not met)  - Patient will demonstrate improved hip flexion strength by at least 1 full grade via MTT for increased tolerance to activity. (progressing, not met)  - Patient will demonstrate improved right hip internal rotation by at least 10 degrees for increased mobility with daily activities.  (progressing, not met)  - Patient will be able to get out of bed in the morning independently with minimal to no increase in right hip pain. (progressing, not met)  - Patient will be able to perform all gardening related activities with minimal to no increase in symptoms for increased ability to perform leisure based activities. (progressing, not met)  - Patient will demonstrate independence with Home Exercise Program for continued improvements outside the clinical setting.    PLAN     Continue with established POC  for improved functional mobility overall.    Cait Andrews, PT, DPT, Cert. DN

## 2022-05-31 ENCOUNTER — CLINICAL SUPPORT (OUTPATIENT)
Dept: REHABILITATION | Facility: HOSPITAL | Age: 87
End: 2022-05-31
Attending: FAMILY MEDICINE
Payer: COMMERCIAL

## 2022-05-31 DIAGNOSIS — M25.651 DECREASED RANGE OF RIGHT HIP MOVEMENT: ICD-10-CM

## 2022-05-31 DIAGNOSIS — R29.898 DECREASED STRENGTH OF LOWER EXTREMITY: Primary | ICD-10-CM

## 2022-05-31 DIAGNOSIS — R68.89 DECREASED FUNCTIONAL ACTIVITY TOLERANCE: ICD-10-CM

## 2022-05-31 PROCEDURE — 97110 THERAPEUTIC EXERCISES: CPT | Mod: PN

## 2022-06-01 NOTE — PROGRESS NOTES
[facility]   Physical Therapy Treatment Note     Name: Marysol Lyles  Clinic Number: [id number]    Therapy Diagnosis:   Encounter Diagnoses   Name Primary?    Decreased strength of lower extremity Yes    Decreased range of right hip movement     Decreased functional activity tolerance      Physician: Aguilar Vickers MD    Visit Date: 6/2/2022    Physician Orders: PT Eval and Treat  Medical Diagnosis from Referral: M51.36 (ICD-10-CM) - DDD (degenerative disc disease), lumbar  Evaluation Date: 5/10/2022  Authorization Period Expiration: 12/31/22  Plan of Care Expiration: 7/10/22  Progress Note Due: 6/10/22  Visit # / Visits authorized: 7 / 20 (1 / 1 Eval)  FOTO: 2 / 3 (5/10/22 - IE, 5/26/22)    PTA Visit #: 1 / 5      Precautions: Hx of DAVID (R), Hx of TKA (L), Hx of partial knee arthroplasty (R), HTN, Osteopenia     Time In: 1135 AM  Time Out: 1215 PM  Total Billable Time: 40 minutes    SUBJECTIVE     Pt reports: she has no pain today.    She was compliant with home exercise program.  Response to previous treatment: mild soreness that improved over time  Functional change: no functional change at this time    Pain: 0/10  Location: right hip      OBJECTIVE     Objective Measures updated at progress report unless specified.     Treatment     Marysol received the treatments listed below:      Therapeutic Exercises to develop strength, endurance, ROM, flexibility, posture and core stabilization for 40 minutes including:    Recumbent Bike x5 min (Seat: 4; Level: 1)   Shuttle Press x2 min Bilateral Lower Extremities (2 black cords) (NP today, time)  Shuttle Press x1 min ULE (1 black cord) (NP today, time)    Supine:  Hamstring Stretch 3x10s B + towel   DKTC x10 reps with stability ball   SKTC x10 reps B  LTR x2 min   Pelvic Tilts x15 reps + BKFO with YTB around knees  Hip Bridge x15 reps + YTB around knees  Straight Leg Raise 2x10 reps B    Side-Lying:  Clamshells 2x10 reps B + YTB around  "knees  Reverse Clamshells 2x10 reps B    Seated:  IR over edge of mat 2x10 reps YTB (RLE only) (NP today - side lying)  Hip Adduction with ball 2x10 reps (3 sec holds)    Standing:  Standing Heel and Toe Raises 2x10 reps B  Gastroc Stretch 10x10s (Slant Board Level 3)   Tandem Stance Balance 2x30s B +airex foam pad  Narrow Stance Balance 2x30s B + airex foam pad + head turns/nods  Step ups 6" x10 each B (NP today)   Side steps on cross walk no UE support x1 lap (NP today)    Patient Education and Home Exercises     Education provided:   - Home Exercise Program Review  - Post Exercise Soreness  - Maintaining a pain free range of motion with all activities  - Anatomy/physiology of the Hip and Lumbar Spine and the surrounding musculature    Written Home Exercises Provided: Patient instructed to cont prior HEP. Exercises were reviewed and Marysol was able to demonstrate them prior to the end of the session.  Marysol demonstrated good  understanding of the education provided. See EMR under Patient Instructions for exercises provided during therapy sessions    ASSESSMENT     Patient did well at today's session, she was able to complete reverse clamshells on right lower extremity with minimal difficulty. She still has intermittent LOB requiring upper extremity assistance correct with both balance exercises.    Marysol Is progressing well towards her goals.   Pt prognosis is Good.     Pt will continue to benefit from skilled outpatient physical therapy to address the deficits listed in the problem list box on initial evaluation, provide pt/family education and to maximize pt's level of independence in the home and community environment.     Pt's spiritual, cultural and educational needs considered and pt agreeable to plan of care and goals.     Anticipated barriers to physical therapy: none stated    Goals: Short Term Goals: 4 weeks   - Patient will demonstrate improved LE strength, especially into hip abduction (glute med) by at " least 1/2 grade via MMT for increased stability and support with functional movements. (progressing, not met)  - Patient will demonstrate improved hip flexion strength by at least 1/2 grade via MTT for increased tolerance to activity. (progressing, not met)  - Patient will demonstrate improved right hip internal rotation by at least 5 degrees for increased mobility with daily activities.  (progressing, not met)     Long Term Goals: 8 weeks   - Patient will demonstrate improved LE strength, especially into hip abduction (glute med) by at least 1 full grade via MMT for increased stability and support with functional movements. (progressing, not met)  - Patient will demonstrate improved hip flexion strength by at least 1 full grade via MTT for increased tolerance to activity. (progressing, not met)  - Patient will demonstrate improved right hip internal rotation by at least 10 degrees for increased mobility with daily activities.  (progressing, not met)  - Patient will be able to get out of bed in the morning independently with minimal to no increase in right hip pain. (progressing, not met)  - Patient will be able to perform all gardening related activities with minimal to no increase in symptoms for increased ability to perform leisure based activities. (progressing, not met)  - Patient will demonstrate independence with Home Exercise Program for continued improvements outside the clinical setting.    PLAN     Continue with established POC for improved functional mobility overall.    Simba Gordillo, PTA

## 2022-06-02 ENCOUNTER — CLINICAL SUPPORT (OUTPATIENT)
Dept: REHABILITATION | Facility: HOSPITAL | Age: 87
End: 2022-06-02
Attending: FAMILY MEDICINE
Payer: COMMERCIAL

## 2022-06-02 DIAGNOSIS — M25.651 DECREASED RANGE OF RIGHT HIP MOVEMENT: ICD-10-CM

## 2022-06-02 DIAGNOSIS — R29.898 DECREASED STRENGTH OF LOWER EXTREMITY: Primary | ICD-10-CM

## 2022-06-02 DIAGNOSIS — R68.89 DECREASED FUNCTIONAL ACTIVITY TOLERANCE: ICD-10-CM

## 2022-06-02 PROCEDURE — 97110 THERAPEUTIC EXERCISES: CPT | Mod: PN,CQ

## 2022-06-02 NOTE — PROGRESS NOTES
[facility]   Physical Therapy Treatment Note     Name: Marysol Lyles  Clinic Number: [id number]    Therapy Diagnosis:   Encounter Diagnoses   Name Primary?    Decreased strength of lower extremity Yes    Decreased range of right hip movement     Decreased functional activity tolerance      Physician: Aguilar Vickers MD    Visit Date: 6/6/2022    Physician Orders: PT Eval and Treat  Medical Diagnosis from Referral: M51.36 (ICD-10-CM) - DDD (degenerative disc disease), lumbar  Evaluation Date: 5/10/2022  Authorization Period Expiration: 12/31/22  Plan of Care Expiration: 7/10/22  Progress Note Due: 6/10/22  Visit # / Visits authorized: 8 / 20 (1 / 1 Eval)  FOTO: 2 / 3 (5/10/22 - IE, 5/26/22)    PTA Visit #: 0 / 5      Precautions: Hx of DAVID (R), Hx of TKA (L), Hx of partial knee arthroplasty (R), HTN, Osteopenia     Time In: 2:15 PM  Time Out: 2:55 PM  Total Billable Time: 40 minutes    SUBJECTIVE     Pt reports: that she is doing a little better overall. She had a busy weekend with the family. Walking from the car into the clinic increased her pain slightly but she is not having any right at this moment. She went to [facility] this morning and participated in a wellness program that takes place one time per year - did a lot of walking.     She was compliant with home exercise program.  Response to previous treatment: mild soreness that improved over time  Functional change: no functional change at this time, easier time getting in and out of chairs/toilet at home    Pain: 0/10  Location: right hip      OBJECTIVE     Objective Measures updated at progress report unless specified.     Treatment     Marysol received the treatments listed below:      Therapeutic Exercises to develop strength, endurance, ROM, flexibility, posture and core stabilization for 40 minutes including:    -Recumbent Bike x5 min (Seat: 4; Level: 1)   -Shuttle Press x2 min Bilateral Lower Extremities (2 black cords)   -Shuttle  "Press x1 min ULE (1 black cord)    Supine:  Hamstring Stretch 3x10s B + towel (NP today - Home Exercise Program)  DKTC x10 reps with stability ball (NP today)  SKTC x10 reps B (NP today - Home Exercise Program)  LTR x2 min (NP today - Home Exercise Program)  -Pelvic Tilts x15 reps + BKFO with YTB around knees  -Hip Bridge x15 reps + YTB around knees  -Straight Leg Raise 2x10 reps B    Side-Lying: (NP today)  Clamshells 2x10 reps B + YTB around knees  Reverse Clamshells 2x10 reps B    Seated:  -IR over edge of mat 2x10 reps YTB (RLE only) (YTB for 10 reps only)  -Hip Adduction with ball 2x10 reps (3 sec holds)    Standing:  -Standing Heel and Toe Raises 2x10 reps B  -Gastroc Stretch 10x10s (Slant Board Level 3)   Tandem Stance Balance 2x30s B +airex foam pad  -Narrow Stance Balance 2x10 reps B + airex foam pad + overhead shoulder flexion with #1 bar + head nods  Step ups 6" x10 each B (NP today)   -Side steps on cross walk no UE support x1 lap + RTB around knees    Patient Education and Home Exercises     Education provided:   - Home Exercise Program Review  - Post Exercise Soreness  - Maintaining a pain free range of motion with all activities  - Anatomy/physiology of the Hip and Lumbar Spine and the surrounding musculature    Written Home Exercises Provided: Patient instructed to cont prior HEP. Exercises were reviewed and Marysol was able to demonstrate them prior to the end of the session.  Marysol demonstrated good  understanding of the education provided. See EMR under Patient Instructions for exercises provided during therapy sessions.    ASSESSMENT     Able to perform side steps in crosswalk with no LOB observed - emphasis on strengthening the glute med musculature. No exacerbation of symptoms/pain during treatment session. Introduced overhead shoulder flexion combined with head nod and narrow stance balance - patient performed well without a true LOB observed. Focus placed on pelvic stability and control with bent " knee fall outs. Will continue to progress as able next session.    Marysol Is progressing well towards her goals.   Pt prognosis is Good.     Pt will continue to benefit from skilled outpatient physical therapy to address the deficits listed in the problem list box on initial evaluation, provide pt/family education and to maximize pt's level of independence in the home and community environment.     Pt's spiritual, cultural and educational needs considered and pt agreeable to plan of care and goals.     Anticipated barriers to physical therapy: none stated    Goals: Short Term Goals: 4 weeks   - Patient will demonstrate improved LE strength, especially into hip abduction (glute med) by at least 1/2 grade via MMT for increased stability and support with functional movements. (progressing, not met)  - Patient will demonstrate improved hip flexion strength by at least 1/2 grade via MTT for increased tolerance to activity. (progressing, not met)  - Patient will demonstrate improved right hip internal rotation by at least 5 degrees for increased mobility with daily activities.  (progressing, not met)     Long Term Goals: 8 weeks   - Patient will demonstrate improved LE strength, especially into hip abduction (glute med) by at least 1 full grade via MMT for increased stability and support with functional movements. (progressing, not met)  - Patient will demonstrate improved hip flexion strength by at least 1 full grade via MTT for increased tolerance to activity. (progressing, not met)  - Patient will demonstrate improved right hip internal rotation by at least 10 degrees for increased mobility with daily activities.  (progressing, not met)  - Patient will be able to get out of bed in the morning independently with minimal to no increase in right hip pain. (progressing, not met)  - Patient will be able to perform all gardening related activities with minimal to no increase in symptoms for increased ability to perform leisure  based activities. (progressing, not met)  - Patient will demonstrate independence with Home Exercise Program for continued improvements outside the clinical setting.    PLAN     Continue with established POC for improved functional mobility overall.    Cait Andrews, PT, DPT, Cert. DN

## 2022-06-06 ENCOUNTER — CLINICAL SUPPORT (OUTPATIENT)
Dept: REHABILITATION | Facility: HOSPITAL | Age: 87
End: 2022-06-06
Attending: FAMILY MEDICINE
Payer: COMMERCIAL

## 2022-06-06 DIAGNOSIS — R68.89 DECREASED FUNCTIONAL ACTIVITY TOLERANCE: ICD-10-CM

## 2022-06-06 DIAGNOSIS — R29.898 DECREASED STRENGTH OF LOWER EXTREMITY: Primary | ICD-10-CM

## 2022-06-06 DIAGNOSIS — M25.651 DECREASED RANGE OF RIGHT HIP MOVEMENT: ICD-10-CM

## 2022-06-06 LAB
CHOL/HDLC RATIO: 27
CHOLESTEROL, TOTAL: 200 MG/DL (ref 100–200)
HDLC SERPL-MCNC: 75 MG/DL
LDLC SERPL CALC-MCNC: 105 MG/DL
TRIGL SERPL-MCNC: 101 MG/DL
VLDLC SERPL-MCNC: 20 MG/DL (ref 5–40)

## 2022-06-06 PROCEDURE — 97110 THERAPEUTIC EXERCISES: CPT | Mod: PN

## 2022-06-06 NOTE — PROGRESS NOTES
[facility]   Physical Therapy Treatment Note     Name: Marysol Lyles  Clinic Number: [id number]    Therapy Diagnosis:   Encounter Diagnoses   Name Primary?    Decreased strength of lower extremity Yes    Decreased range of right hip movement     Decreased functional activity tolerance      Physician: Aguilar Vickers MD    Visit Date: 6/9/2022    Physician Orders: PT Eval and Treat  Medical Diagnosis from Referral: M51.36 (ICD-10-CM) - DDD (degenerative disc disease), lumbar  Evaluation Date: 5/10/2022  Authorization Period Expiration: 12/31/22  Plan of Care Expiration: 7/10/22  Progress Note Due: 6/10/22  Visit # / Visits authorized: 9 / 20 (1 / 1 Eval)  FOTO: 2 / 3 (5/10/22 - IE, 5/26/22)    PTA Visit #: 0 / 5      Precautions: Hx of DAVID (R), Hx of TKA (L), Hx of partial knee arthroplasty (R), HTN, Osteopenia     Time In: 12:50 PM (pt arrived late)  Time Out: 1:30 PM  Total Billable Time: 40 minutes    SUBJECTIVE     Pt reports: that she is doing pretty good this afternoon. Anytime she goes to get up from sitting, she will have a short increase in her symptoms but then after walking a few minutes it will go away. Continues to have pain/discomfort with getting in/out of the car. She is starting to notice improvements in her leg strength. Having any easier time getting in and out of the chair/bed.    She was compliant with home exercise program.  Response to previous treatment: mild soreness that improved over time  Functional change: no functional change at this time, easier time getting in and out of chairs/toilet at home    Pain: 0/10  Location: right hip      OBJECTIVE     Objective Measures updated at progress report unless specified.     Treatment     Marysol received the treatments listed below:      Therapeutic Exercises to develop strength, endurance, ROM, flexibility, posture and core stabilization for 40 minutes including:    -Recumbent Bike x5 min (Seat: 4; Level: 2)   -Shuttle  "Press x2 min Bilateral Lower Extremities (2 black cords)   -Shuttle Press x1 min ULE (1 black cord, 1 red cord) - 1 black cord for the RLE  -Standing Mini Squats x10 reps - BHR support    Supine:  Hamstring Stretch 3x10s B + towel (NP today - Home Exercise Program)  DKTC x10 reps with stability ball (NP today)  SKTC x10 reps B (NP today - Home Exercise Program)  LTR x2 min (NP today - Home Exercise Program)  -Pelvic Tilts x15 reps + BKFO with YTB around knees  -Hip Bridge x15 reps + YTB around knees  -Straight Leg Raise 2x10 reps B    Side-Lying:  -Clamshells 2x10 reps B + YTB around knees  -Reverse Clamshells 2x10 reps B    Seated:  -IR over edge of mat 3x10 reps  B  -Hip Adduction with ball 2x10 reps (3 sec holds)    Standing:  -Standing Toe Raises 2x10 reps B  -Gastroc Stretch 10x10s (Slant Board Level 3)   Tandem Stance Balance 2x30s B +airex foam pad  (NP today)   Narrow Stance Balance 2x10 reps B + airex foam pad + overhead shoulder flexion with #1 bar + head nods  (NP today)   Step ups 6" x10 each B (NP today)   -Wall Squats against Stability Ball x5 reps   -Side steps on cross walk no UE support x1 lap + RTB around knees    Patient Education and Home Exercises     Education provided:   - Home Exercise Program Review  - Post Exercise Soreness  - Maintaining a pain free range of motion with all activities  - Anatomy/physiology of the Hip and Lumbar Spine and the surrounding musculature    Written Home Exercises Provided: Patient instructed to cont prior HEP. Exercises were reviewed and Marysol was able to demonstrate them prior to the end of the session.  Marysol demonstrated good  understanding of the education provided. See EMR under Patient Instructions for exercises provided during therapy sessions.    ASSESSMENT     Patient tolerated all therapeutic exercises well today. No adverse reactions reported. Mini squats were introduced - verbal cues for proper form and mechanics, improved with increased reps/time. " Patient will continue to benefit from skilled outpatient PT to work on addressing strength deficits at the hips, glutes, and quads.    Marysol Is progressing well towards her goals.   Pt prognosis is Good.     Pt will continue to benefit from skilled outpatient physical therapy to address the deficits listed in the problem list box on initial evaluation, provide pt/family education and to maximize pt's level of independence in the home and community environment.     Pt's spiritual, cultural and educational needs considered and pt agreeable to plan of care and goals.     Anticipated barriers to physical therapy: none stated    Goals: Short Term Goals: 4 weeks   - Patient will demonstrate improved LE strength, especially into hip abduction (glute med) by at least 1/2 grade via MMT for increased stability and support with functional movements. (progressing, not met)  - Patient will demonstrate improved hip flexion strength by at least 1/2 grade via MTT for increased tolerance to activity. (progressing, not met)  - Patient will demonstrate improved right hip internal rotation by at least 5 degrees for increased mobility with daily activities.  (progressing, not met)     Long Term Goals: 8 weeks   - Patient will demonstrate improved LE strength, especially into hip abduction (glute med) by at least 1 full grade via MMT for increased stability and support with functional movements. (progressing, not met)  - Patient will demonstrate improved hip flexion strength by at least 1 full grade via MTT for increased tolerance to activity. (progressing, not met)  - Patient will demonstrate improved right hip internal rotation by at least 10 degrees for increased mobility with daily activities.  (progressing, not met)  - Patient will be able to get out of bed in the morning independently with minimal to no increase in right hip pain. (progressing, not met)  - Patient will be able to perform all gardening related activities with minimal  to no increase in symptoms for increased ability to perform leisure based activities. (progressing, not met)  - Patient will demonstrate independence with Home Exercise Program for continued improvements outside the clinical setting.    PLAN     Continue with established POC for improved functional mobility overall.    Cait Andrews, PT, DPT, Cert. DN

## 2022-06-09 ENCOUNTER — CLINICAL SUPPORT (OUTPATIENT)
Dept: REHABILITATION | Facility: HOSPITAL | Age: 87
End: 2022-06-09
Attending: FAMILY MEDICINE
Payer: COMMERCIAL

## 2022-06-09 DIAGNOSIS — M25.651 DECREASED RANGE OF RIGHT HIP MOVEMENT: ICD-10-CM

## 2022-06-09 DIAGNOSIS — R29.898 DECREASED STRENGTH OF LOWER EXTREMITY: Primary | ICD-10-CM

## 2022-06-09 DIAGNOSIS — R68.89 DECREASED FUNCTIONAL ACTIVITY TOLERANCE: ICD-10-CM

## 2022-06-09 PROCEDURE — 97110 THERAPEUTIC EXERCISES: CPT | Mod: PN

## 2022-06-10 NOTE — PROGRESS NOTES
[facility]   Physical Therapy Treatment Note & Re-assessment    Name: Marysol Lyles  Clinic Number: [id number]    Therapy Diagnosis:   Encounter Diagnoses   Name Primary?    Decreased strength of lower extremity Yes    Decreased range of right hip movement     Decreased functional activity tolerance      Physician: Aguilar Vickers MD    Visit Date: 6/13/2022    Physician Orders: PT Eval and Treat  Medical Diagnosis from Referral: M51.36 (ICD-10-CM) - DDD (degenerative disc disease), lumbar  Evaluation Date: 5/10/2022  Authorization Period Expiration: 12/31/22  Plan of Care Expiration: 8/13/22 = NEW POC DATE  Progress Note Due: 7/13/22  Visit # / Visits authorized: 10 / 20 (1 / 1 Eval) (re-assessed on 6/13/22)  FOTO: 3 / 3 (5/10/22 - IE, 5/26/22, 6/13/22)    PTA Visit #: 0 / 5      Precautions: Hx of DAVID (R), Hx of TKA (L), Hx of partial knee arthroplasty (R), HTN, Osteopenia     Time In: 3:45 PM  Time Out: 4:25 PM  Total Billable Time: 40 minutes    SUBJECTIVE     Pt reports: that she thinks therapy is helping make her legs stronger but the pain in her hip is still there. She is able to do a lot of things but still has the same pain. She does feel more steady in the house and does not have to use the cane as much. She thinks she over did it this morning with cooking and cleaning.    She was compliant with home exercise program.  Response to previous treatment: mild soreness that improved over time  Functional change: no functional change at this time, easier time getting in and out of chairs/toilet at home    Pain: 5/10  Location: right hip      OBJECTIVE     Objective Measures updated at progress report unless specified.     Observation/Posture: noted mild thoracolumbar curvature with convexity to the right. Fair posture with rounded shoulders and slightly forward head.     Gait: non-antalgic gait pattern observed with SPC utilized for balance and stability.      DTR:    Right Left  Comment   Patellar (L3-4) 2+ 2+     Achilles (S1) 1+ 1+           Lumbar Range of Motion:     Degrees Pain   Flexion WNL -         Extension WNL    -         Left Side Bending Distal lateral thigh -         Right Side Bending Distal lateral  -         Left rotation    WNL -         Right Rotation    WNL -            Hip range of motion:   Flexion: R = 120 degrees ; L = 120 degrees  IR: R = 40 degrees ; L = 30 degrees  ER: R = 65 degrees ; L = 45 degrees     Lower Extremity Strength  Right LE   Left LE     Knee extension: 5/5 Knee extension: 5/5   Knee flexion: 5/5 Knee flexion: 5/5   Hip flexion: 4/5 Hip flexion: 4/5   Hip extension:  5/5 Hip extension: 5/5   Glute Med (side-lying) 3+/5 (compensation) Glute Med: (side-lying) 3+/5 (compensation)   Hip abduction: (seated) 5/5 Hip abduction: 5/5   Hip adduction: 5/5 Hip adduction 5/5   Ankle dorsiflexion: 5/5 Ankle dorsiflexion: 5/5   Ankle plantarflexion: 5/5 Ankle plantarflexion: 5/5         Special Tests:  -Repeated Flexion: -  -Repeated Ext: -  -Log Roll Test: - B     Neuro Dynamic Testing:               Sciatic nerve:                                       SLR:    R = -                                      L = -     Joint Mobility: slight decrease in hip range of motion when moving into internal and external rotation.      Palpation: No TTP over the right hip or the lumbar spine     Sensation: intact B to light touch     Flexibility:               Hamstring Test: R = 90 degrees ; L = 90 degrees        Endurance Assessment:    Evaluation   30 second Chair Rise  (adults > 59 y/o) 11 completed with no arms      <9 reps is indicative of a higher risk for falls in women age 80-84 years         Limitation/Restriction for FOTO Lumbar Spine Survey     Therapist reviewed FOTO scores for Marysol Lyles on 6/13/2022.   FOTO documents entered into [employer] - see Media section.     Limitation Score: 45%      Treatment     Marysol received the treatments listed below:      Therapeutic  "Exercises to develop strength, endurance, ROM, flexibility, posture and core stabilization for 40 minutes including: (billing for re-assessment and therapeutic exercise)    -Recumbent Bike x5 min (Seat: 4; Level: 2)   -Shuttle Press x2 min Bilateral Lower Extremities (2 black cords)   -Shuttle Press x1 min ULE (1 black cord, 1 red cord) - 1 black cord for the RLE  -Standing Mini Squats x10 reps - BHR support (NP today)    Supine:  Hamstring Stretch 3x10s B + towel (NP today - Home Exercise Program)  DKTC x10 reps with stability ball (NP today)  SKTC x10 reps B (NP today - Home Exercise Program)  LTR x2 min (NP today - Home Exercise Program)  -Pelvic Tilts 2x10 reps + BKFO with YTB around knees  -Hip Bridge x15 reps + YTB around knees  -Straight Leg Raise 2x10 reps B    Side-Lying:  -Clamshells 2x10 reps B + YTB around knees  -Reverse Clamshells 2x10 reps B    Seated:  -IR over edge of mat 3x10 reps  B  -Hip Adduction with ball 2x10 reps (3 sec holds) (NP today)     Standing:  -Standing Toe Raises 2x10 reps B  -Gastroc Stretch 10x10s (Slant Board Level 3)   Tandem Stance Balance 2x30s B +airex foam pad  (NP today)   Narrow Stance Balance 2x10 reps B + airex foam pad + overhead shoulder flexion with #1 bar + head nods  (NP today)   Step ups 6" x10 each B (NP today)   -Wall Squats against Stability Ball x5 reps  (NP today)   -Side steps on cross walk no UE support x1 lap + RTB around knees  -Standing Hip Abduction x10 reps B - no resistance    Patient Education and Home Exercises     Education provided:   - Home Exercise Program Review  - Post Exercise Soreness  - Maintaining a pain free range of motion with all activities  - Anatomy/physiology of the Hip and Lumbar Spine and the surrounding musculature    Written Home Exercises Provided: Patient instructed to cont prior HEP. Exercises were reviewed and Marysol was able to demonstrate them prior to the end of the session.  Marysol demonstrated good  understanding of the " education provided. See EMR under Patient Instructions for exercises provided during therapy sessions.    ASSESSMENT     Upon re-assessment, noted patient to demonstrate improved FOTO score indicative of increased stability and safety with functional activity. Patient continues to have pain in the right hip which limits ambulatory distance and overall mobility. Improved R hip internal rotation noted during examination - no increase in symptoms reported. Glute med strength continues to be limited and display weakness which may be a factor in the patient's pain/symptoms. Focus will continue to be placed on glute med strengthening as well as global hip, glute, and core stability. Balance training and flexibility based exercises will also be utilized to further progress the patient. At this point, the patient will continue to benefit from skilled outpatient PT services.    Marysol Is progressing well towards her goals.   Pt prognosis is Good.     Pt will continue to benefit from skilled outpatient physical therapy to address the deficits listed in the problem list box on initial evaluation, provide pt/family education and to maximize pt's level of independence in the home and community environment.     Pt's spiritual, cultural and educational needs considered and pt agreeable to plan of care and goals.     Anticipated barriers to physical therapy: none stated    Goals: Short Term Goals: 4 weeks   - Patient will demonstrate improved LE strength, especially into hip abduction (glute med) by at least 1/2 grade via MMT for increased stability and support with functional movements. (MET: 6/13/22)  - Patient will demonstrate improved hip flexion strength by at least 1/2 grade via MTT for increased tolerance to activity. (progressing, not met)  - Patient will demonstrate improved right hip internal rotation by at least 5 degrees for increased mobility with daily activities.  (MET: 6/13/22)     Long Term Goals: 8 weeks   - Patient  will demonstrate improved LE strength, especially into hip abduction (glute med) by at least 1 full grade via MMT for increased stability and support with functional movements. (progressing, not met)  - Patient will demonstrate improved hip flexion strength by at least 1 full grade via MTT for increased tolerance to activity. (progressing, not met)  - Patient will demonstrate improved right hip internal rotation by at least 10 degrees for increased mobility with daily activities.  (MET: 6/13/22)  - Patient will be able to get out of bed in the morning independently with minimal to no increase in right hip pain. (progressing, not met)  - Patient will be able to perform all gardening related activities with minimal to no increase in symptoms for increased ability to perform leisure based activities. (progressing, not met)  - Patient will demonstrate independence with Home Exercise Program for continued improvements outside the clinical setting. (MET: 6/13/22)    PLAN     Plan of Care Expiration: 8/13/22 = NEW POC DATE    Continue with established POC for improved functional mobility overall.    Cait Andrews, PT, DPT, Cert. DN

## 2022-06-13 ENCOUNTER — CLINICAL SUPPORT (OUTPATIENT)
Dept: REHABILITATION | Facility: HOSPITAL | Age: 87
End: 2022-06-13
Payer: COMMERCIAL

## 2022-06-13 DIAGNOSIS — R68.89 DECREASED FUNCTIONAL ACTIVITY TOLERANCE: ICD-10-CM

## 2022-06-13 DIAGNOSIS — R29.898 DECREASED STRENGTH OF LOWER EXTREMITY: Primary | ICD-10-CM

## 2022-06-13 DIAGNOSIS — M25.651 DECREASED RANGE OF RIGHT HIP MOVEMENT: ICD-10-CM

## 2022-06-13 PROCEDURE — 97110 THERAPEUTIC EXERCISES: CPT | Mod: PN

## 2022-06-13 NOTE — PLAN OF CARE
[facility]   Physical Therapy Treatment Note & Re-assessment    Name: Marysol Lyles  Clinic Number: [id number]    Therapy Diagnosis:   Encounter Diagnoses   Name Primary?    Decreased strength of lower extremity Yes    Decreased range of right hip movement     Decreased functional activity tolerance      Physician: Aguilar Vickers MD    Visit Date: 6/13/2022    Physician Orders: PT Eval and Treat  Medical Diagnosis from Referral: M51.36 (ICD-10-CM) - DDD (degenerative disc disease), lumbar  Evaluation Date: 5/10/2022  Authorization Period Expiration: 12/31/22  Plan of Care Expiration: 8/13/22 = NEW POC DATE  Progress Note Due: 7/13/22  Visit # / Visits authorized: 10 / 20 (1 / 1 Eval) (re-assessed on 6/13/22)  FOTO: 3 / 3 (5/10/22 - IE, 5/26/22, 6/13/22)    PTA Visit #: 0 / 5      Precautions: Hx of DAVID (R), Hx of TKA (L), Hx of partial knee arthroplasty (R), HTN, Osteopenia     Time In: 3:45 PM  Time Out: 4:25 PM  Total Billable Time: 40 minutes    SUBJECTIVE     Pt reports: that she thinks therapy is helping make her legs stronger but the pain in her hip is still there. She is able to do a lot of things but still has the same pain. She does feel more steady in the house and does not have to use the cane as much. She thinks she over did it this morning with cooking and cleaning.    She was compliant with home exercise program.  Response to previous treatment: mild soreness that improved over time  Functional change: no functional change at this time, easier time getting in and out of chairs/toilet at home    Pain: 5/10  Location: right hip      OBJECTIVE     Objective Measures updated at progress report unless specified.     Observation/Posture: noted mild thoracolumbar curvature with convexity to the right. Fair posture with rounded shoulders and slightly forward head.     Gait: non-antalgic gait pattern observed with SPC utilized for balance and stability.      DTR:    Right Left  Comment   Patellar (L3-4) 2+ 2+     Achilles (S1) 1+ 1+           Lumbar Range of Motion:     Degrees Pain   Flexion WNL -         Extension WNL    -         Left Side Bending Distal lateral thigh -         Right Side Bending Distal lateral  -         Left rotation    WNL -         Right Rotation    WNL -            Hip range of motion:   Flexion: R = 120 degrees ; L = 120 degrees  IR: R = 40 degrees ; L = 30 degrees  ER: R = 65 degrees ; L = 45 degrees     Lower Extremity Strength  Right LE   Left LE     Knee extension: 5/5 Knee extension: 5/5   Knee flexion: 5/5 Knee flexion: 5/5   Hip flexion: 4/5 Hip flexion: 4/5   Hip extension:  5/5 Hip extension: 5/5   Glute Med (side-lying) 3+/5 (compensation) Glute Med: (side-lying) 3+/5 (compensation)   Hip abduction: (seated) 5/5 Hip abduction: 5/5   Hip adduction: 5/5 Hip adduction 5/5   Ankle dorsiflexion: 5/5 Ankle dorsiflexion: 5/5   Ankle plantarflexion: 5/5 Ankle plantarflexion: 5/5         Special Tests:  -Repeated Flexion: -  -Repeated Ext: -  -Log Roll Test: - B     Neuro Dynamic Testing:               Sciatic nerve:                                       SLR:    R = -                                      L = -     Joint Mobility: slight decrease in hip range of motion when moving into internal and external rotation.      Palpation: No TTP over the right hip or the lumbar spine     Sensation: intact B to light touch     Flexibility:               Hamstring Test: R = 90 degrees ; L = 90 degrees        Endurance Assessment:    Evaluation   30 second Chair Rise  (adults > 59 y/o) 11 completed with no arms      <9 reps is indicative of a higher risk for falls in women age 80-84 years         Limitation/Restriction for FOTO Lumbar Spine Survey     Therapist reviewed FOTO scores for Marysol Lyles on 6/13/2022.   FOTO documents entered into [employer] - see Media section.     Limitation Score: 45%      Treatment     Marysol received the treatments listed below:      Therapeutic  "Exercises to develop strength, endurance, ROM, flexibility, posture and core stabilization for 40 minutes including: (billing for re-assessment and therapeutic exercise)    -Recumbent Bike x5 min (Seat: 4; Level: 2)   -Shuttle Press x2 min Bilateral Lower Extremities (2 black cords)   -Shuttle Press x1 min ULE (1 black cord, 1 red cord) - 1 black cord for the RLE  -Standing Mini Squats x10 reps - BHR support (NP today)    Supine:  Hamstring Stretch 3x10s B + towel (NP today - Home Exercise Program)  DKTC x10 reps with stability ball (NP today)  SKTC x10 reps B (NP today - Home Exercise Program)  LTR x2 min (NP today - Home Exercise Program)  -Pelvic Tilts 2x10 reps + BKFO with YTB around knees  -Hip Bridge x15 reps + YTB around knees  -Straight Leg Raise 2x10 reps B    Side-Lying:  -Clamshells 2x10 reps B + YTB around knees  -Reverse Clamshells 2x10 reps B    Seated:  -IR over edge of mat 3x10 reps  B  -Hip Adduction with ball 2x10 reps (3 sec holds) (NP today)     Standing:  -Standing Toe Raises 2x10 reps B  -Gastroc Stretch 10x10s (Slant Board Level 3)   Tandem Stance Balance 2x30s B +airex foam pad  (NP today)   Narrow Stance Balance 2x10 reps B + airex foam pad + overhead shoulder flexion with #1 bar + head nods  (NP today)   Step ups 6" x10 each B (NP today)   -Wall Squats against Stability Ball x5 reps  (NP today)   -Side steps on cross walk no UE support x1 lap + RTB around knees  -Standing Hip Abduction x10 reps B - no resistance    Patient Education and Home Exercises     Education provided:   - Home Exercise Program Review  - Post Exercise Soreness  - Maintaining a pain free range of motion with all activities  - Anatomy/physiology of the Hip and Lumbar Spine and the surrounding musculature    Written Home Exercises Provided: Patient instructed to cont prior HEP. Exercises were reviewed and Marysol was able to demonstrate them prior to the end of the session.  Marysol demonstrated good  understanding of the " education provided. See EMR under Patient Instructions for exercises provided during therapy sessions.    ASSESSMENT     Upon re-assessment, noted patient to demonstrate improved FOTO score indicative of increased stability and safety with functional activity. Patient continues to have pain in the right hip which limits ambulatory distance and overall mobility. Improved R hip internal rotation noted during examination - no increase in symptoms reported. Glute med strength continues to be limited and display weakness which may be a factor in the patient's pain/symptoms. Focus will continue to be placed on glute med strengthening as well as global hip, glute, and core stability. Balance training and flexibility based exercises will also be utilized to further progress the patient. At this point, the patient will continue to benefit from skilled outpatient PT services.    Marysol Is progressing well towards her goals.   Pt prognosis is Good.     Pt will continue to benefit from skilled outpatient physical therapy to address the deficits listed in the problem list box on initial evaluation, provide pt/family education and to maximize pt's level of independence in the home and community environment.     Pt's spiritual, cultural and educational needs considered and pt agreeable to plan of care and goals.     Anticipated barriers to physical therapy: none stated    Goals: Short Term Goals: 4 weeks   - Patient will demonstrate improved LE strength, especially into hip abduction (glute med) by at least 1/2 grade via MMT for increased stability and support with functional movements. (MET: 6/13/22)  - Patient will demonstrate improved hip flexion strength by at least 1/2 grade via MTT for increased tolerance to activity. (progressing, not met)  - Patient will demonstrate improved right hip internal rotation by at least 5 degrees for increased mobility with daily activities.  (MET: 6/13/22)     Long Term Goals: 8 weeks   - Patient  will demonstrate improved LE strength, especially into hip abduction (glute med) by at least 1 full grade via MMT for increased stability and support with functional movements. (progressing, not met)  - Patient will demonstrate improved hip flexion strength by at least 1 full grade via MTT for increased tolerance to activity. (progressing, not met)  - Patient will demonstrate improved right hip internal rotation by at least 10 degrees for increased mobility with daily activities.  (MET: 6/13/22)  - Patient will be able to get out of bed in the morning independently with minimal to no increase in right hip pain. (progressing, not met)  - Patient will be able to perform all gardening related activities with minimal to no increase in symptoms for increased ability to perform leisure based activities. (progressing, not met)  - Patient will demonstrate independence with Home Exercise Program for continued improvements outside the clinical setting. (MET: 6/13/22)    PLAN     Plan of Care Expiration: 8/13/22 = NEW POC DATE    Continue with established POC for improved functional mobility overall.    Cait Andrews, PT, DPT, Cert. DN

## 2022-06-15 ENCOUNTER — OFFICE VISIT (OUTPATIENT)
Dept: OPTOMETRY | Facility: CLINIC | Age: 87
End: 2022-06-15
Payer: COMMERCIAL

## 2022-06-15 DIAGNOSIS — Z98.42 S/P BILATERAL CATARACT EXTRACTION: ICD-10-CM

## 2022-06-15 DIAGNOSIS — Z98.41 S/P BILATERAL CATARACT EXTRACTION: ICD-10-CM

## 2022-06-15 DIAGNOSIS — Z96.1 PSEUDOPHAKIA OF BOTH EYES: ICD-10-CM

## 2022-06-15 DIAGNOSIS — H43.813 POSTERIOR VITREOUS DETACHMENT OF BOTH EYES: Primary | ICD-10-CM

## 2022-06-15 DIAGNOSIS — H52.203 ASTIGMATISM OF BOTH EYES, UNSPECIFIED TYPE: ICD-10-CM

## 2022-06-15 DIAGNOSIS — Z13.5 SCREENING FOR EYE CONDITION: ICD-10-CM

## 2022-06-15 PROCEDURE — 1101F PT FALLS ASSESS-DOCD LE1/YR: CPT | Mod: CPTII,S$GLB,, | Performed by: OPTOMETRIST

## 2022-06-15 PROCEDURE — 92014 COMPRE OPH EXAM EST PT 1/>: CPT | Mod: S$GLB,,, | Performed by: OPTOMETRIST

## 2022-06-15 PROCEDURE — 3288F PR FALLS RISK ASSESSMENT DOCUMENTED: ICD-10-PCS | Mod: CPTII,S$GLB,, | Performed by: OPTOMETRIST

## 2022-06-15 PROCEDURE — 1159F PR MEDICATION LIST DOCUMENTED IN MEDICAL RECORD: ICD-10-PCS | Mod: CPTII,S$GLB,, | Performed by: OPTOMETRIST

## 2022-06-15 PROCEDURE — 1159F MED LIST DOCD IN RCRD: CPT | Mod: CPTII,S$GLB,, | Performed by: OPTOMETRIST

## 2022-06-15 PROCEDURE — 92014 PR EYE EXAM, EST PATIENT,COMPREHESV: ICD-10-PCS | Mod: S$GLB,,, | Performed by: OPTOMETRIST

## 2022-06-15 PROCEDURE — 3288F FALL RISK ASSESSMENT DOCD: CPT | Mod: CPTII,S$GLB,, | Performed by: OPTOMETRIST

## 2022-06-15 PROCEDURE — 1101F PR PT FALLS ASSESS DOC 0-1 FALLS W/OUT INJ PAST YR: ICD-10-PCS | Mod: CPTII,S$GLB,, | Performed by: OPTOMETRIST

## 2022-06-15 PROCEDURE — 99999 PR PBB SHADOW E&M-EST. PATIENT-LVL IV: CPT | Mod: PBBFAC,,, | Performed by: OPTOMETRIST

## 2022-06-15 PROCEDURE — 99999 PR PBB SHADOW E&M-EST. PATIENT-LVL IV: ICD-10-PCS | Mod: PBBFAC,,, | Performed by: OPTOMETRIST

## 2022-06-15 PROCEDURE — 1126F AMNT PAIN NOTED NONE PRSNT: CPT | Mod: CPTII,S$GLB,, | Performed by: OPTOMETRIST

## 2022-06-15 PROCEDURE — 1126F PR PAIN SEVERITY QUANTIFIED, NO PAIN PRESENT: ICD-10-PCS | Mod: CPTII,S$GLB,, | Performed by: OPTOMETRIST

## 2022-06-15 NOTE — PROGRESS NOTES
[facility]   Physical Therapy Treatment Note    Name: Marysol Lyles  Clinic Number: [id number]    Therapy Diagnosis:   Encounter Diagnoses   Name Primary?    Decreased strength of lower extremity Yes    Decreased range of right hip movement     Decreased functional activity tolerance      Physician: Aguilar Vickers MD    Visit Date: 6/16/2022    Physician Orders: PT Eval and Treat  Medical Diagnosis from Referral: M51.36 (ICD-10-CM) - DDD (degenerative disc disease), lumbar  Evaluation Date: 5/10/2022  Authorization Period Expiration: 12/31/22  Plan of Care Expiration: 8/13/22 = NEW POC DATE  Progress Note Due: 7/13/22  Visit # / Visits authorized: 11 / 20 (1 / 1 Eval) (re-assessed on 6/13/22)  FOTO: 3 / 3 (5/10/22 - IE, 5/26/22, 6/13/22)  PTA Visit #: 1 / 5      Precautions: Hx of DAVID (R), Hx of TKA (L), Hx of partial knee arthroplasty (R), HTN, Osteopenia     Time In: 348 PM  Time Out: 425 PM  Total Billable Time: 40 minutes    SUBJECTIVE     Pt reports: her hip is still doing the same, no new complaints.    She was compliant with home exercise program.  Response to previous treatment: mild soreness that improved over time  Functional change: no functional change at this time, easier time getting in and out of chairs/toilet at home    Pain: 5/10  Location: right hip     OBJECTIVE     Objective Measures updated at progress report unless specified.      Treatment     Marysol received the treatments listed below:      Therapeutic Exercises to develop strength, endurance, ROM, flexibility, posture and core stabilization for 40 minutes including:     Recumbent Bike x5 min (Seat: 4; Level: 5)   Shuttle Press x2 min Bilateral Lower Extremities (2 black cords)   Shuttle Press x1 min ULE (1 black cord, 1 red cord) - 1 black cord for the RLE  Standing Mini Squats x10 reps - BHR support (NP today)    Supine:  Hamstring Stretch 3x10s B + towel (NP today - HEP)  DKTC x10 reps with stability ball (NP  "today)  SKTC x10 reps B (NP today - HEP)  LTR x2 min (NP today - HEP)  Straight Leg Raise 2x10 reps B  Pelvic Tilts 2x10 reps + BKFO with YTB around knees  Hip Bridge 2 x10 + YTB around knees    Side-Lying:  Clamshells 2x10 reps B + YTB around knees  Reverse Clamshells 2x10 reps B    Seated:  IR over edge of mat 3x10 reps  B  Hip Adduction with ball 2x10 reps (3 sec holds) (NP today)     Standing:  Standing Toe Raises 2x10 reps B    Gastroc Stretch 10x10s (Slant Board Level 3)   Side steps on cross walk no UE support x1 lap + RTB around knees  Tandem Stance Balance 2x30s B +airex foam pad  (NP today)   Narrow Stance Balance 2x10 reps B + airex foam pad + overhead shoulder flexion with #1 bar + head nods  (NP today)   Step ups 6" x10 each B (NP today)   Wall Squats against Stability Ball x5 reps (NP today)     Patient Education and Home Exercises     Education provided:   - Home Exercise Program Review  - Post Exercise Soreness  - Maintaining a pain free range of motion with all activities  - Anatomy/physiology of the Hip and Lumbar Spine and the surrounding musculature    Written Home Exercises Provided: Patient instructed to cont prior HEP. Exercises were reviewed and Marysol was able to demonstrate them prior to the end of the session.  Marysol demonstrated good  understanding of the education provided. See EMR under Patient Instructions for exercises provided during therapy sessions.    ASSESSMENT     She was able to tolerate increased resistance on recumbent bike for the last 2 min with no difficulties. Standing hip abduction ws not done this session secondary to increased pain it caused in right hip.    Marysol Is progressing well towards her goals.   Pt prognosis is Good.     Pt will continue to benefit from skilled outpatient physical therapy to address the deficits listed in the problem list box on initial evaluation, provide pt/family education and to maximize pt's level of independence in the home and community " environment.     Pt's spiritual, cultural and educational needs considered and pt agreeable to plan of care and goals.     Anticipated barriers to physical therapy: none stated    Goals: Short Term Goals: 4 weeks   - Patient will demonstrate improved LE strength, especially into hip abduction (glute med) by at least 1/2 grade via MMT for increased stability and support with functional movements. (MET: 6/13/22)  - Patient will demonstrate improved hip flexion strength by at least 1/2 grade via MTT for increased tolerance to activity. (progressing, not met)  - Patient will demonstrate improved right hip internal rotation by at least 5 degrees for increased mobility with daily activities.  (MET: 6/13/22)     Long Term Goals: 8 weeks   - Patient will demonstrate improved LE strength, especially into hip abduction (glute med) by at least 1 full grade via MMT for increased stability and support with functional movements. (progressing, not met)  - Patient will demonstrate improved hip flexion strength by at least 1 full grade via MTT for increased tolerance to activity. (progressing, not met)  - Patient will demonstrate improved right hip internal rotation by at least 10 degrees for increased mobility with daily activities.  (MET: 6/13/22)  - Patient will be able to get out of bed in the morning independently with minimal to no increase in right hip pain. (progressing, not met)  - Patient will be able to perform all gardening related activities with minimal to no increase in symptoms for increased ability to perform leisure based activities. (progressing, not met)  - Patient will demonstrate independence with Home Exercise Program for continued improvements outside the clinical setting. (MET: 6/13/22)    PLAN     Continue with established POC for improved functional mobility overall.    Simba Gordillo, PTA

## 2022-06-15 NOTE — PROGRESS NOTES
"HPI     Patient's age: 86 y.o. [place]  Occupation: Retired   Approximate date of last eye examination: 06/14/2021  Name of last eye doctor seen:   City/State: [facility]  Wears glasses?  Yes 1 year old  Wears CLs?:  no   Headaches?  No   Eye pain/discomfort?  Left eye blurry                                                                                       Flashes?  No   Floaters?  Yes, OU  Diplopia/Double vision?  No  Patient's Ocular History:         Any eye surgeries? S/p phaco w/IOL OS.         Any eye injury?  No          Any treatment for eye disease?  No   Family history of eye disease?  No   Significant patient medical history:         1. Diabetes?  No        If yes, IDDM or NIDDM? n/a   2. HBP?  Yes, controlled with medication               ! OTC eyedrops currently using:  No    ! Prescription eye meds currently using:  No    ! Any history of allergy/adverse reaction to any eye meds used   previously?  No     ! Any history of allergy/adverse reaction to eyedrops used during prior   eye exam(s)? No     ! Any history of allergy/adverse reaction to Novacaine or similar meds?   No    ! Any history of allergy/adverse reaction to Epinephrine or similar meds?   No     ! Patient okay with use of anesthetic eyedrops to check eye pressure?    Yes         ! Patient okay with use of eyedrops to dilate pupils today?  Yes    !  Allergies/Medications/Medical History/Family History reviewed today?    Yes       PD =   65/61  Desired reading distance =  15.25"                                                                Last edited by Alexandre Hussein MA on 6/15/2022 11:01 AM. (History)            Assessment /Plan     For exam results, see Encounter Report.    1. Posterior vitreous detachment of both eyes     2. S/P bilateral cataract extraction     3. Pseudophakia of both eyes     4. Astigmatism of both eyes, unspecified type     5. Screening for eye condition                S/P cataract surgery in both eyes, with " bilateral posterior chamber intraocular lens.   History of cystoid macular edema in the right eye following cataract surgery.  Now resolved.  Discharged from the care of Dr. Decker.  RPE mottling at macula of the right eye.  Noted previously.  Stable      Residual astigmatism in each eye, with very satisfactory best-corrected VA in each eye.     New spectacle lens RX issued for use as desired.  Suggest full-time wear - either bifocal lenses, OR single vision distance lenses and/or single vision reading lenses.       Recheck refraction in one year - or prior, if Ms. Lyles notes any changes in vision in the interim

## 2022-06-15 NOTE — PATIENT INSTRUCTIONS
S/P cataract surgery in both eyes, with bilateral posterior chamber intraocular lens.   History of cystoid macular edema in the right eye following cataract surgery.  Now resolved.  Discharged from the care of Dr. Decker.  RPE mottling at macula of the right eye.  Noted previously.  Stable      Residual astigmatism in each eye, with very satisfactory best-corrected VA in each eye.     New spectacle lens RX issued for use as desired.  Suggest full-time wear - either bifocal lenses, OR single vision distance lenses and/or single vision reading lenses.       Recheck refraction in one year - or prior, if Ms. Lyles notes any changes in vision in the interim

## 2022-06-16 ENCOUNTER — CLINICAL SUPPORT (OUTPATIENT)
Dept: REHABILITATION | Facility: HOSPITAL | Age: 87
End: 2022-06-16
Payer: COMMERCIAL

## 2022-06-16 DIAGNOSIS — M25.651 DECREASED RANGE OF RIGHT HIP MOVEMENT: ICD-10-CM

## 2022-06-16 DIAGNOSIS — R68.89 DECREASED FUNCTIONAL ACTIVITY TOLERANCE: ICD-10-CM

## 2022-06-16 DIAGNOSIS — R29.898 DECREASED STRENGTH OF LOWER EXTREMITY: Primary | ICD-10-CM

## 2022-06-16 PROCEDURE — 97110 THERAPEUTIC EXERCISES: CPT | Mod: PN,CQ

## 2022-06-17 NOTE — PROGRESS NOTES
[facility]   Physical Therapy Treatment Note    Name: Marysol Lyles  Clinic Number: [id number]    Therapy Diagnosis:   Encounter Diagnoses   Name Primary?    Decreased strength of lower extremity Yes    Decreased range of right hip movement     Decreased functional activity tolerance      Physician: Aguilar Vickers MD    Visit Date: 6/20/2022    Physician Orders: PT Eval and Treat  Medical Diagnosis from Referral: M51.36 (ICD-10-CM) - DDD (degenerative disc disease), lumbar  Evaluation Date: 5/10/2022  Authorization Period Expiration: 12/31/22  Plan of Care Expiration: 8/13/22  Progress Note Due: 7/13/22  Visit # / Visits authorized: 12 / 20 (1 / 1 Eval) (re-assessed on 6/13/22)  FOTO: 3 / 3 (5/10/22 - IE, 5/26/22, 6/13/22)  PTA Visit #: 0 / 5      Precautions: Hx of DAVID (R), Hx of TKA (L), Hx of partial knee arthroplasty (R), HTN, Osteopenia     Time In: 3:00 PM  Time Out: 3:40 PM  Total Billable Time: 40 minutes    SUBJECTIVE     Pt reports: that she is continuing to have pain when going from sitting to standing - that has not changed. She does feel stronger and more stability overall but no matter how long she sits, when she goes to get up that is when it is the most painful. She is ready to possibly get an MRI for her hip/back.     She was compliant with home exercise program.  Response to previous treatment: no soreness or increase in symptoms.  Functional change: no functional change at this time, easier time getting in and out of chairs/toilet at home    Pain: 4/10  Location: right hip     OBJECTIVE     Objective Measures updated at progress report unless specified.      Treatment     Marysol received the treatments listed below:      Therapeutic Exercises to develop strength, endurance, ROM, flexibility, posture and core stabilization for 40 minutes including:     Recumbent Bike x6 min (Seat: 4; Level: 5)   Shuttle Press x2 min Bilateral Lower Extremities (2 black cords)   Shuttle  "Press x1 min ULE (1 black cord, 1 red cord) - 1 black cord for the RLE  Standing Mini Squats x10 reps - BHR support (NP today)    Supine:  Hamstring Stretch 3x10s B + towel (NP today - HEP)  DKTC x10 reps with stability ball (NP today)  SKTC x10 reps B (NP today - HEP)  LTR x2 min (NP today - HEP)  Straight Leg Raise 2x10 reps B  Pelvic Tilts 2x10 reps + BKFO with YTB around knees  Hip Bridge 2 x10 + YTB around knees    Side-Lying: (NP today - HEP)  Clamshells 2x10 reps B + YTB around knees  Reverse Clamshells 2x10 reps B    Seated:  IR over edge of mat 3x10 reps  B  Hamstring Curls x10 reps B + RTB  Hip Adduction with ball 2x10 reps (3 sec holds) (NP today)     Standing:  Standing Toe Raises 2x10 reps B    Gastroc Stretch 10x10s (Slant Board Level 3)   Side steps on cross walk no UE support x1 lap + RTB around knees  Tandem Stance Balance 2x30s B +airex foam pad  (NP today)   Narrow Stance Balance 2x10 reps B + airex foam pad + overhead shoulder flexion with #1 bar + head nods  (NP today)   Step ups 6" x10 each B (NP today)   Wall Squats against Stability Ball x5 reps (NP today)     Patient Education and Home Exercises     Education provided:   - Home Exercise Program Review  - Post Exercise Soreness  - Maintaining a pain free range of motion with all activities  - Anatomy/physiology of the Hip and Lumbar Spine and the surrounding musculature    Written Home Exercises Provided: Patient instructed to cont prior HEP. Exercises were reviewed and Marysol was able to demonstrate them prior to the end of the session.  Marysol demonstrated good  understanding of the education provided. See EMR under Patient Instructions for exercises provided during therapy sessions.    ASSESSMENT     Emphasis placed on maintaining a pain free range of motion with all activities - no adverse reactions noted during treatment session. Right glute med weakness continues to be prevalent and faitgues quickly - especially with isolated exercises such " as side steps. Seated hamstring curls were performed well with focus placed on eccentric quad control - no hip pain reported. Will continue to progress functional activity training next session.    Marysol Is progressing well towards her goals.   Pt prognosis is Good.     Pt will continue to benefit from skilled outpatient physical therapy to address the deficits listed in the problem list box on initial evaluation, provide pt/family education and to maximize pt's level of independence in the home and community environment.     Pt's spiritual, cultural and educational needs considered and pt agreeable to plan of care and goals.     Anticipated barriers to physical therapy: none stated    Goals: Short Term Goals: 4 weeks   - Patient will demonstrate improved LE strength, especially into hip abduction (glute med) by at least 1/2 grade via MMT for increased stability and support with functional movements. (MET: 6/13/22)  - Patient will demonstrate improved hip flexion strength by at least 1/2 grade via MTT for increased tolerance to activity. (progressing, not met)  - Patient will demonstrate improved right hip internal rotation by at least 5 degrees for increased mobility with daily activities.  (MET: 6/13/22)     Long Term Goals: 8 weeks   - Patient will demonstrate improved LE strength, especially into hip abduction (glute med) by at least 1 full grade via MMT for increased stability and support with functional movements. (progressing, not met)  - Patient will demonstrate improved hip flexion strength by at least 1 full grade via MTT for increased tolerance to activity. (progressing, not met)  - Patient will demonstrate improved right hip internal rotation by at least 10 degrees for increased mobility with daily activities.  (MET: 6/13/22)  - Patient will be able to get out of bed in the morning independently with minimal to no increase in right hip pain. (progressing, not met)  - Patient will be able to perform all  gardening related activities with minimal to no increase in symptoms for increased ability to perform leisure based activities. (progressing, not met)  - Patient will demonstrate independence with Home Exercise Program for continued improvements outside the clinical setting. (MET: 6/13/22)    PLAN     Continue with established POC for improved functional mobility overall.    Cait Andrews, PT, DPT, Cert. DN

## 2022-06-20 ENCOUNTER — CLINICAL SUPPORT (OUTPATIENT)
Dept: REHABILITATION | Facility: HOSPITAL | Age: 87
End: 2022-06-20
Payer: COMMERCIAL

## 2022-06-20 DIAGNOSIS — R68.89 DECREASED FUNCTIONAL ACTIVITY TOLERANCE: ICD-10-CM

## 2022-06-20 DIAGNOSIS — M25.651 DECREASED RANGE OF RIGHT HIP MOVEMENT: ICD-10-CM

## 2022-06-20 DIAGNOSIS — R29.898 DECREASED STRENGTH OF LOWER EXTREMITY: Primary | ICD-10-CM

## 2022-06-20 PROCEDURE — 97110 THERAPEUTIC EXERCISES: CPT | Mod: PN

## 2022-06-21 NOTE — PROGRESS NOTES
[facility]   Physical Therapy Treatment Note    Name: Marysol Lyles  Clinic Number: [id number]    Therapy Diagnosis:   Encounter Diagnoses   Name Primary?    Decreased strength of lower extremity Yes    Decreased range of right hip movement     Decreased functional activity tolerance      Physician: Aguilar Vickers MD    Visit Date: 6/23/2022    Physician Orders: PT Eval and Treat  Medical Diagnosis from Referral: M51.36 (ICD-10-CM) - DDD (degenerative disc disease), lumbar  Evaluation Date: 5/10/2022  Authorization Period Expiration: 12/31/22  Plan of Care Expiration: 8/13/22  Progress Note Due: 7/13/22  Visit # / Visits authorized: 13 / 20 (1 / 1 Eval) (re-assessed on 6/13/22)  FOTO: 3 / 3 (5/10/22 - IE, 5/26/22, 6/13/22)  PTA Visit #: 0 / 5      Precautions: Hx of DAVID (R), Hx of TKA (L), Hx of partial knee arthroplasty (R), HTN, Osteopenia     Time In: 2:19 PM  Time Out: 2:59 PM  Total Billable Time: 40 minutes    SUBJECTIVE     Pt reports: that she has been having a good day - her right hip is not hurting too bad today. She is feeling a little queasy today otherwise doing okay.    She was compliant with home exercise program.  Response to previous treatment: no soreness or increase in symptoms.  Functional change: no functional change at this time, easier time getting in and out of chairs/toilet at home    Pain: 2/10   Location: right hip     OBJECTIVE     Objective Measures updated at progress report unless specified.      Treatment     Marysol received the treatments listed below:      Therapeutic Exercises to develop strength, endurance, ROM, flexibility, posture and core stabilization for 40 minutes including:     Recumbent Bike x6 min (Seat: 4; Level: 5)   Shuttle Press x2 min Bilateral Lower Extremities (2 black cords) (NP today)  Shuttle Press x1 min ULE (1 black cord, 1 red cord) - 1 black cord for the RLE (NP today)    Supine:  Hamstring Stretch 3x10s B + towel (NP today -  "HEP)  DKTC x10 reps with stability ball (NP today)  SKTC x10 reps B (NP today - HEP)  LTR x2 min (NP today - HEP)  Straight Leg Raise 2x10 reps B  Pelvic Tilts 2x10 reps + BKFO with RTB around knees  Hip Bridge x10 + RTB around knees    Side-Lying: (NP today - HEP)  Clamshells 2x10 reps B + YTB around knees  Reverse Clamshells 2x10 reps B    Seated:  IR over edge of mat 3x10 reps  B  Hamstring Curls x10 reps B + RTB  Hip Adduction with ball 2x10 reps (3 sec holds)     Standing:  Standing Toe Raises 2x10 reps B  Standing Mini Squats x15 reps - BHR support   Standing Hip Extension x10 reps (leaning over counter) - keep right knee bent when in stance phase (B)    Gastroc Stretch 10x10s (Slant Board Level 3)   Side steps on cross walk no UE support x1 lap + RTB around knees  Tandem Stance Balance 2x20s B +airex foam pad   Narrow Stance Balance 2x20s B + airex foam pad + head nods + head turns  Step ups 6" x10 each B (NP today)   Wall Squats against Stability Ball x5 reps (NP today)     Patient Education and Home Exercises     Education provided:   - Home Exercise Program Review  - Post Exercise Soreness  - Maintaining a pain free range of motion with all activities  - Anatomy/physiology of the Hip and Lumbar Spine and the surrounding musculature    Written Home Exercises Provided: Patient instructed to cont prior HEP. Exercises were reviewed and Marysol was able to demonstrate them prior to the end of the session.  Marysol demonstrated good  understanding of the education provided. See EMR under Patient Instructions for exercises provided during therapy sessions.    ASSESSMENT     Introduced standing hip extension while leaning over counter to help protect the lumbar spine - while standing on the RLE the patient reported an increase in symptoms - improved slightly with bent knee. Anterior translation of knees over toes with initial reps of standing mini squats - after verbal, tactile, and visual cues, the patient demonstrate " improved mechanics with no increase in right hip pain.     Marysol Is progressing well towards her goals.   Pt prognosis is Good.     Pt will continue to benefit from skilled outpatient physical therapy to address the deficits listed in the problem list box on initial evaluation, provide pt/family education and to maximize pt's level of independence in the home and community environment.     Pt's spiritual, cultural and educational needs considered and pt agreeable to plan of care and goals.     Anticipated barriers to physical therapy: none stated    Goals: Short Term Goals: 4 weeks   - Patient will demonstrate improved LE strength, especially into hip abduction (glute med) by at least 1/2 grade via MMT for increased stability and support with functional movements. (MET: 6/13/22)  - Patient will demonstrate improved hip flexion strength by at least 1/2 grade via MTT for increased tolerance to activity. (progressing, not met)  - Patient will demonstrate improved right hip internal rotation by at least 5 degrees for increased mobility with daily activities.  (MET: 6/13/22)     Long Term Goals: 8 weeks   - Patient will demonstrate improved LE strength, especially into hip abduction (glute med) by at least 1 full grade via MMT for increased stability and support with functional movements. (progressing, not met)  - Patient will demonstrate improved hip flexion strength by at least 1 full grade via MTT for increased tolerance to activity. (progressing, not met)  - Patient will demonstrate improved right hip internal rotation by at least 10 degrees for increased mobility with daily activities.  (MET: 6/13/22)  - Patient will be able to get out of bed in the morning independently with minimal to no increase in right hip pain. (progressing, not met)  - Patient will be able to perform all gardening related activities with minimal to no increase in symptoms for increased ability to perform leisure based activities. (progressing,  not met)  - Patient will demonstrate independence with Home Exercise Program for continued improvements outside the clinical setting. (MET: 6/13/22)    PLAN     Continue with established POC for improved functional mobility overall.    Cait Andrews, PT, DPT, Cert. DN

## 2022-06-23 ENCOUNTER — CLINICAL SUPPORT (OUTPATIENT)
Dept: REHABILITATION | Facility: HOSPITAL | Age: 87
End: 2022-06-23
Payer: COMMERCIAL

## 2022-06-23 DIAGNOSIS — M25.651 DECREASED RANGE OF RIGHT HIP MOVEMENT: ICD-10-CM

## 2022-06-23 DIAGNOSIS — R29.898 DECREASED STRENGTH OF LOWER EXTREMITY: Primary | ICD-10-CM

## 2022-06-23 DIAGNOSIS — R68.89 DECREASED FUNCTIONAL ACTIVITY TOLERANCE: ICD-10-CM

## 2022-06-23 PROCEDURE — 97110 THERAPEUTIC EXERCISES: CPT | Mod: PN

## 2022-06-24 ENCOUNTER — PATIENT MESSAGE (OUTPATIENT)
Dept: FAMILY MEDICINE | Facility: CLINIC | Age: 87
End: 2022-06-24
Payer: COMMERCIAL

## 2022-06-24 ENCOUNTER — PATIENT OUTREACH (OUTPATIENT)
Dept: ADMINISTRATIVE | Facility: HOSPITAL | Age: 87
End: 2022-06-24
Payer: COMMERCIAL

## 2022-06-27 ENCOUNTER — CLINICAL SUPPORT (OUTPATIENT)
Dept: REHABILITATION | Facility: HOSPITAL | Age: 87
End: 2022-06-27
Payer: COMMERCIAL

## 2022-06-27 DIAGNOSIS — R68.89 DECREASED FUNCTIONAL ACTIVITY TOLERANCE: ICD-10-CM

## 2022-06-27 DIAGNOSIS — M25.651 DECREASED RANGE OF RIGHT HIP MOVEMENT: ICD-10-CM

## 2022-06-27 DIAGNOSIS — R29.898 DECREASED STRENGTH OF LOWER EXTREMITY: Primary | ICD-10-CM

## 2022-06-27 PROCEDURE — 97110 THERAPEUTIC EXERCISES: CPT | Mod: PN | Performed by: GENERAL ACUTE CARE HOSPITAL

## 2022-06-27 NOTE — PROGRESS NOTES
[facility]   Physical Therapy Discharge    Name: Marysol Lyles  Clinic Number: [id number]    Therapy Diagnosis:   Encounter Diagnoses   Name Primary?    Decreased strength of lower extremity Yes    Decreased range of right hip movement     Decreased functional activity tolerance      Physician: Aguilar Vickers MD    Visit Date: 6/27/2022    Physician Orders: PT Eval and Treat  Medical Diagnosis from Referral: M51.36 (ICD-10-CM) - DDD (degenerative disc disease), lumbar  Evaluation Date: 5/10/2022  Authorization Period Expiration: 12/31/22  Plan of Care Expiration: 8/13/22  Progress Note Due: 7/13/22  Visit # / Visits authorized: 14 / 20 (1 / 1 Eval) (re-assessed on 6/13/22)  FOTO: 4 / 3 (5/10/22 - IE, 5/26/22, 6/13/22, 6/27/22)  PTA Visit #: 0 / 5      Precautions: Hx of DAVID (R), Hx of TKA (L), Hx of partial knee arthroplasty (R), HTN, Osteopenia     Time In: 1550  Time Out: 1630   Total Billable Time: 40 minutes    SUBJECTIVE     Pt reports: Pt returns to OP PT reporting moderate levels of pain in the lumbar region and R sided hip. She reports that she would like to discontinue therapy due to requesting MRI and following up with pain mgmt/MD. Pt reports high levels of pain over the weekend.     She was compliant with home exercise program.  Response to previous treatment: no soreness or increase in symptoms.  Functional change: no functional change at this time, easier time getting in and out of chairs/toilet at home    Pain: 5/10   Location: right hip     OBJECTIVE     Objective Measures updated at progress report unless specified.      Treatment     Marysol received the treatments listed below:      Therapeutic Exercises to develop strength, endurance, ROM, flexibility, posture and core stabilization for 30 minutes including:   · Recumbent Bike x6 min (Seat: 4; Level: 5)   · DKTC 3x10 reps with stability ball   · Hooklying bridges-RTB 3x10  · Hooklying clamshells-RTB 3x10   · Hooklying  bend knee fall outs x15 brendon RTB  · Slouch overcorrect x15 sitting EOM      Patient Education and Home Exercises   Education provided:   Education/Self-Care provided:   Patient was educated on the role of PT, POC, treatment plan, discharge goals, HEP.   Patient educated on biomechanical justification for therapeutic exercise and importance of compliance with HEP in order to improve overall impairments and QOL    Patient was educated on all the above exercise prior/during/after for proper posture, positioning, and execution for safe performance with home exercise program.      Written Home Exercises Provided: Patient instructed to cont prior HEP. Exercises were reviewed and Marysol was able to demonstrate them prior to the end of the session.  Marysol demonstrated good  understanding of the education provided. See EMR under Patient Instructions for exercises provided during therapy sessions.    ASSESSMENT   Pt reports limited progress thus far with physical therapy interventions, She has requires to cancel remaining physical therapy and follow-up with MD regarding continued pain/symptoms. She is request MRI. Patient is educated on therapeutic exercises, safe exercise performance, and general activity precautions/post exercise soreness.      Marysol Is progressing well towards her goals.   Pt prognosis is Good.     Pt's spiritual, cultural and educational needs considered and pt agreeable to plan of care and goals.     Anticipated barriers to physical therapy: none stated    Goals: Short Term Goals: 4 weeks   - Patient will demonstrate improved LE strength, especially into hip abduction (glute med) by at least 1/2 grade via MMT for increased stability and support with functional movements. (MET: 6/13/22)  - Patient will demonstrate improved hip flexion strength by at least 1/2 grade via MTT for increased tolerance to activity. (progressing, not met)  - Patient will demonstrate improved right hip internal rotation by at least  5 degrees for increased mobility with daily activities.  (MET: 6/13/22)     Long Term Goals: 8 weeks   - Patient will demonstrate improved LE strength, especially into hip abduction (glute med) by at least 1 full grade via MMT for increased stability and support with functional movements. (progressing, not met)  - Patient will demonstrate improved hip flexion strength by at least 1 full grade via MTT for increased tolerance to activity. (progressing, not met)  - Patient will demonstrate improved right hip internal rotation by at least 10 degrees for increased mobility with daily activities.  (MET: 6/13/22)  - Patient will be able to get out of bed in the morning independently with minimal to no increase in right hip pain. (progressing, not met)  - Patient will be able to perform all gardening related activities with minimal to no increase in symptoms for increased ability to perform leisure based activities. (progressing, not met)  - Patient will demonstrate independence with Home Exercise Program for continued improvements outside the clinical setting. (MET: 6/13/22)    PLAN     Patient will follow-up with MD, return as needed.    Mary Boothe, PT,  6/27/2022  4:31 PM

## 2022-07-07 ENCOUNTER — DOCUMENTATION ONLY (OUTPATIENT)
Dept: REHABILITATION | Facility: HOSPITAL | Age: 87
End: 2022-07-07
Payer: COMMERCIAL

## 2022-07-07 PROBLEM — R29.898 DECREASED STRENGTH OF LOWER EXTREMITY: Status: RESOLVED | Noted: 2022-05-10 | Resolved: 2022-07-07

## 2022-07-07 PROBLEM — R68.89 DECREASED FUNCTIONAL ACTIVITY TOLERANCE: Status: RESOLVED | Noted: 2022-05-10 | Resolved: 2022-07-07

## 2022-07-07 PROBLEM — M25.651 DECREASED RANGE OF RIGHT HIP MOVEMENT: Status: RESOLVED | Noted: 2022-05-10 | Resolved: 2022-07-07

## 2022-07-07 NOTE — PROGRESS NOTES
[facility]  Physical Therapy Discharge Note    Name: Marysol Lyles  [facility] Number: [id number]    Therapy Diagnosis:        Encounter Diagnoses   Name Primary?    Decreased strength of lower extremity Yes    Decreased range of right hip movement      Decreased functional activity tolerance        Physician: Aguilar Vickers MD     Visit Date: 6/27/2022     Physician Orders: PT Eval and Treat  Medical Diagnosis from Referral: M51.36 (ICD-10-CM) - DDD (degenerative disc disease), lumbar  Evaluation Date: 5/10/2022  Authorization Period Expiration: 12/31/22  Plan of Care Expiration: 8/13/22  Progress Note Due: 7/13/22  Visit # / Visits authorized: 14 / 30 (1 / 1 Eval) (re-assessed on 6/13/22)  FOTO: 4 / 3 (5/10/22 - IE, 5/26/22, 6/13/22, 6/27/22)  PTA Visit #: 0 / 5       Precautions: Hx of DAVID (R), Hx of TKA (L), Hx of partial knee arthroplasty (R), HTN, Osteopenia      Date of Last visit: 6/27/22  Total Visits Received: 15    ASSESSMENT      Patient requested discharge and will be returning to [place] to seek out further medical treatment at this time. The patient is now officially discharged from skilled outpatient PT services.    Discharge reason: Patient requested discharge and is planning to return to [place] to seek out further treatment.    Discharge FOTO Score: N/A    Goals:   Short Term Goals: 4 weeks   - Patient will demonstrate improved LE strength, especially into hip abduction (glute med) by at least 1/2 grade via MMT for increased stability and support with functional movements. (MET: 6/13/22)  - Patient will demonstrate improved hip flexion strength by at least 1/2 grade via MTT for increased tolerance to activity. (not met - pt requested discharge)  - Patient will demonstrate improved right hip internal rotation by at least 5 degrees for increased mobility with daily activities.  (MET: 6/13/22)     Long Term Goals: 8 weeks   - Patient will demonstrate improved LE strength, especially  into hip abduction (glute med) by at least 1 full grade via MMT for increased stability and support with functional movements. (not met - pt requested discharge)  - Patient will demonstrate improved hip flexion strength by at least 1 full grade via MTT for increased tolerance to activity. (not met - pt requested discharge)  - Patient will demonstrate improved right hip internal rotation by at least 10 degrees for increased mobility with daily activities.  (MET: 6/13/22)  - Patient will be able to get out of bed in the morning independently with minimal to no increase in right hip pain. (not met - pt requested discharge)  - Patient will be able to perform all gardening related activities with minimal to no increase in symptoms for increased ability to perform leisure based activities. (not met - pt requested discharge)  - Patient will demonstrate independence with Home Exercise Program for continued improvements outside the clinical setting. (MET: 6/13/22)    PLAN   This patient is discharged from skilled outpatient Physical Therapy services.    Cait Andrews, PT, DPT, Cert. DN

## 2022-07-08 ENCOUNTER — PATIENT MESSAGE (OUTPATIENT)
Dept: FAMILY MEDICINE | Facility: CLINIC | Age: 87
End: 2022-07-08
Payer: COMMERCIAL

## 2022-07-27 ENCOUNTER — LAB VISIT (OUTPATIENT)
Dept: LAB | Facility: HOSPITAL | Age: 87
End: 2022-07-27
Attending: INTERNAL MEDICINE
Payer: COMMERCIAL

## 2022-07-27 DIAGNOSIS — I10 BENIGN ESSENTIAL HTN: ICD-10-CM

## 2022-07-27 DIAGNOSIS — E21.3 HYPERPARATHYROIDISM: ICD-10-CM

## 2022-07-27 DIAGNOSIS — M85.80 OSTEOPENIA, UNSPECIFIED LOCATION: ICD-10-CM

## 2022-07-27 LAB
ALBUMIN SERPL BCP-MCNC: 3.6 G/DL (ref 3.5–5.2)
ANION GAP SERPL CALC-SCNC: 7 MMOL/L (ref 8–16)
BUN SERPL-MCNC: 19 MG/DL (ref 8–23)
CALCIUM SERPL-MCNC: 9.9 MG/DL (ref 8.7–10.5)
CHLORIDE SERPL-SCNC: 102 MMOL/L (ref 95–110)
CO2 SERPL-SCNC: 31 MMOL/L (ref 23–29)
CREAT SERPL-MCNC: 1 MG/DL (ref 0.5–1.4)
EST. GFR  (AFRICAN AMERICAN): 59.8 ML/MIN/1.73 M^2
EST. GFR  (NON AFRICAN AMERICAN): 51.9 ML/MIN/1.73 M^2
GLUCOSE SERPL-MCNC: 85 MG/DL (ref 70–110)
PHOSPHATE SERPL-MCNC: 3.8 MG/DL (ref 2.7–4.5)
POTASSIUM SERPL-SCNC: 4.2 MMOL/L (ref 3.5–5.1)
PTH-INTACT SERPL-MCNC: 87.9 PG/ML (ref 9–77)
SODIUM SERPL-SCNC: 140 MMOL/L (ref 136–145)

## 2022-07-27 PROCEDURE — 83970 ASSAY OF PARATHORMONE: CPT | Performed by: INTERNAL MEDICINE

## 2022-07-27 PROCEDURE — 36415 COLL VENOUS BLD VENIPUNCTURE: CPT | Mod: PO | Performed by: INTERNAL MEDICINE

## 2022-07-27 PROCEDURE — 80069 RENAL FUNCTION PANEL: CPT | Performed by: INTERNAL MEDICINE

## 2022-08-03 ENCOUNTER — OFFICE VISIT (OUTPATIENT)
Dept: ENDOCRINOLOGY | Facility: CLINIC | Age: 87
End: 2022-08-03
Payer: COMMERCIAL

## 2022-08-03 DIAGNOSIS — I10 BENIGN ESSENTIAL HTN: ICD-10-CM

## 2022-08-03 DIAGNOSIS — M85.80 OSTEOPENIA, UNSPECIFIED LOCATION: Primary | ICD-10-CM

## 2022-08-03 DIAGNOSIS — E21.3 HYPERPARATHYROIDISM: ICD-10-CM

## 2022-08-03 PROCEDURE — 99214 PR OFFICE/OUTPT VISIT, EST, LEVL IV, 30-39 MIN: ICD-10-PCS | Mod: 95,,, | Performed by: INTERNAL MEDICINE

## 2022-08-03 PROCEDURE — 1160F PR REVIEW ALL MEDS BY PRESCRIBER/CLIN PHARMACIST DOCUMENTED: ICD-10-PCS | Mod: CPTII,95,, | Performed by: INTERNAL MEDICINE

## 2022-08-03 PROCEDURE — 1160F RVW MEDS BY RX/DR IN RCRD: CPT | Mod: CPTII,95,, | Performed by: INTERNAL MEDICINE

## 2022-08-03 PROCEDURE — 1159F MED LIST DOCD IN RCRD: CPT | Mod: CPTII,95,, | Performed by: INTERNAL MEDICINE

## 2022-08-03 PROCEDURE — 99214 OFFICE O/P EST MOD 30 MIN: CPT | Mod: 95,,, | Performed by: INTERNAL MEDICINE

## 2022-08-03 PROCEDURE — 1159F PR MEDICATION LIST DOCUMENTED IN MEDICAL RECORD: ICD-10-PCS | Mod: CPTII,95,, | Performed by: INTERNAL MEDICINE

## 2022-08-03 NOTE — PROGRESS NOTES
"  The patient location is: [place]    Visit type: audiovisual    Face to Face time with patient:  11 minutes  13 minutes of total time spent on the encounter, which includes face to face time and non-face to face time preparing to see the patient (eg, review of tests), Obtaining and/or reviewing separately obtained history, Documenting clinical information in the electronic or other health record, Independently interpreting results (not separately reported) and communicating results to the patient/family/caregiver, or Care coordination (not separately reported).         Each patient to whom he or she provides medical services by telemedicine is:  (1) informed of the relationship between the physician and patient and the respective role of any other health care provider with respect to management of the patient; and (2) notified that he or she may decline to receive medical services by telemedicine and may withdraw from such care at any time.      CHIEF COMPLAINT: Osteopenia/Primary Hyperpara  86 y.o.  being seen as a f/u. Daughters with her. Had been seeing DR. Diaz and last saw her in 2016 and then saw DR. Perez. Diagnosed in 2015. Had been on Fosamax. Then switched to Prolia. Tolerating Prolia. Scheduled for Prolia 8/15/22. May be getting a steroid injection in the back. No falls. No fractures. She exercising at home, some decrease from before. Taking Ca + D. No balance issues or vision issues. GERD controlled. Feels like left jaw is "rubbing."      PAST MEDICAL HISTORY/PAST SURGICAL HISTORY:  Reviewed in [facility]    SOCIAL HISTORY: No T/A    FAMILY HISTORY:  + Osteoporosis. No hip fracture    MEDICATIONS/ALLERGIES: The patient's MedCard has been updated and reviewed.        PE:  Virtual Visit            ASSESSMENT/PLAN:  1. Osteopenia- No Fracture history. Had been on fosamax and now on Prolia. Tolerating well. Will continue current therapy due to # 2. Therapy plan placed.  She is having some left jaw pain.  Based on " description does not sound like osteonecrosis of the jaw.  However, advised that she get evaluated by her dentist.    2. Primary hyperparathyroidism- based on history of hypercalcemia and high urine Ca. At this point serum calcium fluctuates from normal to mildly elevated. On Prolia for osteopenia. If Ca increases could consider sensipar.  At this point since calcium is normal she does not need any therapy.    3. GERD- Unable to take PO bisphosphonates. Controlled with diet    4. HTN-  In digital HTN program. Reviewed BP in episodes of care. Avoid use of thiazide diuretics. Due to hypercalcemia.     FOLLOWUP  F/U 6 months prior to next prolia renal panel, PTH- [facility]

## 2022-08-10 ENCOUNTER — PATIENT MESSAGE (OUTPATIENT)
Dept: DERMATOLOGY | Facility: CLINIC | Age: 87
End: 2022-08-10
Payer: COMMERCIAL

## 2022-08-15 ENCOUNTER — INFUSION (OUTPATIENT)
Dept: INFUSION THERAPY | Facility: HOSPITAL | Age: 87
End: 2022-08-15
Attending: INTERNAL MEDICINE
Payer: COMMERCIAL

## 2022-08-15 VITALS
HEART RATE: 50 BPM | TEMPERATURE: 98 F | DIASTOLIC BLOOD PRESSURE: 70 MMHG | RESPIRATION RATE: 16 BRPM | SYSTOLIC BLOOD PRESSURE: 143 MMHG

## 2022-08-15 DIAGNOSIS — M85.80 OSTEOPENIA, UNSPECIFIED LOCATION: Primary | ICD-10-CM

## 2022-08-15 PROCEDURE — 63600175 PHARM REV CODE 636 W HCPCS: Mod: JG,PN | Performed by: INTERNAL MEDICINE

## 2022-08-15 PROCEDURE — 96372 THER/PROPH/DIAG INJ SC/IM: CPT | Mod: PN

## 2022-08-15 RX ADMIN — DENOSUMAB 60 MG: 60 INJECTION SUBCUTANEOUS at 12:08

## 2022-08-15 NOTE — PLAN OF CARE
Pt tolerated prolia well today. Reviewed follow-up appointments. All questions were answered, ambulated independently at d/c.

## 2022-08-26 ENCOUNTER — PATIENT MESSAGE (OUTPATIENT)
Dept: ENDOCRINOLOGY | Facility: CLINIC | Age: 87
End: 2022-08-26
Payer: COMMERCIAL

## 2022-08-28 NOTE — TELEPHONE ENCOUNTER
I have refilled the patient's requested medication x 1 month.  However, the patient is due for a follow up visit.  Call the patient on the phone and book an appointment  
Constitutional - +morbidly obese. Head - NCAT. Airway patent. Neuro - A&Ox3. strength 5/5 x4. sensation intact x4. normal gait. Skin - No rash. MSK - normal ROM.

## 2022-09-06 ENCOUNTER — PATIENT MESSAGE (OUTPATIENT)
Dept: FAMILY MEDICINE | Facility: CLINIC | Age: 87
End: 2022-09-06
Payer: COMMERCIAL

## 2022-09-07 ENCOUNTER — TELEPHONE (OUTPATIENT)
Dept: FAMILY MEDICINE | Facility: CLINIC | Age: 87
End: 2022-09-07
Payer: COMMERCIAL

## 2022-09-07 NOTE — TELEPHONE ENCOUNTER
----- Message from Naa Wright sent at 9/7/2022 12:49 PM CDT -----  States she thinks she has a UTI. She would like to come in and have a urine specimen done. Please call pt [phone]. Thank you

## 2022-09-08 ENCOUNTER — OFFICE VISIT (OUTPATIENT)
Dept: FAMILY MEDICINE | Facility: CLINIC | Age: 87
End: 2022-09-08
Payer: COMMERCIAL

## 2022-09-08 VITALS
SYSTOLIC BLOOD PRESSURE: 150 MMHG | OXYGEN SATURATION: 100 % | BODY MASS INDEX: 32.3 KG/M2 | WEIGHT: 189.19 LBS | DIASTOLIC BLOOD PRESSURE: 66 MMHG | HEART RATE: 59 BPM | HEIGHT: 64 IN | TEMPERATURE: 98 F

## 2022-09-08 DIAGNOSIS — R30.0 DYSURIA: Primary | ICD-10-CM

## 2022-09-08 LAB
BILIRUB UR QL STRIP: NEGATIVE
CLARITY UR: CLEAR
COLOR UR: YELLOW
GLUCOSE UR QL STRIP: NEGATIVE
HGB UR QL STRIP: NEGATIVE
KETONES UR QL STRIP: NEGATIVE
LEUKOCYTE ESTERASE UR QL STRIP: NEGATIVE
NITRITE UR QL STRIP: NEGATIVE
PH UR STRIP: 6 [PH] (ref 5–8)
PROT UR QL STRIP: NEGATIVE
SP GR UR STRIP: 1.01 (ref 1–1.03)
URN SPEC COLLECT METH UR: NORMAL

## 2022-09-08 PROCEDURE — 99999 PR PBB SHADOW E&M-EST. PATIENT-LVL V: ICD-10-PCS | Mod: PBBFAC,,, | Performed by: NURSE PRACTITIONER

## 2022-09-08 PROCEDURE — 99213 PR OFFICE/OUTPT VISIT, EST, LEVL III, 20-29 MIN: ICD-10-PCS | Mod: S$GLB,,, | Performed by: NURSE PRACTITIONER

## 2022-09-08 PROCEDURE — 81002 URINALYSIS NONAUTO W/O SCOPE: CPT | Mod: PO | Performed by: NURSE PRACTITIONER

## 2022-09-08 PROCEDURE — 1101F PR PT FALLS ASSESS DOC 0-1 FALLS W/OUT INJ PAST YR: ICD-10-PCS | Mod: CPTII,S$GLB,, | Performed by: NURSE PRACTITIONER

## 2022-09-08 PROCEDURE — 1126F AMNT PAIN NOTED NONE PRSNT: CPT | Mod: CPTII,S$GLB,, | Performed by: NURSE PRACTITIONER

## 2022-09-08 PROCEDURE — 1159F PR MEDICATION LIST DOCUMENTED IN MEDICAL RECORD: ICD-10-PCS | Mod: CPTII,S$GLB,, | Performed by: NURSE PRACTITIONER

## 2022-09-08 PROCEDURE — 1101F PT FALLS ASSESS-DOCD LE1/YR: CPT | Mod: CPTII,S$GLB,, | Performed by: NURSE PRACTITIONER

## 2022-09-08 PROCEDURE — 99999 PR PBB SHADOW E&M-EST. PATIENT-LVL V: CPT | Mod: PBBFAC,,, | Performed by: NURSE PRACTITIONER

## 2022-09-08 PROCEDURE — 1159F MED LIST DOCD IN RCRD: CPT | Mod: CPTII,S$GLB,, | Performed by: NURSE PRACTITIONER

## 2022-09-08 PROCEDURE — 3288F PR FALLS RISK ASSESSMENT DOCUMENTED: ICD-10-PCS | Mod: CPTII,S$GLB,, | Performed by: NURSE PRACTITIONER

## 2022-09-08 PROCEDURE — 1126F PR PAIN SEVERITY QUANTIFIED, NO PAIN PRESENT: ICD-10-PCS | Mod: CPTII,S$GLB,, | Performed by: NURSE PRACTITIONER

## 2022-09-08 PROCEDURE — 1160F RVW MEDS BY RX/DR IN RCRD: CPT | Mod: CPTII,S$GLB,, | Performed by: NURSE PRACTITIONER

## 2022-09-08 PROCEDURE — 3077F SYST BP >= 140 MM HG: CPT | Mod: CPTII,S$GLB,, | Performed by: NURSE PRACTITIONER

## 2022-09-08 PROCEDURE — 1160F PR REVIEW ALL MEDS BY PRESCRIBER/CLIN PHARMACIST DOCUMENTED: ICD-10-PCS | Mod: CPTII,S$GLB,, | Performed by: NURSE PRACTITIONER

## 2022-09-08 PROCEDURE — 3077F PR MOST RECENT SYSTOLIC BLOOD PRESSURE >= 140 MM HG: ICD-10-PCS | Mod: CPTII,S$GLB,, | Performed by: NURSE PRACTITIONER

## 2022-09-08 PROCEDURE — 3078F PR MOST RECENT DIASTOLIC BLOOD PRESSURE < 80 MM HG: ICD-10-PCS | Mod: CPTII,S$GLB,, | Performed by: NURSE PRACTITIONER

## 2022-09-08 PROCEDURE — 3288F FALL RISK ASSESSMENT DOCD: CPT | Mod: CPTII,S$GLB,, | Performed by: NURSE PRACTITIONER

## 2022-09-08 PROCEDURE — 99213 OFFICE O/P EST LOW 20 MIN: CPT | Mod: S$GLB,,, | Performed by: NURSE PRACTITIONER

## 2022-09-08 PROCEDURE — 3078F DIAST BP <80 MM HG: CPT | Mod: CPTII,S$GLB,, | Performed by: NURSE PRACTITIONER

## 2022-09-08 RX ORDER — PHENAZOPYRIDINE HYDROCHLORIDE 200 MG/1
200 TABLET, FILM COATED ORAL 3 TIMES DAILY PRN
Qty: 30 TABLET | Refills: 0 | Status: SHIPPED | OUTPATIENT
Start: 2022-09-08 | End: 2022-09-18

## 2022-09-08 NOTE — PROGRESS NOTES
Subjective:       Patient ID: Marysol Lyles is a 87 y.o. female.    Chief Complaint: Dysuria    Dysuria   This is a recurrent problem. The current episode started in the past 7 days. The problem occurs every urination. The quality of the pain is described as burning. There has been no fever. Pertinent negatives include no vomiting or rash. She has tried nothing for the symptoms. The treatment provided no relief.     Review of Systems   Constitutional:  Negative for fatigue, fever and unexpected weight change.   HENT:  Negative for ear pain and sore throat.    Eyes:  Negative for pain and visual disturbance.   Respiratory:  Negative for cough and shortness of breath.    Cardiovascular:  Negative for chest pain and palpitations.   Gastrointestinal:  Negative for abdominal pain, diarrhea and vomiting.   Genitourinary:  Positive for dysuria.   Musculoskeletal:  Negative for arthralgias and myalgias.   Skin:  Negative for color change and rash.   Neurological:  Negative for dizziness and headaches.   Psychiatric/Behavioral:  Negative for dysphoric mood and sleep disturbance. The patient is not nervous/anxious.      Vitals:    09/08/22 0954   BP: (!) 150/66   Pulse: (!) 59   Temp: 98 °F (36.7 °C)       Objective:     Current Outpatient Medications   Medication Sig Dispense Refill    ACETAMINOPHEN (TYLENOL EXTRA STRENGTH ORAL) Take 500 mg by mouth every 4 to 6 hours as needed.       blood pressure test kit-large Kit Use once daily as directed. 1 each 0    calcium citrate-vitamin D3 315-200 mg (CITRACAL+D) 315-200 mg-unit per tablet Take 1 tablet by mouth 2 (two) times daily.      cetirizine (ZYRTEC) 10 MG tablet Take 1 tablet (10 mg total) by mouth once daily. 30 tablet 11    CYANOCOBALAMIN, VITAMIN B-12, (VITAMIN B-12 ORAL) Take 1 tablet by mouth once daily. As directed      cyclobenzaprine (FLEXERIL) 10 MG tablet Take 1 tablet (10 mg total) by mouth daily as needed. 90 tablet 3    denosumab (PROLIA) 60 mg/mL Syrg Inject 60  mg into the skin every 6 (six) months.      DULoxetine (CYMBALTA) 60 MG capsule TAKE 1 CAPSULE(60 MG) BY MOUTH EVERY DAY 90 capsule 3    ferrous gluconate (FERGON) 324 MG tablet Take 324 mg by mouth daily with breakfast.      fluticasone propionate (FLONASE) 50 mcg/actuation nasal spray 1 spray (50 mcg total) by Each Nostril route once daily. 16 g 0    gabapentin (NEURONTIN) 300 MG capsule TAKE 1 CAPSULE(300 MG) BY MOUTH THREE TIMES DAILY 270 capsule 3    glucosamine sulfate 500 mg Tab Take 1 tablet by mouth once daily.      ketoconazole (NIZORAL) 2 % shampoo Wash scalp with medicated shampoo at least 2x/week. Let sit on scalp at least 5 minutes prior to rinsing 240 mL 5    LACTOBACILLUS ACIDOPHILUS (PROBIOTIC ORAL) Take 1 tablet by mouth once daily.       losartan (COZAAR) 50 MG tablet Take 1 tablet (50 mg total) by mouth once daily. 90 tablet 3    melatonin 5 mg Cap Take 1 capsule by mouth nightly as needed.      metoprolol tartrate (LOPRESSOR) 50 MG tablet Take 1 tablet (50 mg total) by mouth 2 (two) times daily. 180 tablet 3    MULTIVITAMIN ORAL Take 1 tablet by mouth once daily.       nystatin (NYSTOP) powder Apply topically 3 (three) times daily as needed. 60 g 11    tolterodine (DETROL LA) 4 MG 24 hr capsule Take 1 capsule (4 mg total) by mouth once daily. 90 capsule 3    triamcinolone acetonide 0.025% (KENALOG) 0.025 % Oint Apply to affected areas of groin BID prn irritation. Do not use for longer than 2 weeks in a row. 15 g 0    UROGESIC-BLUE 81.6-40.8-0.12 mg Tab TAKE 1 TABLET BY MOUTH TWICE A WEEK 180 tablet 3    vit C,V-Do-uvgtg-lutein-zeaxan (PRESERVISION AREDS-2) 342-854-02-1 mg-unit-mg-mg Cap Take 1 capsule by mouth 2 (two) times a day.      conjugated estrogens (PREMARIN) vaginal cream Place 0.5 g vaginally twice a week. 30 g 1    phenazopyridine (PYRIDIUM) 200 MG tablet Take 1 tablet (200 mg total) by mouth 3 (three) times daily as needed for Pain. 30 tablet 0     No current facility-administered  medications for this visit.       Physical Exam  Vitals and nursing note reviewed.   Constitutional:       General: She is not in acute distress.     Appearance: She is well-developed.   HENT:      Head: Normocephalic and atraumatic.   Eyes:      Pupils: Pupils are equal, round, and reactive to light.   Cardiovascular:      Rate and Rhythm: Normal rate and regular rhythm.   Pulmonary:      Effort: Pulmonary effort is normal.      Breath sounds: Normal breath sounds.   Musculoskeletal:         General: Normal range of motion.      Cervical back: Normal range of motion and neck supple.   Skin:     General: Skin is warm and dry.      Findings: No rash.   Neurological:      Mental Status: She is alert and oriented to person, place, and time.   Psychiatric:         Judgment: Judgment normal.         Lab Results   Component Value Date    COLORU Yellow 09/08/2022    APPEARANCEUA Clear 09/08/2022    GLUCUA Negative 09/08/2022    SPECGRAV 1.010 09/08/2022    PHUR 6.0 09/08/2022    NITRITE Negative 09/08/2022    KETONESU Negative 09/08/2022    BILIRUBINUA Negative 09/08/2022    OCCULTUA Negative 09/08/2022    LEUKOCYTESUR Negative 09/08/2022           Assessment:       1. Dysuria          Plan:   Dysuria  -     Urinalysis    Other orders  -     phenazopyridine (PYRIDIUM) 200 MG tablet; Take 1 tablet (200 mg total) by mouth 3 (three) times daily as needed for Pain.  Dispense: 30 tablet; Refill: 0      No follow-ups on file.    There are no Patient Instructions on file for this visit.

## 2022-09-13 ENCOUNTER — OFFICE VISIT (OUTPATIENT)
Dept: DERMATOLOGY | Facility: CLINIC | Age: 87
End: 2022-09-13
Payer: COMMERCIAL

## 2022-09-13 DIAGNOSIS — L72.0 MILIA: ICD-10-CM

## 2022-09-13 DIAGNOSIS — L82.1 SEBORRHEIC KERATOSES: ICD-10-CM

## 2022-09-13 DIAGNOSIS — D22.30 MELANOCYTIC NEVUS OF FACE: ICD-10-CM

## 2022-09-13 DIAGNOSIS — L82.0 INFLAMED SEBORRHEIC KERATOSIS: ICD-10-CM

## 2022-09-13 DIAGNOSIS — D48.5 NEOPLASM OF UNCERTAIN BEHAVIOR OF SKIN: Primary | ICD-10-CM

## 2022-09-13 DIAGNOSIS — L73.8 SEBACEOUS HYPERPLASIA: ICD-10-CM

## 2022-09-13 PROCEDURE — 3288F FALL RISK ASSESSMENT DOCD: CPT | Mod: CPTII,S$GLB,, | Performed by: DERMATOLOGY

## 2022-09-13 PROCEDURE — 3288F PR FALLS RISK ASSESSMENT DOCUMENTED: ICD-10-PCS | Mod: CPTII,S$GLB,, | Performed by: DERMATOLOGY

## 2022-09-13 PROCEDURE — 11103 PR TANGENTIAL BIOPSY, SKIN, EA ADDTL LESION: ICD-10-PCS | Mod: 59,S$GLB,, | Performed by: DERMATOLOGY

## 2022-09-13 PROCEDURE — 1101F PR PT FALLS ASSESS DOC 0-1 FALLS W/OUT INJ PAST YR: ICD-10-PCS | Mod: CPTII,S$GLB,, | Performed by: DERMATOLOGY

## 2022-09-13 PROCEDURE — 1126F AMNT PAIN NOTED NONE PRSNT: CPT | Mod: CPTII,S$GLB,, | Performed by: DERMATOLOGY

## 2022-09-13 PROCEDURE — 88305 TISSUE EXAM BY PATHOLOGIST: CPT | Mod: 26,,, | Performed by: DERMATOLOGY

## 2022-09-13 PROCEDURE — 17110 DESTRUCTION B9 LES UP TO 14: CPT | Mod: S$GLB,,, | Performed by: DERMATOLOGY

## 2022-09-13 PROCEDURE — 11103 TANGNTL BX SKIN EA SEP/ADDL: CPT | Mod: 59,S$GLB,, | Performed by: DERMATOLOGY

## 2022-09-13 PROCEDURE — 11102 PR TANGENTIAL BIOPSY, SKIN, SINGLE LESION: ICD-10-PCS | Mod: 59,S$GLB,, | Performed by: DERMATOLOGY

## 2022-09-13 PROCEDURE — 88305 TISSUE EXAM BY PATHOLOGIST: ICD-10-PCS | Mod: 26,,, | Performed by: DERMATOLOGY

## 2022-09-13 PROCEDURE — 1160F RVW MEDS BY RX/DR IN RCRD: CPT | Mod: CPTII,S$GLB,, | Performed by: DERMATOLOGY

## 2022-09-13 PROCEDURE — 1126F PR PAIN SEVERITY QUANTIFIED, NO PAIN PRESENT: ICD-10-PCS | Mod: CPTII,S$GLB,, | Performed by: DERMATOLOGY

## 2022-09-13 PROCEDURE — 1101F PT FALLS ASSESS-DOCD LE1/YR: CPT | Mod: CPTII,S$GLB,, | Performed by: DERMATOLOGY

## 2022-09-13 PROCEDURE — 99213 OFFICE O/P EST LOW 20 MIN: CPT | Mod: 25,S$GLB,, | Performed by: DERMATOLOGY

## 2022-09-13 PROCEDURE — 99213 PR OFFICE/OUTPT VISIT, EST, LEVL III, 20-29 MIN: ICD-10-PCS | Mod: 25,S$GLB,, | Performed by: DERMATOLOGY

## 2022-09-13 PROCEDURE — 1159F PR MEDICATION LIST DOCUMENTED IN MEDICAL RECORD: ICD-10-PCS | Mod: CPTII,S$GLB,, | Performed by: DERMATOLOGY

## 2022-09-13 PROCEDURE — 1160F PR REVIEW ALL MEDS BY PRESCRIBER/CLIN PHARMACIST DOCUMENTED: ICD-10-PCS | Mod: CPTII,S$GLB,, | Performed by: DERMATOLOGY

## 2022-09-13 PROCEDURE — 88305 TISSUE EXAM BY PATHOLOGIST: CPT | Mod: 59 | Performed by: DERMATOLOGY

## 2022-09-13 PROCEDURE — 17110 PR DESTRUCTION BENIGN LESIONS UP TO 14: ICD-10-PCS | Mod: S$GLB,,, | Performed by: DERMATOLOGY

## 2022-09-13 PROCEDURE — 1159F MED LIST DOCD IN RCRD: CPT | Mod: CPTII,S$GLB,, | Performed by: DERMATOLOGY

## 2022-09-13 PROCEDURE — 11102 TANGNTL BX SKIN SINGLE LES: CPT | Mod: 59,S$GLB,, | Performed by: DERMATOLOGY

## 2022-09-13 NOTE — PATIENT INSTRUCTIONS
Biopsy Wound Care Instructions    Leave the bandage on for 24 hours without getting it wet.   Clean the area once a day with a gentle soap and water, then pat dry and apply Vaseline and a bandaid.  The site should be kept moist with Vaseline at all times to improve healing. Reapply a thick coating as needed. Do not let the site air out or form a scab, as this will delay healing and worsen scarring.  If any bleeding or oozing occurs once you return home, apply firm pressure to the area for 30 minutes straight without peeking. If bleeding continues, call the office immediately.  Please message us via [facility], call us at [phone], or return to the office at any sign of increasing redness, swelling, tenderness, pain, heat, yellow drainage/discharge, or continued bleeding.      Receiving Your Pathology Results    Your pathology results will be released to you on [facility] at the same time that Dr. Humphrey receives them.   Dr. Humphrey will then message you with her interpretation of the results and/or with the plan going forward.  If you do not use [facility] or if your pathology results require more of an explanation, you will receive your results via a phone call.  If 2 weeks go by and you have not received your results, please message us via [facility] or call us at [phone] to inform us.

## 2022-09-13 NOTE — PROGRESS NOTES
Patient Information  Name: Marysol Lyles  : 1935  MRN: [id number]     Referring Physician:  No ref. provider found   Primary Care Physician:  Aguilar Vickers MD   Date of Visit: 2022      Subjective:     History of Present lllness:    Marysol Lyles is a 87 y.o. female who presents with a chief complaint of several growths/bumps/moles.    Location: face and chest  Duration: <1 year  Signs/Symptoms: crusted raised growths and bumps; one on L clavicle gets pulled and irritated by her necklaces; mole on face has slowly grown, new bumps on face as well  Relieving factors/Prior treatments: cryo in the past on a few spots    Location: L and R waistline  Duration: years  Signs/Symptoms: gets irritated from pants rubbing on it  Relieving factors/Prior treatments: none    Patient was last seen: 2022.  Prior notes by myself reviewed.   Clinical documentation obtained by nursing staff reviewed.    Review of Systems    Objective:   Physical Exam   Constitutional: She appears well-developed and well-nourished. No distress.   Neurological: She is alert and oriented to person, place, and time. She is not disoriented.   Psychiatric: She has a normal mood and affect.   Skin:   Areas Examined (abnormalities noted in diagram):   Head / Face Inspection Performed  Neck Inspection Performed  Chest / Axilla Inspection Performed               Diagram Legend     Erythematous scaling macule/papule c/w actinic keratosis       Vascular papule c/w angioma      Pigmented verrucoid papule/plaque c/w seborrheic keratosis      Yellow umbilicated papule c/w sebaceous hyperplasia      Irregularly shaped tan macule c/w lentigo     1-2 mm smooth white papules consistent with Milia      Movable subcutaneous cyst with punctum c/w epidermal inclusion cyst      Subcutaneous movable cyst c/w pilar cyst      Firm pink to brown papule c/w dermatofibroma      Pedunculated fleshy papule(s) c/w skin tag(s)      Evenly pigmented macule c/w junctional  nevus     Mildly variegated pigmented, slightly irregular-bordered macule c/w mildly atypical nevus      Flesh colored to evenly pigmented papule c/w intradermal nevus       Pink pearly papule/plaque c/w basal cell carcinoma      Erythematous hyperkeratotic cursted plaque c/w SCC      Surgical scar with no sign of skin cancer recurrence      Open and closed comedones      Inflammatory papules and pustules      Verrucoid papule consistent consistent with wart     Erythematous eczematous patches and plaques     Dystrophic onycholytic nail with subungual debris c/w onychomycosis     Umbilicated papule    Erythematous-base heme-crusted tan verrucoid plaque consistent with inflamed seborrheic keratosis     Erythematous Silvery Scaling Plaque c/w Psoriasis     See annotation              [] Data reviewed  [] Prior external notes reviewed  [] Independent review of test  [] Management discussed with another provider  [] Independent historian    Assessment / Plan:      Pathology Orders:       Normal Orders This Visit    Specimen to Pathology, Dermatology     Questions:    Procedure Type: Dermatology and skin neoplasms    Number of Specimens: 3    ------------------------: -------------------------    Spec 1 Procedure: Biopsy    Spec 1 Clinical Impression: r/o neurofibroma vs irritated nevus    Spec 1 Source: left lower back    ------------------------: -------------------------    Spec 2 Procedure: Biopsy    Spec 2 Clinical Impression: r/o inflamed acrochordon    Spec 2 Source: right lower back    ------------------------: -------------------------    Spec 3 Procedure: Biopsy    Spec 3 Clinical Impression: r/o inflamed acrochordon    Spec 3 Source: left inframammary    Release to patient:           Neoplasm of uncertain behavior of skin  -     Specimen to Pathology, Dermatology    Shave biopsy procedure note x 3:  Risk, benefits, and alternatives of biopsy are discussed with the patient, including risk of infection, scar,  recurrence, and need for additional treatment of site. The patient agrees to the procedure by verbal consent. The area is marked and prepped with alcohol.  Approximately 1 mL of lidocaine 1% with epinephrine is used for local anesthesia. A sharp blade is used to remove the lesion. The specimen is sent for pathology. Hemostasis is obtained with aluminum chloride and/or monopolar hyfrecation if needed. The area is then dressed and bandaged. The patient tolerated the procedure well without adverse event. Written instructions on wound care were given and were reviewed with the patient, who is to call for any signs of bleeding or infection. The patient will be notified of the pathology results.    Inflamed seborrheic keratosis  Cryosurgery procedure note:  Risk, benefits, and alternatives of cryosurgery are discussed with the patient, including but not limited to the risks of hypopigmentation, hyperpigmentation, scar, infection, recurrence of lesion(s), development of new lesion(s), and need for additional treatment of the lesion(s). Verbal consent obtained from patient. Liquid nitrogen cryosurgery applied to 1 lesion(s) to produce a freeze injury. Counseled patient that blisters may form, and instructed patient on wound care with gentle cleansing and use of Vaseline ointment to keep moist until healed. Handout was provided, and patient was instructed to return to clinic in 1-2 months if lesions do not completely resolve.    Milia  This is a small, benign cyst. Reassurance provided. No treatment is necessary unless it is symptomatic. These may resolve on their own.    Melanocytic nevus of face  Benign-appearing nevus on exam today. Counseled patient to periodically examine moles and return to clinic if any changes in size, shape, or color are noted or if it becomes symptomatic (bleeding, itching, pain, etc).  -     Ambulatory referral/consult to Plastic Surgery; Future; Expected date: 09/20/2022    Sebaceous  hyperplasia  This is a common condition representing benign enlargement of oil glands. It typically occurs in adulthood. Reassurance was given to the patient. No treatment required.    Seborrheic keratoses  These are benign, inherited growths without a malignant potential. Reassurance given to patient. No treatment is necessary.    Follow up dependent on pathology results.      Gissel Humphrey MD, FAAD  [facility]

## 2022-09-21 LAB
FINAL PATHOLOGIC DIAGNOSIS: NORMAL
GROSS: NORMAL
Lab: NORMAL
MICROSCOPIC EXAM: NORMAL

## 2022-09-28 ENCOUNTER — IMMUNIZATION (OUTPATIENT)
Dept: FAMILY MEDICINE | Facility: CLINIC | Age: 87
End: 2022-09-28
Payer: COMMERCIAL

## 2022-09-28 DIAGNOSIS — Z23 NEED FOR VACCINATION: Primary | ICD-10-CM

## 2022-09-28 PROCEDURE — 90471 FLU VACCINE - QUADRIVALENT - ADJUVANTED: ICD-10-PCS | Mod: S$GLB,,, | Performed by: FAMILY MEDICINE

## 2022-09-28 PROCEDURE — 91312 COVID-19, MRNA, LNP-S, BIVALENT BOOSTER, PF, 30 MCG/0.3 ML DOSE: CPT | Mod: S$GLB,,, | Performed by: FAMILY MEDICINE

## 2022-09-28 PROCEDURE — 90694 FLU VACCINE - QUADRIVALENT - ADJUVANTED: ICD-10-PCS | Mod: S$GLB,,, | Performed by: FAMILY MEDICINE

## 2022-09-28 PROCEDURE — 0124A COVID-19, MRNA, LNP-S, BIVALENT BOOSTER, PF, 30 MCG/0.3 ML DOSE: CPT | Mod: PBBFAC | Performed by: FAMILY MEDICINE

## 2022-09-28 PROCEDURE — 91312 COVID-19, MRNA, LNP-S, BIVALENT BOOSTER, PF, 30 MCG/0.3 ML DOSE: ICD-10-PCS | Mod: S$GLB,,, | Performed by: FAMILY MEDICINE

## 2022-09-28 PROCEDURE — 90694 VACC AIIV4 NO PRSRV 0.5ML IM: CPT | Mod: S$GLB,,, | Performed by: FAMILY MEDICINE

## 2022-09-28 PROCEDURE — 90471 IMMUNIZATION ADMIN: CPT | Mod: S$GLB,,, | Performed by: FAMILY MEDICINE

## 2022-12-07 ENCOUNTER — PATIENT MESSAGE (OUTPATIENT)
Dept: FAMILY MEDICINE | Facility: CLINIC | Age: 87
End: 2022-12-07
Payer: COMMERCIAL

## 2022-12-07 DIAGNOSIS — Z12.31 VISIT FOR SCREENING MAMMOGRAM: Primary | ICD-10-CM

## 2022-12-08 ENCOUNTER — HOSPITAL ENCOUNTER (OUTPATIENT)
Dept: RADIOLOGY | Facility: HOSPITAL | Age: 87
Discharge: HOME | End: 2022-12-08
Attending: FAMILY MEDICINE
Payer: COMMERCIAL

## 2022-12-08 VITALS — BODY MASS INDEX: 32.61 KG/M2 | WEIGHT: 190 LBS

## 2022-12-08 DIAGNOSIS — Z12.31 VISIT FOR SCREENING MAMMOGRAM: ICD-10-CM

## 2022-12-08 PROCEDURE — 77063 MAMMO DIGITAL SCREENING BILAT WITH TOMO: ICD-10-PCS | Mod: 26,,, | Performed by: RADIOLOGY

## 2022-12-08 PROCEDURE — 77063 BREAST TOMOSYNTHESIS BI: CPT | Mod: 26,,, | Performed by: RADIOLOGY

## 2022-12-08 PROCEDURE — 77067 SCR MAMMO BI INCL CAD: CPT | Mod: 26,,, | Performed by: RADIOLOGY

## 2022-12-08 PROCEDURE — 77063 BREAST TOMOSYNTHESIS BI: CPT | Mod: TC

## 2022-12-08 PROCEDURE — 77067 MAMMO DIGITAL SCREENING BILAT WITH TOMO: ICD-10-PCS | Mod: 26,,, | Performed by: RADIOLOGY

## 2022-12-23 ENCOUNTER — PATIENT MESSAGE (OUTPATIENT)
Dept: FAMILY MEDICINE | Facility: CLINIC | Age: 87
End: 2022-12-23
Payer: COMMERCIAL

## 2023-02-01 ENCOUNTER — PATIENT MESSAGE (OUTPATIENT)
Dept: ADMINISTRATIVE | Facility: OTHER | Age: 88
End: 2023-02-01
Payer: COMMERCIAL

## 2023-02-02 ENCOUNTER — LAB VISIT (OUTPATIENT)
Dept: LAB | Facility: HOSPITAL | Age: 88
End: 2023-02-02
Attending: INTERNAL MEDICINE
Payer: COMMERCIAL

## 2023-02-02 DIAGNOSIS — E21.3 HYPERPARATHYROIDISM: ICD-10-CM

## 2023-02-02 DIAGNOSIS — M85.80 OSTEOPENIA, UNSPECIFIED LOCATION: ICD-10-CM

## 2023-02-02 LAB
ALBUMIN SERPL BCP-MCNC: 4 G/DL (ref 3.5–5.2)
ANION GAP SERPL CALC-SCNC: 9 MMOL/L (ref 8–16)
BUN SERPL-MCNC: 24 MG/DL (ref 8–23)
CALCIUM SERPL-MCNC: 10.2 MG/DL (ref 8.7–10.5)
CHLORIDE SERPL-SCNC: 101 MMOL/L (ref 95–110)
CO2 SERPL-SCNC: 31 MMOL/L (ref 23–29)
CREAT SERPL-MCNC: 0.9 MG/DL (ref 0.5–1.4)
EST. GFR  (NO RACE VARIABLE): >60 ML/MIN/1.73 M^2
GLUCOSE SERPL-MCNC: 87 MG/DL (ref 70–110)
PHOSPHATE SERPL-MCNC: 4.7 MG/DL (ref 2.7–4.5)
POTASSIUM SERPL-SCNC: 5.1 MMOL/L (ref 3.5–5.1)
PTH-INTACT SERPL-MCNC: 108.3 PG/ML (ref 9–77)
SODIUM SERPL-SCNC: 141 MMOL/L (ref 136–145)

## 2023-02-02 PROCEDURE — 83970 ASSAY OF PARATHORMONE: CPT | Performed by: INTERNAL MEDICINE

## 2023-02-02 PROCEDURE — 36415 COLL VENOUS BLD VENIPUNCTURE: CPT | Mod: PO | Performed by: INTERNAL MEDICINE

## 2023-02-02 PROCEDURE — 80069 RENAL FUNCTION PANEL: CPT | Performed by: INTERNAL MEDICINE

## 2023-02-07 NOTE — TELEPHONE ENCOUNTER
I have signed for the following orders AND/OR meds.  Please call the patient and ask the patient to schedule the testing AND/OR inform about any medications that were sent.      Orders Placed This Encounter   Procedures    Ambulatory referral to Neurology     Referral Priority:   Routine     Referral Type:   Consultation     Referral Reason:   Specialty Services Required     Referred to Provider:   Prakash Swanson Jr., MD     Requested Specialty:   Neurology     Number of Visits Requested:   1           English

## 2023-02-08 ENCOUNTER — OFFICE VISIT (OUTPATIENT)
Dept: ENDOCRINOLOGY | Facility: CLINIC | Age: 88
End: 2023-02-08
Payer: MEDICARE

## 2023-02-08 DIAGNOSIS — M85.80 OSTEOPENIA, UNSPECIFIED LOCATION: Primary | ICD-10-CM

## 2023-02-08 DIAGNOSIS — I10 BENIGN ESSENTIAL HTN: ICD-10-CM

## 2023-02-08 DIAGNOSIS — E21.3 HYPERPARATHYROIDISM: ICD-10-CM

## 2023-02-08 PROCEDURE — 99214 OFFICE O/P EST MOD 30 MIN: CPT | Mod: 95,,, | Performed by: INTERNAL MEDICINE

## 2023-02-08 PROCEDURE — 1160F PR REVIEW ALL MEDS BY PRESCRIBER/CLIN PHARMACIST DOCUMENTED: ICD-10-PCS | Mod: CPTII,95,, | Performed by: INTERNAL MEDICINE

## 2023-02-08 PROCEDURE — 1159F PR MEDICATION LIST DOCUMENTED IN MEDICAL RECORD: ICD-10-PCS | Mod: CPTII,95,, | Performed by: INTERNAL MEDICINE

## 2023-02-08 PROCEDURE — 1160F RVW MEDS BY RX/DR IN RCRD: CPT | Mod: CPTII,95,, | Performed by: INTERNAL MEDICINE

## 2023-02-08 PROCEDURE — 1159F MED LIST DOCD IN RCRD: CPT | Mod: CPTII,95,, | Performed by: INTERNAL MEDICINE

## 2023-02-08 PROCEDURE — 99214 PR OFFICE/OUTPT VISIT, EST, LEVL IV, 30-39 MIN: ICD-10-PCS | Mod: 95,,, | Performed by: INTERNAL MEDICINE

## 2023-02-08 NOTE — PROGRESS NOTES
The patient location is: [place]    Visit type: audiovisual    Face to Face time with patient:  12 minutes  15 minutes of total time spent on the encounter, which includes face to face time and non-face to face time preparing to see the patient (eg, review of tests), Obtaining and/or reviewing separately obtained history, Documenting clinical information in the electronic or other health record, Independently interpreting results (not separately reported) and communicating results to the patient/family/caregiver, or Care coordination (not separately reported).         Each patient to whom he or she provides medical services by telemedicine is:  (1) informed of the relationship between the physician and patient and the respective role of any other health care provider with respect to management of the patient; and (2) notified that he or she may decline to receive medical services by telemedicine and may withdraw from such care at any time.      CHIEF COMPLAINT: Osteopenia/Primary Hyperpara  87 y.o.  being seen as a f/u. Daughters with her. Had been seeing DR. Diaz and last saw her in 2016 and then saw DR. Perez. Diagnosed in 2015. Had been on Fosamax. Then switched to Prolia. Tolerating Prolia. Scheduled for Prolia 2/13/23. No falls. No fractures. She exercising at home, some decrease from before. Taking Ca + D. Has had a back steroid injection. States Jaw pain evaluated by dentist. Thought it was due to grinding teeth.             PAST MEDICAL HISTORY/PAST SURGICAL HISTORY:  Reviewed in [facility]    SOCIAL HISTORY: No T/A    FAMILY HISTORY:  + Osteoporosis. No hip fracture    MEDICATIONS/ALLERGIES: The patient's MedCard has been updated and reviewed.        PE:  Virtual Visit     Latest Reference Range & Units 02/02/23 14:09   Sodium 136 - 145 mmol/L 141   Potassium 3.5 - 5.1 mmol/L 5.1   Chloride 95 - 110 mmol/L 101   CO2 23 - 29 mmol/L 31 (H)   Anion Gap 8 - 16 mmol/L 9   BUN 8 - 23 mg/dL 24 (H)   Creatinine 0.5 - 1.4  mg/dL 0.9   eGFR >60 mL/min/1.73 m^2 >60.0   Glucose 70 - 110 mg/dL 87   Calcium 8.7 - 10.5 mg/dL 10.2   Phosphorus 2.7 - 4.5 mg/dL 4.7 (H)   Albumin 3.5 - 5.2 g/dL 4.0   (H): Data is abnormally high     Latest Reference Range & Units 02/02/23 14:09   PTH 9.0 - 77.0 pg/mL 108.3 (H)   (H): Data is abnormally high    ASSESSMENT/PLAN:  1. Osteopenia- No Fracture history. Had been on fosamax and now on Prolia. Tolerating well. Will continue current therapy due to # 2. Therapy plan placed.      2. Primary hyperparathyroidism- based on history of hypercalcemia and high urine Ca. At this point serum calcium fluctuates from normal to mildly elevated. On Prolia for osteopenia. If Ca increases could consider sensipar.  At this point since calcium is normal she does not need any therapy.    3. GERD- Unable to take PO bisphosphonates. Controlled with diet    4. HTN-  In digital HTN program. R Avoid use of thiazide diuretics. Due to hypercalcemia.     FOLLOWUP  F/U 6 months prior to next prolia renal panel, PTH- [facility]

## 2023-02-10 ENCOUNTER — PATIENT MESSAGE (OUTPATIENT)
Dept: ENDOCRINOLOGY | Facility: CLINIC | Age: 88
End: 2023-02-10
Payer: COMMERCIAL

## 2023-02-13 ENCOUNTER — INFUSION (OUTPATIENT)
Dept: INFUSION THERAPY | Facility: HOSPITAL | Age: 88
End: 2023-02-13
Attending: INTERNAL MEDICINE
Payer: COMMERCIAL

## 2023-02-13 VITALS
BODY MASS INDEX: 32.92 KG/M2 | SYSTOLIC BLOOD PRESSURE: 132 MMHG | TEMPERATURE: 98 F | HEART RATE: 81 BPM | WEIGHT: 191.81 LBS | DIASTOLIC BLOOD PRESSURE: 74 MMHG | RESPIRATION RATE: 16 BRPM

## 2023-02-13 DIAGNOSIS — M85.80 OSTEOPENIA, UNSPECIFIED LOCATION: Primary | ICD-10-CM

## 2023-02-13 PROCEDURE — 63600175 PHARM REV CODE 636 W HCPCS: Mod: JG,PN | Performed by: INTERNAL MEDICINE

## 2023-02-13 PROCEDURE — 96372 THER/PROPH/DIAG INJ SC/IM: CPT | Mod: PN

## 2023-02-13 RX ADMIN — DENOSUMAB 60 MG: 60 INJECTION SUBCUTANEOUS at 01:02

## 2023-02-14 DIAGNOSIS — I10 ESSENTIAL HYPERTENSION: ICD-10-CM

## 2023-02-14 RX ORDER — LOSARTAN POTASSIUM 50 MG/1
TABLET ORAL
Qty: 90 TABLET | Refills: 0 | Status: SHIPPED | OUTPATIENT
Start: 2023-02-14 | End: 2023-03-15 | Stop reason: SDUPTHER

## 2023-02-14 NOTE — TELEPHONE ENCOUNTER
Refill Decision Note   Marysol Lyles  is requesting a refill authorization.  Brief Assessment and Rationale for Refill:  Approve     Medication Therapy Plan:       Medication Reconciliation Completed: No   Comments:     No Care Gaps recommended.     Note composed:9:42 AM 02/14/2023

## 2023-02-14 NOTE — TELEPHONE ENCOUNTER
No new care gaps identified.  [facility] Embedded Care Gaps. Reference number: [id number]. 2/14/2023   3:41:18 AM CST

## 2023-02-15 ENCOUNTER — PATIENT MESSAGE (OUTPATIENT)
Dept: FAMILY MEDICINE | Facility: CLINIC | Age: 88
End: 2023-02-15
Payer: COMMERCIAL

## 2023-02-15 NOTE — TELEPHONE ENCOUNTER
I have signed for the following orders AND/OR meds.  Please call the patient and ask the patient to schedule the testing AND/OR inform about any medications that were sent.      Orders Placed This Encounter   Procedures    Pneumococcal Conjugate Vaccine (20 Valent) (IM)     Standing Status:   Future     Standing Expiration Date:   8/15/2024

## 2023-02-21 ENCOUNTER — CLINICAL SUPPORT (OUTPATIENT)
Dept: FAMILY MEDICINE | Facility: CLINIC | Age: 88
End: 2023-02-21
Payer: COMMERCIAL

## 2023-02-21 DIAGNOSIS — Z23 IMMUNIZATION DUE: Primary | ICD-10-CM

## 2023-02-21 PROCEDURE — 90677 PNEUMOCOCCAL CONJUGATE VACCINE 20-VALENT: ICD-10-PCS | Mod: S$GLB,,, | Performed by: FAMILY MEDICINE

## 2023-02-21 PROCEDURE — 99999 PR PBB SHADOW E&M-EST. PATIENT-LVL III: ICD-10-PCS | Mod: PBBFAC,,,

## 2023-02-21 PROCEDURE — 99999 PR PBB SHADOW E&M-EST. PATIENT-LVL III: CPT | Mod: PBBFAC,,,

## 2023-02-21 PROCEDURE — 90677 PCV20 VACCINE IM: CPT | Mod: S$GLB,,, | Performed by: FAMILY MEDICINE

## 2023-02-21 PROCEDURE — 90471 PNEUMOCOCCAL CONJUGATE VACCINE 20-VALENT: ICD-10-PCS | Mod: S$GLB,,, | Performed by: FAMILY MEDICINE

## 2023-02-21 PROCEDURE — 90471 IMMUNIZATION ADMIN: CPT | Mod: S$GLB,,, | Performed by: FAMILY MEDICINE

## 2023-03-15 ENCOUNTER — PATIENT MESSAGE (OUTPATIENT)
Dept: FAMILY MEDICINE | Facility: CLINIC | Age: 88
End: 2023-03-15

## 2023-03-15 ENCOUNTER — OFFICE VISIT (OUTPATIENT)
Dept: FAMILY MEDICINE | Facility: CLINIC | Age: 88
End: 2023-03-15
Payer: COMMERCIAL

## 2023-03-15 VITALS
HEART RATE: 45 BPM | RESPIRATION RATE: 18 BRPM | WEIGHT: 193 LBS | SYSTOLIC BLOOD PRESSURE: 117 MMHG | TEMPERATURE: 98 F | BODY MASS INDEX: 32.95 KG/M2 | HEIGHT: 64 IN | DIASTOLIC BLOOD PRESSURE: 70 MMHG

## 2023-03-15 DIAGNOSIS — N95.2 VAGINAL ATROPHY: ICD-10-CM

## 2023-03-15 DIAGNOSIS — E78.5 HYPERLIPIDEMIA, UNSPECIFIED HYPERLIPIDEMIA TYPE: ICD-10-CM

## 2023-03-15 DIAGNOSIS — I10 ESSENTIAL HYPERTENSION: ICD-10-CM

## 2023-03-15 DIAGNOSIS — E21.3 HYPERPARATHYROIDISM: ICD-10-CM

## 2023-03-15 DIAGNOSIS — R30.0 DYSURIA: Primary | ICD-10-CM

## 2023-03-15 DIAGNOSIS — I10 PRIMARY HYPERTENSION: ICD-10-CM

## 2023-03-15 DIAGNOSIS — R39.15 URINARY URGENCY: ICD-10-CM

## 2023-03-15 DIAGNOSIS — G62.9 NEUROPATHY: ICD-10-CM

## 2023-03-15 LAB
BACTERIA #/AREA URNS HPF: ABNORMAL /HPF
BILIRUB UR QL STRIP: NEGATIVE
CLARITY UR: ABNORMAL
COLOR UR: ABNORMAL
GLUCOSE UR QL STRIP: NEGATIVE
HGB UR QL STRIP: ABNORMAL
KETONES UR QL STRIP: NEGATIVE
LEUKOCYTE ESTERASE UR QL STRIP: ABNORMAL
MICROSCOPIC COMMENT: ABNORMAL
NITRITE UR QL STRIP: NEGATIVE
PH UR STRIP: 6 [PH] (ref 5–8)
PROT UR QL STRIP: NEGATIVE
RBC #/AREA URNS HPF: 10 /HPF (ref 0–4)
SP GR UR STRIP: 1.01 (ref 1–1.03)
URN SPEC COLLECT METH UR: ABNORMAL
WBC #/AREA URNS HPF: >100 /HPF (ref 0–5)

## 2023-03-15 PROCEDURE — 87088 URINE BACTERIA CULTURE: CPT | Performed by: FAMILY MEDICINE

## 2023-03-15 PROCEDURE — 99999 PR PBB SHADOW E&M-EST. PATIENT-LVL IV: ICD-10-PCS | Mod: PBBFAC,,, | Performed by: FAMILY MEDICINE

## 2023-03-15 PROCEDURE — 99214 PR OFFICE/OUTPT VISIT, EST, LEVL IV, 30-39 MIN: ICD-10-PCS | Mod: S$GLB,,, | Performed by: FAMILY MEDICINE

## 2023-03-15 PROCEDURE — 3074F PR MOST RECENT SYSTOLIC BLOOD PRESSURE < 130 MM HG: ICD-10-PCS | Mod: CPTII,S$GLB,, | Performed by: FAMILY MEDICINE

## 2023-03-15 PROCEDURE — 1159F MED LIST DOCD IN RCRD: CPT | Mod: CPTII,S$GLB,, | Performed by: FAMILY MEDICINE

## 2023-03-15 PROCEDURE — 3288F FALL RISK ASSESSMENT DOCD: CPT | Mod: CPTII,S$GLB,, | Performed by: FAMILY MEDICINE

## 2023-03-15 PROCEDURE — 1101F PR PT FALLS ASSESS DOC 0-1 FALLS W/OUT INJ PAST YR: ICD-10-PCS | Mod: CPTII,S$GLB,, | Performed by: FAMILY MEDICINE

## 2023-03-15 PROCEDURE — 1159F PR MEDICATION LIST DOCUMENTED IN MEDICAL RECORD: ICD-10-PCS | Mod: CPTII,S$GLB,, | Performed by: FAMILY MEDICINE

## 2023-03-15 PROCEDURE — 87077 CULTURE AEROBIC IDENTIFY: CPT | Performed by: FAMILY MEDICINE

## 2023-03-15 PROCEDURE — 3078F PR MOST RECENT DIASTOLIC BLOOD PRESSURE < 80 MM HG: ICD-10-PCS | Mod: CPTII,S$GLB,, | Performed by: FAMILY MEDICINE

## 2023-03-15 PROCEDURE — 81000 URINALYSIS NONAUTO W/SCOPE: CPT | Mod: PO | Performed by: FAMILY MEDICINE

## 2023-03-15 PROCEDURE — 99999 PR PBB SHADOW E&M-EST. PATIENT-LVL IV: CPT | Mod: PBBFAC,,, | Performed by: FAMILY MEDICINE

## 2023-03-15 PROCEDURE — 1126F PR PAIN SEVERITY QUANTIFIED, NO PAIN PRESENT: ICD-10-PCS | Mod: CPTII,S$GLB,, | Performed by: FAMILY MEDICINE

## 2023-03-15 PROCEDURE — 3078F DIAST BP <80 MM HG: CPT | Mod: CPTII,S$GLB,, | Performed by: FAMILY MEDICINE

## 2023-03-15 PROCEDURE — 3074F SYST BP LT 130 MM HG: CPT | Mod: CPTII,S$GLB,, | Performed by: FAMILY MEDICINE

## 2023-03-15 PROCEDURE — 99214 OFFICE O/P EST MOD 30 MIN: CPT | Mod: S$GLB,,, | Performed by: FAMILY MEDICINE

## 2023-03-15 PROCEDURE — 87186 SC STD MICRODIL/AGAR DIL: CPT | Performed by: FAMILY MEDICINE

## 2023-03-15 PROCEDURE — 87086 URINE CULTURE/COLONY COUNT: CPT | Performed by: FAMILY MEDICINE

## 2023-03-15 PROCEDURE — 1126F AMNT PAIN NOTED NONE PRSNT: CPT | Mod: CPTII,S$GLB,, | Performed by: FAMILY MEDICINE

## 2023-03-15 PROCEDURE — 3288F PR FALLS RISK ASSESSMENT DOCUMENTED: ICD-10-PCS | Mod: CPTII,S$GLB,, | Performed by: FAMILY MEDICINE

## 2023-03-15 PROCEDURE — 1101F PT FALLS ASSESS-DOCD LE1/YR: CPT | Mod: CPTII,S$GLB,, | Performed by: FAMILY MEDICINE

## 2023-03-15 RX ORDER — METOPROLOL TARTRATE 50 MG/1
50 TABLET ORAL 2 TIMES DAILY
Qty: 180 TABLET | Refills: 3 | Status: SHIPPED | OUTPATIENT
Start: 2023-03-15 | End: 2024-01-29 | Stop reason: SDUPTHER

## 2023-03-15 RX ORDER — LOSARTAN POTASSIUM 50 MG/1
50 TABLET ORAL DAILY
Qty: 90 TABLET | Refills: 3 | Status: SHIPPED | OUTPATIENT
Start: 2023-03-15

## 2023-03-15 RX ORDER — DULOXETIN HYDROCHLORIDE 60 MG/1
CAPSULE, DELAYED RELEASE ORAL
Qty: 90 CAPSULE | Refills: 3 | Status: SHIPPED | OUTPATIENT
Start: 2023-03-15

## 2023-03-15 RX ORDER — TOLTERODINE 4 MG/1
CAPSULE, EXTENDED RELEASE ORAL
Qty: 90 CAPSULE | Refills: 3 | Status: SHIPPED | OUTPATIENT
Start: 2023-03-15

## 2023-03-15 RX ORDER — GABAPENTIN 300 MG/1
300 CAPSULE ORAL 3 TIMES DAILY
Qty: 270 CAPSULE | Refills: 3 | Status: SHIPPED | OUTPATIENT
Start: 2023-03-15 | End: 2024-02-27

## 2023-03-15 NOTE — PROGRESS NOTES
"Subjective:      Patient ID: Marysol Lyles is a 87 y.o. female.    Chief Complaint: Annual Exam  She has had some urinary urgency and has a "different feeling' in her bladder area.  She has had UTI's in the past.  She thinks she needs to have additional fluids but wants to rule out a UTI.  NO fever noted.  Problem List Items Addressed This Visit       Hyperlipidemia    Overview     She has hyperlipidemia and this was tracked. But, we have discussed her age and the probability that we would not treat if it was higher.  Lab Results   Component Value Date    CHOL 220 (H) 03/09/2021    CHOL 229 (H) 02/19/2020    CHOL 205 (H) 01/17/2019     Lab Results   Component Value Date    HDL 75 06/06/2022    HDL 72 (H) 03/09/2021    HDL 77 (H) 02/19/2020     Lab Results   Component Value Date    LDLCALC 105 06/06/2022    LDLCALC 123 03/09/2021    LDLCALC 138.8 02/19/2020     Lab Results   Component Value Date    TRIG 101 06/06/2022    TRIG 130 03/09/2021    TRIG 66 02/19/2020     Lab Results   Component Value Date    CHOLHDL 33.6 02/19/2020    CHOLHDL 32.7 01/17/2019    CHOLHDL 32.3 11/09/2017              Hyperparathyroidism    Overview     She has hyperparthyroidism and is on prolia and her last PTH was stable.   Lab Results   Component Value Date    .3 (H) 02/02/2023    CALCIUM 10.2 02/02/2023    PHOS 4.7 (H) 02/02/2023            Hypertension    Overview     The patient presents with essential hypertension.  The patient is tolerating the medication well and is in excellent compliance.  The patient is experiencing no side effects.  Counseling was offered regarding low salt diets.  The patient has a reduced salt intake.  The patient denies chest pain, palpitations, shortness of breath, dyspnea on exertion, left or murmur neck pain, nausea, vomiting, diaphoresis, paroxysmal nocturnal dyspnea, and orthopnea.   Hypertension Medications               losartan (COZAAR) 50 MG tablet TAKE 1 TABLET(50 MG) BY MOUTH EVERY DAY    " metoprolol tartrate (LOPRESSOR) 50 MG tablet TAKE 1 TABLET(50 MG) BY MOUTH TWICE DAILY                Relevant Medications    metoprolol tartrate (LOPRESSOR) 50 MG tablet    losartan (COZAAR) 50 MG tablet    Neuropathy    Overview     She has neuropathy and has been on cymbalta for this along with gabapentin and they help with this.           Relevant Medications    gabapentin (NEURONTIN) 300 MG capsule    DULoxetine (CYMBALTA) 60 MG capsule     Other Visit Diagnoses       Dysuria    -  Primary    Relevant Orders    Urinalysis, Reflex to Urine Culture Urine, Clean Catch    Essential hypertension        Relevant Medications    metoprolol tartrate (LOPRESSOR) 50 MG tablet    losartan (COZAAR) 50 MG tablet    Urinary urgency        Relevant Medications    tolterodine (DETROL LA) 4 MG 24 hr capsule    Vaginal atrophy        Relevant Medications    conjugated estrogens (PREMARIN) vaginal cream (Start on 3/16/2023)            The patient's Health Maintenance was reviewed and the following appears to be due:   There are no preventive care reminders to display for this patient.    Past Medical History:  Past Medical History:   Diagnosis Date    Arthritis     Chronic pain 8/14/2013    Colon polyp     DDD (degenerative disc disease)     DDD (degenerative disc disease)     GERD (gastroesophageal reflux disease)     Hyperlipidemia     Hypertension     Osteopenia 3/2/2016    Primary hyperparathyroidism     Urinary incontinence 4/19/2021    She has urinary incontinence and she has used urogesic and detrol and premarin cream to help with this and it has. She will continue them.     Past Surgical History:   Procedure Laterality Date    APPENDECTOMY      CATARACT EXTRACTION W/  INTRAOCULAR LENS IMPLANT Left 05/16/2018    Dr Haywood    CATARACT EXTRACTION W/  INTRAOCULAR LENS IMPLANT Right 12/02/2020    Dr. Tirado    COLONOSCOPY  11/16/2016    Dr. Vickers; polyp removed and small ileocecal valve ulceration; Bx:  tubular adenoma; benign ileal mucosa    COLONOSCOPY N/A 04/14/2022    Procedure: COLONOSCOPY;  Surgeon: Jagjit Aguirre MD;  Location: [facility];  Service: Endoscopy;  Laterality: N/A;    COLONOSCOPY W/ BIOPSIES  02/2011    repeat in 3 years    EYE SURGERY  2020    INTRAOCULAR PROSTHESES INSERTION Right 12/02/2020    Procedure: INSERTION, IOL PROSTHESIS;  Surgeon: Nava Tirado MD;  Location: [facility] OR [address] [place] [address];  Service: Ophthalmology;  Laterality: Right;    JOINT REPLACEMENT  yes    LUMBAR EPIDURAL INJECTION N/A 12/2019    Dr. Tejada     PARTIAL KNEE ARTHROPLASTY Right     Right    PHACOEMULSIFICATION OF CATARACT Right 12/02/2020    Procedure: PHACOEMULSIFICATION, CATARACT;  Surgeon: Nava Tirado MD;  Location: [facility] OR [address] [place] [address];  Service: Ophthalmology;  Laterality: Right;    SLEEVE GASTROPLASTY  02/08/2012    TONSILLECTOMY      TOTAL HIP ARTHROPLASTY Right     TOTAL KNEE ARTHROPLASTY Left     UPPER GASTROINTESTINAL ENDOSCOPY       Review of patient's allergies indicates:   Allergen Reactions    Penicillins Swelling    Formaldehyde Hives    Mercury Hives    Mercury (elemental) Hives    Penicillamine     Adhesive Rash     Other reaction(s): Rash  Other reaction(s): Rash    Adhesive tape-silicones Rash    Dilaudid [hydromorphone (bulk)] Itching    Hydromorphone Itching    Latex, natural rubber Hives     Current Outpatient Medications on File Prior to Visit   Medication Sig Dispense Refill    ACETAMINOPHEN (TYLENOL EXTRA STRENGTH ORAL) Take 500 mg by mouth every 4 to 6 hours as needed.       blood pressure test kit-large Kit Use once daily as directed. 1 each 0    calcium citrate-vitamin D3 315-200 mg (CITRACAL+D) 315-200 mg-unit per tablet Take 1 tablet by mouth 2 (two) times daily.      cetirizine (ZYRTEC) 10 MG tablet Take 1 tablet (10 mg total) by mouth once daily. 30 tablet 11    conjugated estrogens (PREMARIN) vaginal cream Place 0.5 g vaginally twice a week. 30 g 1     CYANOCOBALAMIN, VITAMIN B-12, (VITAMIN B-12 ORAL) Take 1 tablet by mouth once daily. As directed      cyclobenzaprine (FLEXERIL) 10 MG tablet Take 1 tablet (10 mg total) by mouth daily as needed. 90 tablet 3    denosumab (PROLIA) 60 mg/mL Syrg Inject 60 mg into the skin every 6 (six) months.      DULoxetine (CYMBALTA) 60 MG capsule TAKE 1 CAPSULE(60 MG) BY MOUTH EVERY DAY 90 capsule 3    ferrous gluconate (FERGON) 324 MG tablet Take 324 mg by mouth daily with breakfast.      fluticasone propionate (FLONASE) 50 mcg/actuation nasal spray 1 spray (50 mcg total) by Each Nostril route once daily. 16 g 0    gabapentin (NEURONTIN) 300 MG capsule TAKE 1 CAPSULE(300 MG) BY MOUTH THREE TIMES DAILY 270 capsule 3    glucosamine sulfate 500 mg Tab Take 1 tablet by mouth once daily.      ketoconazole (NIZORAL) 2 % shampoo Wash scalp with medicated shampoo at least 2x/week. Let sit on scalp at least 5 minutes prior to rinsing 240 mL 5    LACTOBACILLUS ACIDOPHILUS (PROBIOTIC ORAL) Take 1 tablet by mouth once daily.       losartan (COZAAR) 50 MG tablet TAKE 1 TABLET(50 MG) BY MOUTH EVERY DAY 90 tablet 0    melatonin 5 mg Cap Take 1 capsule by mouth nightly as needed.      methen-sod phos-meth blue-hyos (UROGESIC-BLUE) 81.6-40.8-0.12 mg Tab Take 1 tablet by mouth twice a week. 180 tablet 3    metoprolol tartrate (LOPRESSOR) 50 MG tablet TAKE 1 TABLET(50 MG) BY MOUTH TWICE DAILY 180 tablet 3    MULTIVITAMIN ORAL Take 1 tablet by mouth once daily.       nystatin (NYSTOP) powder Apply topically 3 (three) times daily as needed. 60 g 11    tolterodine (DETROL LA) 4 MG 24 hr capsule TAKE 1 CAPSULE(4 MG) BY MOUTH EVERY DAY 90 capsule 0    triamcinolone acetonide 0.025% (KENALOG) 0.025 % Oint Apply to affected areas of groin BID prn irritation. Do not use for longer than 2 weeks in a row. 15 g 0    vit C,R-Qy-khwgj-lutein-zeaxan (PRESERVISION AREDS-2) 014-648-24-1 mg-unit-mg-mg Cap Take 1 capsule by mouth 2 (two) times  a day.       No current facility-administered medications on file prior to visit.     Social History     Socioeconomic History    Marital status:    Tobacco Use    Smoking status: Former     Packs/day: 0.00     Years: 20.00     Pack years: 0.00     Types: Cigarettes     Start date: 1954     Quit date: 1971     Years since quittin.2    Smokeless tobacco: Never    Tobacco comments:     less than 2-3 cigarettes per day   Substance and Sexual Activity    Alcohol use: Not Currently     Comment: occasionally    Drug use: No    Sexual activity: Not Currently     Partners: Male     Birth control/protection: None     Comment: 86 years old   Social History Narrative    Wears a seatbelt.     Family History   Problem Relation Age of Onset    Arthritis Mother     Cancer Mother              Breast cancer Mother     Arthritis Father     Heart disease Father     Hyperlipidemia Father     Heart attack Father     Hypertension Father              Cancer Sister     Depression Sister     Cataracts Sister     Cancer Brother     Depression Brother     Diabetes Brother     Heart disease Brother     Hyperlipidemia Brother     Blindness Brother         dm    Retinal detachment Brother     Alcohol abuse Son     Birth defects Son     Diabetes Son     Mental retardation Son     Arthritis Son     Hyperlipidemia Son     Hypertension Son     Learning disabilities Son     Mental illness Son     Cancer Maternal Aunt     Alcohol abuse Maternal Uncle     Cancer Maternal Uncle     Depression Maternal Uncle     Cancer Paternal Aunt     Cancer Paternal Uncle     Arthritis Maternal Grandmother     Hypertension Maternal Grandmother     Alcohol abuse Maternal Grandfather     Arthritis Maternal Grandfather     Depression Maternal Grandfather     Hypertension Maternal Grandfather     Arthritis Paternal Grandmother     Arthritis Paternal Grandfather     HIV Son      "Glaucoma Son     Alcohol abuse Son     Arthritis Son     Breast cancer Paternal Cousin     Arthritis Daughter     Hearing loss Daughter     Cancer Maternal Aunt              Cancer Maternal Uncle         4 , 1 living    Cancer Paternal Aunt              Cancer Paternal Uncle              Cancer Sister          , living     Cancer Brother          , living     COPD Son     Diabetes Brother              Hearing loss Daughter     Amblyopia Neg Hx     Macular degeneration Neg Hx     Strabismus Neg Hx     Stroke Neg Hx     Thyroid disease Neg Hx     Colon cancer Neg Hx        Review of Systems   Constitutional:  Negative for fatigue, fever and unexpected weight change.   HENT:  Negative for congestion, ear pain, postnasal drip and sore throat.    Eyes:  Negative for visual disturbance.   Respiratory:  Negative for cough, chest tightness, shortness of breath and wheezing.    Cardiovascular:  Negative for chest pain, palpitations and leg swelling.   Gastrointestinal:  Negative for abdominal pain, blood in stool, constipation, diarrhea, nausea and vomiting.   Genitourinary:  Negative for dysuria and hematuria.   Musculoskeletal:  Positive for back pain.   Neurological:  Negative for weakness and numbness.     Objective:   /70 (BP Location: Left arm, Patient Position: Sitting, BP Method: Medium (Automatic))   Pulse (!) 45   Temp 98.2 °F (36.8 °C)   Resp 18   Ht 5' 4" (1.626 m)   Wt 87.5 kg (193 lb)   LMP 1994   BMI 33.13 kg/m²     Physical Exam  Constitutional:       Appearance: Normal appearance. She is well-developed.   HENT:      Head: Normocephalic and atraumatic.      Right Ear: Tympanic membrane, ear canal and external ear normal.      Left Ear: Tympanic membrane, ear canal and external ear normal.      Nose: Nose normal.      Mouth/Throat:      Mouth: No oral lesions.      Pharynx: Uvula " midline. No oropharyngeal exudate or posterior oropharyngeal erythema.   Eyes:      General: Lids are normal. No scleral icterus.        Right eye: No discharge.         Left eye: No discharge.      Extraocular Movements:      Right eye: No nystagmus.      Left eye: No nystagmus.      Conjunctiva/sclera:      Right eye: Right conjunctiva is not injected. No hemorrhage.     Left eye: Left conjunctiva is not injected. No hemorrhage.     Pupils: Pupils are equal, round, and reactive to light.   Neck:      Thyroid: No thyroid mass or thyromegaly.      Vascular: No carotid bruit or JVD.      Trachea: No tracheal tenderness or tracheal deviation.   Cardiovascular:      Rate and Rhythm: Normal rate and regular rhythm.      Pulses:           Carotid pulses are 2+ on the right side and 2+ on the left side.       Radial pulses are 2+ on the right side and 2+ on the left side.        Posterior tibial pulses are 2+ on the right side and 2+ on the left side.      Heart sounds: S1 normal and S2 normal. No murmur heard.  Pulmonary:      Effort: Pulmonary effort is normal. No respiratory distress.      Breath sounds: Normal breath sounds. No wheezing, rhonchi or rales.   Abdominal:      General: Bowel sounds are normal. There is no distension or abdominal bruit.      Palpations: Abdomen is soft. There is no mass or pulsatile mass.      Tenderness: There is no abdominal tenderness. There is no rebound.   Musculoskeletal:      Cervical back: Full passive range of motion without pain, normal range of motion and neck supple.      Right knee: No swelling. No tenderness.      Left knee: No swelling. No tenderness.   Lymphadenopathy:      Head:      Right side of head: No submental or submandibular adenopathy.      Left side of head: No submental or submandibular adenopathy.      Cervical: No cervical adenopathy.   Skin:     General: Skin is warm and dry.      Findings: No rash.      Nails: There is no clubbing.   Neurological:       Mental Status: She is alert.      Cranial Nerves: No cranial nerve deficit.      Sensory: No sensory deficit.   Psychiatric:         Speech: Speech normal.         Behavior: Behavior normal. Behavior is cooperative.         Thought Content: Thought content normal.   Assessment:     1. Dysuria      Plan:   I am having Marysol Lyles maintain her MULTIVITAMIN ORAL, ACETAMINOPHEN (TYLENOL EXTRA STRENGTH ORAL), (CYANOCOBALAMIN, VITAMIN B-12, (VITAMIN B-12 ORAL)), LACTOBACILLUS ACIDOPHILUS (PROBIOTIC ORAL), calcium citrate-vitamin D3 315-200 mg, ferrous gluconate, glucosamine sulfate, blood pressure test kit-large, fluticasone propionate, cetirizine, PROLIA, melatonin, PRESERVISION AREDS-2, DULoxetine, conjugated estrogens, cyclobenzaprine, gabapentin, triamcinolone acetonide 0.025%, ketoconazole, nystatin, metoprolol tartrate, tolterodine, losartan, and methen-sod phos-meth blue-hyos.  No problem-specific Assessment & Plan notes found for this encounter.      No follow-ups on file.    Marysol was seen today for annual exam.    Diagnoses and all orders for this visit:    Dysuria         The patient was instructed to stop the following meds:  There are no discontinued medications.  No orders of the defined types were placed in this encounter.      Medication List with Changes/Refills   Current Medications    ACETAMINOPHEN (TYLENOL EXTRA STRENGTH ORAL)    Take 500 mg by mouth every 4 to 6 hours as needed.     BLOOD PRESSURE TEST KIT-LARGE KIT    Use once daily as directed.    CALCIUM CITRATE-VITAMIN D3 315-200 MG (CITRACAL+D) 315-200 MG-UNIT PER TABLET    Take 1 tablet by mouth 2 (two) times daily.    CETIRIZINE (ZYRTEC) 10 MG TABLET    Take 1 tablet (10 mg total) by mouth once daily.    CONJUGATED ESTROGENS (PREMARIN) VAGINAL CREAM    Place 0.5 g vaginally twice a week.    CYANOCOBALAMIN, VITAMIN B-12, (VITAMIN B-12 ORAL)    Take 1 tablet by mouth once daily. As directed    CYCLOBENZAPRINE (FLEXERIL) 10 MG TABLET    Take 1  tablet (10 mg total) by mouth daily as needed.    DENOSUMAB (PROLIA) 60 MG/ML SYRG    Inject 60 mg into the skin every 6 (six) months.    DULOXETINE (CYMBALTA) 60 MG CAPSULE    TAKE 1 CAPSULE(60 MG) BY MOUTH EVERY DAY    FERROUS GLUCONATE (FERGON) 324 MG TABLET    Take 324 mg by mouth daily with breakfast.    FLUTICASONE PROPIONATE (FLONASE) 50 MCG/ACTUATION NASAL SPRAY    1 spray (50 mcg total) by Each Nostril route once daily.    GABAPENTIN (NEURONTIN) 300 MG CAPSULE    TAKE 1 CAPSULE(300 MG) BY MOUTH THREE TIMES DAILY    GLUCOSAMINE SULFATE 500 MG TAB    Take 1 tablet by mouth once daily.    KETOCONAZOLE (NIZORAL) 2 % SHAMPOO    Wash scalp with medicated shampoo at least 2x/week. Let sit on scalp at least 5 minutes prior to rinsing    LACTOBACILLUS ACIDOPHILUS (PROBIOTIC ORAL)    Take 1 tablet by mouth once daily.     LOSARTAN (COZAAR) 50 MG TABLET    TAKE 1 TABLET(50 MG) BY MOUTH EVERY DAY    MELATONIN 5 MG CAP    Take 1 capsule by mouth nightly as needed.    METHEN-SOD PHOS-METH BLUE-HYOS (UROGESIC-BLUE) 81.6-40.8-0.12 MG TAB    Take 1 tablet by mouth twice a week.    METOPROLOL TARTRATE (LOPRESSOR) 50 MG TABLET    TAKE 1 TABLET(50 MG) BY MOUTH TWICE DAILY    MULTIVITAMIN ORAL    Take 1 tablet by mouth once daily.     NYSTATIN (NYSTOP) POWDER    Apply topically 3 (three) times daily as needed.    TOLTERODINE (DETROL LA) 4 MG 24 HR CAPSULE    TAKE 1 CAPSULE(4 MG) BY MOUTH EVERY DAY    TRIAMCINOLONE ACETONIDE 0.025% (KENALOG) 0.025 % OINT    Apply to affected areas of groin BID prn irritation. Do not use for longer than 2 weeks in a row.    VIT C,N-MS-NLFWA-LUTEIN-ZEAXAN (PRESERVISION AREDS-2) 887-253-50-1 MG-UNIT-MG-MG CAP    Take 1 capsule by mouth 2 (two) times a day.      Medication List with Changes/Refills   Current Medications    ACETAMINOPHEN (TYLENOL EXTRA STRENGTH ORAL)    Take 500 mg by mouth every 4 to 6 hours as needed.        Start Date: 12/16/2011End Date: --    BLOOD PRESSURE TEST KIT-LARGE KIT     Use once daily as directed.       Start Date: 4/6/2020  End Date: --    CALCIUM CITRATE-VITAMIN D3 315-200 MG (CITRACAL+D) 315-200 MG-UNIT PER TABLET    Take 1 tablet by mouth 2 (two) times daily.       Start Date: --        End Date: --    CETIRIZINE (ZYRTEC) 10 MG TABLET    Take 1 tablet (10 mg total) by mouth once daily.       Start Date: 7/14/2020 End Date: --    CONJUGATED ESTROGENS (PREMARIN) VAGINAL CREAM    Place 0.5 g vaginally twice a week.       Start Date: 4/4/2022  End Date: 3/15/2023    CYANOCOBALAMIN, VITAMIN B-12, (VITAMIN B-12 ORAL)    Take 1 tablet by mouth once daily. As directed       Start Date: 12/16/2011End Date: --    CYCLOBENZAPRINE (FLEXERIL) 10 MG TABLET    Take 1 tablet (10 mg total) by mouth daily as needed.       Start Date: 4/4/2022  End Date: --    DENOSUMAB (PROLIA) 60 MG/ML SYRG    Inject 60 mg into the skin every 6 (six) months.       Start Date: --        End Date: --    DULOXETINE (CYMBALTA) 60 MG CAPSULE    TAKE 1 CAPSULE(60 MG) BY MOUTH EVERY DAY       Start Date: 4/4/2022  End Date: --    FERROUS GLUCONATE (FERGON) 324 MG TABLET    Take 324 mg by mouth daily with breakfast.       Start Date: --        End Date: --    FLUTICASONE PROPIONATE (FLONASE) 50 MCG/ACTUATION NASAL SPRAY    1 spray (50 mcg total) by Each Nostril route once daily.       Start Date: 5/8/2020  End Date: --    GABAPENTIN (NEURONTIN) 300 MG CAPSULE    TAKE 1 CAPSULE(300 MG) BY MOUTH THREE TIMES DAILY       Start Date: 4/25/2022 End Date: --    GLUCOSAMINE SULFATE 500 MG TAB    Take 1 tablet by mouth once daily.       Start Date: --        End Date: --    KETOCONAZOLE (NIZORAL) 2 % SHAMPOO    Wash scalp with medicated shampoo at least 2x/week. Let sit on scalp at least 5 minutes prior to rinsing       Start Date: 4/28/2022 End Date: --    LACTOBACILLUS ACIDOPHILUS (PROBIOTIC ORAL)    Take 1 tablet by mouth once daily.        Start Date: --        End Date: --    LOSARTAN (COZAAR) 50 MG TABLET    TAKE 1  TABLET(50 MG) BY MOUTH EVERY DAY       Start Date: 2/14/2023 End Date: --    MELATONIN 5 MG CAP    Take 1 capsule by mouth nightly as needed.       Start Date: --        End Date: --    METHEN-SOD PHOS-METH BLUE-HYOS (UROGESIC-BLUE) 81.6-40.8-0.12 MG TAB    Take 1 tablet by mouth twice a week.       Start Date: 3/13/2023 End Date: --    METOPROLOL TARTRATE (LOPRESSOR) 50 MG TABLET    TAKE 1 TABLET(50 MG) BY MOUTH TWICE DAILY       Start Date: 1/17/2023 End Date: --    MULTIVITAMIN ORAL    Take 1 tablet by mouth once daily.        Start Date: 12/16/2011End Date: --    NYSTATIN (NYSTOP) POWDER    Apply topically 3 (three) times daily as needed.       Start Date: 5/3/2022  End Date: --    TOLTERODINE (DETROL LA) 4 MG 24 HR CAPSULE    TAKE 1 CAPSULE(4 MG) BY MOUTH EVERY DAY       Start Date: 1/27/2023 End Date: --    TRIAMCINOLONE ACETONIDE 0.025% (KENALOG) 0.025 % OINT    Apply to affected areas of groin BID prn irritation. Do not use for longer than 2 weeks in a row.       Start Date: 4/28/2022 End Date: --    VIT C,C-HL-MICSN-LUTEIN-ZEAXAN (PRESERVISION AREDS-2) 220-629-51-1 MG-UNIT-MG-MG CAP    Take 1 capsule by mouth 2 (two) times a day.       Start Date: --        End Date: --

## 2023-03-16 RX ORDER — SULFAMETHOXAZOLE AND TRIMETHOPRIM 800; 160 MG/1; MG/1
1 TABLET ORAL 2 TIMES DAILY
Qty: 20 TABLET | Refills: 0 | Status: SHIPPED | OUTPATIENT
Start: 2023-03-16 | End: 2023-03-26

## 2023-03-18 LAB — BACTERIA UR CULT: ABNORMAL

## 2023-03-20 NOTE — PROGRESS NOTES
The culture is positive for klebsiella and you are on an antibiotic that kills it. Please finish all of the med.

## 2023-05-12 ENCOUNTER — PATIENT MESSAGE (OUTPATIENT)
Dept: OPTOMETRY | Facility: CLINIC | Age: 88
End: 2023-05-12
Payer: COMMERCIAL

## 2023-06-03 NOTE — TELEPHONE ENCOUNTER
I have refilled the patient's requested medication x 1 month.  However, the patient is due for a follow up visit.  Call the patient on the phone and book an appointment   To get better and follow your care plan as instructed.

## 2023-06-07 NOTE — TELEPHONE ENCOUNTER
Spoke with Nafisa, advised on labs needed prior to appt, scheduled labs in [place] as requested    intact Patient presented s/p mechanical fall, complaining of right knee pain and also complaining of worsening abdominal distention and dyspnea 2/2 fluid overload. On arrival to ED, afebrile, HD stable, neuro intact. 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 Xray of right knee where patient was having pain was negative for acute fx or dislocation or effusion. Obtained labs which shows expected transaminitis but also showed (+) significant hyponatremia below patient's baseline. Given IVF in ED. Patient significantly fluid overloaded, but afebrile and non-tender in the abdomen to suggest SBP. Given the fluid overload with likely need for recurrent non-emergent paracentesis, plus hyponatremia, will admit for further monitoring and management. Patient is otherwise asymptomatic from hyponatremia and therefore emergent hypertonic saline or ICU admission is not warranted at this time. Patient agrees with plan. HD stable at time of admission.

## 2023-07-12 ENCOUNTER — OFFICE VISIT (OUTPATIENT)
Dept: OPTOMETRY | Facility: CLINIC | Age: 88
End: 2023-07-12
Payer: COMMERCIAL

## 2023-07-12 DIAGNOSIS — Z98.42 S/P BILATERAL CATARACT EXTRACTION: ICD-10-CM

## 2023-07-12 DIAGNOSIS — Z13.5 SCREENING FOR EYE CONDITION: ICD-10-CM

## 2023-07-12 DIAGNOSIS — H43.813 POSTERIOR VITREOUS DETACHMENT OF BOTH EYES: Primary | ICD-10-CM

## 2023-07-12 DIAGNOSIS — Z98.41 S/P BILATERAL CATARACT EXTRACTION: ICD-10-CM

## 2023-07-12 DIAGNOSIS — H52.203 ASTIGMATISM OF BOTH EYES, UNSPECIFIED TYPE: ICD-10-CM

## 2023-07-12 DIAGNOSIS — Z96.1 PSEUDOPHAKIA OF BOTH EYES: ICD-10-CM

## 2023-07-12 PROCEDURE — 92015 PR REFRACTION: ICD-10-PCS | Mod: S$GLB,,, | Performed by: OPTOMETRIST

## 2023-07-12 PROCEDURE — 92014 PR EYE EXAM, EST PATIENT,COMPREHESV: ICD-10-PCS | Mod: S$GLB,,, | Performed by: OPTOMETRIST

## 2023-07-12 PROCEDURE — 99999 PR PBB SHADOW E&M-EST. PATIENT-LVL III: CPT | Mod: PBBFAC,,, | Performed by: OPTOMETRIST

## 2023-07-12 PROCEDURE — 92014 COMPRE OPH EXAM EST PT 1/>: CPT | Mod: S$GLB,,, | Performed by: OPTOMETRIST

## 2023-07-12 PROCEDURE — 1159F PR MEDICATION LIST DOCUMENTED IN MEDICAL RECORD: ICD-10-PCS | Mod: CPTII,S$GLB,, | Performed by: OPTOMETRIST

## 2023-07-12 PROCEDURE — 99999 PR PBB SHADOW E&M-EST. PATIENT-LVL III: ICD-10-PCS | Mod: PBBFAC,,, | Performed by: OPTOMETRIST

## 2023-07-12 PROCEDURE — 1159F MED LIST DOCD IN RCRD: CPT | Mod: CPTII,S$GLB,, | Performed by: OPTOMETRIST

## 2023-07-12 PROCEDURE — 92015 DETERMINE REFRACTIVE STATE: CPT | Mod: S$GLB,,, | Performed by: OPTOMETRIST

## 2023-07-13 NOTE — PROGRESS NOTES
"HPI     Hypertensive Eye Exam            Comments: Annual general eye examination.  No acute problems, but does have occasional symptoms of ocular migraines,   without associated headaches.           Comments    Patient's age: 87 y.o. [place]  Occupation: Retired  Approximate date of last eye examination:  06/15/2022  Name of last eye doctor seen: Dr. Mccarthy  City/State: [facility]  Wears glasses? Yes     If yes, wears  Full-time or part-time?  Part-time for daytime driving   at TV watching  Present glasses are: Bifocal, SV Distance, SV Reading?  SV distance  Approximate age of present glasses:  1 year   Got new glasses following last exam, or subsequently?:  Yes   Any problem with VA with glasses?  None  Wears CLs?:  No  Headaches?  No  Eye pain/discomfort?  No                                                                                     Flashes?  Pt had "zigzags" in VA one week ago but they went away   (consistent with ocular migraine)  Floaters?  None  Diplopia/Double vision?  No  Patient's Ocular History:         Any eye surgeries? Yes, PCIOL OU and PVD OU         Any eye injury?  No         Any treatment for eye disease?  No  Family history of eye disease?  No  Significant patient medical history:         1. Diabetes?  No       If yes, IDDM or NIDDM? N/A   2. HBP?  Yes              3. Other (describe):  None   ! OTC eyedrops currently using:  No   ! Prescription eye meds currently using:  None   ! Any history of allergy/adverse reaction to any eye meds used   previously?  No    ! Any history of allergy/adverse reaction to eyedrops used during prior   eye exam(s)? No    ! Any history of allergy/adverse reaction to Novacaine or similar meds?   No   ! Any history of allergy/adverse reaction to Epinephrine or similar meds?   No    ! Patient okay with use of anesthetic eyedrops to check eye pressure?    Yes         ! Patient okay with use of eyedrops to dilate pupils today?  Yes   !  Allergies/Medications/Medical " "History/Family History reviewed today?    Yes      PD =   65/61  Desired reading distance =  15.25"                                                                         Last edited by Asa Mccarthy, OD on 7/12/2023  3:05 PM.            Assessment /Plan     For exam results, see Encounter Report.    1. Posterior vitreous detachment of both eyes        2. S/P bilateral cataract extraction        3. Pseudophakia of both eyes        4. Astigmatism of both eyes, unspecified type        5. Screening for eye condition                     S/P cataract surgery in both eyes, with bilateral posterior chamber intraocular lens.   History of cystoid macular edema in the right eye following cataract surgery.  Now resolved.    RPE mottling at macula of the right eye.  Noted previously.  Stable      Residual astigmatism in each eye, with best-corrected VA of 20/25-3 in the right eye and 20/30-1+4 in the left eye     New spectacle lens RX issued for use as desired.  Suggest full-time wear - either bifocal lenses, OR single vision distance lenses and/or single vision reading lenses.       Recheck refraction in one year - or prior, if Ms. Lyles notes any changes in vision in the interim            "

## 2023-07-13 NOTE — PATIENT INSTRUCTIONS
S/P cataract surgery in both eyes, with bilateral posterior chamber intraocular lens.   History of cystoid macular edema in the right eye following cataract surgery.  Now resolved.    RPE mottling at macula of the right eye.  Noted previously.  Stable      Residual astigmatism in each eye, with best-corrected VA of 20/25-3 in the right eye and 20/30-1+4 in the left eye     New spectacle lens RX issued for use as desired.  Suggest full-time wear - either bifocal lenses, OR single vision distance lenses and/or single vision reading lenses.       Recheck refraction in one year - or prior, if Ms. Lyles notes any changes in vision in the interim

## 2023-07-26 ENCOUNTER — LAB VISIT (OUTPATIENT)
Dept: LAB | Facility: HOSPITAL | Age: 88
End: 2023-07-26
Payer: COMMERCIAL

## 2023-07-26 DIAGNOSIS — E21.3 HYPERPARATHYROIDISM: ICD-10-CM

## 2023-07-26 DIAGNOSIS — M85.80 OSTEOPENIA, UNSPECIFIED LOCATION: ICD-10-CM

## 2023-07-26 PROCEDURE — 83970 ASSAY OF PARATHORMONE: CPT | Performed by: INTERNAL MEDICINE

## 2023-07-26 PROCEDURE — 80069 RENAL FUNCTION PANEL: CPT | Performed by: INTERNAL MEDICINE

## 2023-07-26 PROCEDURE — 36415 COLL VENOUS BLD VENIPUNCTURE: CPT | Mod: PO | Performed by: INTERNAL MEDICINE

## 2023-07-27 LAB
ALBUMIN SERPL BCP-MCNC: 3.6 G/DL (ref 3.5–5.2)
ANION GAP SERPL CALC-SCNC: 11 MMOL/L (ref 8–16)
BUN SERPL-MCNC: 24 MG/DL (ref 8–23)
CALCIUM SERPL-MCNC: 10.5 MG/DL (ref 8.7–10.5)
CHLORIDE SERPL-SCNC: 100 MMOL/L (ref 95–110)
CO2 SERPL-SCNC: 28 MMOL/L (ref 23–29)
CREAT SERPL-MCNC: 0.9 MG/DL (ref 0.5–1.4)
EST. GFR  (NO RACE VARIABLE): >60 ML/MIN/1.73 M^2
GLUCOSE SERPL-MCNC: 81 MG/DL (ref 70–110)
PHOSPHATE SERPL-MCNC: 3.9 MG/DL (ref 2.7–4.5)
POTASSIUM SERPL-SCNC: 4.6 MMOL/L (ref 3.5–5.1)
PTH-INTACT SERPL-MCNC: 83.8 PG/ML (ref 9–77)
SODIUM SERPL-SCNC: 139 MMOL/L (ref 136–145)

## 2023-07-29 ENCOUNTER — PATIENT MESSAGE (OUTPATIENT)
Dept: ENDOCRINOLOGY | Facility: CLINIC | Age: 88
End: 2023-07-29
Payer: COMMERCIAL

## 2023-07-30 ENCOUNTER — PATIENT MESSAGE (OUTPATIENT)
Dept: FAMILY MEDICINE | Facility: CLINIC | Age: 88
End: 2023-07-30
Payer: COMMERCIAL

## 2023-07-31 ENCOUNTER — TELEPHONE (OUTPATIENT)
Dept: FAMILY MEDICINE | Facility: CLINIC | Age: 88
End: 2023-07-31
Payer: COMMERCIAL

## 2023-07-31 RX ORDER — NYSTATIN 100000 [USP'U]/G
POWDER TOPICAL 3 TIMES DAILY PRN
Qty: 8 EACH | Refills: 11 | Status: SHIPPED | OUTPATIENT
Start: 2023-07-31

## 2023-07-31 NOTE — TELEPHONE ENCOUNTER
----- Message from Navneet Stahl sent at 7/31/2023 11:32 AM CDT -----  Contact: [facility]  Type:  Pharmacy Calling to Clarify an RX    Name of Caller:Cindy  [facility] Name[facility]   Prescription Name:  What do they need to clarify?:medication   Best Call Back Number:[phone]  Additional Information: n/a      Thanks KB

## 2023-08-01 ENCOUNTER — TELEPHONE (OUTPATIENT)
Dept: OPTOMETRY | Facility: CLINIC | Age: 88
End: 2023-08-01
Payer: COMMERCIAL

## 2023-08-02 ENCOUNTER — OFFICE VISIT (OUTPATIENT)
Dept: ENDOCRINOLOGY | Facility: CLINIC | Age: 88
End: 2023-08-02
Payer: COMMERCIAL

## 2023-08-02 DIAGNOSIS — M85.80 OSTEOPENIA, UNSPECIFIED LOCATION: Primary | ICD-10-CM

## 2023-08-02 DIAGNOSIS — E21.3 HYPERPARATHYROIDISM: ICD-10-CM

## 2023-08-02 DIAGNOSIS — I10 BENIGN ESSENTIAL HTN: ICD-10-CM

## 2023-08-02 PROCEDURE — 1159F MED LIST DOCD IN RCRD: CPT | Mod: CPTII,95,, | Performed by: INTERNAL MEDICINE

## 2023-08-02 PROCEDURE — 99214 PR OFFICE/OUTPT VISIT, EST, LEVL IV, 30-39 MIN: ICD-10-PCS | Mod: 95,,, | Performed by: INTERNAL MEDICINE

## 2023-08-02 PROCEDURE — 1159F PR MEDICATION LIST DOCUMENTED IN MEDICAL RECORD: ICD-10-PCS | Mod: CPTII,95,, | Performed by: INTERNAL MEDICINE

## 2023-08-02 PROCEDURE — 1160F PR REVIEW ALL MEDS BY PRESCRIBER/CLIN PHARMACIST DOCUMENTED: ICD-10-PCS | Mod: CPTII,95,, | Performed by: INTERNAL MEDICINE

## 2023-08-02 PROCEDURE — 99214 OFFICE O/P EST MOD 30 MIN: CPT | Mod: 95,,, | Performed by: INTERNAL MEDICINE

## 2023-08-02 PROCEDURE — 1160F RVW MEDS BY RX/DR IN RCRD: CPT | Mod: CPTII,95,, | Performed by: INTERNAL MEDICINE

## 2023-08-02 NOTE — PROGRESS NOTES
The patient location is: [place]    Visit type: audiovisual    Face to Face time with patient:  9 minutes  11 minutes of total time spent on the encounter, which includes face to face time and non-face to face time preparing to see the patient (eg, review of tests), Obtaining and/or reviewing separately obtained history, Documenting clinical information in the electronic or other health record, Independently interpreting results (not separately reported) and communicating results to the patient/family/caregiver, or Care coordination (not separately reported).         Each patient to whom he or she provides medical services by telemedicine is:  (1) informed of the relationship between the physician and patient and the respective role of any other health care provider with respect to management of the patient; and (2) notified that he or she may decline to receive medical services by telemedicine and may withdraw from such care at any time.      CHIEF COMPLAINT: Osteopenia/Primary Hyperpara  87 y.o.  being seen as a f/u. Daughters with her. Had been seeing DR. Diaz and last saw her in 2016 and then saw DR. Perez. Diagnosed in 2015. Had been o/n Fosamax. Then switched to Prolia. Tolerating Prolia. Scheduled for Prolia 8/14//23. No falls. No fractures. Taking Ca + D. Exercise is limited.  No longer having jaw pain that she was having before.            PAST MEDICAL HISTORY/PAST SURGICAL HISTORY:  Reviewed in [facility]    SOCIAL HISTORY: No T/A    FAMILY HISTORY:  + Osteoporosis. No hip fracture    MEDICATIONS/ALLERGIES: The patient's MedCard has been updated and reviewed.        PE:  Virtual Visit       Latest Reference Range & Units 07/26/23 14:15   Sodium 136 - 145 mmol/L 139   Potassium 3.5 - 5.1 mmol/L 4.6   Chloride 95 - 110 mmol/L 100   CO2 23 - 29 mmol/L 28   Anion Gap 8 - 16 mmol/L 11   BUN 8 - 23 mg/dL 24 (H)   Creatinine 0.5 - 1.4 mg/dL 0.9   eGFR >60 mL/min/1.73 m^2 >60.0   Glucose 70 - 110 mg/dL 81   Calcium 8.7 -  10.5 mg/dL 10.5   Phosphorus 2.7 - 4.5 mg/dL 3.9   Albumin 3.5 - 5.2 g/dL 3.6   (H): Data is abnormally high     Latest Reference Range & Units 07/26/23 14:15   PTH 9.0 - 77.0 pg/mL 83.8 (H)   (H): Data is abnormally high        ASSESSMENT/PLAN:  1. Osteopenia- No Fracture history. Had been on fosamax and now on Prolia. Tolerating well. Will continue current therapy due to # 2. Therapy plan placed.  Check bone density at follow-up    2. Primary hyperparathyroidism- based on history of hypercalcemia and high urine Ca. At this point serum calcium fluctuates from normal to mildly elevated. On Prolia for osteopenia. If Ca increases could consider sensipar.  At this point since calcium is normal she does not need any therapy for calcium..  Can monitor renal panel and PTH    3. GERD- Unable to take PO bisphosphonates. Controlled with diet    4. HTN-  In digital HTN program. Avoid use of thiazide diuretics. Due to hypercalcemia.     FOLLOWUP  F/U 6 months with Aleshia prior to next prolia renal panel, PTH, DEXA- [place]

## 2023-08-14 ENCOUNTER — INFUSION (OUTPATIENT)
Dept: INFUSION THERAPY | Facility: HOSPITAL | Age: 88
End: 2023-08-14
Attending: INTERNAL MEDICINE
Payer: COMMERCIAL

## 2023-08-14 VITALS
DIASTOLIC BLOOD PRESSURE: 84 MMHG | HEART RATE: 55 BPM | TEMPERATURE: 98 F | SYSTOLIC BLOOD PRESSURE: 139 MMHG | RESPIRATION RATE: 20 BRPM

## 2023-08-14 DIAGNOSIS — M85.80 OSTEOPENIA, UNSPECIFIED LOCATION: Primary | ICD-10-CM

## 2023-08-14 PROCEDURE — 96372 THER/PROPH/DIAG INJ SC/IM: CPT | Mod: PN

## 2023-08-14 PROCEDURE — 63600175 PHARM REV CODE 636 W HCPCS: Mod: JZ,JG,PN | Performed by: INTERNAL MEDICINE

## 2023-08-14 RX ADMIN — DENOSUMAB 60 MG: 60 INJECTION SUBCUTANEOUS at 01:08

## 2023-08-28 ENCOUNTER — PATIENT MESSAGE (OUTPATIENT)
Dept: FAMILY MEDICINE | Facility: CLINIC | Age: 88
End: 2023-08-28
Payer: COMMERCIAL

## 2023-08-28 DIAGNOSIS — R39.15 URINARY URGENCY: ICD-10-CM

## 2023-08-28 DIAGNOSIS — R30.0 DYSURIA: ICD-10-CM

## 2023-08-28 RX ORDER — URINARY ANTISEPTIC ANTISPASMODIC 81.6; 40.8; 10.8; .12 MG/1; MG/1; MG/1; MG/1
1 TABLET ORAL
Qty: 60 TABLET | Refills: 11 | Status: SHIPPED | OUTPATIENT
Start: 2023-08-28 | End: 2023-09-01 | Stop reason: SDUPTHER

## 2023-08-28 NOTE — TELEPHONE ENCOUNTER
The patient requested medicine refills and I did refill it x 1 year.  Message the patient to notify of any health maintenance care gaps that need to be arranged.   Health Maintenance Due   Topic Date Due    COVID-19 Vaccine (6 - Pfizer series) 01/28/2023     BP Readings from Last 3 Encounters:   08/14/23 139/84   03/15/23 117/70   02/13/23 132/74     Lab Results   Component Value Date    HGBA1C 6.1 11/18/2011

## 2023-08-31 ENCOUNTER — PATIENT MESSAGE (OUTPATIENT)
Dept: FAMILY MEDICINE | Facility: CLINIC | Age: 88
End: 2023-08-31
Payer: COMMERCIAL

## 2023-08-31 ENCOUNTER — PATIENT MESSAGE (OUTPATIENT)
Dept: UROLOGY | Facility: CLINIC | Age: 88
End: 2023-08-31
Payer: COMMERCIAL

## 2023-09-01 RX ORDER — METHENAMINE, SODIUM PHOSPHATE, MONOBASIC, MONOHYDRATE, PHENYL SALICYLATE, METHYLENE BLUE, AND HYOSCYAMINE SULFATE 118; 40.8; 36; 10; .12 MG/1; MG/1; MG/1; MG/1; MG/1
1 CAPSULE ORAL
Qty: 100 CAPSULE | Refills: 0 | Status: SHIPPED | OUTPATIENT
Start: 2023-09-04 | End: 2023-09-06 | Stop reason: SDUPTHER

## 2023-09-06 ENCOUNTER — PATIENT MESSAGE (OUTPATIENT)
Dept: FAMILY MEDICINE | Facility: CLINIC | Age: 88
End: 2023-09-06
Payer: COMMERCIAL

## 2023-09-06 RX ORDER — METHENAMINE, SODIUM PHOSPHATE, MONOBASIC, MONOHYDRATE, PHENYL SALICYLATE, METHYLENE BLUE, AND HYOSCYAMINE SULFATE 118; 40.8; 36; 10; .12 MG/1; MG/1; MG/1; MG/1; MG/1
1 CAPSULE ORAL
Qty: 100 CAPSULE | Refills: 11 | Status: SHIPPED | OUTPATIENT
Start: 2023-09-07

## 2023-09-06 NOTE — TELEPHONE ENCOUNTER
The patient requested medicine refills and I did refill it x 1 year.  Message the patient to notify of any health maintenance care gaps that need to be arranged.   Health Maintenance Due   Topic Date Due    COVID-19 Vaccine (6 - Pfizer series) 01/28/2023    Influenza Vaccine (1) 09/01/2023     BP Readings from Last 3 Encounters:   08/14/23 139/84   03/15/23 117/70   02/13/23 132/74     Lab Results   Component Value Date    HGBA1C 6.1 11/18/2011

## 2023-09-20 ENCOUNTER — PATIENT MESSAGE (OUTPATIENT)
Dept: ENDOCRINOLOGY | Facility: CLINIC | Age: 88
End: 2023-09-20
Payer: COMMERCIAL

## 2023-12-15 ENCOUNTER — PATIENT MESSAGE (OUTPATIENT)
Dept: FAMILY MEDICINE | Facility: CLINIC | Age: 88
End: 2023-12-15
Payer: COMMERCIAL

## 2023-12-15 DIAGNOSIS — L24.89 IRRITANT CONTACT DERMATITIS DUE TO OTHER AGENTS: ICD-10-CM

## 2023-12-18 RX ORDER — TRIAMCINOLONE ACETONIDE 0.25 MG/G
OINTMENT TOPICAL
Qty: 15 G | Refills: 0 | Status: SHIPPED | OUTPATIENT
Start: 2023-12-18

## 2024-01-18 ENCOUNTER — ON-DEMAND VIRTUAL (OUTPATIENT)
Dept: URGENT CARE | Facility: CLINIC | Age: 89
End: 2024-01-18
Payer: COMMERCIAL

## 2024-01-18 DIAGNOSIS — U07.1 COVID-19: Primary | ICD-10-CM

## 2024-01-18 DIAGNOSIS — R05.9 COUGH, UNSPECIFIED TYPE: ICD-10-CM

## 2024-01-18 DIAGNOSIS — R52 BODY ACHES: ICD-10-CM

## 2024-01-18 PROCEDURE — 99213 OFFICE O/P EST LOW 20 MIN: CPT | Mod: 95,S$GLB,, | Performed by: NURSE PRACTITIONER

## 2024-01-18 RX ORDER — PROMETHAZINE HYDROCHLORIDE AND DEXTROMETHORPHAN HYDROBROMIDE 6.25; 15 MG/5ML; MG/5ML
5 SYRUP ORAL EVERY 4 HOURS PRN
Qty: 118 ML | Refills: 0 | Status: SHIPPED | OUTPATIENT
Start: 2024-01-18 | End: 2024-01-28

## 2024-01-18 NOTE — PATIENT INSTRUCTIONS
Discussed with the patients daughter that Mrs. Lyles was going to have to stop her Losartan and her Detrol [place] in order to take the Plaxovid.  The daughter stated that they are comfortable doing so for the safety of their mothers health.  They will stop the medication for the duration of 14 days.  There was no further questions about COVID since all family members have had.  There was a concern about cough syrup.  The family had requested Hyrdomet but the patient is allergic to an ingredient and was unable to write for that prescription.  Will try Promethazine DM.  Please watch for drowsiness and don't allow the patient to walk on her own without being monitored until you know how the medication will affect her.  Feel better soon and call back if you have any questions or concerns.

## 2024-01-18 NOTE — PROGRESS NOTES
Subjective:      Patient ID: Marysol Lyles is a 88 y.o. female.    Vitals:  vitals were not taken for this visit.     Chief Complaint: Cough (Tested positive for COVID this morning. )      Visit Type: TELE AUDIOVISUAL    Present with the patient at the time of consultation: TELEMED PRESENT WITH PATIENT: family member    Past Medical History:   Diagnosis Date    Arthritis     Chronic pain 8/14/2013    Colon polyp     DDD (degenerative disc disease)     DDD (degenerative disc disease)     GERD (gastroesophageal reflux disease)     Hyperlipidemia     Hypertension     Osteopenia 3/2/2016    Primary hyperparathyroidism     Urinary incontinence 4/19/2021    She has urinary incontinence and she has used urogesic and detrol and premarin cream to help with this and it has. She will continue them.     Past Surgical History:   Procedure Laterality Date    APPENDECTOMY      CATARACT EXTRACTION W/  INTRAOCULAR LENS IMPLANT Left 05/16/2018    Dr Haywood    CATARACT EXTRACTION W/  INTRAOCULAR LENS IMPLANT Right 12/02/2020    Dr. Tirado    COLONOSCOPY  11/16/2016    Dr. Vickers; polyp removed and small ileocecal valve ulceration; Bx: tubular adenoma; benign ileal mucosa    COLONOSCOPY N/A 04/14/2022    Procedure: COLONOSCOPY;  Surgeon: Jagjit Aguirre MD;  Location: [facility];  Service: Endoscopy;  Laterality: N/A;    COLONOSCOPY W/ BIOPSIES  02/2011    repeat in 3 years    EYE SURGERY  2020    INTRAOCULAR PROSTHESES INSERTION Right 12/02/2020    Procedure: INSERTION, IOL PROSTHESIS;  Surgeon: Nava Tirado MD;  Location: [facility] OR [address] [place] [address];  Service: Ophthalmology;  Laterality: Right;    JOINT REPLACEMENT  yes    LUMBAR EPIDURAL INJECTION N/A 12/2019    Dr. Tejada     PARTIAL KNEE ARTHROPLASTY Right     Right    PHACOEMULSIFICATION OF CATARACT Right 12/02/2020    Procedure: PHACOEMULSIFICATION, CATARACT;  Surgeon: Nava Tirado MD;  Location: [facility] OR [address] [place] [address];  Service: Ophthalmology;  Laterality: Right;     SLEEVE GASTROPLASTY  02/08/2012    TONSILLECTOMY      TOTAL HIP ARTHROPLASTY Right     TOTAL KNEE ARTHROPLASTY Left     UPPER GASTROINTESTINAL ENDOSCOPY       Review of patient's allergies indicates:   Allergen Reactions    Penicillins Swelling    Formaldehyde Hives    Mercury Hives    Mercury (elemental) Hives    Penicillamine     Adhesive Rash     Other reaction(s): Rash  Other reaction(s): Rash    Adhesive tape-silicones Rash    Dilaudid [hydromorphone (bulk)] Itching    Hydromorphone Itching    Latex, natural rubber Hives     Current Outpatient Medications on File Prior to Visit   Medication Sig Dispense Refill    ACETAMINOPHEN (TYLENOL EXTRA STRENGTH ORAL) Take 500 mg by mouth every 4 to 6 hours as needed.       blood pressure test kit-large Kit Use once daily as directed. 1 each 0    calcium citrate-vitamin D3 315-200 mg (CITRACAL+D) 315-200 mg-unit per tablet Take 1 tablet by mouth 2 (two) times daily.      cetirizine (ZYRTEC) 10 MG tablet Take 1 tablet (10 mg total) by mouth once daily. 30 tablet 11    conjugated estrogens (PREMARIN) vaginal cream Place 0.5 g vaginally twice a week. 30 g 11    CYANOCOBALAMIN, VITAMIN B-12, (VITAMIN B-12 ORAL) Take 1 tablet by mouth once daily. As directed      denosumab (PROLIA) 60 mg/mL Syrg Inject 60 mg into the skin every 6 (six) months.      DULoxetine (CYMBALTA) 60 MG capsule TAKE 1 CAPSULE(60 MG) BY MOUTH EVERY DAY 90 capsule 3    ferrous gluconate (FERGON) 324 MG tablet Take 324 mg by mouth daily with breakfast.      fluticasone propionate (FLONASE) 50 mcg/actuation nasal spray 1 spray (50 mcg total) by Each Nostril route once daily. 16 g 0    gabapentin (NEURONTIN) 300 MG capsule Take 1 capsule (300 mg total) by mouth 3 (three) times daily. 270 capsule 3    glucosamine sulfate 500 mg Tab Take 1 tablet by mouth once daily.      ketoconazole (NIZORAL) 2 % shampoo Wash scalp with medicated shampoo at least 2x/week. Let sit on scalp at least 5 minutes prior to rinsing  240 mL 5    LACTOBACILLUS ACIDOPHILUS (PROBIOTIC ORAL) Take 1 tablet by mouth once daily.       losartan (COZAAR) 50 MG tablet Take 1 tablet (50 mg total) by mouth once daily. 90 tablet 3    melatonin 5 mg Cap Take 1 capsule by mouth nightly as needed.      methen-m.blue-s.phos-phsal-hyo (URIBEL) 118-10-40.8-36 mg Cap Take 1 capsule by mouth twice a week. 100 capsule 11    metoprolol tartrate (LOPRESSOR) 50 MG tablet Take 1 tablet (50 mg total) by mouth 2 (two) times daily. 180 tablet 3    MULTIVITAMIN ORAL Take 1 tablet by mouth once daily.       nystatin (NYSTOP) powder Apply topically 3 (three) times daily as needed (rash). Give 8 bottles at a time 8 each 11    tolterodine (DETROL LA) 4 MG 24 hr capsule TAKE 1 CAPSULE(4 MG) BY MOUTH EVERY DAY 90 capsule 3    triamcinolone acetonide 0.025% (KENALOG) 0.025 % Oint Apply to affected areas of groin BID prn irritation. Do not use for longer than 2 weeks in a row. 15 g 0    vit C,C-Xj-lqtln-lutein-zeaxan (PRESERVISION AREDS-2) 331-230-75-1 mg-unit-mg-mg Cap Take 1 capsule by mouth 2 (two) times a day.       No current facility-administered medications on file prior to visit.     Family History   Problem Relation Age of Onset    Arthritis Mother     Cancer Mother              Breast cancer Mother     Arthritis Father     Heart disease Father     Hyperlipidemia Father     Heart attack Father     Hypertension Father              Cancer Sister     Depression Sister     Cataracts Sister     Cancer Brother     Depression Brother     Diabetes Brother     Heart disease Brother     Hyperlipidemia Brother     Blindness Brother         dm    Retinal detachment Brother     Alcohol abuse Son     Birth defects Son     Diabetes Son     Intellectual disability Son     Arthritis Son     Hyperlipidemia Son     Hypertension Son     Learning disabilities Son     Mental illness Son     Cancer Maternal Aunt     Alcohol abuse Maternal Uncle     Cancer Maternal Uncle      Depression Maternal Uncle     Cancer Paternal Aunt     Cancer Paternal Uncle     Arthritis Maternal Grandmother     Hypertension Maternal Grandmother     Alcohol abuse Maternal Grandfather     Arthritis Maternal Grandfather     Depression Maternal Grandfather     Hypertension Maternal Grandfather     Arthritis Paternal Grandmother     Arthritis Paternal Grandfather     HIV Son     Glaucoma Son     Alcohol abuse Son     Arthritis Son     Breast cancer Paternal Cousin     Arthritis Daughter     Hearing loss Daughter     Cancer Maternal Aunt              Cancer Maternal Uncle         4 , 1 living    Cancer Paternal Aunt              Cancer Paternal Uncle              Cancer Sister          , living     Cancer Brother          , living     COPD Son     Diabetes Brother              Hearing loss Daughter     Amblyopia Neg Hx     Macular degeneration Neg Hx     Strabismus Neg Hx     Stroke Neg Hx     Thyroid disease Neg Hx     Colon cancer Neg Hx        Medications Ordered                [id number] - [place] - [address] AT [facility] [address] [facility]   [address] [place] [address]    Telephone: [phone]   Fax: [phone]   Hours: Not open 24 hours                         E-Prescribed (2 of 2)              nirmatrelvir-ritonavir 300 mg (150 mg x 2)-100 mg copackaged tablets (EUA)    Sig: Take 3 tablets by mouth 2 (two) times daily for 5 days. Each dose contains 2 nirmatrelvir (pink tablets) and 1 ritonavir (white tablet). Take all 3 tablets together       Start: 24     Quantity: 30 tablet Refills: 0                         promethazine-dextromethorphan (PROMETHAZINE-DM) 6.25-15 mg/5 mL Syrp    Sig: Take 5 mLs by mouth every 4 (four) hours as needed (as needed for cough).       Start: 24     Quantity: 118 mL Refills: 0                           Ohs Peq Odvv Intake     1/18/2024 11:08 AM CST - Filed by Patient   What is your current physical address in the event of a medical emergency? [id number] [address]   Are you able to take your vital signs? Yes   Systolic Blood Pressure: 149   Diastolic Blood Pressure: 76   Weight: 188   Height: 62   Pulse: 82   Temperature:    Respiration rate:    Pulse Oxygen:    Please attach any relevant images or files          Pt is a 88 year old female calling from [place] with her daughter at her side.  The daughter states they have all had COVID in the house and now her mother has it.  She had a rough night last night coughing.  She would like her mother to have codeine cough syrup so she can sleep. She would like her mother to start Plaxovid and knows she has to stop medications in order to take it.  She has several co-morbities and doesn't want her mother hospitalized.     Cough  This is a new problem. The current episode started yesterday. The problem has been gradually worsening. The problem occurs constantly. The cough is Non-productive. Associated symptoms include headaches, myalgias and nasal congestion. She has tried nothing for the symptoms.     Constitution: Positive for appetite change.   Respiratory:  Positive for cough.    Musculoskeletal:  Positive for muscle ache.   Neurological:  Positive for headaches.   Psychiatric/Behavioral:  Positive for sleep disturbance.       Objective:   The physical exam was conducted virtually.  Physical Exam   Constitutional: She is oriented to person, place, and time.   HENT:   Head: Normocephalic and atraumatic.   Eyes: Conjunctivae are normal. Pupils are equal, round, and reactive to light. Extraocular movement intact   Pulmonary/Chest: Effort normal.         Comments: Frequent dry cough noted    Abdominal: Normal appearance.   Neurological: no focal deficit. She is alert and oriented to person, place, and time.   Psychiatric: Her behavior is normal. Mood normal.     Assessment:      1. COVID-19  - nirmatrelvir-ritonavir 300 mg (150 mg x 2)-100 mg copackaged tablets (EUA); Take 3 tablets by mouth 2 (two) times daily for 5 days. Each dose contains 2 nirmatrelvir (pink tablets) and 1 ritonavir (white tablet). Take all 3 tablets together  Dispense: 30 tablet; Refill: 0  - promethazine-dextromethorphan (PROMETHAZINE-DM) 6.25-15 mg/5 mL Syrp; Take 5 mLs by mouth every 4 (four) hours as needed (as needed for cough).  Dispense: 118 mL; Refill: 0    2. Cough, unspecified type  - promethazine-dextromethorphan (PROMETHAZINE-DM) 6.25-15 mg/5 mL Syrp; Take 5 mLs by mouth every 4 (four) hours as needed (as needed for cough).  Dispense: 118 mL; Refill: 0    3. Body aches       Plan:     Patient Instructions   Discussed with the patients daughter that Mrs. Lyles was going to have to stop her Losartan and her Detrol [place] in order to take the Plaxovid.  The daughter stated that they are comfortable doing so for the safety of their mothers health.  They will stop the medication for the duration of 14 days.  There was no further questions about COVID since all family members have had.  There was a concern about cough syrup.  The family had requested Hyrdomet but the patient is allergic to an ingredient and was unable to write for that prescription.  Will try Promethazine DM.  Please watch for drowsiness and don't allow the patient to walk on her own without being monitored until you know how the medication will affect her.  Feel better soon and call back if you have any questions or concerns.         COVID-19  -     nirmatrelvir-ritonavir 300 mg (150 mg x 2)-100 mg copackaged tablets (EUA); Take 3 tablets by mouth 2 (two) times daily for 5 days. Each dose contains 2 nirmatrelvir (pink tablets) and 1 ritonavir (white tablet). Take all 3 tablets together  Dispense: 30 tablet; Refill: 0  -     promethazine-dextromethorphan (PROMETHAZINE-DM) 6.25-15 mg/5 mL Syrp; Take 5 mLs by mouth every 4 (four) hours as needed (as  needed for cough).  Dispense: 118 mL; Refill: 0    Cough, unspecified type  -     promethazine-dextromethorphan (PROMETHAZINE-DM) 6.25-15 mg/5 mL Syrp; Take 5 mLs by mouth every 4 (four) hours as needed (as needed for cough).  Dispense: 118 mL; Refill: 0    Body aches

## 2024-01-24 ENCOUNTER — PATIENT MESSAGE (OUTPATIENT)
Dept: ADMINISTRATIVE | Facility: OTHER | Age: 89
End: 2024-01-24
Payer: COMMERCIAL

## 2024-01-29 DIAGNOSIS — I10 ESSENTIAL HYPERTENSION: ICD-10-CM

## 2024-01-29 NOTE — TELEPHONE ENCOUNTER
No care due was identified.  Health [facility] Embedded Care Due Messages. Reference number: [id number].   1/29/2024 3:55:07 PM CST

## 2024-01-30 RX ORDER — METOPROLOL TARTRATE 50 MG/1
50 TABLET ORAL 2 TIMES DAILY
Qty: 180 TABLET | Refills: 3 | OUTPATIENT
Start: 2024-01-30

## 2024-01-30 RX ORDER — METOPROLOL TARTRATE 50 MG/1
50 TABLET ORAL 2 TIMES DAILY
Qty: 180 TABLET | Refills: 0 | Status: SHIPPED | OUTPATIENT
Start: 2024-01-30 | End: 2024-04-26

## 2024-01-30 NOTE — TELEPHONE ENCOUNTER
Refill Decision Note   Marysol Lyles  is requesting a refill authorization.  Brief Assessment and Rationale for Refill:  Quick Discontinue     Medication Therapy Plan:  Duplicate request: reordered on 1/30/2024 by Madonna Dickson.      Comments:     Note composed:8:35 AM 01/30/2024

## 2024-01-30 NOTE — TELEPHONE ENCOUNTER
Refill Decision Note   Marysol Lyles  is requesting a refill authorization.  Brief Assessment and Rationale for Refill:  Approve     Medication Therapy Plan:  LOV March 2023      Comments:     Note composed:8:34 AM 01/30/2024

## 2024-01-30 NOTE — TELEPHONE ENCOUNTER
No care due was identified.  Health [facility] Embedded Care Due Messages. Reference number: [id number].   1/29/2024 11:12:24 PM CST

## 2024-02-01 ENCOUNTER — LAB VISIT (OUTPATIENT)
Dept: LAB | Facility: HOSPITAL | Age: 89
End: 2024-02-01
Attending: INTERNAL MEDICINE
Payer: COMMERCIAL

## 2024-02-01 DIAGNOSIS — I10 BENIGN ESSENTIAL HTN: ICD-10-CM

## 2024-02-01 DIAGNOSIS — M85.80 OSTEOPENIA, UNSPECIFIED LOCATION: ICD-10-CM

## 2024-02-01 DIAGNOSIS — E21.3 HYPERPARATHYROIDISM: ICD-10-CM

## 2024-02-01 PROCEDURE — 80069 RENAL FUNCTION PANEL: CPT | Performed by: INTERNAL MEDICINE

## 2024-02-01 PROCEDURE — 83970 ASSAY OF PARATHORMONE: CPT | Performed by: INTERNAL MEDICINE

## 2024-02-01 PROCEDURE — 36415 COLL VENOUS BLD VENIPUNCTURE: CPT | Mod: PO | Performed by: INTERNAL MEDICINE

## 2024-02-02 LAB
ALBUMIN SERPL BCP-MCNC: 3.9 G/DL (ref 3.5–5.2)
ANION GAP SERPL CALC-SCNC: 10 MMOL/L (ref 8–16)
BUN SERPL-MCNC: 16 MG/DL (ref 8–23)
CALCIUM SERPL-MCNC: 10.6 MG/DL (ref 8.7–10.5)
CHLORIDE SERPL-SCNC: 100 MMOL/L (ref 95–110)
CO2 SERPL-SCNC: 28 MMOL/L (ref 23–29)
CREAT SERPL-MCNC: 1 MG/DL (ref 0.5–1.4)
EST. GFR  (NO RACE VARIABLE): 54.9 ML/MIN/1.73 M^2
GLUCOSE SERPL-MCNC: 90 MG/DL (ref 70–110)
PHOSPHATE SERPL-MCNC: 4.3 MG/DL (ref 2.7–4.5)
POTASSIUM SERPL-SCNC: 4.8 MMOL/L (ref 3.5–5.1)
PTH-INTACT SERPL-MCNC: 97.8 PG/ML (ref 9–77)
SODIUM SERPL-SCNC: 138 MMOL/L (ref 136–145)

## 2024-02-05 ENCOUNTER — HOSPITAL ENCOUNTER (OUTPATIENT)
Dept: RADIOLOGY | Facility: HOSPITAL | Age: 89
Discharge: HOME | End: 2024-02-05
Attending: INTERNAL MEDICINE
Payer: COMMERCIAL

## 2024-02-05 DIAGNOSIS — E21.3 HYPERPARATHYROIDISM: ICD-10-CM

## 2024-02-05 DIAGNOSIS — M85.80 OSTEOPENIA, UNSPECIFIED LOCATION: ICD-10-CM

## 2024-02-05 DIAGNOSIS — I10 BENIGN ESSENTIAL HTN: ICD-10-CM

## 2024-02-05 PROCEDURE — 77080 DXA BONE DENSITY AXIAL: CPT | Mod: TC,PO

## 2024-02-05 PROCEDURE — 77080 DXA BONE DENSITY AXIAL: CPT | Mod: 26,,, | Performed by: RADIOLOGY

## 2024-02-08 ENCOUNTER — OFFICE VISIT (OUTPATIENT)
Dept: ENDOCRINOLOGY | Facility: CLINIC | Age: 89
End: 2024-02-08
Payer: COMMERCIAL

## 2024-02-08 DIAGNOSIS — E21.3 HYPERPARATHYROIDISM: ICD-10-CM

## 2024-02-08 DIAGNOSIS — I10 PRIMARY HYPERTENSION: ICD-10-CM

## 2024-02-08 DIAGNOSIS — M85.80 OSTEOPENIA, UNSPECIFIED LOCATION: Primary | ICD-10-CM

## 2024-02-08 PROCEDURE — 99213 OFFICE O/P EST LOW 20 MIN: CPT | Mod: 95,,, | Performed by: NURSE PRACTITIONER

## 2024-02-08 NOTE — PROGRESS NOTES
The patient location is: home  The chief complaint leading to consultation is: diabetes    Visit type: audiovisual    Face to Face time with patient: 9 mins  12 minutes of total time spent on the encounter, which includes face to face time and non-face to face time preparing to see the patient (eg, review of tests), Obtaining and/or reviewing separately obtained history, Documenting clinical information in the electronic or other health record, Independently interpreting results (not separately reported) and communicating results to the patient/family/caregiver, or Care coordination (not separately reported).     Each patient to whom he or she provides medical services by telemedicine is:  (1) informed of the relationship between the physician and patient and the respective role of any other health care provider with respect to management of the patient; and (2) notified that he or she may decline to receive medical services by telemedicine and may withdraw from such care at any time.     CC: This 88 y.o. female presents for management of osteopenia and chronic conditions Primary hyperparathyroidism, HTN, GERD    HPI: Was seen by Dr Stubbs, August 2023, Diagnosed with osteopenia n 2015. Had been o/n Fosamax. Then switched to Prolia. Tolerating Prolia without issue.   Scheduled for Prolia next week  No recent fall or fractures  Never any fractures   Has never had kidney stones  Menopause 50s, + HRT for several years and then stopped   Her mom had bone cancer- passed away at age 42   Taking Ca 500 mg daily + D 3 daily.   Exercise - none recently- has been with less energy since her Covid about 3 weeks- ago      2/5/2024- Dexa- FINDINGS:   The L1 to L4 vertebral bone mineral density is equal to 1.29 g/cm squared with a T score of 2.2.  The left femoral neck bone mineral density is equal to 0.70 g/cm squared with a T score of -1.3.  There is a 11% risk of a major osteoporotic fracture and a 2.9% risk of hip fracture in  "the next 10 years (FRAX).  Impression: Osteopenia    ROS:   Gen: Appetite good   Cardiac: No palpitations, chest pain    GI: +nausea, no vomiting, diarrhea, constipation   Other systems: negative.    PE:  GENERAL: Well developed, well nourished.  PSYCH: AAOx3, appropriate mood and affect, pleasant expression, conversant, appears relaxed, well groomed.   EYES: Conjunctiva, corneas clear  NECK: Supple, trachea midline     Personally reviewed Past Medical, Surgical, Social History.    LMP 05/06/1994      Personally reviewed the below labs:      Chemistry        Component Value Date/Time     02/01/2024 1247    K 4.8 02/01/2024 1247     02/01/2024 1247    CO2 28 02/01/2024 1247    BUN 16 02/01/2024 1247    CREATININE 1.0 02/01/2024 1247    GLU 90 02/01/2024 1247        Component Value Date/Time    CALCIUM 10.6 (H) 02/01/2024 1247    ALKPHOS 37 (L) 02/02/2022 1024    AST 22 02/02/2022 1024    ALT 13 02/02/2022 1024    BILITOT 0.4 02/02/2022 1024    ESTGFRAFRICA 59.8 (A) 07/27/2022 1015    EGFRNONAA 51.9 (A) 07/27/2022 1015            Lab Results   Component Value Date    TSH 0.923 11/17/2015       Recent Labs   Lab 03/09/21  0000 06/06/22  0000   LDL Cholesterol 123 105   HDL 72 H 75   Cholesterol 220 H  --         Results for orders placed or performed in visit on 06/21/19   Vitamin D   Result Value Ref Range    Vit D, 25-Hydroxy 74 30 - 96 ng/mL     No results found. However, due to the size of the patient record, not all encounters were searched. Please check Results Review for a complete set of results.    No results found for: "MICALBCREAT"    Hemoglobin A1C   Date Value Ref Range Status   11/18/2011 6.1 4.0 - 6.2 % Final        ASSESSMENT and PLAN:    1. Osteopenia- No Fracture history.   Had been on fosamax and now on Prolia. Tolerating well. Will continue current therapy due to # 2. Therapy plan placed.     Resume exercise , once she's feeling better   Continue calcium and D3     2. Primary " hyperparathyroidism- based on history of hypercalcemia and high urine Ca. At this point serum calcium fluctuates from normal to mildly elevated. On Prolia for osteopenia. If Ca increases could consider sensipar.  At this point since calcium is normal she does not need any therapy for calcium..  Can monitor renal panel and PTH     3. GERD- Unable to take PO bisphosphonates. Controlled with diet     4. HTN-  In digital HTN program. Avoid use of thiazide diuretics. Due to hypercalcemia.      FOLLOWUP cmp in 6 months     Follow-up: in 12 months with CMP

## 2024-02-14 ENCOUNTER — INFUSION (OUTPATIENT)
Dept: INFUSION THERAPY | Facility: HOSPITAL | Age: 89
End: 2024-02-14
Attending: INTERNAL MEDICINE
Payer: COMMERCIAL

## 2024-02-14 VITALS
TEMPERATURE: 97 F | DIASTOLIC BLOOD PRESSURE: 71 MMHG | RESPIRATION RATE: 16 BRPM | WEIGHT: 197.06 LBS | HEART RATE: 54 BPM | HEIGHT: 64 IN | BODY MASS INDEX: 33.64 KG/M2 | SYSTOLIC BLOOD PRESSURE: 135 MMHG

## 2024-02-14 DIAGNOSIS — M85.80 OSTEOPENIA, UNSPECIFIED LOCATION: Primary | ICD-10-CM

## 2024-02-14 PROCEDURE — 96372 THER/PROPH/DIAG INJ SC/IM: CPT | Mod: PN

## 2024-02-14 PROCEDURE — 63600175 PHARM REV CODE 636 W HCPCS: Mod: JZ,JG,PN | Performed by: NURSE PRACTITIONER

## 2024-02-14 RX ADMIN — DENOSUMAB 60 MG: 60 INJECTION SUBCUTANEOUS at 01:02

## 2024-02-27 DIAGNOSIS — G62.9 NEUROPATHY: ICD-10-CM

## 2024-02-27 RX ORDER — GABAPENTIN 300 MG/1
300 CAPSULE ORAL 3 TIMES DAILY
Qty: 270 CAPSULE | Refills: 3 | Status: SHIPPED | OUTPATIENT
Start: 2024-02-27 | End: 2024-05-01 | Stop reason: SDUPTHER

## 2024-02-27 NOTE — TELEPHONE ENCOUNTER
No care due was identified.  [facility] Embedded Care Due Messages. Reference number: [id number].   2/27/2024 8:09:35 AM CST

## 2024-03-20 ENCOUNTER — OFFICE VISIT (OUTPATIENT)
Dept: DERMATOLOGY | Facility: CLINIC | Age: 89
End: 2024-03-20
Payer: COMMERCIAL

## 2024-03-20 DIAGNOSIS — Z12.83 SKIN CANCER SCREENING: ICD-10-CM

## 2024-03-20 DIAGNOSIS — L82.1 SEBORRHEIC KERATOSIS: ICD-10-CM

## 2024-03-20 DIAGNOSIS — D22.9 MULTIPLE BENIGN MELANOCYTIC NEVI: Primary | ICD-10-CM

## 2024-03-20 DIAGNOSIS — D18.01 ANGIOMA OF SKIN: ICD-10-CM

## 2024-03-20 PROCEDURE — 1126F AMNT PAIN NOTED NONE PRSNT: CPT | Mod: CPTII,S$GLB,, | Performed by: DERMATOLOGY

## 2024-03-20 PROCEDURE — 99213 OFFICE O/P EST LOW 20 MIN: CPT | Mod: S$GLB,,, | Performed by: DERMATOLOGY

## 2024-03-20 PROCEDURE — 1159F MED LIST DOCD IN RCRD: CPT | Mod: CPTII,S$GLB,, | Performed by: DERMATOLOGY

## 2024-03-20 PROCEDURE — 1101F PT FALLS ASSESS-DOCD LE1/YR: CPT | Mod: CPTII,S$GLB,, | Performed by: DERMATOLOGY

## 2024-03-20 PROCEDURE — 1160F RVW MEDS BY RX/DR IN RCRD: CPT | Mod: CPTII,S$GLB,, | Performed by: DERMATOLOGY

## 2024-03-20 PROCEDURE — 3288F FALL RISK ASSESSMENT DOCD: CPT | Mod: CPTII,S$GLB,, | Performed by: DERMATOLOGY

## 2024-03-20 NOTE — PROGRESS NOTES
"  Patient Information  Name: Marysol Lyles  : 1935  MRN: [id number]     Referring Physician:  No ref. provider found   Primary Care Physician:  Aguilar Vickers MD   Date of Visit: 2024      Subjective:     History of Present lllness:    Marysol Lyles is a 88 y.o. female who presents with a chief complaint of moles.  Patient is here today for a "mole" check.   Today, patient has no additional complaints. Denies any new, changing, or symptomatic lesions on the skin.    Lesions  Location: back, under bra  Duration: months  Signs/Symptoms: itching, irritated by bra  Exacerbating factors: bra rubbing  Relieving factors/Prior treatments: none    Patient was last seen: 2022.  Prior notes by myself reviewed.   Clinical documentation obtained by nursing staff reviewed.    Review of Systems    Objective:   Physical Exam   Constitutional: She appears well-developed and well-nourished. No distress.   Neurological: She is alert and oriented to person, place, and time. She is not disoriented.   Psychiatric: She has a normal mood and affect.   Skin:   Areas Examined (abnormalities noted in diagram):   Scalp / Hair Palpated and Inspected  Head / Face Inspection Performed  Neck Inspection Performed  Chest / Axilla Inspection Performed  Abdomen Inspection Performed  Genitals / Buttocks / Groin Inspection Performed  Back Inspection Performed  RUE Inspected  LUE Inspection Performed  RLE Inspected  LLE Inspection Performed                 Diagram Legend     Erythematous scaling macule/papule c/w actinic keratosis       Vascular papule c/w angioma      Pigmented verrucoid papule/plaque c/w seborrheic keratosis      Yellow umbilicated papule c/w sebaceous hyperplasia      Irregularly shaped tan macule c/w lentigo     1-2 mm smooth white papules consistent with Milia      Movable subcutaneous cyst with punctum c/w epidermal inclusion cyst      Subcutaneous movable cyst c/w pilar cyst      Firm pink to brown papule c/w " dermatofibroma      Pedunculated fleshy papule(s) c/w skin tag(s)      Evenly pigmented macule c/w junctional nevus     Mildly variegated pigmented, slightly irregular-bordered macule c/w mildly atypical nevus      Flesh colored to evenly pigmented papule c/w intradermal nevus       Pink pearly papule/plaque c/w basal cell carcinoma      Erythematous hyperkeratotic cursted plaque c/w SCC      Surgical scar with no sign of skin cancer recurrence      Open and closed comedones      Inflammatory papules and pustules      Verrucoid papule consistent consistent with wart     Erythematous eczematous patches and plaques     Dystrophic onycholytic nail with subungual debris c/w onychomycosis     Umbilicated papule    Erythematous-base heme-crusted tan verrucoid plaque consistent with inflamed seborrheic keratosis     Erythematous Silvery Scaling Plaque c/w Psoriasis     See annotation    No images are attached to the encounter or orders placed in the encounter.      [] Data reviewed  [] Prior external notes reviewed  [] Independent review of test  [] Management discussed with another provider  [] Independent historian    Assessment / Plan:        Multiple benign melanocytic nevi  Multiple benign-appearing nevi present on exam today. Reassurance provided. Counseled patient to periodically examine moles and return to clinic if any changes in size, shape, or color are noted or if it becomes symptomatic (bleeding, itching, pain, etc).  Recommend using a broad-spectrum, water-resistant sunscreen with SPF of 30 or higher--reapply every 2 hours. Seek shade, wear sun-protective clothing, and perform regular skin self-exams.    Seborrheic keratosis  These are benign, inherited growths without a malignant potential. Reassurance given to patient. No treatment is necessary.    Angioma of skin  These are benign vascular lesions that are inherited. Treatment is not necessary.    Skin cancer screening  Total body skin examination  performed today as noted in physical exam. No lesions suspicious for malignancy were seen.  Recommend using a broad-spectrum, water-resistant sunscreen with SPF of 30 or higher--reapply every 2 hours. Seek shade, wear sun-protective clothing, and perform regular skin self-exams.             Follow up for any new problems or changing lesions.      Gissel Humphrey MD, FAAD  [facility]

## 2024-03-23 ENCOUNTER — HOSPITAL ENCOUNTER (OUTPATIENT)
Dept: RADIOLOGY | Facility: HOSPITAL | Age: 89
Discharge: HOME | End: 2024-03-23
Attending: FAMILY MEDICINE
Payer: COMMERCIAL

## 2024-03-23 VITALS — HEIGHT: 64 IN | WEIGHT: 197 LBS | BODY MASS INDEX: 33.63 KG/M2

## 2024-03-23 DIAGNOSIS — Z12.31 ENCOUNTER FOR SCREENING MAMMOGRAM FOR BREAST CANCER: ICD-10-CM

## 2024-03-23 DIAGNOSIS — N95.2 VAGINAL ATROPHY: ICD-10-CM

## 2024-03-23 PROCEDURE — 77063 BREAST TOMOSYNTHESIS BI: CPT | Mod: 26,,, | Performed by: RADIOLOGY

## 2024-03-23 PROCEDURE — 77067 SCR MAMMO BI INCL CAD: CPT | Mod: TC,PO

## 2024-03-23 PROCEDURE — 77067 SCR MAMMO BI INCL CAD: CPT | Mod: 26,,, | Performed by: RADIOLOGY

## 2024-03-23 RX ORDER — CONJUGATED ESTROGENS 0.62 MG/G
CREAM VAGINAL
Qty: 30 G | Refills: 11 | Status: SHIPPED | OUTPATIENT
Start: 2024-03-23 | End: 2024-03-25

## 2024-03-23 NOTE — TELEPHONE ENCOUNTER
Care Due:                  Date            Visit Type   Department     Provider  --------------------------------------------------------------------------------                                EP -                              PRIMARY      [facility] FAMILY  Last Visit: 03-      CARE ([facility])   MEDICINE       Aguilar Vickers  Next Visit: None Scheduled  None         None Found                                                            Last  Test          Frequency    Reason                     Performed    Due Date  --------------------------------------------------------------------------------    Office Visit  15 months..  conjugated, losartan,      03-   06-                             metoprolol...............    [employer] Embedded Care Due Messages. Reference number: [id number].   3/23/2024 12:31:03 PM CDT

## 2024-03-24 ENCOUNTER — PATIENT MESSAGE (OUTPATIENT)
Dept: OBSTETRICS AND GYNECOLOGY | Facility: CLINIC | Age: 89
End: 2024-03-24
Payer: COMMERCIAL

## 2024-03-25 RX ORDER — ESTRADIOL 10 UG/1
10 INSERT VAGINAL
Qty: 24 TABLET | Refills: 3 | Status: SHIPPED | OUTPATIENT
Start: 2024-03-25 | End: 2024-05-01 | Stop reason: SDUPTHER

## 2024-04-04 DIAGNOSIS — I10 ESSENTIAL HYPERTENSION: ICD-10-CM

## 2024-04-04 NOTE — TELEPHONE ENCOUNTER
No care due was identified.  [facility] Embedded Care Due Messages. Reference number: [id number].   4/04/2024 4:21:18 PM CDT

## 2024-04-05 DIAGNOSIS — G62.9 NEUROPATHY: ICD-10-CM

## 2024-04-05 DIAGNOSIS — I10 ESSENTIAL HYPERTENSION: ICD-10-CM

## 2024-04-05 RX ORDER — LOSARTAN POTASSIUM 50 MG/1
50 TABLET ORAL
Qty: 90 TABLET | Refills: 3 | OUTPATIENT
Start: 2024-04-05

## 2024-04-05 NOTE — TELEPHONE ENCOUNTER
No care due was identified.  [facility] Embedded Care Due Messages. Reference number: [id number].   4/05/2024 12:41:39 PM CDT

## 2024-04-06 RX ORDER — DULOXETIN HYDROCHLORIDE 60 MG/1
CAPSULE, DELAYED RELEASE ORAL
Qty: 90 CAPSULE | Refills: 0 | Status: SHIPPED | OUTPATIENT
Start: 2024-04-06 | End: 2024-05-01 | Stop reason: SDUPTHER

## 2024-04-06 RX ORDER — LOSARTAN POTASSIUM 50 MG/1
50 TABLET ORAL DAILY
Qty: 90 TABLET | Refills: 0 | Status: SHIPPED | OUTPATIENT
Start: 2024-04-06 | End: 2024-05-01 | Stop reason: SDUPTHER

## 2024-04-06 NOTE — TELEPHONE ENCOUNTER
Refill Decision Note   Marysol Lyles  is requesting a refill authorization.  Brief Assessment and Rationale for Refill:  Quick Discontinue     Medication Therapy Plan:         Comments:     Note composed:9:49 PM 04/05/2024

## 2024-04-06 NOTE — TELEPHONE ENCOUNTER
Refill Decision Note   Marysol Lyles  is requesting a refill authorization.  Brief Assessment and Rationale for Refill:  Approve     Medication Therapy Plan:       Medication Reconciliation Completed: No   Comments:     No Care Gaps recommended.     Note composed:1:35 PM 04/06/2024

## 2024-04-25 DIAGNOSIS — I10 ESSENTIAL HYPERTENSION: ICD-10-CM

## 2024-04-25 NOTE — TELEPHONE ENCOUNTER
No care due was identified.  [facility] Embedded Care Due Messages. Reference number: [id number].   4/25/2024 5:17:49 PM CDT

## 2024-04-26 RX ORDER — METOPROLOL TARTRATE 50 MG/1
50 TABLET ORAL 2 TIMES DAILY
Qty: 180 TABLET | Refills: 0 | Status: SHIPPED | OUTPATIENT
Start: 2024-04-26 | End: 2024-05-01 | Stop reason: SDUPTHER

## 2024-04-26 NOTE — TELEPHONE ENCOUNTER
Refill Decision Note   Marysol Lyles  is requesting a refill authorization.  Brief Assessment and Rationale for Refill:  Approve     Medication Therapy Plan:       Medication Reconciliation Completed: No   Comments:     No Care Gaps recommended.     Note composed:9:20 AM 04/26/2024

## 2024-05-01 ENCOUNTER — OFFICE VISIT (OUTPATIENT)
Dept: FAMILY MEDICINE | Facility: CLINIC | Age: 89
End: 2024-05-01
Payer: COMMERCIAL

## 2024-05-01 VITALS
BODY MASS INDEX: 33.31 KG/M2 | RESPIRATION RATE: 16 BRPM | DIASTOLIC BLOOD PRESSURE: 79 MMHG | SYSTOLIC BLOOD PRESSURE: 153 MMHG | WEIGHT: 188 LBS | HEART RATE: 52 BPM | HEIGHT: 63 IN

## 2024-05-01 DIAGNOSIS — I10 ESSENTIAL HYPERTENSION: ICD-10-CM

## 2024-05-01 DIAGNOSIS — G62.9 NEUROPATHY: ICD-10-CM

## 2024-05-01 DIAGNOSIS — E78.5 HYPERLIPIDEMIA, UNSPECIFIED HYPERLIPIDEMIA TYPE: ICD-10-CM

## 2024-05-01 DIAGNOSIS — R39.15 URINARY URGENCY: ICD-10-CM

## 2024-05-01 DIAGNOSIS — E21.3 HYPERPARATHYROIDISM: ICD-10-CM

## 2024-05-01 DIAGNOSIS — R60.9 EDEMA, UNSPECIFIED TYPE: Primary | ICD-10-CM

## 2024-05-01 PROCEDURE — 99397 PER PM REEVAL EST PAT 65+ YR: CPT | Mod: S$GLB,,, | Performed by: FAMILY MEDICINE

## 2024-05-01 PROCEDURE — 1159F MED LIST DOCD IN RCRD: CPT | Mod: CPTII,S$GLB,, | Performed by: FAMILY MEDICINE

## 2024-05-01 PROCEDURE — 1126F AMNT PAIN NOTED NONE PRSNT: CPT | Mod: CPTII,S$GLB,, | Performed by: FAMILY MEDICINE

## 2024-05-01 PROCEDURE — 3288F FALL RISK ASSESSMENT DOCD: CPT | Mod: CPTII,S$GLB,, | Performed by: FAMILY MEDICINE

## 2024-05-01 PROCEDURE — 99999 PR PBB SHADOW E&M-EST. PATIENT-LVL IV: CPT | Mod: PBBFAC,,, | Performed by: FAMILY MEDICINE

## 2024-05-01 PROCEDURE — 1101F PT FALLS ASSESS-DOCD LE1/YR: CPT | Mod: CPTII,S$GLB,, | Performed by: FAMILY MEDICINE

## 2024-05-01 RX ORDER — FERROUS GLUCONATE 324(38)MG
324 TABLET ORAL
Qty: 90 TABLET | Refills: 3 | Status: SHIPPED | OUTPATIENT
Start: 2024-05-01

## 2024-05-01 RX ORDER — TOLTERODINE 4 MG/1
CAPSULE, EXTENDED RELEASE ORAL
Qty: 90 CAPSULE | Refills: 3 | Status: SHIPPED | OUTPATIENT
Start: 2024-05-01

## 2024-05-01 RX ORDER — GABAPENTIN 300 MG/1
300 CAPSULE ORAL 3 TIMES DAILY
Qty: 270 CAPSULE | Refills: 3 | Status: SHIPPED | OUTPATIENT
Start: 2024-05-01

## 2024-05-01 RX ORDER — DULOXETIN HYDROCHLORIDE 60 MG/1
CAPSULE, DELAYED RELEASE ORAL
Qty: 90 CAPSULE | Refills: 0 | Status: SHIPPED | OUTPATIENT
Start: 2024-05-01

## 2024-05-01 RX ORDER — METOPROLOL TARTRATE 50 MG/1
50 TABLET ORAL 2 TIMES DAILY
Qty: 180 TABLET | Refills: 0 | Status: SHIPPED | OUTPATIENT
Start: 2024-05-01

## 2024-05-01 RX ORDER — ESTRADIOL 10 UG/1
10 INSERT VAGINAL
Qty: 24 TABLET | Refills: 3 | Status: SHIPPED | OUTPATIENT
Start: 2024-05-02

## 2024-05-01 RX ORDER — LOSARTAN POTASSIUM 50 MG/1
50 TABLET ORAL DAILY
Qty: 90 TABLET | Refills: 0 | Status: SHIPPED | OUTPATIENT
Start: 2024-05-01

## 2024-05-01 NOTE — PROGRESS NOTES
Subjective:      Patient ID: Marysol Lyles is a 88 y.o. female.    Chief Complaint: edema  Edema: Patient complains of edema. The location of the edema is lower leg(s) bilateral.  The edema has been moderate.  Onset of symptoms was 2 years ago, gradually worsening since that time. The edema is present in the evening. The patient states the problem is long-standing.  The swelling has been aggravated by dependency of involved area, relieved by elevation of involved area, and been associated with nothing. Cardiac risk factors include advanced age (older than 55 for men, 65 for women), dyslipidemia, hypertension, obesity (BMI >= 30 kg/m2), and sedentary lifestyle.    Problem List Items Addressed This Visit       Hyperlipidemia    Overview     She has hyperlipidemia and this was tracked. But, we have discussed her age and the probability that we would not treat if it was higher.  Lab Results   Component Value Date    CHOL 220 (H) 03/09/2021    CHOL 229 (H) 02/19/2020    CHOL 205 (H) 01/17/2019     Lab Results   Component Value Date    HDL 75 06/06/2022    HDL 72 (H) 03/09/2021    HDL 77 (H) 02/19/2020     Lab Results   Component Value Date    LDLCALC 105 06/06/2022    LDLCALC 123 03/09/2021    LDLCALC 138.8 02/19/2020     Lab Results   Component Value Date    TRIG 101 06/06/2022    TRIG 130 03/09/2021    TRIG 66 02/19/2020     Lab Results   Component Value Date    CHOLHDL 33.6 02/19/2020    CHOLHDL 32.7 01/17/2019    CHOLHDL 32.3 11/09/2017                Relevant Orders    Lipid Panel    Hyperparathyroidism    Overview     She has hyperparthyroidism and is on prolia and her last PTH was stable.   Lab Results   Component Value Date    PTH 97.8 (H) 02/01/2024    CALCIUM 10.6 (H) 02/01/2024    PHOS 4.3 02/01/2024              Neuropathy    Overview     She has neuropathy and has been on cymbalta for this along with gabapentin and they help with this.           Relevant Medications    DULoxetine (CYMBALTA) 60 MG capsule     gabapentin (NEURONTIN) 300 MG capsule     Other Visit Diagnoses       Edema, unspecified type    -  Primary    Relevant Orders    BNP    CBC Auto Differential    Comprehensive Metabolic Panel    D-Dimer, Quantitative    TSH    Essential hypertension        Relevant Medications    losartan (COZAAR) 50 MG tablet    metoprolol tartrate (LOPRESSOR) 50 MG tablet    Urinary urgency        Relevant Medications    tolterodine (DETROL LA) 4 MG 24 hr capsule            The patient's Health Maintenance was reviewed and the following appears to be due:   Health Maintenance Due   Topic Date Due    RSV Vaccine (Age 60+ and Pregnant patients) (1 - 1-dose 60+ series) Never done       Past Medical History:  Past Medical History:   Diagnosis Date    Arthritis     Chronic pain 8/14/2013    Colon polyp     DDD (degenerative disc disease)     DDD (degenerative disc disease)     GERD (gastroesophageal reflux disease)     Hyperlipidemia     Hypertension     Osteopenia 3/2/2016    Primary hyperparathyroidism     Urinary incontinence 4/19/2021    She has urinary incontinence and she has used urogesic and detrol and premarin cream to help with this and it has. She will continue them.     Past Surgical History:   Procedure Laterality Date    APPENDECTOMY      CATARACT EXTRACTION W/  INTRAOCULAR LENS IMPLANT Left 05/16/2018    Dr Haywood    CATARACT EXTRACTION W/  INTRAOCULAR LENS IMPLANT Right 12/02/2020    Dr. Tirdao    COLONOSCOPY  11/16/2016    Dr. Vickers; polyp removed and small ileocecal valve ulceration; Bx: tubular adenoma; benign ileal mucosa    COLONOSCOPY N/A 04/14/2022    Procedure: COLONOSCOPY;  Surgeon: Jagjit Aguirre MD;  Location: [facility];  Service: Endoscopy;  Laterality: N/A;    COLONOSCOPY W/ BIOPSIES  02/2011    repeat in 3 years    EYE SURGERY  2020    INTRAOCULAR PROSTHESES INSERTION Right 12/02/2020    Procedure: INSERTION, IOL PROSTHESIS;  Surgeon: Nava Tirado MD;  Location: [facility] OR [facility];   Service: Ophthalmology;  Laterality: Right;    JOINT REPLACEMENT  yes    LUMBAR EPIDURAL INJECTION N/A 12/2019    Dr. Tejada     PARTIAL KNEE ARTHROPLASTY Right     Right    PHACOEMULSIFICATION OF CATARACT Right 12/02/2020    Procedure: PHACOEMULSIFICATION, CATARACT;  Surgeon: Nava Tirado MD;  Location: [facility] OR [address] [place] [address];  Service: Ophthalmology;  Laterality: Right;    SLEEVE GASTROPLASTY  02/08/2012    TONSILLECTOMY      TOTAL HIP ARTHROPLASTY Right     TOTAL KNEE ARTHROPLASTY Left     UPPER GASTROINTESTINAL ENDOSCOPY       Review of patient's allergies indicates:   Allergen Reactions    Penicillins Swelling     Other reaction(s): Swelling    Formaldehyde Hives    Mercury Hives    Mercury (elemental) Hives    Penicillamine     Adhesive Rash     Other reaction(s): Rash  Other reaction(s): Rash    Adhesive tape-silicones Rash    Dilaudid [hydromorphone (bulk)] Itching    Hydromorphone Itching    Latex, natural rubber Hives     Current Outpatient Medications on File Prior to Visit   Medication Sig Dispense Refill    ACETAMINOPHEN (TYLENOL EXTRA STRENGTH ORAL) Take 500 mg by mouth every 4 to 6 hours as needed.       blood pressure test kit-large Kit Use once daily as directed. 1 each 0    calcium citrate-vitamin D3 315-200 mg (CITRACAL+D) 315-200 mg-unit per tablet Take 1 tablet by mouth 2 (two) times daily.      CYANOCOBALAMIN, VITAMIN B-12, (VITAMIN B-12 ORAL) Take 1 tablet by mouth once daily. As directed      denosumab (PROLIA) 60 mg/mL Syrg Inject 60 mg into the skin every 6 (six) months.      glucosamine sulfate 500 mg Tab Take 1 tablet by mouth once daily.      LACTOBACILLUS ACIDOPHILUS (PROBIOTIC ORAL) Take 1 tablet by mouth once daily.       melatonin 5 mg Cap Take 1 capsule by mouth nightly as needed.      methen-m.blue-s.phos-phsal-hyo (URIBEL) 118-10-40.8-36 mg Cap Take 1 capsule by mouth twice a week. 100 capsule 11    MULTIVITAMIN ORAL Take 1 tablet by mouth once daily.       nystatin (NYSTOP)  powder Apply topically 3 (three) times daily as needed (rash). Give 8 bottles at a time 8 each 11    vit C,N-Dw-pzmls-lutein-zeaxan (PRESERVISION AREDS-2) 365-722-07-1 mg-unit-mg-mg Cap Take 1 capsule by mouth 2 (two) times a day.      [DISCONTINUED] DULoxetine (CYMBALTA) 60 MG capsule TAKE 1 CAPSULE(60 MG) BY MOUTH EVERY DAY 90 capsule 0    [DISCONTINUED] estradioL (VAGIFEM) 10 mcg Tab Place 1 tablet (10 mcg total) vaginally every Monday and Thursday. 24 tablet 3    [DISCONTINUED] ferrous gluconate (FERGON) 324 MG tablet Take 324 mg by mouth daily with breakfast.      [DISCONTINUED] gabapentin (NEURONTIN) 300 MG capsule TAKE 1 CAPSULE(300 MG) BY MOUTH THREE TIMES DAILY 270 capsule 3    [DISCONTINUED] losartan (COZAAR) 50 MG tablet Take 1 tablet (50 mg total) by mouth once daily. 90 tablet 0    [DISCONTINUED] metoprolol tartrate (LOPRESSOR) 50 MG tablet TAKE 1 TABLET(50 MG) BY MOUTH TWICE DAILY 180 tablet 0    [DISCONTINUED] tolterodine (DETROL LA) 4 MG 24 hr capsule TAKE 1 CAPSULE(4 MG) BY MOUTH EVERY DAY 90 capsule 3    [DISCONTINUED] cetirizine (ZYRTEC) 10 MG tablet Take 1 tablet (10 mg total) by mouth once daily. (Patient not taking: Reported on 5/1/2024) 30 tablet 11    [DISCONTINUED] fluticasone propionate (FLONASE) 50 mcg/actuation nasal spray 1 spray (50 mcg total) by Each Nostril route once daily. (Patient not taking: Reported on 5/1/2024) 16 g 0    [DISCONTINUED] ketoconazole (NIZORAL) 2 % shampoo Wash scalp with medicated shampoo at least 2x/week. Let sit on scalp at least 5 minutes prior to rinsing (Patient not taking: Reported on 5/1/2024) 240 mL 5    [DISCONTINUED] triamcinolone acetonide 0.025% (KENALOG) 0.025 % Oint Apply to affected areas of groin BID prn irritation. Do not use for longer than 2 weeks in a row. (Patient not taking: Reported on 5/1/2024) 15 g 0     No current facility-administered medications on file prior to visit.     Social History     Socioeconomic History    Marital status:     Tobacco Use    Smoking status: Former     Current packs/day: 0.00     Types: Cigarettes     Start date: 1954     Quit date: 1971     Years since quittin.3    Smokeless tobacco: Never    Tobacco comments:     less than 2-3 cigarettes per day   Substance and Sexual Activity    Alcohol use: Not Currently     Comment: occasionally    Drug use: No    Sexual activity: Not Currently     Partners: Male     Birth control/protection: None     Comment: 86 years old   Social History Narrative    Wears a seatbelt.     Family History   Problem Relation Name Age of Onset    Arthritis Mother Evonne     Cancer Mother Evonne              Breast cancer Mother Evonne     Arthritis Father Omari     Heart disease Father Omari     Hyperlipidemia Father Omari     Heart attack Father Omari     Hypertension Father Omari              Cancer Sister x2     Depression Sister x2     Cataracts Sister x2     Cancer Brother Rambo     Depression Brother Rambo     Diabetes Brother Rambo     Heart disease Brother Rambo     Hyperlipidemia Brother Rambo     Blindness Brother Rambo         dm    Retinal detachment Brother Rambo     Alcohol abuse Son Florencio     Birth defects Son Florencio     Diabetes Son Florencio     Intellectual disability Son Florencio     Arthritis Son Florencio     Hyperlipidemia Son Florencio     Hypertension Son Florencio     Learning disabilities Son Florencio     Mental illness Son Florencio     Cancer Maternal Aunt      Alcohol abuse Maternal Uncle uncle     Cancer Maternal Uncle uncle     Depression Maternal Uncle uncle     Cancer Paternal Aunt      Cancer Paternal Uncle      Arthritis Maternal Grandmother      Hypertension Maternal Grandmother      Alcohol abuse Maternal Grandfather uncle     Arthritis Maternal Grandfather uncle     Depression Maternal Grandfather uncle     Hypertension Maternal Grandfather uncle     Arthritis Paternal Grandmother      Arthritis Paternal Grandfather      HIV Son oldest      "Glaucoma Son oldest     Alcohol abuse Son oldest     Arthritis Son oldest     Breast cancer Paternal Cousin 2nd     Arthritis Daughter Nafisa     Hearing loss Daughter Nafisa     Cancer Maternal Aunt Joleen              Cancer Maternal Uncle 5 uncles         4 , 1 living    Cancer Paternal Aunt liver cancer [place]              Cancer Paternal Uncle Ashley, liver cancer              Cancer Sister Tita colon, [place] lung          , living 2013    Cancer Brother Laith Napier          , living     COPD Son Fede     Diabetes Brother Byron, juvenille diabetic              Hearing loss Daughter Korin     Amblyopia Neg Hx      Macular degeneration Neg Hx      Strabismus Neg Hx      Stroke Neg Hx      Thyroid disease Neg Hx      Colon cancer Neg Hx         Review of Systems    Objective:   BP (!) 153/79 (BP Location: Left arm, Patient Position: Sitting, BP Method: Medium (Automatic))   Pulse (!) 52   Resp 16   Ht 5' 2.99" (1.6 m)   Wt 85.3 kg (188 lb)   LMP 1994   BMI 33.31 kg/m²     Physical Exam  Assessment:     1. Edema, unspecified type    2. Hyperlipidemia, unspecified hyperlipidemia type    3. Neuropathy    4. Essential hypertension    5. Urinary urgency    6. Hyperparathyroidism      Plan:   I have changed Marysol Lyles's ferrous gluconate, gabapentin, and metoprolol tartrate. I am also having her maintain her MULTIVITAMIN ORAL, ACETAMINOPHEN (TYLENOL EXTRA STRENGTH ORAL), (CYANOCOBALAMIN, VITAMIN B-12, (VITAMIN B-12 ORAL)), LACTOBACILLUS ACIDOPHILUS (PROBIOTIC ORAL), calcium citrate-vitamin D3 315-200 mg, glucosamine sulfate, blood pressure test kit-large, PROLIA, melatonin, PRESERVISION AREDS-2, nystatin, UribeL, DULoxetine, estradioL, losartan, and tolterodine.  No problem-specific Assessment & Plan notes found for this encounter.      No follow-ups on file.    Marysol was seen today for annual exam.    Diagnoses and " all orders for this visit:    Edema, unspecified type  -     BNP; Future  -     CBC Auto Differential; Future  -     Comprehensive Metabolic Panel; Future  -     D-Dimer, Quantitative; Future  -     TSH; Future    Hyperlipidemia, unspecified hyperlipidemia type  -     Lipid Panel; Future    Neuropathy  -     DULoxetine (CYMBALTA) 60 MG capsule; TAKE 1 CAPSULE(60 MG) BY MOUTH EVERY DAY  -     gabapentin (NEURONTIN) 300 MG capsule; Take 1 capsule (300 mg total) by mouth 3 (three) times daily.    Essential hypertension  -     losartan (COZAAR) 50 MG tablet; Take 1 tablet (50 mg total) by mouth once daily.  -     metoprolol tartrate (LOPRESSOR) 50 MG tablet; Take 1 tablet (50 mg total) by mouth 2 (two) times daily.    Urinary urgency  -     tolterodine (DETROL LA) 4 MG 24 hr capsule; TAKE 1 CAPSULE(4 MG) BY MOUTH EVERY DAY    Hyperparathyroidism    Other orders  -     estradioL (VAGIFEM) 10 mcg Tab; Place 1 tablet (10 mcg total) vaginally every Monday and Thursday.  -     ferrous gluconate (FERGON) 324 MG tablet; Take 1 tablet (324 mg total) by mouth daily with breakfast.      Medications Ordered This Encounter   Medications    DULoxetine (CYMBALTA) 60 MG capsule     Sig: TAKE 1 CAPSULE(60 MG) BY MOUTH EVERY DAY     Dispense:  90 capsule     Refill:  0    estradioL (VAGIFEM) 10 mcg Tab     Sig: Place 1 tablet (10 mcg total) vaginally every Monday and Thursday.     Dispense:  24 tablet     Refill:  3    ferrous gluconate (FERGON) 324 MG tablet     Sig: Take 1 tablet (324 mg total) by mouth daily with breakfast.     Dispense:  90 tablet     Refill:  3    gabapentin (NEURONTIN) 300 MG capsule     Sig: Take 1 capsule (300 mg total) by mouth 3 (three) times daily.     Dispense:  270 capsule     Refill:  3     ZERO refills remain on this prescription. Your patient is requesting advance approval of refills for this medication to PREVENT ANY MISSED DOSES    losartan (COZAAR) 50 MG tablet     Sig: Take 1 tablet (50 mg total) by  mouth once daily.     Dispense:  90 tablet     Refill:  0     .    metoprolol tartrate (LOPRESSOR) 50 MG tablet     Sig: Take 1 tablet (50 mg total) by mouth 2 (two) times daily.     Dispense:  180 tablet     Refill:  0     .    tolterodine (DETROL LA) 4 MG 24 hr capsule     Sig: TAKE 1 CAPSULE(4 MG) BY MOUTH EVERY DAY     Dispense:  90 capsule     Refill:  3     The patient was instructed to stop the following meds:  Medications Discontinued During This Encounter   Medication Reason    cetirizine (ZYRTEC) 10 MG tablet Patient no longer taking    ketoconazole (NIZORAL) 2 % shampoo Patient no longer taking    fluticasone propionate (FLONASE) 50 mcg/actuation nasal spray Patient no longer taking    triamcinolone acetonide 0.025% (KENALOG) 0.025 % Oint Patient no longer taking    ferrous gluconate (FERGON) 324 MG tablet Reorder    tolterodine (DETROL LA) 4 MG 24 hr capsule Reorder    gabapentin (NEURONTIN) 300 MG capsule Reorder    estradioL (VAGIFEM) 10 mcg Tab Reorder    losartan (COZAAR) 50 MG tablet Reorder    DULoxetine (CYMBALTA) 60 MG capsule Reorder    metoprolol tartrate (LOPRESSOR) 50 MG tablet Reorder     Orders Placed This Encounter   Procedures    Lipid Panel     Standing Status:   Future     Standing Expiration Date:   5/1/2025    BNP     Standing Status:   Future     Standing Expiration Date:   5/2/2025    CBC Auto Differential     Standing Status:   Future     Standing Expiration Date:   5/2/2025    Comprehensive Metabolic Panel     Standing Status:   Future     Standing Expiration Date:   5/1/2025    D-Dimer, Quantitative     Standing Status:   Future     Standing Expiration Date:   5/1/2025    TSH     Standing Status:   Future     Standing Expiration Date:   5/1/2025       Medication List with Changes/Refills   Current Medications    ACETAMINOPHEN (TYLENOL EXTRA STRENGTH ORAL)    Take 500 mg by mouth every 4 to 6 hours as needed.     BLOOD PRESSURE TEST KIT-LARGE KIT    Use once daily as directed.     CALCIUM CITRATE-VITAMIN D3 315-200 MG (CITRACAL+D) 315-200 MG-UNIT PER TABLET    Take 1 tablet by mouth 2 (two) times daily.    CYANOCOBALAMIN, VITAMIN B-12, (VITAMIN B-12 ORAL)    Take 1 tablet by mouth once daily. As directed    DENOSUMAB (PROLIA) 60 MG/ML SYRG    Inject 60 mg into the skin every 6 (six) months.    GLUCOSAMINE SULFATE 500 MG TAB    Take 1 tablet by mouth once daily.    LACTOBACILLUS ACIDOPHILUS (PROBIOTIC ORAL)    Take 1 tablet by mouth once daily.     MELATONIN 5 MG CAP    Take 1 capsule by mouth nightly as needed.    METHEN-M.BLUE-S.PHOS-PHSAL-HYO (URIBEL) 118-10-40.8-36 MG CAP    Take 1 capsule by mouth twice a week.    MULTIVITAMIN ORAL    Take 1 tablet by mouth once daily.     NYSTATIN (NYSTOP) POWDER    Apply topically 3 (three) times daily as needed (rash). Give 8 bottles at a time    VIT C,P-WG-GBBWC-LUTEIN-ZEAXAN (PRESERVISION AREDS-2) 104-525-08-1 MG-UNIT-MG-MG CAP    Take 1 capsule by mouth 2 (two) times a day.   Changed and/or Refilled Medications    Modified Medication Previous Medication    DULOXETINE (CYMBALTA) 60 MG CAPSULE DULoxetine (CYMBALTA) 60 MG capsule       TAKE 1 CAPSULE(60 MG) BY MOUTH EVERY DAY    TAKE 1 CAPSULE(60 MG) BY MOUTH EVERY DAY    ESTRADIOL (VAGIFEM) 10 MCG TAB estradioL (VAGIFEM) 10 mcg Tab       Place 1 tablet (10 mcg total) vaginally every Monday and Thursday.    Place 1 tablet (10 mcg total) vaginally every Monday and Thursday.    FERROUS GLUCONATE (FERGON) 324 MG TABLET ferrous gluconate (FERGON) 324 MG tablet       Take 1 tablet (324 mg total) by mouth daily with breakfast.    Take 324 mg by mouth daily with breakfast.    GABAPENTIN (NEURONTIN) 300 MG CAPSULE gabapentin (NEURONTIN) 300 MG capsule       Take 1 capsule (300 mg total) by mouth 3 (three) times daily.    TAKE 1 CAPSULE(300 MG) BY MOUTH THREE TIMES DAILY    LOSARTAN (COZAAR) 50 MG TABLET losartan (COZAAR) 50 MG tablet       Take 1 tablet (50 mg total) by mouth once daily.    Take 1 tablet (50  mg total) by mouth once daily.    METOPROLOL TARTRATE (LOPRESSOR) 50 MG TABLET metoprolol tartrate (LOPRESSOR) 50 MG tablet       Take 1 tablet (50 mg total) by mouth 2 (two) times daily.    TAKE 1 TABLET(50 MG) BY MOUTH TWICE DAILY    TOLTERODINE (DETROL LA) 4 MG 24 HR CAPSULE tolterodine (DETROL LA) 4 MG 24 hr capsule       TAKE 1 CAPSULE(4 MG) BY MOUTH EVERY DAY    TAKE 1 CAPSULE(4 MG) BY MOUTH EVERY DAY   Discontinued Medications    CETIRIZINE (ZYRTEC) 10 MG TABLET    Take 1 tablet (10 mg total) by mouth once daily.    FLUTICASONE PROPIONATE (FLONASE) 50 MCG/ACTUATION NASAL SPRAY    1 spray (50 mcg total) by Each Nostril route once daily.    KETOCONAZOLE (NIZORAL) 2 % SHAMPOO    Wash scalp with medicated shampoo at least 2x/week. Let sit on scalp at least 5 minutes prior to rinsing    TRIAMCINOLONE ACETONIDE 0.025% (KENALOG) 0.025 % OINT    Apply to affected areas of groin BID prn irritation. Do not use for longer than 2 weeks in a row.      Medication List with Changes/Refills   Current Medications    ACETAMINOPHEN (TYLENOL EXTRA STRENGTH ORAL)    Take 500 mg by mouth every 4 to 6 hours as needed.        Start Date: 12/16/2011End Date: --    BLOOD PRESSURE TEST KIT-LARGE KIT    Use once daily as directed.       Start Date: 4/6/2020  End Date: --    CALCIUM CITRATE-VITAMIN D3 315-200 MG (CITRACAL+D) 315-200 MG-UNIT PER TABLET    Take 1 tablet by mouth 2 (two) times daily.       Start Date: --        End Date: --    CYANOCOBALAMIN, VITAMIN B-12, (VITAMIN B-12 ORAL)    Take 1 tablet by mouth once daily. As directed       Start Date: 12/16/2011End Date: --    DENOSUMAB (PROLIA) 60 MG/ML SYRG    Inject 60 mg into the skin every 6 (six) months.       Start Date: --        End Date: --    GLUCOSAMINE SULFATE 500 MG TAB    Take 1 tablet by mouth once daily.       Start Date: --        End Date: --    LACTOBACILLUS ACIDOPHILUS (PROBIOTIC ORAL)    Take 1 tablet by mouth once daily.        Start Date: --        End Date:  --    MELATONIN 5 MG CAP    Take 1 capsule by mouth nightly as needed.       Start Date: --        End Date: --    STEVECHRISTINE.BLUE-S.PHOS-JANNAL-HYO (URIBEL) 118-10-40.8-36 MG CAP    Take 1 capsule by mouth twice a week.       Start Date: 9/7/2023  End Date: --    MULTIVITAMIN ORAL    Take 1 tablet by mouth once daily.        Start Date: 12/16/2011End Date: --    NYSTATIN (NYSTOP) POWDER    Apply topically 3 (three) times daily as needed (rash). Give 8 bottles at a time       Start Date: 7/31/2023 End Date: --    VIT C,L-ZO-RXJQD-LUTEIN-ZEAXAN (PRESERVISION AREDS-2) 343-530-12-1 MG-UNIT-MG-MG CAP    Take 1 capsule by mouth 2 (two) times a day.       Start Date: --        End Date: --   Changed and/or Refilled Medications    Modified Medication Previous Medication    DULOXETINE (CYMBALTA) 60 MG CAPSULE DULoxetine (CYMBALTA) 60 MG capsule       TAKE 1 CAPSULE(60 MG) BY MOUTH EVERY DAY    TAKE 1 CAPSULE(60 MG) BY MOUTH EVERY DAY       Start Date: 5/1/2024  End Date: --    Start Date: 4/6/2024  End Date: 5/1/2024    ESTRADIOL (VAGIFEM) 10 MCG TAB estradioL (VAGIFEM) 10 mcg Tab       Place 1 tablet (10 mcg total) vaginally every Monday and Thursday.    Place 1 tablet (10 mcg total) vaginally every Monday and Thursday.       Start Date: 5/2/2024  End Date: --    Start Date: 3/25/2024 End Date: 5/1/2024    FERROUS GLUCONATE (FERGON) 324 MG TABLET ferrous gluconate (FERGON) 324 MG tablet       Take 1 tablet (324 mg total) by mouth daily with breakfast.    Take 324 mg by mouth daily with breakfast.       Start Date: 5/1/2024  End Date: --    Start Date: --        End Date: 5/1/2024    GABAPENTIN (NEURONTIN) 300 MG CAPSULE gabapentin (NEURONTIN) 300 MG capsule       Take 1 capsule (300 mg total) by mouth 3 (three) times daily.    TAKE 1 CAPSULE(300 MG) BY MOUTH THREE TIMES DAILY       Start Date: 5/1/2024  End Date: --    Start Date: 2/27/2024 End Date: 5/1/2024    LOSARTAN (COZAAR) 50 MG TABLET losartan (COZAAR) 50 MG tablet        Take 1 tablet (50 mg total) by mouth once daily.    Take 1 tablet (50 mg total) by mouth once daily.       Start Date: 5/1/2024  End Date: --    Start Date: 4/6/2024  End Date: 5/1/2024    METOPROLOL TARTRATE (LOPRESSOR) 50 MG TABLET metoprolol tartrate (LOPRESSOR) 50 MG tablet       Take 1 tablet (50 mg total) by mouth 2 (two) times daily.    TAKE 1 TABLET(50 MG) BY MOUTH TWICE DAILY       Start Date: 5/1/2024  End Date: --    Start Date: 4/26/2024 End Date: 5/1/2024    TOLTERODINE (DETROL LA) 4 MG 24 HR CAPSULE tolterodine (DETROL LA) 4 MG 24 hr capsule       TAKE 1 CAPSULE(4 MG) BY MOUTH EVERY DAY    TAKE 1 CAPSULE(4 MG) BY MOUTH EVERY DAY       Start Date: 5/1/2024  End Date: --    Start Date: 3/15/2023 End Date: 5/1/2024   Discontinued Medications    CETIRIZINE (ZYRTEC) 10 MG TABLET    Take 1 tablet (10 mg total) by mouth once daily.       Start Date: 7/14/2020 End Date: 5/1/2024    FLUTICASONE PROPIONATE (FLONASE) 50 MCG/ACTUATION NASAL SPRAY    1 spray (50 mcg total) by Each Nostril route once daily.       Start Date: 5/8/2020  End Date: 5/1/2024    KETOCONAZOLE (NIZORAL) 2 % SHAMPOO    Wash scalp with medicated shampoo at least 2x/week. Let sit on scalp at least 5 minutes prior to rinsing       Start Date: 4/28/2022 End Date: 5/1/2024    TRIAMCINOLONE ACETONIDE 0.025% (KENALOG) 0.025 % OINT    Apply to affected areas of groin BID prn irritation. Do not use for longer than 2 weeks in a row.       Start Date: 12/18/2023End Date: 5/1/2024            Visit today included increased complexity associated with the care of the episodic problem htn addressed and managing the longitudinal care of the patient due to the serious and/or complex managed problem(s) htn.

## 2024-05-07 ENCOUNTER — LAB VISIT (OUTPATIENT)
Dept: LAB | Facility: HOSPITAL | Age: 89
End: 2024-05-07
Attending: FAMILY MEDICINE
Payer: COMMERCIAL

## 2024-05-07 DIAGNOSIS — R60.9 EDEMA, UNSPECIFIED TYPE: ICD-10-CM

## 2024-05-07 DIAGNOSIS — E78.5 HYPERLIPIDEMIA, UNSPECIFIED HYPERLIPIDEMIA TYPE: ICD-10-CM

## 2024-05-07 LAB
ALBUMIN SERPL BCP-MCNC: 3.8 G/DL (ref 3.5–5.2)
ALP SERPL-CCNC: 35 U/L (ref 55–135)
ALT SERPL W/O P-5'-P-CCNC: 14 U/L (ref 10–44)
ANION GAP SERPL CALC-SCNC: 12 MMOL/L (ref 8–16)
AST SERPL-CCNC: 23 U/L (ref 10–40)
BASOPHILS # BLD AUTO: 0.04 K/UL (ref 0–0.2)
BASOPHILS NFR BLD: 0.7 % (ref 0–1.9)
BILIRUB SERPL-MCNC: 0.6 MG/DL (ref 0.1–1)
BNP SERPL-MCNC: 359 PG/ML (ref 0–99)
BUN SERPL-MCNC: 18 MG/DL (ref 8–23)
CALCIUM SERPL-MCNC: 10.9 MG/DL (ref 8.7–10.5)
CHLORIDE SERPL-SCNC: 104 MMOL/L (ref 95–110)
CHOLEST SERPL-MCNC: 196 MG/DL (ref 120–199)
CHOLEST/HDLC SERPL: 3 {RATIO} (ref 2–5)
CO2 SERPL-SCNC: 27 MMOL/L (ref 23–29)
CREAT SERPL-MCNC: 0.8 MG/DL (ref 0.5–1.4)
DIFFERENTIAL METHOD BLD: ABNORMAL
EOSINOPHIL # BLD AUTO: 0.2 K/UL (ref 0–0.5)
EOSINOPHIL NFR BLD: 2.7 % (ref 0–8)
ERYTHROCYTE [DISTWIDTH] IN BLOOD BY AUTOMATED COUNT: 14.3 % (ref 11.5–14.5)
EST. GFR  (NO RACE VARIABLE): >60 ML/MIN/1.73 M^2
GLUCOSE SERPL-MCNC: 87 MG/DL (ref 70–110)
HCT VFR BLD AUTO: 41.9 % (ref 37–48.5)
HDLC SERPL-MCNC: 66 MG/DL (ref 40–75)
HDLC SERPL: 33.7 % (ref 20–50)
HGB BLD-MCNC: 13.2 G/DL (ref 12–16)
IMM GRANULOCYTES # BLD AUTO: 0.02 K/UL (ref 0–0.04)
IMM GRANULOCYTES NFR BLD AUTO: 0.4 % (ref 0–0.5)
LDLC SERPL CALC-MCNC: 112.4 MG/DL (ref 63–159)
LYMPHOCYTES # BLD AUTO: 1.8 K/UL (ref 1–4.8)
LYMPHOCYTES NFR BLD: 32.5 % (ref 18–48)
MCH RBC QN AUTO: 33.9 PG (ref 27–31)
MCHC RBC AUTO-ENTMCNC: 31.5 G/DL (ref 32–36)
MCV RBC AUTO: 108 FL (ref 82–98)
MONOCYTES # BLD AUTO: 0.5 K/UL (ref 0.3–1)
MONOCYTES NFR BLD: 8.6 % (ref 4–15)
NEUTROPHILS # BLD AUTO: 3.1 K/UL (ref 1.8–7.7)
NEUTROPHILS NFR BLD: 55.1 % (ref 38–73)
NONHDLC SERPL-MCNC: 130 MG/DL
NRBC BLD-RTO: 0 /100 WBC
PLATELET # BLD AUTO: 269 K/UL (ref 150–450)
PMV BLD AUTO: 9.9 FL (ref 9.2–12.9)
POTASSIUM SERPL-SCNC: 4.7 MMOL/L (ref 3.5–5.1)
PROT SERPL-MCNC: 6.9 G/DL (ref 6–8.4)
RBC # BLD AUTO: 3.89 M/UL (ref 4–5.4)
SODIUM SERPL-SCNC: 143 MMOL/L (ref 136–145)
TRIGL SERPL-MCNC: 88 MG/DL (ref 30–150)
TSH SERPL DL<=0.005 MIU/L-ACNC: 0.63 UIU/ML (ref 0.4–4)
WBC # BLD AUTO: 5.6 K/UL (ref 3.9–12.7)

## 2024-05-07 PROCEDURE — 85379 FIBRIN DEGRADATION QUANT: CPT | Performed by: FAMILY MEDICINE

## 2024-05-07 PROCEDURE — 80061 LIPID PANEL: CPT | Performed by: FAMILY MEDICINE

## 2024-05-07 PROCEDURE — 85025 COMPLETE CBC W/AUTO DIFF WBC: CPT | Performed by: FAMILY MEDICINE

## 2024-05-07 PROCEDURE — 36415 COLL VENOUS BLD VENIPUNCTURE: CPT | Mod: PO | Performed by: FAMILY MEDICINE

## 2024-05-07 PROCEDURE — 80053 COMPREHEN METABOLIC PANEL: CPT | Performed by: FAMILY MEDICINE

## 2024-05-07 PROCEDURE — 83880 ASSAY OF NATRIURETIC PEPTIDE: CPT | Performed by: FAMILY MEDICINE

## 2024-05-07 PROCEDURE — 84443 ASSAY THYROID STIM HORMONE: CPT | Performed by: FAMILY MEDICINE

## 2024-05-08 LAB — D DIMER PPP IA.FEU-MCNC: 0.74 MG/L FEU

## 2024-05-09 ENCOUNTER — PATIENT MESSAGE (OUTPATIENT)
Dept: FAMILY MEDICINE | Facility: CLINIC | Age: 89
End: 2024-05-09
Payer: COMMERCIAL

## 2024-05-09 DIAGNOSIS — R79.89 POSITIVE D-DIMER: ICD-10-CM

## 2024-05-09 DIAGNOSIS — R60.9 EDEMA, UNSPECIFIED TYPE: Primary | ICD-10-CM

## 2024-05-09 NOTE — PROGRESS NOTES
I have reviewed the D-Dimer test which is a blood test that looks for possible clot formation in your body.  A positive test does not definitively diagnose that you have significant clot formation but due to its being positive, more study will need to be done.  At this time, I would like for you to have a VENOUS ULTRASOUND performed to rule this out.   I will send a message to my nurse asking her to call you to arrange for this to be done.  Please follow up with me after this is done.      STAT ORDERS WERE WRITTEN.

## 2024-05-16 ENCOUNTER — PATIENT MESSAGE (OUTPATIENT)
Dept: ADMINISTRATIVE | Facility: OTHER | Age: 89
End: 2024-05-16
Payer: COMMERCIAL

## 2024-07-09 ENCOUNTER — VITALS (OUTPATIENT)
Dept: FAMILY MEDICINE | Facility: CLINIC | Age: 89
End: 2024-07-09
Payer: COMMERCIAL

## 2024-07-09 ENCOUNTER — PATIENT MESSAGE (OUTPATIENT)
Dept: FAMILY MEDICINE | Facility: CLINIC | Age: 89
End: 2024-07-09
Payer: COMMERCIAL

## 2024-07-09 VITALS
SYSTOLIC BLOOD PRESSURE: 131 MMHG | DIASTOLIC BLOOD PRESSURE: 73 MMHG | DIASTOLIC BLOOD PRESSURE: 73 MMHG | SYSTOLIC BLOOD PRESSURE: 131 MMHG

## 2024-07-09 DIAGNOSIS — G62.9 NEUROPATHY: ICD-10-CM

## 2024-07-09 RX ORDER — DULOXETIN HYDROCHLORIDE 60 MG/1
CAPSULE, DELAYED RELEASE ORAL
Qty: 90 CAPSULE | Refills: 0 | Status: SHIPPED | OUTPATIENT
Start: 2024-07-09

## 2024-07-09 NOTE — TELEPHONE ENCOUNTER
No care due was identified.  [facility] Embedded Care Due Messages. Reference number: [id number].   7/09/2024 3:40:32 AM CDT

## 2024-07-09 NOTE — TELEPHONE ENCOUNTER
Refill Routing Note   Medication(s) are not appropriate for processing by [facility] for the following reason(s):        Required vitals abnormal  05/01/24 (!) 153/79       [facility] action(s):  Defer        Medication Therapy Plan: 05/01/24 (!) 153/79      Appointments  past 12m or future 3m with PCP    Date Provider   Last Visit   5/1/2024 Aguilar Vickers MD   Next Visit   Visit date not found Aguilar Vickers MD   ED visits in past 90 days: 0        Note composed:12:44 PM 07/09/2024

## 2024-07-09 NOTE — TELEPHONE ENCOUNTER
Please call the patient and try to document a good blood pressure in the chart to meet his blood pressure requirements of control and if they remain high, let me know so that I can address this. The last 3 blood pressures are below to inform the patient.   BP Readings from Last 3 Encounters:   05/01/24 (!) 153/79   02/14/24 135/71   08/14/23 139/84

## 2024-07-10 DIAGNOSIS — M25.552 LEFT HIP PAIN: Primary | ICD-10-CM

## 2024-07-15 ENCOUNTER — TELEPHONE (OUTPATIENT)
Dept: OPTOMETRY | Facility: CLINIC | Age: 89
End: 2024-07-15
Payer: COMMERCIAL

## 2024-07-17 ENCOUNTER — OFFICE VISIT (OUTPATIENT)
Dept: OPTOMETRY | Facility: CLINIC | Age: 89
End: 2024-07-17
Payer: COMMERCIAL

## 2024-07-17 DIAGNOSIS — H43.813 POSTERIOR VITREOUS DETACHMENT OF BOTH EYES: Primary | ICD-10-CM

## 2024-07-17 DIAGNOSIS — Z13.5 SCREENING FOR EYE CONDITION: ICD-10-CM

## 2024-07-17 DIAGNOSIS — H52.203 ASTIGMATISM OF BOTH EYES, UNSPECIFIED TYPE: ICD-10-CM

## 2024-07-17 DIAGNOSIS — Z96.1 PSEUDOPHAKIA OF BOTH EYES: ICD-10-CM

## 2024-07-17 DIAGNOSIS — Z98.41 S/P BILATERAL CATARACT EXTRACTION: ICD-10-CM

## 2024-07-17 DIAGNOSIS — Z98.42 S/P BILATERAL CATARACT EXTRACTION: ICD-10-CM

## 2024-07-17 PROCEDURE — 92014 COMPRE OPH EXAM EST PT 1/>: CPT | Mod: S$GLB,,, | Performed by: OPTOMETRIST

## 2024-07-17 PROCEDURE — 1159F MED LIST DOCD IN RCRD: CPT | Mod: CPTII,S$GLB,, | Performed by: OPTOMETRIST

## 2024-07-17 PROCEDURE — 1126F AMNT PAIN NOTED NONE PRSNT: CPT | Mod: CPTII,S$GLB,, | Performed by: OPTOMETRIST

## 2024-07-17 PROCEDURE — 3288F FALL RISK ASSESSMENT DOCD: CPT | Mod: CPTII,S$GLB,, | Performed by: OPTOMETRIST

## 2024-07-17 PROCEDURE — 99999 PR PBB SHADOW E&M-EST. PATIENT-LVL IV: CPT | Mod: PBBFAC,,, | Performed by: OPTOMETRIST

## 2024-07-17 PROCEDURE — 1101F PT FALLS ASSESS-DOCD LE1/YR: CPT | Mod: CPTII,S$GLB,, | Performed by: OPTOMETRIST

## 2024-07-17 NOTE — PROGRESS NOTES
"HPI     Annual Exam            Comments: Annual general eye exam and refraction.  Mucoid discharge at outer canthus of the left eye upon awakening in the   morning.           Comments    Patient's age: 88 y.o. [place]  Occupation: Retired  Approximate date of last eye examination:  07/12/2023  Name of last eye doctor seen: Dr. Mccarthy  City/State: [facility]  Wears glasses? Yes     If yes, wears  Full-time or part-time?  Part-time   Present glasses are: Bifocal, SV Distance, SV Reading?  SV distance   Sunglasses   Approximate age of present glasses:  1 year   Got new glasses following last exam, or subsequently?:  Yes   Any problem with VA with glasses?  None  Wears CLs?:  No  Headaches?  No  Eye pain/discomfort?    No                                                                                 Flashes?   Floaters?  None  Diplopia/Double vision?  No  Patient's Ocular History:         Any eye surgeries? Yes, PCIOL OU and PVD OU         Any eye injury?  No         Any treatment for eye disease?  No  Family history of eye disease?  No  Significant patient medical history:         1. Diabetes?  No       If yes, IDDM or NIDDM? N/A   2. HBP?  Yes              3. Other (describe):  None   ! OTC eyedrops currently using:  No   ! Prescription eye meds currently using:  None   ! Any history of allergy/adverse reaction to any eye meds used   previously?  No    ! Any history of allergy/adverse reaction to eyedrops used during prior   eye exam(s)? No    ! Any history of allergy/adverse reaction to Novacaine or similar meds?   No   ! Any history of allergy/adverse reaction to Epinephrine or similar meds?   No    ! Patient okay with use of anesthetic eyedrops to check eye pressure?    Yes         ! Patient okay with use of eyedrops to dilate pupils today?  Yes   !  Allergies/Medications/Medical History/Family History reviewed today?    Yes      PD =   65/61  Desired reading distance =  15.25"                                          "                                Last edited by Asa Mccarthy, OD on 7/17/2024  3:11 PM.            Assessment /Plan     For exam results, see Encounter Report.    1. Posterior vitreous detachment of both eyes        2. S/P bilateral cataract extraction        3. Pseudophakia of both eyes        4. Screening for eye condition        5. Astigmatism of both eyes, unspecified type                       S/P cataract surgery in both eyes, with bilateral posterior chamber intraocular lens.   History of cystoid macular edema in the right eye following cataract surgery.  Now resolved.     RPE mottling at macula of the right eye.  Noted previously.  Stable      Residual astigmatism in each eye, with best-corrected VA of 20/25-3 in the right eye and 20/30-1+4 in the left eye     New spectacle lens RX issued for use as desired.  Suggest full-time wear - either bifocal lenses, OR single vision distance lenses and/or single vision reading lenses.       Recheck refraction in one year - or prior, if Ms. Lyles notes any changes in vision in the interim

## 2024-07-20 DIAGNOSIS — I10 ESSENTIAL HYPERTENSION: ICD-10-CM

## 2024-07-20 RX ORDER — METOPROLOL TARTRATE 50 MG/1
50 TABLET ORAL 2 TIMES DAILY
Qty: 180 TABLET | Refills: 3 | Status: SHIPPED | OUTPATIENT
Start: 2024-07-20

## 2024-07-20 NOTE — TELEPHONE ENCOUNTER
Refill Decision Note   Marysol Lyles  is requesting a refill authorization.  Brief Assessment and Rationale for Refill:  Approve     Medication Therapy Plan:         Comments:     Note composed:2:51 PM 07/20/2024

## 2024-07-20 NOTE — TELEPHONE ENCOUNTER
No care due was identified.  [facility] Embedded Care Due Messages. Reference number: [id number].   7/20/2024 12:12:56 PM CDT

## 2024-07-22 ENCOUNTER — CLINICAL SUPPORT (OUTPATIENT)
Dept: REHABILITATION | Facility: HOSPITAL | Age: 89
End: 2024-07-22
Payer: COMMERCIAL

## 2024-07-22 DIAGNOSIS — R29.898 DECREASED STRENGTH OF LOWER EXTREMITY: Primary | ICD-10-CM

## 2024-07-22 DIAGNOSIS — R68.89 DECREASED FUNCTIONAL ACTIVITY TOLERANCE: ICD-10-CM

## 2024-07-22 DIAGNOSIS — M25.652 DECREASED RANGE OF LEFT HIP MOVEMENT: ICD-10-CM

## 2024-07-22 DIAGNOSIS — M25.552 LEFT HIP PAIN: ICD-10-CM

## 2024-07-22 PROCEDURE — 97161 PT EVAL LOW COMPLEX 20 MIN: CPT | Mod: PN

## 2024-07-22 PROCEDURE — 97112 NEUROMUSCULAR REEDUCATION: CPT | Mod: PN

## 2024-07-22 PROCEDURE — 97110 THERAPEUTIC EXERCISES: CPT | Mod: PN

## 2024-07-22 NOTE — PLAN OF CARE
[facility]   Physical Therapy Initial Evaluation      Date: 7/22/2024   Name: Marysol Lyles  Clinic Number: [id number]    Therapy Diagnosis:    Encounter Diagnoses   Name Primary?    Left hip pain     Decreased strength of lower extremity Yes    Decreased functional activity tolerance     Decreased range of left hip movement       Physician: Order, Paper     Physician Orders: PT Eval and Treat  Medical Diagnosis from Referral: M25.552 (ICD-10-CM) - Left hip pain   Evaluation Date: 7/22/2024  Authorization Period Expiration: 12/31/24  Plan of Care Expiration: 9/22/2024  Progress Note Due: 8/22/2024  Visit # / Visits authorized: 1/1  FOTO: 1/3 (last performed on 7/22/2024)    Precautions: Standard and HTN, R DAVID, L TKA    Time In: 2:30 PM  Time Out: 3:15 PM  Total Billable Time (timed & untimed codes): 45 minutes    SUBJECTIVE     Date of onset: 3 months ago    History of current condition - Marysol reports that her left hip has been bothering her as well as the right lower leg. The left hip is the main culprit - she has been having pain for the last 3 months or so. MD told her it is bursitis. Has some tingling in the right ankle - nothing on the left side. Pain in the hip is exacerbated by prolonged walking but the pain can be intermittent in nature. Has not been using any ice or heat. Using a SPC when going out in the community. Able to perform normal ADL's but son will help with bigger activities such as sweeping and mopping. The pain is sharp and shooting in nature - will take Tylenol as needed. She has had both knees replaced and has been doing well since those surgeries. Taking high blood pressure medication. No other medical conditions to be concerned with at this time.    Current Activity Level:     Falls: [x] No  [] Yes:     Imaging: [] Xray [] MRI [] CT: reports having hip and knee imaged but nothing in the chart.    Prior Therapy:  [] No  [x] Yes:   Social History: Patient lives with  their son [x] House [] Apartment/Condo []other  Stairs: [x] No  [] Yes:   Occupation: Patient is Retired - loves her dog, loves to play on the computer and garden  School/Work Restrictions: [x] none  Prior Level of Function: independent with intermittent use of cane when ambulating in the community  Current Level of Function:  difficulty walking longer distances    Pain:  Current 0/10, worst 3/10, best 0/10   Location: left hip   Description: Sharp and Shooting  Aggravating Factors: walking long distances  Easing Factors: rest    Dominant Extremity:    [x] Right    [] Left    Patients goals: improve mobility and decrease pain    Medical History:   Past Medical History:   Diagnosis Date    Arthritis     Chronic pain 8/14/2013    Colon polyp     DDD (degenerative disc disease)     DDD (degenerative disc disease)     GERD (gastroesophageal reflux disease)     Hyperlipidemia     Hypertension     Osteopenia 3/2/2016    Primary hyperparathyroidism     Urinary incontinence 4/19/2021    She has urinary incontinence and she has used urogesic and detrol and premarin cream to help with this and it has. She will continue them.       Surgical History:   Marysol Lyles  has a past surgical history that includes Tonsillectomy; Appendectomy; Total knee arthroplasty (Left); Total hip arthroplasty (Right); Colonoscopy w/ biopsies (02/2011); Sleeve Gastroplasty (02/08/2012); Partial knee arthroplasty (Right); Lumbar epidural injection (N/A, 12/2019); Phacoemulsification of cataract (Right, 12/02/2020); Intraocular prosthesis insertion (Right, 12/02/2020); Cataract extraction w/  intraocular lens implant (Left, 05/16/2018); Cataract extraction w/  intraocular lens implant (Right, 12/02/2020); Colonoscopy (11/16/2016); Upper gastrointestinal endoscopy; Colonoscopy (N/A, 04/14/2022); Eye surgery (2020); and Joint replacement (yes).    Medications:   Marysol has a current medication list which includes the following prescription(s):  acetaminophen, blood pressure test kit-large, calcium citrate-vitamin d3 315-200 mg, cyanocobalamin (vitamin b-12), prolia, duloxetine, estradiol, ferrous gluconate, gabapentin, glucosamine sulfate, lactobacillus acidophilus, losartan, melatonin, uribel, metoprolol tartrate, multivitamin, nystatin, tolterodine, and preservision areds-2.    Allergies:   Review of patient's allergies indicates:   Allergen Reactions    Penicillins Swelling     Other reaction(s): Swelling    Formaldehyde Hives    Mercury Hives    Mercury (elemental) Hives    Penicillamine     Adhesive Rash     Other reaction(s): Rash  Other reaction(s): Rash    Adhesive tape-silicones Rash    Dilaudid [hydromorphone (bulk)] Itching    Hydromorphone Itching    Latex, natural rubber Hives      OBJECTIVE     Posture:  Patient presents with postural abnormalities which include:    [x] Forward Head   [] Increased Lumbar Lordosis   [x] Rounded Shoulder   [] Flat Back Posture   [x] Increased Thoracic Kyphosis [] Pes Planus   [] Increased Trunk Sway  [] Valgus knee position   [] Increased Trunk Rotation  [] Varus knee position   [] Increased cervical lordosis [] Other:    Sensation:    Sensation to light touch over LE's is  [x] Intact [] Impaired [] Defer    Reflexes:  Patellar    [x] Defer [] Normal [] Hyper []  Hypo   Achilles   [x] Defer [] Normal [] Hyper []  Hypo    Gait Analysis: The patient ambulated with the following assistive device: straight cane; the patient presents with the following gait abnormalities: occasional unsteady gait     Functional Tests  Outcome Norms   30 second Sit to Stand 13 Age Male Female   60-64 <14 <12   65-69 <12 <11   70-74 <12 <10   75-79 <11 <10   80-84 <10 <9   85-89 <8 <8   90-93 <7 <4         Range of Motion:  Hip AROM/PROM Right Left Pain/Dysfunction with Movement   Hip Flexion (120º) 120 120    Hip Extension (30º) 30 30    Hip Abduction (45º) 45 45    Hip IR (45º) 40 38    Hip ER (45º) 45 45      Strength:  L/E MMT  Right  (spine) Left Pain/Dysfunction with Movement   Hip Flexion  5/5 4+/5    Hip Extension  5/5 4+/5    Hip Abduction  4+/5 4+/5    Knee Extension 4+/5 4+/5    Knee Flexion 4+/5 4+/5    Hip IR 4+/5 4+/5    Hip ER 4+/5 4+/5    Ankle DF 5/5 5/5    Ankle PF 5/5 5/5      Joint Mobility: tightness noted when moving into B hip IR    Palpation: mild TTP noted along the greater trochanter of the left hip near the left hip bursa    FOTO:  Intake Outcome Measure for FOTO Hip Survey    Therapist reviewed FOTO scores for Marysol Lyles on 7/22/2024.   FOTO documents entered into [employer] - see Media section or FOTO account episode details..    Intake Score: 99     TREATMENT     Total Treatment time (time-based codes) separate from Evaluation: (16) minutes     Marysol received the treatments listed below:   Marysol received therapeutic exercises to develop strength, endurance, ROM, and flexibility for 8 minutes including:    Supine SKTC x5 reps B  Supine or Seated Hamstring Stretch + strap 10x10s B  Long Sitting Calf Stretch 10x10s B + strap    Marysol participated in neuromuscular re-education activities to improve: Balance, Coordination, Kinesthetic, Sense, and Proprioception for 8 minutes. The following activities were included:    Supine Hip Bridge x10 reps  Side-Lying Clamshells x10 reps B + RTB    Plan for Next Visit: NuStep, Shuttle, Heel Raises        PATIENT EDUCATION AND HOME EXERCISES     Education provided: (during treatment session) minutes  Home exercise program review and administration  Post Exercise Soreness  Maintaining a pain free range of motion with all activities  Anatomy/Physiology of the Hip and the surrounding musculature    Written Home Exercises Provided: yes.  Exercises were reviewed and Marysol was able to demonstrate them prior to the end of the session.  Marysol demonstrated good  understanding of the education provided. See EMR under Patient Instructions for exercises provided during therapy sessions.    ASSESSMENT      Marysol is a 88 y.o. female referred to outpatient Physical Therapy with a medical diagnosis of Left hip pain. The patient presents with signs and symptoms consistent with diagnosis and impairments which include weakness, impaired endurance, impaired functional mobility, gait instability, impaired balance, decreased lower extremity function, pain, decreased ROM, impaired joint extensibility, and impaired muscle length. Treatment will focus on improving hip IR range of motion, increased LE strength, and improved functional activity performance. Balance training may also be incorporated to aid with safety.    Patient prognosis is Good, if patient is consistent and compliant with Physical Therapy and home exercise program.  Patient will benefit from skilled outpatient Physical Therapy to address the deficits stated above and in the chart below, provide patient/family education, and to maximize patient's level of independence.     Plan of care discussed with patient: Yes  Patient's spiritual, cultural and educational needs considered and patient is agreeable to the plan of care and goals as stated below:     Anticipated Barriers for therapy: none      Medical Necessity is demonstrated by the following:  History  Co-morbidities and personal factors that may impact the plan of care [] LOW: no personal factors / co-morbidities  [] MODERATE: 1-2 personal factors / co-morbidities  [x] HIGH: 3+ personal factors / co-morbidities    Moderate / High Support Documentation:   Past Medical History:   Diagnosis Date    Arthritis     Chronic pain 8/14/2013    Colon polyp     DDD (degenerative disc disease)     DDD (degenerative disc disease)     GERD (gastroesophageal reflux disease)     Hyperlipidemia     Hypertension     Osteopenia 3/2/2016    Primary hyperparathyroidism     Urinary incontinence 4/19/2021    She has urinary incontinence and she has used urogesic and detrol and premarin cream to help with this and it has. She will  continue them.         Examination  Body Structures and Functions, activity limitations and participation restrictions that may impact the plan of care [x] LOW: addressing 1-2 elements  [] MODERATE: 3+ elements  [] HIGH: 4+ elements (please support below)    Moderate / High Support Documentation: See evaluation / objective measurements above and FOTO.     Clinical Presentation [x] LOW: stable  [] MODERATE: Evolving  [] HIGH: Unstable     Decision Making/ Complexity Score: low       Goals:  Reviewed: 7/22/2024    Short Term Goals: In 4 weeks  Progress Date   1.Patient to be educated on HEP. [x] Progressing  [] MET   [] Not MET   [] Not tested    2.Patient to increase left hip IR range of motion by 5 degrees, in order to improve available range of motion for ADL's.  [x] Progressing  [] MET   [] Not MET  [] Not tested    3.Patient to increase left hip extension strength by 1/2 grade, in order to improve endurance and increase ability to perform all functional activities for increased time.  [x] Progressing  [] MET   [] Not MET  [] Not tested    4.Patient to have pain less than 3/10 at worst, to improve QOL. [x] Progressing  [] MET   [] Not MET  [] Not tested    5.Patient to improve score on the FOTO, to improve QOL. [x] Progressing  [] MET   [] Not MET  [] Not tested      Long Term Goals: In 8 weeks Progress Date   1.Patient to improve score on the FOTO predicted score or better, to improve QOL. [x] Progressing  [] MET   [] Not MET  [] Not tested    2. Patient to increase hip abduction strength to 5/5 or greater, in order to improve endurance and increase ability to perform all functional activities for increased time. [x] Progressing  [] MET   [] Not MET  [] Not tested    3. Patient to have decreased pain to 1/10 at worst, to improve QOL. [x] Progressing  [] MET   [] Not MET  [] Not tested    5. Patient to perform daily activities including walking, bending, and lifting without increased symptoms and with proper  mechanics. [x] Progressing  [] MET   [] Not MET  [] Not tested      PLAN     Plan of care Certification: 7/22/2024  to 9/22/24.    Outpatient Physical Therapy 2 times weekly for 8 weeks to include any combination of the following interventions: Aquatic Therapy, virtual visits, electrical stimulation (PRN), Cervical/Lumbar Traction, Gait Training, Manual Therapy, Neuromuscular Re-ed, Patient Education/Self Care, Therapeutic Activites, Therapeutic Exercise, Dry Needling, and Moist Hot Pack/Cold Pack    This patient CAN be treated by a PTA.    Cait Andrews, PT, DPT, Cert. DN

## 2024-07-23 NOTE — PROGRESS NOTES
[facility]   Physical Therapy Treatment Note      Name: Marysol Lyles  Clinic Number: [id number]    Therapy Diagnosis:   Encounter Diagnosis   Name Primary?    Decreased range of left hip movement Yes     Physician: Order, Paper    Visit Date: 7/24/2024    Physician Orders: PT Eval and Treat  Medical Diagnosis from Referral: M25.552 (ICD-10-CM) - Left hip pain   Evaluation Date: 7/22/2024  Authorization Period Expiration: 12/31/24  Plan of Care Expiration: 9/22/2024  Progress Note Due: 8/22/2024  Visit # / Visits authorized: 1 / 20 (1/1 Eval)  FOTO: 1/3 (last performed on 7/22/2024)     Precautions: Standard and HTN, R DAVID, L TKA     Time In: 11:00 AM  Time Out: 11:53 AM  Total Billable Time (timed & untimed codes): 53 minutes    PTA Visit #: 0/5     Subjective     Patient reports: that she is having pain in the right ankle and right lower leg this morning.    She was compliant with home exercise program.  Response to previous treatment: no adverse reactions reported  Functional change: in progress - first follow up appointment    Pain: 2/10  Location: right ankle/lower leg      Objective      Objective Measures updated at progress report unless specified.     Treatment     Marysol received the treatments listed below:      Marysol received therapeutic exercises to develop strength, endurance, ROM, and flexibility for 20 minutes including:     NuStep x8 min (Level 5)  Supine DKTC x30 reps  Supine LTR's over Stability Ball x30 reps  Supine SKTC x5 reps B  Seated Hamstring Stretch + strap 10x10s B + stool  Long Sitting Calf Stretch 10x10s B + strap     Marysol participated in neuromuscular re-education activities to improve: Balance, Coordination, Kinesthetic, Sense, and Proprioception for 23 minutes. The following activities were included:     Seated Hip Adduction 3x10 reps (3s holds)  Seated Hip Abduction 3x10 reps + RTB + TA Activation  Supine Straight Leg Raise x15 reps B (rest breaks required due to  weakness)  Side-Lying Clamshells 2x10 reps B + RTB     Plan for Next Visit: Heel Raises        Therapeutic Activities to improve functional performance for 10  minutes, including:    Shuttle Press Double Leg x2 min (1 black cord, 1 red cord)  Shuttle Press Single Leg x2 min each leg (1 red cord)  Supine Hip Bridge 3x10 reps    *A portion of this treatment session is provided with the assistance of a skilled rehbailitation technician under the supervision of a licensed physical therapist.    Patient Education and Home Exercises       Education provided:   Home exercise program review  Post Exercise Soreness  Maintaining a pain free range of motion with all activities  Anatomy/Physiology of the Hip and the surrounding musculature    Written Home Exercises Provided: Patient instructed to cont prior HEP. Exercises were reviewed and Marysol was able to demonstrate them prior to the end of the session.  Marysol demonstrated good  understanding of the education provided. See Electronic Medical Record under Patient Instructions for exercises provided during therapy sessions.    Assessment     Patient tolerated therapy well overall this date. Fatigue was evident with Straight Leg Raise's but no increase in back pain reported. Emphasis placed on core engagement with all activities. Encouraged patient to continue with performance of Home Exercise Program for maintenance outside the clinical    Marysol Is progressing well towards her goals.   Patient prognosis is Good.     Patient will continue to benefit from skilled outpatient physical therapy to address the deficits listed in the problem list box on initial evaluation, provide pt/family education and to maximize pt's level of independence in the home and community environment.     Patient's spiritual, cultural and educational needs considered and pt agreeable to plan of care and goals.     Anticipated barriers to physical therapy: none stated    Goals:   Reviewed: 7/22/2024    Short  Term Goals: In 4 weeks  Progress Date   1.Patient to be educated on HEP. [x] Progressing  [] MET   [] Not MET   [] Not tested     2.Patient to increase left hip IR range of motion by 5 degrees, in order to improve available range of motion for ADL's.  [x] Progressing  [] MET   [] Not MET  [] Not tested     3.Patient to increase left hip extension strength by 1/2 grade, in order to improve endurance and increase ability to perform all functional activities for increased time.  [x] Progressing  [] MET   [] Not MET  [] Not tested     4.Patient to have pain less than 3/10 at worst, to improve QOL. [x] Progressing  [] MET   [] Not MET  [] Not tested     5.Patient to improve score on the FOTO, to improve QOL. [x] Progressing  [] MET   [] Not MET  [] Not tested        Long Term Goals: In 8 weeks Progress Date   1.Patient to improve score on the FOTO predicted score or better, to improve QOL. [x] Progressing  [] MET   [] Not MET  [] Not tested     2. Patient to increase hip abduction strength to 5/5 or greater, in order to improve endurance and increase ability to perform all functional activities for increased time. [x] Progressing  [] MET   [] Not MET  [] Not tested     3. Patient to have decreased pain to 1/10 at worst, to improve QOL. [x] Progressing  [] MET   [] Not MET  [] Not tested     5. Patient to perform daily activities including walking, bending, and lifting without increased symptoms and with proper mechanics. [x] Progressing  [] MET   [] Not MET  [] Not tested        Plan     Continue with established POC for improved ability to perform ADL's.    Cait Andrews, PT, DPT, Cert. DN

## 2024-07-24 ENCOUNTER — CLINICAL SUPPORT (OUTPATIENT)
Dept: REHABILITATION | Facility: HOSPITAL | Age: 89
End: 2024-07-24
Payer: COMMERCIAL

## 2024-07-24 DIAGNOSIS — M25.652 DECREASED RANGE OF LEFT HIP MOVEMENT: Primary | ICD-10-CM

## 2024-07-24 PROCEDURE — 97110 THERAPEUTIC EXERCISES: CPT | Mod: PN

## 2024-07-24 PROCEDURE — 97112 NEUROMUSCULAR REEDUCATION: CPT | Mod: PN

## 2024-07-24 PROCEDURE — 97530 THERAPEUTIC ACTIVITIES: CPT | Mod: PN

## 2024-07-26 NOTE — PROGRESS NOTES
[facility]   Physical Therapy Treatment Note      Name: Marysol Lyles  Clinic Number: [id number]    Therapy Diagnosis:   Encounter Diagnosis   Name Primary?    Decreased range of left hip movement Yes     Physician: Order, Paper    Visit Date: 7/29/2024    Physician Orders: PT Eval and Treat  Medical Diagnosis from Referral: M25.552 (ICD-10-CM) - Left hip pain   Evaluation Date: 7/22/2024  Authorization Period Expiration: 12/31/24  Plan of Care Expiration: 9/22/2024  Progress Note Due: 8/22/2024  Visit # / Visits authorized: 2 / 20 (1/1 Eval)  FOTO: 1/3 (last performed on 7/22/2024)     Precautions: Standard and HTN, R DAVID, L TKA     Time In: 1:30 PM  Time Out: 2:23 PM  Total Billable Time (timed & untimed codes): 53 minutes    PTA Visit #: 0/5     Subjective     Patient reports: that she is doing well today. Leg lifts have been difficult at home causing her back to hurt me.    She was compliant with home exercise program.  Response to previous treatment: no adverse reactions reported  Functional change: in progress - first follow up appointment    Pain: 2/10  Location: right ankle/lower leg      Objective      Objective Measures updated at progress report unless specified.     Treatment     Marysol received the treatments listed below:      Marysol received therapeutic exercises to develop strength, endurance, ROM, and flexibility for 20 minutes including:     NuStep x10 min (Level 5)  Supine DKTC x30 reps  Supine LTR's over Stability Ball x30 reps  Supine SKTC x5 reps B  Seated Hamstring Stretch + strap 10x10s B + stool  Standing Calf Stretch 10x10s B + level 2 slant board     Marysol participated in neuromuscular re-education activities to improve: Balance, Coordination, Kinesthetic, Sense, and Proprioception for 23 minutes. The following activities were included:     Seated Hip Adduction 3x10 reps (3s holds)  Seated Hip Abduction 3x10 reps + RTB + TA Activation  Supine Short Arc Quad's over medium  bolster 2x10 reps B  Side-Lying Clamshells 2x10 reps B + RTB  Standing Hip Abduction 2x10 reps B     Plan for Next Visit: Heel Raises        Therapeutic Activities to improve functional performance for 10  minutes, including:    Shuttle Press Double Leg x2 min (1 black cord, 1 red cord)  Shuttle Press Single Leg x2 min each leg (1 red cord)  Supine Hip Bridge 3x10 reps    *A portion of this treatment session is provided with the assistance of a skilled rehbailitation technician under the supervision of a licensed physical therapist.    Patient Education and Home Exercises       Education provided:   Home exercise program review  Post Exercise Soreness  Maintaining a pain free range of motion with all activities  Anatomy/Physiology of the Hip and the surrounding musculature    Written Home Exercises Provided: Patient instructed to cont prior HEP. Exercises were reviewed and Marysol was able to demonstrate them prior to the end of the session.  Marysol demonstrated good  understanding of the education provided. See Electronic Medical Record under Patient Instructions for exercises provided during therapy sessions.    Assessment     Patient continues to progress well through therapy. Emphasis on LE strengthening and stability without exacerbation of symptoms. Able to progress reps and or intensity of some exercises without complication. Will continue to progress functional activity training moving forward.    Marysol Is progressing well towards her goals.   Patient prognosis is Good.     Patient will continue to benefit from skilled outpatient physical therapy to address the deficits listed in the problem list box on initial evaluation, provide pt/family education and to maximize pt's level of independence in the home and community environment.     Patient's spiritual, cultural and educational needs considered and pt agreeable to plan of care and goals.     Anticipated barriers to physical therapy: none stated    Goals:    Reviewed: 7/22/2024    Short Term Goals: In 4 weeks  Progress Date   1.Patient to be educated on HEP. [x] Progressing  [] MET   [] Not MET   [] Not tested     2.Patient to increase left hip IR range of motion by 5 degrees, in order to improve available range of motion for ADL's.  [x] Progressing  [] MET   [] Not MET  [] Not tested     3.Patient to increase left hip extension strength by 1/2 grade, in order to improve endurance and increase ability to perform all functional activities for increased time.  [x] Progressing  [] MET   [] Not MET  [] Not tested     4.Patient to have pain less than 3/10 at worst, to improve QOL. [x] Progressing  [] MET   [] Not MET  [] Not tested     5.Patient to improve score on the FOTO, to improve QOL. [x] Progressing  [] MET   [] Not MET  [] Not tested        Long Term Goals: In 8 weeks Progress Date   1.Patient to improve score on the FOTO predicted score or better, to improve QOL. [x] Progressing  [] MET   [] Not MET  [] Not tested     2. Patient to increase hip abduction strength to 5/5 or greater, in order to improve endurance and increase ability to perform all functional activities for increased time. [x] Progressing  [] MET   [] Not MET  [] Not tested     3. Patient to have decreased pain to 1/10 at worst, to improve QOL. [x] Progressing  [] MET   [] Not MET  [] Not tested     5. Patient to perform daily activities including walking, bending, and lifting without increased symptoms and with proper mechanics. [x] Progressing  [] MET   [] Not MET  [] Not tested        Plan     Continue with established POC for improved ability to perform ADL's.    Cait Andrews, PT, DPT, Cert. DN

## 2024-07-29 ENCOUNTER — CLINICAL SUPPORT (OUTPATIENT)
Dept: REHABILITATION | Facility: HOSPITAL | Age: 89
End: 2024-07-29
Payer: COMMERCIAL

## 2024-07-29 DIAGNOSIS — M25.652 DECREASED RANGE OF LEFT HIP MOVEMENT: Primary | ICD-10-CM

## 2024-07-29 PROCEDURE — 97112 NEUROMUSCULAR REEDUCATION: CPT | Mod: PN

## 2024-07-29 PROCEDURE — 97530 THERAPEUTIC ACTIVITIES: CPT | Mod: PN

## 2024-07-29 PROCEDURE — 97110 THERAPEUTIC EXERCISES: CPT | Mod: PN

## 2024-07-30 NOTE — PROGRESS NOTES
[facility]   Physical Therapy Treatment Note      Name: Marysol Lyles  Clinic Number: [id number]    Therapy Diagnosis:   Encounter Diagnosis   Name Primary?    Decreased range of left hip movement Yes     Physician: Order, Paper    Visit Date: 7/31/2024    Physician Orders: PT Eval and Treat  Medical Diagnosis from Referral: M25.552 (ICD-10-CM) - Left hip pain   Evaluation Date: 7/22/2024  Authorization Period Expiration: 12/31/24  Plan of Care Expiration: 9/22/2024  Progress Note Due: 8/22/2024  Visit # / Visits authorized: 3 / 20 (1/1 Eval)  FOTO: 1/3 (last performed on 7/22/2024)     Precautions: Standard and HTN, R DAVID, L TKA     Time In: 2:05 PM  Time Out: 2:58 PM  Total Billable Time (timed & untimed codes): 53 minutes    PTA Visit #: 0/5     Subjective     Patient reports: that she is doing okay today. Still has weakness/trouble with the right ankle/leg.    She was compliant with home exercise program.  Response to previous treatment: no adverse reactions reported  Functional change: in progress    Pain: 2/10  Location: right ankle/lower leg      Objective      Objective Measures updated at progress report unless specified.     Treatment     Marysol received the treatments listed below:      Marysol received therapeutic exercises to develop strength, endurance, ROM, and flexibility for 20 minutes including:     NuStep x10 min (Level 5)  Supine DKTC x30 reps  Supine LTR's over Stability Ball x30 reps  Supine SKTC x5 reps B  Seated Hamstring Stretch + strap 10x10s B + stool  Standing Calf Stretch 10x10s B + level 2 slant board  Standing Heel Raises 2x10 reps B  Supine Ankle PF x20 reps + RTB - over medium bolster     Marysol participated in neuromuscular re-education activities to improve: Balance, Coordination, Kinesthetic, Sense, and Proprioception for 23 minutes. The following activities were included:     Seated Hip Adduction 3x10 reps (3s holds)  Seated Hip Abduction 3x10 reps + RTB + TA  Activation  Supine Short Arc Quad's over medium bolster 3x10 reps B  Side-Lying Clamshells 2x10 reps B + RTB  Standing Hip Abduction 3x10 reps B     Plan for Next Visit: Heel Raises        Therapeutic Activities to improve functional performance for 10  minutes, including:    Shuttle Press Double Leg x2 min (1 black cord, 1 red cord)  Shuttle Press Single Leg x2 min each leg (1 red cord)  Supine Hip Bridge 3x10 reps    *A portion of this treatment session is provided with the assistance of a skilled rehbailitation technician under the supervision of a licensed physical therapist.    Patient Education and Home Exercises       Education provided:   Home exercise program review  Post Exercise Soreness  Maintaining a pain free range of motion with all activities  Anatomy/Physiology of the Hip and the surrounding musculature    Written Home Exercises Provided: Patient instructed to cont prior HEP. Exercises were reviewed and Marysol was able to demonstrate them prior to the end of the session.  Marysol demonstrated good  understanding of the education provided. See Electronic Medical Record under Patient Instructions for exercises provided during therapy sessions.    Assessment     Introduced more ankle specific exercises today to assist with greater strengthening and stability at the talocrural joints. Muscle fatigue more evident on the R compared to the L. Encouraged a pain free range of motion with all activities.     Marysol Is progressing well towards her goals.   Patient prognosis is Good.     Patient will continue to benefit from skilled outpatient physical therapy to address the deficits listed in the problem list box on initial evaluation, provide pt/family education and to maximize pt's level of independence in the home and community environment.     Patient's spiritual, cultural and educational needs considered and pt agreeable to plan of care and goals.     Anticipated barriers to physical therapy: none  stated    Goals:   Reviewed: 7/22/2024    Short Term Goals: In 4 weeks  Progress Date   1.Patient to be educated on HEP. [x] Progressing  [] MET   [] Not MET   [] Not tested     2.Patient to increase left hip IR range of motion by 5 degrees, in order to improve available range of motion for ADL's.  [x] Progressing  [] MET   [] Not MET  [] Not tested     3.Patient to increase left hip extension strength by 1/2 grade, in order to improve endurance and increase ability to perform all functional activities for increased time.  [x] Progressing  [] MET   [] Not MET  [] Not tested     4.Patient to have pain less than 3/10 at worst, to improve QOL. [x] Progressing  [] MET   [] Not MET  [] Not tested     5.Patient to improve score on the FOTO, to improve QOL. [x] Progressing  [] MET   [] Not MET  [] Not tested        Long Term Goals: In 8 weeks Progress Date   1.Patient to improve score on the FOTO predicted score or better, to improve QOL. [x] Progressing  [] MET   [] Not MET  [] Not tested     2. Patient to increase hip abduction strength to 5/5 or greater, in order to improve endurance and increase ability to perform all functional activities for increased time. [x] Progressing  [] MET   [] Not MET  [] Not tested     3. Patient to have decreased pain to 1/10 at worst, to improve QOL. [x] Progressing  [] MET   [] Not MET  [] Not tested     5. Patient to perform daily activities including walking, bending, and lifting without increased symptoms and with proper mechanics. [x] Progressing  [] MET   [] Not MET  [] Not tested        Plan     Continue with established POC for improved ability to perform ADL's.    Cait Andrews, PT, DPT, Cert. DN

## 2024-07-31 ENCOUNTER — CLINICAL SUPPORT (OUTPATIENT)
Dept: REHABILITATION | Facility: HOSPITAL | Age: 89
End: 2024-07-31
Payer: COMMERCIAL

## 2024-07-31 DIAGNOSIS — M25.652 DECREASED RANGE OF LEFT HIP MOVEMENT: Primary | ICD-10-CM

## 2024-07-31 PROCEDURE — 97110 THERAPEUTIC EXERCISES: CPT | Mod: PN

## 2024-07-31 PROCEDURE — 97112 NEUROMUSCULAR REEDUCATION: CPT | Mod: PN

## 2024-07-31 PROCEDURE — 97530 THERAPEUTIC ACTIVITIES: CPT | Mod: PN

## 2024-08-05 ENCOUNTER — CLINICAL SUPPORT (OUTPATIENT)
Dept: REHABILITATION | Facility: HOSPITAL | Age: 89
End: 2024-08-05
Payer: COMMERCIAL

## 2024-08-05 DIAGNOSIS — M25.652 DECREASED RANGE OF LEFT HIP MOVEMENT: Primary | ICD-10-CM

## 2024-08-05 PROCEDURE — 97110 THERAPEUTIC EXERCISES: CPT | Mod: PN

## 2024-08-05 PROCEDURE — 97112 NEUROMUSCULAR REEDUCATION: CPT | Mod: PN

## 2024-08-05 PROCEDURE — 97530 THERAPEUTIC ACTIVITIES: CPT | Mod: PN

## 2024-08-07 ENCOUNTER — CLINICAL SUPPORT (OUTPATIENT)
Dept: REHABILITATION | Facility: HOSPITAL | Age: 89
End: 2024-08-07
Payer: COMMERCIAL

## 2024-08-07 ENCOUNTER — LAB VISIT (OUTPATIENT)
Dept: LAB | Facility: HOSPITAL | Age: 89
End: 2024-08-07
Attending: NURSE PRACTITIONER
Payer: COMMERCIAL

## 2024-08-07 DIAGNOSIS — M85.80 OSTEOPENIA, UNSPECIFIED LOCATION: ICD-10-CM

## 2024-08-07 DIAGNOSIS — M25.652 DECREASED RANGE OF LEFT HIP MOVEMENT: Primary | ICD-10-CM

## 2024-08-07 LAB
ALBUMIN SERPL BCP-MCNC: 3.8 G/DL (ref 3.5–5.2)
ALP SERPL-CCNC: 32 U/L (ref 55–135)
ALT SERPL W/O P-5'-P-CCNC: 19 U/L (ref 10–44)
ANION GAP SERPL CALC-SCNC: 7 MMOL/L (ref 8–16)
AST SERPL-CCNC: 23 U/L (ref 10–40)
BILIRUB SERPL-MCNC: 0.5 MG/DL (ref 0.1–1)
BUN SERPL-MCNC: 16 MG/DL (ref 8–23)
CALCIUM SERPL-MCNC: 10.4 MG/DL (ref 8.7–10.5)
CHLORIDE SERPL-SCNC: 99 MMOL/L (ref 95–110)
CO2 SERPL-SCNC: 32 MMOL/L (ref 23–29)
CREAT SERPL-MCNC: 1 MG/DL (ref 0.5–1.4)
EST. GFR  (NO RACE VARIABLE): 54.9 ML/MIN/1.73 M^2
GLUCOSE SERPL-MCNC: 85 MG/DL (ref 70–110)
POTASSIUM SERPL-SCNC: 4.5 MMOL/L (ref 3.5–5.1)
PROT SERPL-MCNC: 6.8 G/DL (ref 6–8.4)
SODIUM SERPL-SCNC: 138 MMOL/L (ref 136–145)

## 2024-08-07 PROCEDURE — 36415 COLL VENOUS BLD VENIPUNCTURE: CPT | Mod: PO | Performed by: NURSE PRACTITIONER

## 2024-08-07 PROCEDURE — 97112 NEUROMUSCULAR REEDUCATION: CPT | Mod: PN

## 2024-08-07 PROCEDURE — 80053 COMPREHEN METABOLIC PANEL: CPT | Performed by: NURSE PRACTITIONER

## 2024-08-07 PROCEDURE — 97530 THERAPEUTIC ACTIVITIES: CPT | Mod: PN

## 2024-08-07 PROCEDURE — 97110 THERAPEUTIC EXERCISES: CPT | Mod: PN

## 2024-08-09 ENCOUNTER — TELEPHONE (OUTPATIENT)
Dept: FAMILY MEDICINE | Facility: CLINIC | Age: 89
End: 2024-08-09
Payer: COMMERCIAL

## 2024-08-12 ENCOUNTER — CLINICAL SUPPORT (OUTPATIENT)
Dept: REHABILITATION | Facility: HOSPITAL | Age: 89
End: 2024-08-12
Payer: COMMERCIAL

## 2024-08-12 DIAGNOSIS — R68.89 DECREASED FUNCTIONAL ACTIVITY TOLERANCE: ICD-10-CM

## 2024-08-12 DIAGNOSIS — M25.652 DECREASED RANGE OF LEFT HIP MOVEMENT: Primary | ICD-10-CM

## 2024-08-12 PROCEDURE — 97530 THERAPEUTIC ACTIVITIES: CPT | Mod: PN

## 2024-08-12 PROCEDURE — 97110 THERAPEUTIC EXERCISES: CPT | Mod: PN

## 2024-08-12 PROCEDURE — 97112 NEUROMUSCULAR REEDUCATION: CPT | Mod: PN

## 2024-08-12 NOTE — PROGRESS NOTES
[facility]   Physical Therapy Treatment Note      Name: Marysol Lyles  Clinic Number: [id number]    Therapy Diagnosis:   Encounter Diagnoses   Name Primary?    Decreased range of left hip movement Yes    Decreased functional activity tolerance      Physician: Order, Paper    Visit Date: 8/12/2024    Physician Orders: PT Eval and Treat  Medical Diagnosis from Referral: M25.552 (ICD-10-CM) - Left hip pain   Evaluation Date: 7/22/2024  Authorization Period Expiration: 12/31/24  Plan of Care Expiration: 9/22/2024  Progress Note Due: 8/22/2024  Visit # / Visits authorized: 6 / 20 (1/1 Eval)  FOTO: 2/3 (last performed on 7/22/2024, 8/7/24)     Precautions: Standard and HTN, R DAVID, L TKA     Time In: 1:33 PM  Time Out: 2:26 PM  Total Billable Time (timed & untimed codes): 53 minutes    PTA Visit #: 0/5     Subjective     Patient reports: that her left hip is feeling better. Her right leg continues to be the weaker leg compared to the left. No problems after last session.    She was compliant with home exercise program.  Response to previous treatment: no adverse reactions reported  Functional change: in progress    Pain: 0/10  Location: right ankle/lower leg      Objective      Objective Measures updated at progress report unless specified.     Treatment     Marysol received the treatments listed below:      Marysol received therapeutic exercises to develop strength, endurance, ROM, and flexibility for 20 minutes including:     NuStep x10 min (Level 5)  Supine DKTC x30 reps  Supine LTR's over Stability Ball x30 reps  Supine SKTC x5 reps B  Seated Hamstring Stretch + strap 10x10s B + stool  Standing Calf Stretch 10x10s B + level 2 slant board  Standing Heel Raises 2x10 reps B  Supine Ankle PF x20 reps + RTB - over medium bolster     Marysol participated in neuromuscular re-education activities to improve: Balance, Coordination, Kinesthetic, Sense, and Proprioception for 23 minutes. The following activities  were included:     Seated Hip Adduction 3x10 reps (3s holds)  Seated Hip Abduction 3x10 reps + RTB + TA Activation  Supine Short Arc Quad's over medium bolster 3x10 reps B  Side-Lying Clamshells 3x10 reps B + YTB  Standing Hip Abduction 2x10 reps B + YTB     Plan for Next Visit:        Therapeutic Activities to improve functional performance for 10 minutes, including:    Shuttle Press Double Leg x2 min (2 black cords)  Shuttle Press Single Leg x2 min each leg (1 red cord)  Supine Hip Bridge 3x10 reps + YTB with hip abd iso    *A portion of this treatment session is provided with the assistance of a skilled rehbailitation technician under the supervision of a licensed physical therapist.    Patient Education and Home Exercises       Education provided:   Home exercise program review  Post Exercise Soreness  Maintaining a pain free range of motion with all activities  Anatomy/Physiology of the Hip and the surrounding musculature    Written Home Exercises Provided: Patient instructed to cont prior HEP. Exercises were reviewed and Marysol was able to demonstrate them prior to the end of the session.  Marysol demonstrated good  understanding of the education provided. See Electronic Medical Record under Patient Instructions for exercises provided during therapy sessions.    Assessment     Patient tolerated therapy well overall this date. Emphasis placed on the Bilateral Lower Extremities strengthening without exacerbation of symptoms. Encouraged patient to continue with performance of Home Exercise Program daily.     Marysol Is progressing well towards her goals.   Patient prognosis is Good.     Patient will continue to benefit from skilled outpatient physical therapy to address the deficits listed in the problem list box on initial evaluation, provide pt/family education and to maximize pt's level of independence in the home and community environment.     Patient's spiritual, cultural and educational needs considered and pt  agreeable to plan of care and goals.     Anticipated barriers to physical therapy: none stated    Goals:   Reviewed: 7/22/2024    Short Term Goals: In 4 weeks  Progress Date   1.Patient to be educated on HEP. [x] Progressing  [] MET   [] Not MET   [] Not tested     2.Patient to increase left hip IR range of motion by 5 degrees, in order to improve available range of motion for ADL's.  [x] Progressing  [] MET   [] Not MET  [] Not tested     3.Patient to increase left hip extension strength by 1/2 grade, in order to improve endurance and increase ability to perform all functional activities for increased time.  [x] Progressing  [] MET   [] Not MET  [] Not tested     4.Patient to have pain less than 3/10 at worst, to improve QOL. [x] Progressing  [] MET   [] Not MET  [] Not tested     5.Patient to improve score on the FOTO, to improve QOL. [x] Progressing  [] MET   [] Not MET  [] Not tested        Long Term Goals: In 8 weeks Progress Date   1.Patient to improve score on the FOTO predicted score or better, to improve QOL. [x] Progressing  [] MET   [] Not MET  [] Not tested     2. Patient to increase hip abduction strength to 5/5 or greater, in order to improve endurance and increase ability to perform all functional activities for increased time. [x] Progressing  [] MET   [] Not MET  [] Not tested     3. Patient to have decreased pain to 1/10 at worst, to improve QOL. [x] Progressing  [] MET   [] Not MET  [] Not tested     5. Patient to perform daily activities including walking, bending, and lifting without increased symptoms and with proper mechanics. [x] Progressing  [] MET   [] Not MET  [] Not tested        Plan     Continue with established POC for improved ability to perform ADL's.    Cait Andrews, PT, DPT, Cert. DN

## 2024-08-13 NOTE — PROGRESS NOTES
[facility]   Physical Therapy Treatment Note      Name: Marysol Lyles  Clinic Number: [id number]    Therapy Diagnosis:   Encounter Diagnosis   Name Primary?    Decreased range of left hip movement Yes     Physician: Order, Paper    Visit Date: 8/15/2024    Physician Orders: PT Eval and Treat  Medical Diagnosis from Referral: M25.552 (ICD-10-CM) - Left hip pain   Evaluation Date: 7/22/2024  Authorization Period Expiration: 12/31/24  Plan of Care Expiration: 9/22/2024  Progress Note Due: 8/22/2024  Visit # / Visits authorized: 7 / 20 (1/1 Eval)  FOTO: 2/3 (last performed on 7/22/2024, 8/7/24)     Precautions: Standard and HTN, R DAVID, L TKA     Time In: 1:30 PM  Time Out: 2:26 PM  Total Billable Time (timed & untimed codes): 56 minutes    PTA Visit #: 0/5     Subjective     Patient reports: that she is feeling good today. Noticed her right leg got tired while walking around shopping.    She was compliant with home exercise program.  Response to previous treatment: no adverse reactions reported  Functional change: in progress    Pain: 0/10  Location: right ankle/lower leg      Objective      Objective Measures updated at progress report unless specified.     Treatment     Marysol received the treatments listed below:      Marysol received therapeutic exercises to develop strength, endurance, ROM, and flexibility for 23 minutes including:     NuStep x10 min (Level 6)  Supine DKTC x30 reps  Supine LTR's over Stability Ball x30 reps  Supine SKTC x5 reps B  Seated Hamstring Stretch + strap 10x10s B + stool  Standing Calf Stretch 10x10s B + level 3 slant board  Standing Heel Raises 2x10 reps B  Supine Ankle PF x20 reps + RTB - over medium bolster     Marysol participated in neuromuscular re-education activities to improve: Balance, Coordination, Kinesthetic, Sense, and Proprioception for 23 minutes. The following activities were included:     Seated Hip Adduction 3x10 reps (3s holds)  Seated Hip Abduction 3x10  reps + RTB + TA Activation  Supine Short Arc Quad's over medium bolster 3x10 reps B  Side-Lying Clamshells 3x10 reps B + YTB  Standing Hip Abduction 2x10 reps B + YTB     Plan for Next Visit:        Therapeutic Activities to improve functional performance for 10 minutes, including:    Shuttle Press Double Leg x2 min (2 black cords)  Shuttle Press Single Leg x2 min each leg (1 red cord)  Supine Hip Bridge 3x10 reps + YTB with hip abd iso    *A portion of this treatment session is provided with the assistance of a skilled rehbailitation technician under the supervision of a licensed physical therapist.    Patient Education and Home Exercises       Education provided:   Home exercise program review  Post Exercise Soreness  Maintaining a pain free range of motion with all activities  Anatomy/Physiology of the Hip and the surrounding musculature    Written Home Exercises Provided: Patient instructed to cont prior HEP. Exercises were reviewed and Marysol was able to demonstrate them prior to the end of the session.  Marysol demonstrated good  understanding of the education provided. See Electronic Medical Record under Patient Instructions for exercises provided during therapy sessions.    Assessment     Patient progressed well through all prescribed exercises. Continuing to work on LE strengthening, balance, and flexibility without exacerbation of symptoms.    Marysol Is progressing well towards her goals.   Patient prognosis is Good.     Patient will continue to benefit from skilled outpatient physical therapy to address the deficits listed in the problem list box on initial evaluation, provide pt/family education and to maximize pt's level of independence in the home and community environment.     Patient's spiritual, cultural and educational needs considered and pt agreeable to plan of care and goals.     Anticipated barriers to physical therapy: none stated    Goals:   Reviewed: 7/22/2024    Short Term Goals: In 4 weeks   Progress Date   1.Patient to be educated on HEP. [x] Progressing  [] MET   [] Not MET   [] Not tested     2.Patient to increase left hip IR range of motion by 5 degrees, in order to improve available range of motion for ADL's.  [x] Progressing  [] MET   [] Not MET  [] Not tested     3.Patient to increase left hip extension strength by 1/2 grade, in order to improve endurance and increase ability to perform all functional activities for increased time.  [x] Progressing  [] MET   [] Not MET  [] Not tested     4.Patient to have pain less than 3/10 at worst, to improve QOL. [x] Progressing  [] MET   [] Not MET  [] Not tested     5.Patient to improve score on the FOTO, to improve QOL. [x] Progressing  [] MET   [] Not MET  [] Not tested        Long Term Goals: In 8 weeks Progress Date   1.Patient to improve score on the FOTO predicted score or better, to improve QOL. [x] Progressing  [] MET   [] Not MET  [] Not tested     2. Patient to increase hip abduction strength to 5/5 or greater, in order to improve endurance and increase ability to perform all functional activities for increased time. [x] Progressing  [] MET   [] Not MET  [] Not tested     3. Patient to have decreased pain to 1/10 at worst, to improve QOL. [x] Progressing  [] MET   [] Not MET  [] Not tested     5. Patient to perform daily activities including walking, bending, and lifting without increased symptoms and with proper mechanics. [x] Progressing  [] MET   [] Not MET  [] Not tested        Plan     Continue with established POC for improved ability to perform ADL's.    Cait Andrews, PT, DPT, Cert. DN

## 2024-08-14 ENCOUNTER — INFUSION (OUTPATIENT)
Dept: INFUSION THERAPY | Facility: HOSPITAL | Age: 89
End: 2024-08-14
Attending: INTERNAL MEDICINE
Payer: COMMERCIAL

## 2024-08-14 VITALS
RESPIRATION RATE: 18 BRPM | TEMPERATURE: 98 F | DIASTOLIC BLOOD PRESSURE: 66 MMHG | HEART RATE: 81 BPM | SYSTOLIC BLOOD PRESSURE: 127 MMHG

## 2024-08-14 DIAGNOSIS — M85.80 OSTEOPENIA, UNSPECIFIED LOCATION: Primary | ICD-10-CM

## 2024-08-14 PROCEDURE — 63600175 PHARM REV CODE 636 W HCPCS: Mod: JZ,JG,PN | Performed by: NURSE PRACTITIONER

## 2024-08-14 PROCEDURE — 96372 THER/PROPH/DIAG INJ SC/IM: CPT | Mod: PN

## 2024-08-14 RX ADMIN — DENOSUMAB 60 MG: 60 INJECTION SUBCUTANEOUS at 10:08

## 2024-08-15 ENCOUNTER — CLINICAL SUPPORT (OUTPATIENT)
Dept: REHABILITATION | Facility: HOSPITAL | Age: 89
End: 2024-08-15
Payer: COMMERCIAL

## 2024-08-15 DIAGNOSIS — M25.652 DECREASED RANGE OF LEFT HIP MOVEMENT: Primary | ICD-10-CM

## 2024-08-15 PROCEDURE — 97112 NEUROMUSCULAR REEDUCATION: CPT | Mod: PN

## 2024-08-15 PROCEDURE — 97530 THERAPEUTIC ACTIVITIES: CPT | Mod: PN

## 2024-08-15 PROCEDURE — 97110 THERAPEUTIC EXERCISES: CPT | Mod: PN

## 2024-08-16 NOTE — PROGRESS NOTES
[facility]   Physical Therapy Treatment Note      Name: Marysol Lyles  Clinic Number: [id number]    Therapy Diagnosis:   Encounter Diagnosis   Name Primary?    Decreased range of left hip movement Yes     Physician: Order, Paper    Visit Date: 8/19/2024    Physician Orders: PT Eval and Treat  Medical Diagnosis from Referral: M25.552 (ICD-10-CM) - Left hip pain   Evaluation Date: 7/22/2024  Authorization Period Expiration: 12/31/24  Plan of Care Expiration: 9/22/2024  Progress Note Due: 8/22/2024  Visit # / Visits authorized: 8 / 20 (1/1 Eval)  FOTO: 2/3 (last performed on 7/22/2024, 8/7/24)     Precautions: Standard and HTN, R DAVID, L TKA     Time In: 1:30 PM  Time Out: 2:26 PM  Total Billable Time (timed & untimed codes): 56 minutes    PTA Visit #: 0/5     Subjective     Patient reports: that she feels like her left leg is much better. Her right leg is giving her more trouble lately.    She was compliant with home exercise program.  Response to previous treatment: no adverse reactions reported  Functional change: in progress    Pain: 0/10  Location: right ankle/lower leg      Objective      Objective Measures updated at progress report unless specified.     Treatment     Marysol received the treatments listed below:      Marysol received therapeutic exercises to develop strength, endurance, ROM, and flexibility for 23 minutes including:     NuStep x8 min (Level 6)  Supine DKTC x30 reps  Supine LTR's over Stability Ball x30 reps  Seated Hamstring Stretch + strap 10x10s B + stool  Standing Calf Stretch 10x10s B + level 3 slant board  Standing Heel Raises 2x10 reps B  Supine Ankle PF x20 reps + RTB - over medium bolster     Marysol participated in neuromuscular re-education activities to improve: Balance, Coordination, Kinesthetic, Sense, and Proprioception for 23 minutes. The following activities were included:     Seated Hip Adduction 3x10 reps (3s holds)  Seated Hip Abduction 3x10 reps + RTB + TA  Activation  Supine Straight Leg Raise x10 reps B   Supine Short Arc Quad's over medium bolster 3x10 reps B  Side-Lying Clamshells 3x10 reps B + YTB  Standing Hip Abduction 2x10 reps B + YTB     Plan for Next Visit:        Therapeutic Activities to improve functional performance for 10 minutes, including:    Shuttle Press Double Leg x2 min (1 black cord, 1 red cord)  Shuttle Press Single Leg x2 min each leg (1 red cord)  Supine Hip Bridge 3x10 reps + YTB with hip abd iso    *A portion of this treatment session is provided with the assistance of a skilled rehbailitation technician under the supervision of a licensed physical therapist.    Patient Education and Home Exercises       Education provided:   Home exercise program review  Post Exercise Soreness  Maintaining a pain free range of motion with all activities  Anatomy/Physiology of the Hip and the surrounding musculature    Written Home Exercises Provided: Patient instructed to cont prior HEP. Exercises were reviewed and Marysol was able to demonstrate them prior to the end of the session.  Marysol demonstrated good  understanding of the education provided. See Electronic Medical Record under Patient Instructions for exercises provided during therapy sessions.    Assessment     Patient with greater tolerance to straight leg raises this date. Right knee continues to cause some discomfort with the shuttle press so weight was decreased. Will continue to focus on LE strengthening without exacerbation of pain/symptoms.     Marysol Is progressing well towards her goals.   Patient prognosis is Good.     Patient will continue to benefit from skilled outpatient physical therapy to address the deficits listed in the problem list box on initial evaluation, provide pt/family education and to maximize pt's level of independence in the home and community environment.     Patient's spiritual, cultural and educational needs considered and pt agreeable to plan of care and goals.      Anticipated barriers to physical therapy: none stated    Goals:   Reviewed: 7/22/2024    Short Term Goals: In 4 weeks  Progress Date   1.Patient to be educated on HEP. [x] Progressing  [] MET   [] Not MET   [] Not tested     2.Patient to increase left hip IR range of motion by 5 degrees, in order to improve available range of motion for ADL's.  [x] Progressing  [] MET   [] Not MET  [] Not tested     3.Patient to increase left hip extension strength by 1/2 grade, in order to improve endurance and increase ability to perform all functional activities for increased time.  [x] Progressing  [] MET   [] Not MET  [] Not tested     4.Patient to have pain less than 3/10 at worst, to improve QOL. [x] Progressing  [] MET   [] Not MET  [] Not tested     5.Patient to improve score on the FOTO, to improve QOL. [x] Progressing  [] MET   [] Not MET  [] Not tested        Long Term Goals: In 8 weeks Progress Date   1.Patient to improve score on the FOTO predicted score or better, to improve QOL. [x] Progressing  [] MET   [] Not MET  [] Not tested     2. Patient to increase hip abduction strength to 5/5 or greater, in order to improve endurance and increase ability to perform all functional activities for increased time. [x] Progressing  [] MET   [] Not MET  [] Not tested     3. Patient to have decreased pain to 1/10 at worst, to improve QOL. [x] Progressing  [] MET   [] Not MET  [] Not tested     5. Patient to perform daily activities including walking, bending, and lifting without increased symptoms and with proper mechanics. [x] Progressing  [] MET   [] Not MET  [] Not tested        Plan     Continue with established POC for improved ability to perform ADL's.    Cait Andrews, PT, DPT, Cert. DN

## 2024-08-19 ENCOUNTER — CLINICAL SUPPORT (OUTPATIENT)
Dept: REHABILITATION | Facility: HOSPITAL | Age: 89
End: 2024-08-19
Payer: COMMERCIAL

## 2024-08-19 DIAGNOSIS — M25.652 DECREASED RANGE OF LEFT HIP MOVEMENT: Primary | ICD-10-CM

## 2024-08-19 PROCEDURE — 97112 NEUROMUSCULAR REEDUCATION: CPT | Mod: PN

## 2024-08-19 PROCEDURE — 97110 THERAPEUTIC EXERCISES: CPT | Mod: PN

## 2024-08-19 PROCEDURE — 97530 THERAPEUTIC ACTIVITIES: CPT | Mod: PN

## 2024-08-21 NOTE — PROGRESS NOTES
[facility]   Physical Therapy Treatment Note      Name: Marysol Lyles  Clinic Number: [id number]    Therapy Diagnosis:   Encounter Diagnosis   Name Primary?    Decreased range of left hip movement Yes     Physician: Order, Paper    Visit Date: 8/23/2024    Physician Orders: PT Eval and Treat  Medical Diagnosis from Referral: M25.552 (ICD-10-CM) - Left hip pain   Evaluation Date: 7/22/2024  Authorization Period Expiration: 12/31/24  Plan of Care Expiration: 9/22/2024  Progress Note Due: 8/22/2024  Visit # / Visits authorized: 9 / 20 (1/1 Eval)  FOTO: 2/3 (last performed on 7/22/2024, 8/7/24)     Precautions: Standard and HTN, R DAVID, L TKA     Time In: 11:05 AM  Time Out: 12:00 PM  Total Billable Time (timed & untimed codes): 55 minutes    PTA Visit #: 0/5     Subjective     Patient reports: that she is feeling pretty good this morning. No problems after last session.    She was compliant with home exercise program.  Response to previous treatment: no adverse reactions reported  Functional change: in progress    Pain: 0/10  Location: right ankle/lower leg      Objective      Objective Measures updated at progress report unless specified.     Treatment     Marysol received the treatments listed below:      Marysol received therapeutic exercises to develop strength, endurance, ROM, and flexibility for 23 minutes including:     NuStep x10 min (Level 6, seat 10)  Supine DKTC x30 reps  Supine LTR's over Stability Ball x30 reps  Seated Hamstring Stretch + strap 10x10s B + stool  Standing Calf Stretch 10x10s B + level 3 slant board  Standing Heel Raises 2x10 reps B  Supine Ankle PF x20 reps + RTB - over medium bolster     Marysol participated in neuromuscular re-education activities to improve: Balance, Coordination, Kinesthetic, Sense, and Proprioception for 23 minutes. The following activities were included:     Seated Hip Adduction 3x10 reps (3s holds)  Seated Hip Abduction 3x10 reps + RTB + TA  Activation  Supine Straight Leg Raise x10 reps B   Supine Short Arc Quad's over medium bolster 3x10 reps B  Side-Lying Clamshells 3x10 reps B + YTB  Standing Hip Abduction 2x10 reps B + YTB     Plan for Next Visit:        Therapeutic Activities to improve functional performance for 9 minutes, including:    Shuttle Press Double Leg x2 min (1 black cord, 1 red cord)  Shuttle Press Single Leg x2 min each leg (1 red cord)  Supine Hip Bridge 3x10 reps + YTB with hip abd iso    *A portion of this treatment session is provided with the assistance of a skilled rehbailitation technician under the supervision of a licensed physical therapist.    Patient Education and Home Exercises       Education provided:   Home exercise program review  Post Exercise Soreness  Maintaining a pain free range of motion with all activities  Anatomy/Physiology of the Hip and the surrounding musculature    Written Home Exercises Provided: Patient instructed to cont prior HEP. Exercises were reviewed and Marysol was able to demonstrate them prior to the end of the session.  Marysol demonstrated good  understanding of the education provided. See Electronic Medical Record under Patient Instructions for exercises provided during therapy sessions.    Assessment     Patient tolerated therapy well overall today. Emphasis on global LE strengthening without exacerbation of pain/symptoms. Verbal cues for proper core activation and engagement with all activities. Emphasis also placed on proper quad activation especially with Straight Leg Raise's.    Marysol Is progressing well towards her goals.   Patient prognosis is Good.     Patient will continue to benefit from skilled outpatient physical therapy to address the deficits listed in the problem list box on initial evaluation, provide pt/family education and to maximize pt's level of independence in the home and community environment.     Patient's spiritual, cultural and educational needs considered and pt  agreeable to plan of care and goals.     Anticipated barriers to physical therapy: none stated    Goals:   Reviewed: 7/22/2024    Short Term Goals: In 4 weeks  Progress Date   1.Patient to be educated on HEP. [x] Progressing  [] MET   [] Not MET   [] Not tested     2.Patient to increase left hip IR range of motion by 5 degrees, in order to improve available range of motion for ADL's.  [x] Progressing  [] MET   [] Not MET  [] Not tested     3.Patient to increase left hip extension strength by 1/2 grade, in order to improve endurance and increase ability to perform all functional activities for increased time.  [x] Progressing  [] MET   [] Not MET  [] Not tested     4.Patient to have pain less than 3/10 at worst, to improve QOL. [x] Progressing  [] MET   [] Not MET  [] Not tested     5.Patient to improve score on the FOTO, to improve QOL. [x] Progressing  [] MET   [] Not MET  [] Not tested        Long Term Goals: In 8 weeks Progress Date   1.Patient to improve score on the FOTO predicted score or better, to improve QOL. [x] Progressing  [] MET   [] Not MET  [] Not tested     2. Patient to increase hip abduction strength to 5/5 or greater, in order to improve endurance and increase ability to perform all functional activities for increased time. [x] Progressing  [] MET   [] Not MET  [] Not tested     3. Patient to have decreased pain to 1/10 at worst, to improve QOL. [x] Progressing  [] MET   [] Not MET  [] Not tested     5. Patient to perform daily activities including walking, bending, and lifting without increased symptoms and with proper mechanics. [x] Progressing  [] MET   [] Not MET  [] Not tested        Plan     Continue with established POC for improved ability to perform ADL's.    Cait Andrews, PT, DPT, Cert. DN

## 2024-08-23 ENCOUNTER — CLINICAL SUPPORT (OUTPATIENT)
Dept: REHABILITATION | Facility: HOSPITAL | Age: 89
End: 2024-08-23
Payer: COMMERCIAL

## 2024-08-23 DIAGNOSIS — M25.652 DECREASED RANGE OF LEFT HIP MOVEMENT: Primary | ICD-10-CM

## 2024-08-23 PROCEDURE — 97110 THERAPEUTIC EXERCISES: CPT | Mod: PN

## 2024-08-23 PROCEDURE — 97112 NEUROMUSCULAR REEDUCATION: CPT | Mod: PN

## 2024-08-23 PROCEDURE — 97530 THERAPEUTIC ACTIVITIES: CPT | Mod: PN

## 2024-08-23 NOTE — PROGRESS NOTES
[facility]   Physical Therapy Treatment Note & Re-assessment     Name: Marysol Lyles  Clinic Number: [id number]    Therapy Diagnosis:   Encounter Diagnosis   Name Primary?    Decreased range of left hip movement Yes     Physician: Order, Paper    Visit Date: 8/26/2024    Physician Orders: PT Eval and Treat  Medical Diagnosis from Referral: M25.552 (ICD-10-CM) - Left hip pain   Evaluation Date: 7/22/2024  Authorization Period Expiration: 12/31/24  Plan of Care Expiration: 9/22/2024  Progress Note Due: 9/26/2024  Visit # / Visits authorized: 10 / 20 (1/1 Eval) (re-assessed on 8/26/24)  FOTO: 3/3 (last performed on 7/22/2024, 8/7/24, 8/26/24)     Precautions: Standard and HTN, R DAVID, L TKA     Time In: 1:30 PM  Time Out: 2:30 PM  Total Billable Time (timed & untimed codes): 60 minutes    PTA Visit #: 0/5     Subjective     Patient reports: that she is feeling good today. Therapy has been very helpful up to this point. Feels like she is able to move easier and her left hip pain has resolved completely. Would like to continue working on getting stronger in the legs.    She was compliant with home exercise program.  Response to previous treatment: no adverse reactions reported  Functional change: in progress    Pain: 0/10  Location: right ankle/lower leg      Objective      Objective Measures updated at progress report unless specified.     Posture:  Patient presents with postural abnormalities which include:               [x] Forward Head                                [] Increased Lumbar Lordosis              [x] Rounded Shoulder                         [] Flat Back Posture              [x] Increased Thoracic Kyphosis        [] Pes Planus              [] Increased Trunk Sway                   [] Valgus knee position              [] Increased Trunk Rotation              [] Varus knee position              [] Increased cervical lordosis            [] Other:     Sensation:    Sensation to light touch  over LE's is  [x] Intact [] Impaired [] Defer     Reflexes:  Patellar                        [x] Defer [] Normal [] Hyper []  Hypo   Achilles                        [x] Defer [] Normal [] Hyper []  Hypo     Gait Analysis: The patient ambulated with the following assistive device: straight cane; the patient presents with the following gait abnormalities: occasional unsteady gait      Functional Tests  Outcome Norms   30 second Sit to Stand 13 Age Male Female   60-64 <14 <12   65-69 <12 <11   70-74 <12 <10   75-79 <11 <10   80-84 <10 <9   85-89 <8 <8   90-93 <7 <4         Range of Motion:  Hip AROM/PROM Right Left Pain/Dysfunction with Movement   Hip Flexion (120º) 120 120     Hip Extension (30º) 30 30     Hip Abduction (45º) 45 45     Hip IR (45º) 40 40     Hip ER (45º) 45 45        Strength:  L/E MMT Right  (spine) Left Pain/Dysfunction with Movement   Hip Flexion  5/5 5/5     Hip Extension  5/5 5/5     Hip Abduction  4+/5 5/5     Knee Extension 4+/5 5/5     Knee Flexion 4+/5 5/5     Hip IR 4+/5 4+/5     Hip ER 4+/5 4+/5     Ankle DF 5/5 5/5     Ankle PF 5/5 5/5        Joint Mobility: mild tightness noted when moving into B hip IR     Palpation: no TTP reported     Treatment     Marysol received the treatments listed below:      Marysol received therapeutic exercises to develop strength, endurance, ROM, and flexibility for 25 minutes including:     Re-assessment  Supine DKTC x30 reps  Supine LTR's over Stability Ball x30 reps  Seated Hamstring Stretch + strap 10x10s B + stool  Standing Calf Stretch 10x10s B + level 3 slant board  Standing Heel Raises 2x10 reps B  Supine Ankle PF x20 reps + RTB - over medium bolster     Maryslo participated in neuromuscular re-education activities to improve: Balance, Coordination, Kinesthetic, Sense, and Proprioception for 25 minutes. The following activities were included:     Seated Hip Adduction 3x10 reps (3s holds)  Seated Hip Abduction 3x10 reps + RTB + TA Activation  Supine Straight  Leg Raise x10 reps B   Supine Short Arc Quad's over medium bolster 3x10 reps B  Side-Lying Clamshells 3x10 reps B + YTB  Standing Hip Abduction 2x10 reps B + YTB     Plan for Next Visit:        Therapeutic Activities to improve functional performance for 10 minutes, including:    Shuttle Press Double Leg x2 min (1 black cord, 1 red cord)  Shuttle Press Single Leg x2 min each leg (1 red cord)  Supine Hip Bridge 3x10 reps + YTB with hip abd iso    *A portion of this treatment session is provided with the assistance of a skilled rehbailitation technician under the supervision of a licensed physical therapist.    Patient Education and Home Exercises       Education provided:   Home exercise program review  Post Exercise Soreness  Maintaining a pain free range of motion with all activities  Anatomy/Physiology of the Hip and the surrounding musculature    Written Home Exercises Provided: Patient instructed to cont prior HEP. Exercises were reviewed and Marysol was able to demonstrate them prior to the end of the session.  Marysol demonstrated good  understanding of the education provided. See Electronic Medical Record under Patient Instructions for exercises provided during therapy sessions.    Assessment     Range of motion at the left hip has improved and is now equal to the contralateral side. Functional activity performance has also improved as well as LE strength, however, the RLE remains slightly weaker compared to the left. Treatment will continue to focus on LE strengthening and stability moving forward. Overall, the patient will continue to benefit from skilled outpatient PT services to further address strength and functional deficits.     Marysol Is progressing well towards her goals.   Patient prognosis is Good.     Patient will continue to benefit from skilled outpatient physical therapy to address the deficits listed in the problem list box on initial evaluation, provide pt/family education and to maximize pt's level  of independence in the home and community environment.     Patient's spiritual, cultural and educational needs considered and pt agreeable to plan of care and goals.     Anticipated barriers to physical therapy: none stated    Goals:   Reviewed: 7/22/2024    Short Term Goals: In 4 weeks  Progress Date   1.Patient to be educated on HEP. [] Progressing  [x] MET   [] Not MET   [] Not tested  8/26/24   2.Patient to increase left hip IR range of motion by 5 degrees, in order to improve available range of motion for ADL's.  [] Progressing  [x] MET   [] Not MET  [] Not tested  8/26/24   3.Patient to increase left hip extension strength by 1/2 grade, in order to improve endurance and increase ability to perform all functional activities for increased time.  [] Progressing  [x] MET   [] Not MET  [] Not tested  8/26/24   4.Patient to have pain less than 3/10 at worst, to improve QOL. [] Progressing  [x] MET   [] Not MET  [] Not tested  8/26/24   5.Patient to improve score on the FOTO, to improve QOL. [] Progressing  [x] MET   [] Not MET  [] Not tested  8/26/24      Long Term Goals: In 8 weeks Progress Date   1.Patient to improve score on the FOTO predicted score or better, to improve QOL. [x] Progressing  [] MET   [] Not MET  [] Not tested     2. Patient to increase hip abduction strength to 5/5 or greater, in order to improve endurance and increase ability to perform all functional activities for increased time. [x] Progressing  [] MET   [] Not MET  [] Not tested     3. Patient to have decreased pain to 1/10 at worst, to improve QOL. [x] Progressing  [] MET   [] Not MET  [] Not tested     5. Patient to perform daily activities including walking, bending, and lifting without increased symptoms and with proper mechanics. [x] Progressing  [] MET   [] Not MET  [] Not tested        Plan     Continue with established POC for improved ability to perform ADL's.    Cait Andrews, PT, DPT, Cert. DN

## 2024-08-26 ENCOUNTER — CLINICAL SUPPORT (OUTPATIENT)
Dept: REHABILITATION | Facility: HOSPITAL | Age: 89
End: 2024-08-26
Payer: COMMERCIAL

## 2024-08-26 DIAGNOSIS — M25.652 DECREASED RANGE OF LEFT HIP MOVEMENT: Primary | ICD-10-CM

## 2024-08-26 PROCEDURE — 97530 THERAPEUTIC ACTIVITIES: CPT | Mod: PN

## 2024-08-26 PROCEDURE — 97112 NEUROMUSCULAR REEDUCATION: CPT | Mod: PN

## 2024-08-26 PROCEDURE — 97110 THERAPEUTIC EXERCISES: CPT | Mod: PN

## 2024-08-28 LAB
CHOL/HDLC RATIO: 2.6 (ref ?–5)
CHOLEST SERPL-MSCNC: 198 MG/DL (ref 100–200)
HDLC SERPL-MCNC: 75 MG/DL (ref 40–?)
LDLC SERPL CALC-MCNC: 100 MG/DL (ref 0–129)
TRIGL SERPL-MCNC: 116 MG/DL (ref ?–149)
VLDLC SERPL-MCNC: 23 MG/DL (ref 5–40)

## 2024-09-03 NOTE — PROGRESS NOTES
[facility]   Physical Therapy Treatment Note & Re-assessment     Name: Marysol Lyles  Clinic Number: [id number]    Therapy Diagnosis:   Encounter Diagnosis   Name Primary?    Decreased range of left hip movement Yes     Physician: Order, Paper    Visit Date: 9/4/2024    Physician Orders: PT Eval and Treat  Medical Diagnosis from Referral: M25.552 (ICD-10-CM) - Left hip pain   Evaluation Date: 7/22/2024  Authorization Period Expiration: 12/31/24  Plan of Care Expiration: 9/22/2024  Progress Note Due: 9/26/2024  Visit # / Visits authorized: 11 / 20 (1/1 Eval) (re-assessed on 8/26/24)  FOTO: 3/3 (last performed on 7/22/2024, 8/7/24, 8/26/24)     Precautions: Standard and HTN, R DAVID, L TKA     Time In: 1:05 PM  Time Out: 2:00 PM  Total Billable Time (timed & untimed codes): 55 minutes    PTA Visit #: 0/5     Subjective     Patient reports: that she is doing pretty good this afternoon. Not really having pain. Patient was able to go see her  without any issues walking.    She was compliant with home exercise program.  Response to previous treatment: no adverse reactions reported  Functional change: in progress    Pain: 0/10  Location: right ankle/lower leg      Objective      Objective Measures updated at progress report unless specified.      Treatment     Marysol received the treatments listed below:      Marysol received therapeutic exercises to develop strength, endurance, ROM, and flexibility for 20 minutes including:    Recumbent Bike x10 min (Level 3)  Supine DKTC x30 reps  Supine LTR's over Stability Ball x30 reps  Seated Hamstring Stretch + strap 10x10s B + stool  Standing Calf Stretch 10x10s B + level 3 slant board  Standing Heel Raises 3x10 reps B  Supine Ankle PF x20 reps + RTB - over medium bolster     Marysol participated in neuromuscular re-education activities to improve: Balance, Coordination, Kinesthetic, Sense, and Proprioception for 25 minutes. The following activities were  included:     Seated Hip Adduction 3x10 reps (3s holds)  Seated Hip Abduction 3x10 reps + RTB + TA Activation  Supine Straight Leg Raise 2x10 reps B   Supine Short Arc Quad's over medium bolster 3x10 reps B + #2 ankle weights   Side-Lying Clamshells 2x10 reps B + RTB  Standing Hip Abduction 3x10 reps B + RTB     Plan for Next Visit:        Therapeutic Activities to improve functional performance for 10 minutes, including:    Shuttle Press Double Leg x2 min (1 black cord, 1 red cord)  Shuttle Press Single Leg x2 min each leg (body weight only) (red cord with LLE)  Supine Hip Bridge 3x10 reps + RTB with hip abd iso    *A portion of this treatment session is provided with the assistance of a skilled rehbailitation technician under the supervision of a licensed physical therapist.    Patient Education and Home Exercises       Education provided:   Home exercise program review  Post Exercise Soreness  Maintaining a pain free range of motion with all activities  Anatomy/Physiology of the Hip and the surrounding musculature    Written Home Exercises Provided: Patient instructed to cont prior HEP. Exercises were reviewed and Marysol was able to demonstrate them prior to the end of the session.  Marysol demonstrated good  understanding of the education provided. See Electronic Medical Record under Patient Instructions for exercises provided during therapy sessions.    Assessment     Emphasis placed on proper quad activation with Straight Leg Raise's. Able to progress reps and or intensity of some exercises without a complication. RLE functional strength is improving gradually.    Marysol Is progressing well towards her goals.   Patient prognosis is Good.     Patient will continue to benefit from skilled outpatient physical therapy to address the deficits listed in the problem list box on initial evaluation, provide pt/family education and to maximize pt's level of independence in the home and community environment.     Patient's  spiritual, cultural and educational needs considered and pt agreeable to plan of care and goals.     Anticipated barriers to physical therapy: none stated    Goals:   Reviewed: 7/22/2024    Short Term Goals: In 4 weeks  Progress Date   1.Patient to be educated on HEP. [] Progressing  [x] MET   [] Not MET   [] Not tested  8/26/24   2.Patient to increase left hip IR range of motion by 5 degrees, in order to improve available range of motion for ADL's.  [] Progressing  [x] MET   [] Not MET  [] Not tested  8/26/24   3.Patient to increase left hip extension strength by 1/2 grade, in order to improve endurance and increase ability to perform all functional activities for increased time.  [] Progressing  [x] MET   [] Not MET  [] Not tested  8/26/24   4.Patient to have pain less than 3/10 at worst, to improve QOL. [] Progressing  [x] MET   [] Not MET  [] Not tested  8/26/24   5.Patient to improve score on the FOTO, to improve QOL. [] Progressing  [x] MET   [] Not MET  [] Not tested  8/26/24      Long Term Goals: In 8 weeks Progress Date   1.Patient to improve score on the FOTO predicted score or better, to improve QOL. [x] Progressing  [] MET   [] Not MET  [] Not tested     2. Patient to increase hip abduction strength to 5/5 or greater, in order to improve endurance and increase ability to perform all functional activities for increased time. [x] Progressing  [] MET   [] Not MET  [] Not tested     3. Patient to have decreased pain to 1/10 at worst, to improve QOL. [x] Progressing  [] MET   [] Not MET  [] Not tested     5. Patient to perform daily activities including walking, bending, and lifting without increased symptoms and with proper mechanics. [x] Progressing  [] MET   [] Not MET  [] Not tested        Plan     Continue with established POC for improved ability to perform ADL's.    Cait Andrews, PT, DPT, Cert. DN

## 2024-09-04 ENCOUNTER — CLINICAL SUPPORT (OUTPATIENT)
Dept: REHABILITATION | Facility: HOSPITAL | Age: 89
End: 2024-09-04
Payer: COMMERCIAL

## 2024-09-04 DIAGNOSIS — M25.652 DECREASED RANGE OF LEFT HIP MOVEMENT: Primary | ICD-10-CM

## 2024-09-04 PROCEDURE — 97110 THERAPEUTIC EXERCISES: CPT | Mod: PN

## 2024-09-04 PROCEDURE — 97530 THERAPEUTIC ACTIVITIES: CPT | Mod: PN

## 2024-09-04 PROCEDURE — 97112 NEUROMUSCULAR REEDUCATION: CPT | Mod: PN

## 2024-09-09 RX ORDER — METHENAMINE, SODIUM PHOSPHATE, MONOBASIC, MONOHYDRATE, PHENYL SALICYLATE, METHYLENE BLUE, AND HYOSCYAMINE SULFATE 118; 40.8; 36; 10; .12 MG/1; MG/1; MG/1; MG/1; MG/1
1 CAPSULE ORAL
Qty: 100 CAPSULE | Refills: 11 | Status: SHIPPED | OUTPATIENT
Start: 2024-09-09 | End: 2024-09-12 | Stop reason: SDUPTHER

## 2024-09-10 ENCOUNTER — PATIENT OUTREACH (OUTPATIENT)
Dept: ADMINISTRATIVE | Facility: HOSPITAL | Age: 89
End: 2024-09-10
Payer: COMMERCIAL

## 2024-09-11 ENCOUNTER — PATIENT MESSAGE (OUTPATIENT)
Dept: FAMILY MEDICINE | Facility: CLINIC | Age: 89
End: 2024-09-11
Payer: COMMERCIAL

## 2024-09-12 ENCOUNTER — PATIENT MESSAGE (OUTPATIENT)
Dept: FAMILY MEDICINE | Facility: CLINIC | Age: 89
End: 2024-09-12
Payer: COMMERCIAL

## 2024-09-12 RX ORDER — PHENAZOPYRIDINE HYDROCHLORIDE 100 MG/1
TABLET, FILM COATED ORAL
Qty: 6 TABLET | Refills: 11 | Status: SHIPPED | OUTPATIENT
Start: 2024-09-12

## 2024-09-12 RX ORDER — METHENAMINE, SODIUM PHOSPHATE, MONOBASIC, MONOHYDRATE, PHENYL SALICYLATE, METHYLENE BLUE, AND HYOSCYAMINE SULFATE 118; 40.8; 36; 10; .12 MG/1; MG/1; MG/1; MG/1; MG/1
1 CAPSULE ORAL
Qty: 100 CAPSULE | Refills: 11 | Status: SHIPPED | OUTPATIENT
Start: 2024-09-12 | End: 2024-09-12

## 2024-09-12 NOTE — PROGRESS NOTES
[facility]   Physical Therapy Treatment Note      Name: Marysol Lyles  Clinic Number: [id number]    Therapy Diagnosis:   Encounter Diagnosis   Name Primary?    Decreased range of left hip movement Yes     Physician: Order, Paper    Visit Date: 9/16/2024    Physician Orders: PT Eval and Treat  Medical Diagnosis from Referral: M25.552 (ICD-10-CM) - Left hip pain   Evaluation Date: 7/22/2024  Authorization Period Expiration: 12/31/24  Plan of Care Expiration: 9/22/2024  Progress Note Due: 9/26/2024  Visit # / Visits authorized: 12 / 20 (1/1 Eval) (re-assessed on 8/26/24)  FOTO: 3/3 (last performed on 7/22/2024, 8/7/24, 8/26/24)     Precautions: Standard and HTN, R DAVID, L TKA     Time In: 1:30 PM  Time Out: 2:25 PM  Total Billable Time (timed & untimed codes): 55 minutes    PTA Visit #: 0/5     Subjective     Patient reports: that she is doing pretty good. Still feels like her right leg is slightly weaker compared to the left leg but she can tell it is getting stronger.    She was compliant with home exercise program.  Response to previous treatment: no adverse reactions reported  Functional change: in progress    Pain: 0/10  Location: right ankle/lower leg      Objective      Objective Measures updated at progress report unless specified.      Treatment     Marysol received the treatments listed below:      Marysol received therapeutic exercises to develop strength, endurance, ROM, and flexibility for 20 minutes including:    Recumbent Bike x10 min (Level 3)  Supine DKTC x30 reps  Supine LTR's over Stability Ball x30 reps  Seated Hamstring Stretch + strap 10x10s B + stool  Standing Calf Stretch 10x10s B + level 3 slant board  Standing Heel Raises 3x10 reps B  Supine Ankle PF x20 reps + RTB - over medium bolster     Marysol participated in neuromuscular re-education activities to improve: Balance, Coordination, Kinesthetic, Sense, and Proprioception for 25 minutes. The following activities were included:      Seated Hip Adduction 3x10 reps (3s holds)  Seated Hip Abduction 3x10 reps + RTB + TA Activation  Supine Straight Leg Raise 2x10 reps B   Supine Short Arc Quad's over medium bolster 3x10 reps B + #2 ankle weights   Side-Lying Clamshells 2x10 reps B + RTB  Standing Hip Abduction 3x10 reps B + RTB     Plan for Next Visit:        Therapeutic Activities to improve functional performance for 10 minutes, including:    Shuttle Press Double Leg x2 min (1 black cord, 1 red cord)  Shuttle Press Single Leg x2 min each leg (body weight only) (red cord with LLE)  Supine Hip Bridge 3x10 reps + RTB with hip abd iso    *A portion of this treatment session is provided with the assistance of a skilled rehbailitation technician under the supervision of a licensed physical therapist.    Patient Education and Home Exercises       Education provided:   Home exercise program review  Post Exercise Soreness  Maintaining a pain free range of motion with all activities  Anatomy/Physiology of the Hip and the surrounding musculature    Written Home Exercises Provided: Patient instructed to cont prior HEP. Exercises were reviewed and Marysol was able to demonstrate them prior to the end of the session.  Marysol demonstrated good  understanding of the education provided. See Electronic Medical Record under Patient Instructions for exercises provided during therapy sessions.    Assessment     Continuing to focus on LE strengthening without exacerbation of symptoms as well as proper functional activity performance. Verbal cues for upright posture with all standing and seated exercises. Educated patient on the importance of performing Home Exercise Program daily.    Marysol Is progressing well towards her goals.   Patient prognosis is Good.     Patient will continue to benefit from skilled outpatient physical therapy to address the deficits listed in the problem list box on initial evaluation, provide pt/family education and to maximize pt's level of  independence in the home and community environment.     Patient's spiritual, cultural and educational needs considered and pt agreeable to plan of care and goals.     Anticipated barriers to physical therapy: none stated    Goals:   Reviewed: 7/22/2024    Short Term Goals: In 4 weeks  Progress Date   1.Patient to be educated on HEP. [] Progressing  [x] MET   [] Not MET   [] Not tested  8/26/24   2.Patient to increase left hip IR range of motion by 5 degrees, in order to improve available range of motion for ADL's.  [] Progressing  [x] MET   [] Not MET  [] Not tested  8/26/24   3.Patient to increase left hip extension strength by 1/2 grade, in order to improve endurance and increase ability to perform all functional activities for increased time.  [] Progressing  [x] MET   [] Not MET  [] Not tested  8/26/24   4.Patient to have pain less than 3/10 at worst, to improve QOL. [] Progressing  [x] MET   [] Not MET  [] Not tested  8/26/24   5.Patient to improve score on the FOTO, to improve QOL. [] Progressing  [x] MET   [] Not MET  [] Not tested  8/26/24      Long Term Goals: In 8 weeks Progress Date   1.Patient to improve score on the FOTO predicted score or better, to improve QOL. [x] Progressing  [] MET   [] Not MET  [] Not tested     2. Patient to increase hip abduction strength to 5/5 or greater, in order to improve endurance and increase ability to perform all functional activities for increased time. [x] Progressing  [] MET   [] Not MET  [] Not tested     3. Patient to have decreased pain to 1/10 at worst, to improve QOL. [x] Progressing  [] MET   [] Not MET  [] Not tested     5. Patient to perform daily activities including walking, bending, and lifting without increased symptoms and with proper mechanics. [x] Progressing  [] MET   [] Not MET  [] Not tested        Plan     Continue with established POC for improved ability to perform ADL's.    Cait Andrews, PT, DPT, Cert. DN

## 2024-09-13 RX ORDER — METHENAMINE, SODIUM PHOSPHATE, MONOBASIC, MONOHYDRATE, PHENYL SALICYLATE, METHYLENE BLUE, AND HYOSCYAMINE SULFATE 118; 40.8; 36; 10; .12 MG/1; MG/1; MG/1; MG/1; MG/1
CAPSULE ORAL
Qty: 24 CAPSULE | Refills: 3 | Status: SHIPPED | OUTPATIENT
Start: 2024-09-13

## 2024-09-13 RX ORDER — PHENAZOPYRIDINE HYDROCHLORIDE 100 MG/1
100 TABLET, FILM COATED ORAL
Qty: 100 TABLET | Refills: 2 | OUTPATIENT
Start: 2024-09-13

## 2024-09-16 ENCOUNTER — CLINICAL SUPPORT (OUTPATIENT)
Dept: REHABILITATION | Facility: HOSPITAL | Age: 89
End: 2024-09-16
Payer: COMMERCIAL

## 2024-09-16 DIAGNOSIS — M25.652 DECREASED RANGE OF LEFT HIP MOVEMENT: Primary | ICD-10-CM

## 2024-09-16 PROCEDURE — 97112 NEUROMUSCULAR REEDUCATION: CPT | Mod: PN

## 2024-09-16 PROCEDURE — 97530 THERAPEUTIC ACTIVITIES: CPT | Mod: PN

## 2024-09-16 PROCEDURE — 97110 THERAPEUTIC EXERCISES: CPT | Mod: PN

## 2024-09-17 NOTE — PROGRESS NOTES
[facility]   Physical Therapy Treatment Note      Name: Marysol Lyles  Clinic Number: [id number]    Therapy Diagnosis:   Encounter Diagnosis   Name Primary?    Decreased range of left hip movement Yes     Physician: Order, Paper    Visit Date: 9/18/2024    Physician Orders: PT Eval and Treat  Medical Diagnosis from Referral: M25.552 (ICD-10-CM) - Left hip pain   Evaluation Date: 7/22/2024  Authorization Period Expiration: 12/31/24  Plan of Care Expiration: 9/22/2024  Progress Note Due: 9/26/2024  Visit # / Visits authorized: 13 / 20 (1/1 Eval) (re-assessed on 8/26/24)  FOTO: 3/3 (last performed on 7/22/2024, 8/7/24, 8/26/24)     Precautions: Standard and HTN, R DAVID, L TKA     Time In: 1:03 PM  Time Out: 1:58 PM  Total Billable Time (timed & untimed codes): 55 minutes    PTA Visit #: 0/5     Subjective     Patient reports: that she is doing okay this afternoon. Not really having any pain - still feels like her right leg is weaker compared to the right.    She was compliant with home exercise program.  Response to previous treatment: no adverse reactions reported  Functional change: in progress    Pain: 0/10  Location: right ankle/lower leg      Objective      Objective Measures updated at progress report unless specified.      Treatment     Marysol received the treatments listed below:      Marysol received therapeutic exercises to develop strength, endurance, ROM, and flexibility for 20 minutes including:    Recumbent Bike x10 min (Level 3)  Supine DKTC x30 reps  Supine LTR's over Stability Ball x30 reps  Seated Hamstring Stretch + strap 10x10s B + stool  Standing Calf Stretch 10x10s B + level 3 slant board  Standing Heel Raises 3x10 reps B     Marysol participated in neuromuscular re-education activities to improve: Balance, Coordination, Kinesthetic, Sense, and Proprioception for 25 minutes. The following activities were included:     Seated Hip Adduction 3x10 reps (3s holds)  Seated Hip Abduction  3x10 reps + RTB + TA Activation  Supine Straight Leg Raise 2x10 reps B   Side-Lying Clamshells 2x10 reps B + RTB  Standing Hip Abduction 3x10 reps B + RTB  Standing Hip Extension 2x10 reps B + RTB     Plan for Next Visit:        Therapeutic Activities to improve functional performance for 10 minutes, including:    Shuttle Press Double Leg x2 min (1 black cord, 1 red cord)  Shuttle Press Single Leg x2 min each leg (body weight only) (red cord with LLE)  Supine Hip Bridge 3x10 reps + RTB with hip abd iso  Step Ups 2x10 reps on blue foam pad B    *A portion of this treatment session is provided with the assistance of a skilled rehbailitation technician under the supervision of a licensed physical therapist.    Patient Education and Home Exercises       Education provided:   Home exercise program review  Post Exercise Soreness  Maintaining a pain free range of motion with all activities  Anatomy/Physiology of the Hip and the surrounding musculature    Written Home Exercises Provided: Patient instructed to cont prior HEP. Exercises were reviewed and Marysol was able to demonstrate them prior to the end of the session.  Marysol demonstrated good  understanding of the education provided. See Electronic Medical Record under Patient Instructions for exercises provided during therapy sessions.    Assessment     Patient tolerated therapy well today. Introduced step ups on foam to challenge stability and control. Difficulty noted with step ups on foam pad - instability observed - required verbal cues for increased hip flexion and foot clearance. Educated the patient on the importance of continuing to perform Home Exercise Program daily.     Marysol Is progressing well towards her goals.   Patient prognosis is Good.     Patient will continue to benefit from skilled outpatient physical therapy to address the deficits listed in the problem list box on initial evaluation, provide pt/family education and to maximize pt's level of  independence in the home and community environment.     Patient's spiritual, cultural and educational needs considered and pt agreeable to plan of care and goals.     Anticipated barriers to physical therapy: none stated    Goals:   Reviewed: 7/22/2024    Short Term Goals: In 4 weeks  Progress Date   1.Patient to be educated on HEP. [] Progressing  [x] MET   [] Not MET   [] Not tested  8/26/24   2.Patient to increase left hip IR range of motion by 5 degrees, in order to improve available range of motion for ADL's.  [] Progressing  [x] MET   [] Not MET  [] Not tested  8/26/24   3.Patient to increase left hip extension strength by 1/2 grade, in order to improve endurance and increase ability to perform all functional activities for increased time.  [] Progressing  [x] MET   [] Not MET  [] Not tested  8/26/24   4.Patient to have pain less than 3/10 at worst, to improve QOL. [] Progressing  [x] MET   [] Not MET  [] Not tested  8/26/24   5.Patient to improve score on the FOTO, to improve QOL. [] Progressing  [x] MET   [] Not MET  [] Not tested  8/26/24      Long Term Goals: In 8 weeks Progress Date   1.Patient to improve score on the FOTO predicted score or better, to improve QOL. [x] Progressing  [] MET   [] Not MET  [] Not tested     2. Patient to increase hip abduction strength to 5/5 or greater, in order to improve endurance and increase ability to perform all functional activities for increased time. [x] Progressing  [] MET   [] Not MET  [] Not tested     3. Patient to have decreased pain to 1/10 at worst, to improve QOL. [x] Progressing  [] MET   [] Not MET  [] Not tested     5. Patient to perform daily activities including walking, bending, and lifting without increased symptoms and with proper mechanics. [x] Progressing  [] MET   [] Not MET  [] Not tested        Plan     Continue with established POC for improved ability to perform ADL's.    Cait Andrews, PT, DPT, Cert. DN

## 2024-09-18 ENCOUNTER — CLINICAL SUPPORT (OUTPATIENT)
Dept: REHABILITATION | Facility: HOSPITAL | Age: 89
End: 2024-09-18
Payer: COMMERCIAL

## 2024-09-18 DIAGNOSIS — M25.652 DECREASED RANGE OF LEFT HIP MOVEMENT: Primary | ICD-10-CM

## 2024-09-18 PROCEDURE — 97112 NEUROMUSCULAR REEDUCATION: CPT | Mod: PN

## 2024-09-18 PROCEDURE — 97110 THERAPEUTIC EXERCISES: CPT | Mod: PN

## 2024-09-18 PROCEDURE — 97530 THERAPEUTIC ACTIVITIES: CPT | Mod: PN

## 2024-09-20 NOTE — PROGRESS NOTES
[facility]   Physical Therapy Treatment Note & Re-assessment     Name: Marysol Lyles  Clinic Number: [id number]    Therapy Diagnosis:   Encounter Diagnosis   Name Primary?    Decreased range of left hip movement Yes     Physician: Order, Paper    Visit Date: 9/23/2024    Physician Orders: PT Eval and Treat  Medical Diagnosis from Referral: M25.552 (ICD-10-CM) - Left hip pain   Evaluation Date: 7/22/2024  Authorization Period Expiration: 12/31/24  Plan of Care Expiration: 11/23/24 = NEW POC DATE  Progress Note Due: 10/23/24  Visit # / Visits authorized: 14 / 20 (1/1 Eval) (re-assessed on 8/26/24) (re-assessed on 9/23/24)  FOTO: 3/3 (last performed on 7/22/2024, 8/7/24, 8/26/24)     Precautions: Standard and HTN, R DAVID, L TKA     Time In: 1:31 PM  Time Out: 2:26 PM  Total Billable Time (timed & untimed codes): 55 minutes    PTA Visit #: 0/5     Subjective     Patient reports: that she does feel like her right leg is getting stronger. The left hip continues to feel good.    She was compliant with home exercise program.  Response to previous treatment: no adverse reactions reported  Functional change: in progress    Pain: 0/10  Location: right ankle/lower leg      Objective      Objective Measures updated at progress report unless specified.     Posture:  Patient presents with postural abnormalities which include:               [x] Forward Head                                [] Increased Lumbar Lordosis              [x] Rounded Shoulder                         [] Flat Back Posture              [x] Increased Thoracic Kyphosis        [] Pes Planus              [] Increased Trunk Sway                   [] Valgus knee position              [] Increased Trunk Rotation              [] Varus knee position              [] Increased cervical lordosis            [] Other:     Sensation:    Sensation to light touch over LE's is  [x] Intact [] Impaired [] Defer     Reflexes:  Patellar                         [x] Defer [] Normal [] Hyper []  Hypo   Achilles                        [x] Defer [] Normal [] Hyper []  Hypo     Gait Analysis: The patient ambulated with the following assistive device: straight cane; the patient presents with the following gait abnormalities: occasional unsteady gait      Functional Tests  Outcome Norms   30 second Sit to Stand 13 Age Male Female   60-64 <14 <12   65-69 <12 <11   70-74 <12 <10   75-79 <11 <10   80-84 <10 <9   85-89 <8 <8   90-93 <7 <4         Range of Motion:  Hip AROM/PROM Right Left Pain/Dysfunction with Movement   Hip Flexion (120º) 120 120     Hip Extension (30º) 30 30     Hip Abduction (45º) 45 45     Hip IR (45º) 40 40     Hip ER (45º) 45 45        Strength:  L/E MMT Right  (spine) Left Pain/Dysfunction with Movement   Hip Flexion  5/5 5/5     Hip Extension  5/5 5/5     Hip Abduction  4+/5 5/5     Knee Extension 5/5 5/5     Knee Flexion 4+/5 5/5     Hip IR 4+/5 4+/5     Hip ER 4+/5 4+/5     Ankle DF 5/5 5/5     Ankle PF 5/5 5/5        Joint Mobility: mild tightness noted when moving into B hip IR     Palpation: no TTP reported     Treatment     Marysol received the treatments listed below:      Marysol received therapeutic exercises to develop strength, endurance, ROM, and flexibility for 20 minutes including:    Re-assessment  Recumbent Bike x10 min (Level 3) - interval training today up to level 6 for about 2 min at a time  Supine DKTC x30 reps  Supine LTR's over Stability Ball x30 reps  Seated Hamstring Stretch + strap 10x10s B + stool  Standing Calf Stretch 10x10s B + level 3 slant board  Standing Heel Raises 3x10 reps B     Marysol participated in neuromuscular re-education activities to improve: Balance, Coordination, Kinesthetic, Sense, and Proprioception for 25 minutes. The following activities were included:     Seated Hip Adduction 3x10 reps (3s holds)  Seated Hip Abduction 3x10 reps + RTB + TA Activation  Supine Straight Leg Raise 2x10 reps B   Side-Lying Clamshells 3x10  reps B + RTB  Standing Hip Abduction 3x10 reps B + RTB  Standing Hip Extension 3x10 reps B + RTB     Plan for Next Visit:        Therapeutic Activities to improve functional performance for 10 minutes, including:    Shuttle Press Double Leg x2 min (1 black cord, 1 red cord)  Shuttle Press Single Leg x2 min each leg (body weight only) (red cord with LLE)  Supine Hip Bridge 3x10 reps + RTB with hip abd iso  Step Ups 2x10 reps on blue foam pad B    *A portion of this treatment session is provided with the assistance of a skilled rehbailitation technician under the supervision of a licensed physical therapist.    Patient Education and Home Exercises       Education provided:   Home exercise program review  Post Exercise Soreness  Maintaining a pain free range of motion with all activities  Anatomy/Physiology of the Hip and the surrounding musculature    Written Home Exercises Provided: Patient instructed to cont prior HEP. Exercises were reviewed and Marysol was able to demonstrate them prior to the end of the session.  Marysol demonstrated good  understanding of the education provided. See Electronic Medical Record under Patient Instructions for exercises provided during therapy sessions.    Assessment     The patient continues to demonstrate improved LE strength with slight deficits between the right and the left LE's. Range of motion is functional at the hips and knees B. 30s chair rise test remains functional as well. Discharge likely next week.    Marysol Is progressing well towards her goals.   Patient prognosis is Good.     Patient will continue to benefit from skilled outpatient physical therapy to address the deficits listed in the problem list box on initial evaluation, provide pt/family education and to maximize pt's level of independence in the home and community environment.     Patient's spiritual, cultural and educational needs considered and pt agreeable to plan of care and goals.     Anticipated barriers to  physical therapy: none stated    Goals:   Reviewed: 7/22/2024    Short Term Goals: In 4 weeks  Progress Date   1.Patient to be educated on HEP. [] Progressing  [x] MET   [] Not MET   [] Not tested  8/26/24   2.Patient to increase left hip IR range of motion by 5 degrees, in order to improve available range of motion for ADL's.  [] Progressing  [x] MET   [] Not MET  [] Not tested  8/26/24   3.Patient to increase left hip extension strength by 1/2 grade, in order to improve endurance and increase ability to perform all functional activities for increased time.  [] Progressing  [x] MET   [] Not MET  [] Not tested  8/26/24   4.Patient to have pain less than 3/10 at worst, to improve QOL. [] Progressing  [x] MET   [] Not MET  [] Not tested  8/26/24   5.Patient to improve score on the FOTO, to improve QOL. [] Progressing  [x] MET   [] Not MET  [] Not tested  8/26/24      Long Term Goals: In 8 weeks Progress Date   1.Patient to improve score on the FOTO predicted score or better, to improve QOL. [x] Progressing  [] MET   [] Not MET  [] Not tested     2. Patient to increase hip abduction strength to 5/5 or greater, in order to improve endurance and increase ability to perform all functional activities for increased time. [x] Progressing  [] MET   [] Not MET  [] Not tested     3. Patient to have decreased pain to 1/10 at worst, to improve QOL. [x] Progressing  [] MET   [] Not MET  [] Not tested     5. Patient to perform daily activities including walking, bending, and lifting without increased symptoms and with proper mechanics. [x] Progressing  [] MET   [] Not MET  [] Not tested        Plan     Plan of Care Expiration: 11/23/24 = NEW POC DATE    Continue with established POC for improved ability to perform ADL's.    Cait Andrews, PT, DPT, Cert. DN

## 2024-09-23 ENCOUNTER — CLINICAL SUPPORT (OUTPATIENT)
Dept: REHABILITATION | Facility: HOSPITAL | Age: 89
End: 2024-09-23
Payer: COMMERCIAL

## 2024-09-23 DIAGNOSIS — M25.652 DECREASED RANGE OF LEFT HIP MOVEMENT: Primary | ICD-10-CM

## 2024-09-23 PROCEDURE — 97112 NEUROMUSCULAR REEDUCATION: CPT | Mod: PN

## 2024-09-23 PROCEDURE — 97530 THERAPEUTIC ACTIVITIES: CPT | Mod: PN

## 2024-09-23 PROCEDURE — 97110 THERAPEUTIC EXERCISES: CPT | Mod: PN

## 2024-09-23 NOTE — PLAN OF CARE
[facility]   Physical Therapy Treatment Note & Re-assessment     Name: Marysol Lyles  Clinic Number: [id number]    Therapy Diagnosis:   Encounter Diagnosis   Name Primary?    Decreased range of left hip movement Yes     Physician: Order, Paper    Visit Date: 9/23/2024    Physician Orders: PT Eval and Treat  Medical Diagnosis from Referral: M25.552 (ICD-10-CM) - Left hip pain   Evaluation Date: 7/22/2024  Authorization Period Expiration: 12/31/24  Plan of Care Expiration: 11/23/24 = NEW POC DATE  Progress Note Due: 10/23/24  Visit # / Visits authorized: 14 / 20 (1/1 Eval) (re-assessed on 8/26/24) (re-assessed on 9/23/24)  FOTO: 3/3 (last performed on 7/22/2024, 8/7/24, 8/26/24)     Precautions: Standard and HTN, R DAVID, L TKA     Time In: 1:31 PM  Time Out: 2:26 PM  Total Billable Time (timed & untimed codes): 55 minutes    PTA Visit #: 0/5     Subjective     Patient reports: that she does feel like her right leg is getting stronger. The left hip continues to feel good.    She was compliant with home exercise program.  Response to previous treatment: no adverse reactions reported  Functional change: in progress    Pain: 0/10  Location: right ankle/lower leg      Objective      Objective Measures updated at progress report unless specified.     Posture:  Patient presents with postural abnormalities which include:               [x] Forward Head                                [] Increased Lumbar Lordosis              [x] Rounded Shoulder                         [] Flat Back Posture              [x] Increased Thoracic Kyphosis        [] Pes Planus              [] Increased Trunk Sway                   [] Valgus knee position              [] Increased Trunk Rotation              [] Varus knee position              [] Increased cervical lordosis            [] Other:     Sensation:    Sensation to light touch over LE's is  [x] Intact [] Impaired [] Defer     Reflexes:  Patellar                         [x] Defer [] Normal [] Hyper []  Hypo   Achilles                        [x] Defer [] Normal [] Hyper []  Hypo     Gait Analysis: The patient ambulated with the following assistive device: straight cane; the patient presents with the following gait abnormalities: occasional unsteady gait      Functional Tests  Outcome Norms   30 second Sit to Stand 13 Age Male Female   60-64 <14 <12   65-69 <12 <11   70-74 <12 <10   75-79 <11 <10   80-84 <10 <9   85-89 <8 <8   90-93 <7 <4         Range of Motion:  Hip AROM/PROM Right Left Pain/Dysfunction with Movement   Hip Flexion (120º) 120 120     Hip Extension (30º) 30 30     Hip Abduction (45º) 45 45     Hip IR (45º) 40 40     Hip ER (45º) 45 45        Strength:  L/E MMT Right  (spine) Left Pain/Dysfunction with Movement   Hip Flexion  5/5 5/5     Hip Extension  5/5 5/5     Hip Abduction  4+/5 5/5     Knee Extension 5/5 5/5     Knee Flexion 4+/5 5/5     Hip IR 4+/5 4+/5     Hip ER 4+/5 4+/5     Ankle DF 5/5 5/5     Ankle PF 5/5 5/5        Joint Mobility: mild tightness noted when moving into B hip IR     Palpation: no TTP reported     Treatment     Marysol received the treatments listed below:      Marysol received therapeutic exercises to develop strength, endurance, ROM, and flexibility for 20 minutes including:    Re-assessment  Recumbent Bike x10 min (Level 3) - interval training today up to level 6 for about 2 min at a time  Supine DKTC x30 reps  Supine LTR's over Stability Ball x30 reps  Seated Hamstring Stretch + strap 10x10s B + stool  Standing Calf Stretch 10x10s B + level 3 slant board  Standing Heel Raises 3x10 reps B     Marysol participated in neuromuscular re-education activities to improve: Balance, Coordination, Kinesthetic, Sense, and Proprioception for 25 minutes. The following activities were included:     Seated Hip Adduction 3x10 reps (3s holds)  Seated Hip Abduction 3x10 reps + RTB + TA Activation  Supine Straight Leg Raise 2x10 reps B   Side-Lying Clamshells 3x10  reps B + RTB  Standing Hip Abduction 3x10 reps B + RTB  Standing Hip Extension 3x10 reps B + RTB     Plan for Next Visit:        Therapeutic Activities to improve functional performance for 10 minutes, including:    Shuttle Press Double Leg x2 min (1 black cord, 1 red cord)  Shuttle Press Single Leg x2 min each leg (body weight only) (red cord with LLE)  Supine Hip Bridge 3x10 reps + RTB with hip abd iso  Step Ups 2x10 reps on blue foam pad B    *A portion of this treatment session is provided with the assistance of a skilled rehbailitation technician under the supervision of a licensed physical therapist.    Patient Education and Home Exercises       Education provided:   Home exercise program review  Post Exercise Soreness  Maintaining a pain free range of motion with all activities  Anatomy/Physiology of the Hip and the surrounding musculature    Written Home Exercises Provided: Patient instructed to cont prior HEP. Exercises were reviewed and Marysol was able to demonstrate them prior to the end of the session.  Marysol demonstrated good  understanding of the education provided. See Electronic Medical Record under Patient Instructions for exercises provided during therapy sessions.    Assessment     The patient continues to demonstrate improved LE strength with slight deficits between the right and the left LE's. Range of motion is functional at the hips and knees B. 30s chair rise test remains functional as well. Discharge likely next week.    Marysol Is progressing well towards her goals.   Patient prognosis is Good.     Patient will continue to benefit from skilled outpatient physical therapy to address the deficits listed in the problem list box on initial evaluation, provide pt/family education and to maximize pt's level of independence in the home and community environment.     Patient's spiritual, cultural and educational needs considered and pt agreeable to plan of care and goals.     Anticipated barriers to  physical therapy: none stated    Goals:   Reviewed: 7/22/2024    Short Term Goals: In 4 weeks  Progress Date   1.Patient to be educated on HEP. [] Progressing  [x] MET   [] Not MET   [] Not tested  8/26/24   2.Patient to increase left hip IR range of motion by 5 degrees, in order to improve available range of motion for ADL's.  [] Progressing  [x] MET   [] Not MET  [] Not tested  8/26/24   3.Patient to increase left hip extension strength by 1/2 grade, in order to improve endurance and increase ability to perform all functional activities for increased time.  [] Progressing  [x] MET   [] Not MET  [] Not tested  8/26/24   4.Patient to have pain less than 3/10 at worst, to improve QOL. [] Progressing  [x] MET   [] Not MET  [] Not tested  8/26/24   5.Patient to improve score on the FOTO, to improve QOL. [] Progressing  [x] MET   [] Not MET  [] Not tested  8/26/24      Long Term Goals: In 8 weeks Progress Date   1.Patient to improve score on the FOTO predicted score or better, to improve QOL. [x] Progressing  [] MET   [] Not MET  [] Not tested     2. Patient to increase hip abduction strength to 5/5 or greater, in order to improve endurance and increase ability to perform all functional activities for increased time. [x] Progressing  [] MET   [] Not MET  [] Not tested     3. Patient to have decreased pain to 1/10 at worst, to improve QOL. [x] Progressing  [] MET   [] Not MET  [] Not tested     5. Patient to perform daily activities including walking, bending, and lifting without increased symptoms and with proper mechanics. [x] Progressing  [] MET   [] Not MET  [] Not tested        Plan     Plan of Care Expiration: 11/23/24 = NEW POC DATE    Continue with established POC for improved ability to perform ADL's.    Cait Andrews, PT, DPT, Cert. DN

## 2024-09-24 DIAGNOSIS — I10 ESSENTIAL HYPERTENSION: ICD-10-CM

## 2024-09-24 RX ORDER — LOSARTAN POTASSIUM 50 MG/1
50 TABLET ORAL DAILY
Qty: 90 TABLET | Refills: 2 | Status: SHIPPED | OUTPATIENT
Start: 2024-09-24

## 2024-09-24 RX ORDER — LOSARTAN POTASSIUM 50 MG/1
TABLET ORAL
Qty: 90 TABLET | Refills: 0 | OUTPATIENT
Start: 2024-09-24

## 2024-09-24 NOTE — TELEPHONE ENCOUNTER
No care due was identified.  [facility] Embedded Care Due Messages. Reference number: [id number].   9/24/2024 12:10:57 PM CDT

## 2024-09-24 NOTE — TELEPHONE ENCOUNTER
No care due was identified.  [facility] Embedded Care Due Messages. Reference number: [id number].   9/24/2024 12:09:47 PM CDT

## 2024-09-24 NOTE — TELEPHONE ENCOUNTER
Refill Decision Note   Marysol Lyles  is requesting a refill authorization.  Brief Assessment and Rationale for Refill:  Approve     Medication Therapy Plan:         Comments:     Note composed:6:31 PM 09/24/2024

## 2024-09-24 NOTE — TELEPHONE ENCOUNTER
Refill Decision Note   Marysol Lyles  is requesting a refill authorization.  Brief Assessment and Rationale for Refill:  Quick Discontinue     Medication Therapy Plan:       Medication Reconciliation Completed: No   Comments:     No Care Gaps recommended.     Duplicate Pended Encounter(s)/ Last Prescribed Details:    Pharmacy    [id number] - [place] - [address]  [address], [place] [address]  Phone: [phone]  Fax: [phone]  ARUNA #: [id number]   ANTONIA Reason: --     Outpatient Medication Detail     Disp Refills Start End ANTONIA   losartan (COZAAR) 50 MG tablet 90 tablet 2 9/24/2024 -- No   Sig - Route: Take 1 tablet (50 mg total) by mouth once daily. - Oral   Sent to pharmacy as: losartan (COZAAR) 50 MG tablet   Class: Normal   Notes to Pharmacy: Please inactivate all prior scripts with same name and strength including any scripts on hold.   Order: [id number]   Cosign for Ordering: Required by Aguilar Vickers MD   Date/Time Signed: 9/24/2024 18:31       E-Prescribing Status: Receipt confirmed by pharmacy (9/24/2024  6:31 PM CDT)     Proportion of Days Covered for losartan potassium    From 3/21/2024 to 9/16/2024        60    80   100%  of total days covered  (178/178 days) Recent Dispenses        06/30/2024 50 MG Tab (disp 90, 90d supply)   04/06/2024 50 MG Tab (disp 90, 90d supply)   12/27/2023 50 MG Tab (disp 90, 90d supply)   10/12/2023 50 MG Tab (disp 90, 90d supply)          High Confidence    Fill data for this medication is likely complete. Other factors may still affect the accuracy of the score.     Ordering Encounter Report    Associated Reports   View Encounter              Note composed:6:33 PM 09/24/2024

## 2024-09-24 NOTE — PROGRESS NOTES
[facility] OUTPATIENT THERAPY AND WELLNESS   Physical Therapy Treatment Note     Name: Marysol Lyles  Clinic Number: [id number]    Therapy Diagnosis:   Encounter Diagnosis   Name Primary?    Decreased range of left hip movement Yes     Physician: Order, Paper    Visit Date: 9/25/2024    Physician Orders: PT Eval and Treat  Medical Diagnosis from Referral: M25.552 (ICD-10-CM) - Left hip pain   Evaluation Date: 7/22/2024  Authorization Period Expiration: 12/31/24  Plan of Care Expiration: 11/23/24  Progress Note Due: 10/23/24  Visit # / Visits authorized: 15 / 20 (1/1 Eval) (re-assessed on 8/26/24) (re-assessed on 9/23/24)  FOTO: 3/3 (last performed on 7/22/2024, 8/7/24, 8/26/24)     Precautions: Standard and HTN, R DAVID, L TKA     Time In: 1:05 PM  Time Out: 2:00 PM  Total Billable Time (timed & untimed codes): 55 minutes    PTA Visit #: 0/5     Subjective     Patient reports: that she is doing well overall today. Not really having any problems.    She was compliant with home exercise program.  Response to previous treatment: no adverse reactions reported  Functional change: in progress    Pain: 0/10  Location: right ankle/lower leg      Objective      Objective Measures updated at progress report unless specified.     Treatment     Marysol received the treatments listed below:      Marysol received therapeutic exercises to develop strength, endurance, ROM, and flexibility for 20 minutes including:    Recumbent Bike x10 min (Level 3) - interval training today up to level 6 for about 2 min at a time  Supine DKTC x30 reps  Supine LTR's over Stability Ball x30 reps  Seated Hamstring Stretch + strap 10x10s B + stool  Standing Calf Stretch 10x10s B + level 3 slant board  Standing Heel Raises 3x10 reps B     Marysol participated in neuromuscular re-education activities to improve: Balance, Coordination, Kinesthetic, Sense, and Proprioception for 25 minutes. The following activities were included:     Seated Hip Adduction 3x10 reps (3s  holds)  Seated Hip Abduction 3x10 reps + RTB + TA Activation  Supine Straight Leg Raise 2x10 reps B   Side-Lying Clamshells 3x10 reps B + RTB  Standing Hip Abduction 3x10 reps B + RTB  Standing Hip Extension 3x10 reps B + RTB     Plan for Next Visit:        Therapeutic Activities to improve functional performance for 10 minutes, including:    Shuttle Press Double Leg x2 min (1 black cord, 1 red cord)  Shuttle Press Single Leg x2 min each leg (body weight only) (red cord with LLE)  Supine Hip Bridge 3x10 reps + RTB with hip abd iso  Step Ups 2x10 reps on blue foam pad B    *A portion of this treatment session is provided with the assistance of a skilled rehbailitation technician under the supervision of a licensed physical therapist.    Patient Education and Home Exercises       Education provided:   Home exercise program review  Post Exercise Soreness  Maintaining a pain free range of motion with all activities  Anatomy/Physiology of the Hip and the surrounding musculature    Written Home Exercises Provided: Patient instructed to cont prior HEP. Exercises were reviewed and Marysol was able to demonstrate them prior to the end of the session.  Marysol demonstrated good  understanding of the education provided. See Electronic Medical Record under Patient Instructions for exercises provided during therapy sessions.    Assessment     Patient tolerated therapy well overall today with a focus placed on progressing LE strengthening exercises. Plan to discharge patient next week.    Marysol Is progressing well towards her goals.   Patient prognosis is Good.     Patient will continue to benefit from skilled outpatient physical therapy to address the deficits listed in the problem list box on initial evaluation, provide pt/family education and to maximize pt's level of independence in the home and community environment.     Patient's spiritual, cultural and educational needs considered and pt agreeable to plan of care and goals.      Anticipated barriers to physical therapy: none stated    Goals:   Reviewed: 7/22/2024    Short Term Goals: In 4 weeks  Progress Date   1.Patient to be educated on HEP. [] Progressing  [x] MET   [] Not MET   [] Not tested  8/26/24   2.Patient to increase left hip IR range of motion by 5 degrees, in order to improve available range of motion for ADL's.  [] Progressing  [x] MET   [] Not MET  [] Not tested  8/26/24   3.Patient to increase left hip extension strength by 1/2 grade, in order to improve endurance and increase ability to perform all functional activities for increased time.  [] Progressing  [x] MET   [] Not MET  [] Not tested  8/26/24   4.Patient to have pain less than 3/10 at worst, to improve QOL. [] Progressing  [x] MET   [] Not MET  [] Not tested  8/26/24   5.Patient to improve score on the FOTO, to improve QOL. [] Progressing  [x] MET   [] Not MET  [] Not tested  8/26/24      Long Term Goals: In 8 weeks Progress Date   1.Patient to improve score on the FOTO predicted score or better, to improve QOL. [x] Progressing  [] MET   [] Not MET  [] Not tested     2. Patient to increase hip abduction strength to 5/5 or greater, in order to improve endurance and increase ability to perform all functional activities for increased time. [x] Progressing  [] MET   [] Not MET  [] Not tested     3. Patient to have decreased pain to 1/10 at worst, to improve QOL. [x] Progressing  [] MET   [] Not MET  [] Not tested     5. Patient to perform daily activities including walking, bending, and lifting without increased symptoms and with proper mechanics. [x] Progressing  [] MET   [] Not MET  [] Not tested        Plan     Plan of Care Expiration: 11/23/24 = NEW POC DATE    Continue with established POC for improved ability to perform ADL's.    Cait Andrews, PT, DPT, Cert. DN

## 2024-09-25 ENCOUNTER — CLINICAL SUPPORT (OUTPATIENT)
Dept: REHABILITATION | Facility: HOSPITAL | Age: 89
End: 2024-09-25
Payer: COMMERCIAL

## 2024-09-25 DIAGNOSIS — M25.652 DECREASED RANGE OF LEFT HIP MOVEMENT: Primary | ICD-10-CM

## 2024-09-25 PROCEDURE — 97110 THERAPEUTIC EXERCISES: CPT | Mod: PN

## 2024-09-25 PROCEDURE — 97112 NEUROMUSCULAR REEDUCATION: CPT | Mod: PN

## 2024-09-25 PROCEDURE — 97530 THERAPEUTIC ACTIVITIES: CPT | Mod: PN

## 2024-10-02 NOTE — PROGRESS NOTES
[facility]   Physical Therapy Treatment Note & Discharge Summary     Name: Marysol Lyles  Clinic Number: [id number]    Therapy Diagnosis:   Encounter Diagnosis   Name Primary?    Decreased functional activity tolerance Yes     Physician: Order, Paper    Visit Date: 10/7/2024    Physician Orders: PT Eval and Treat  Medical Diagnosis from Referral: M25.552 (ICD-10-CM) - Left hip pain   Evaluation Date: 7/22/2024  Authorization Period Expiration: 12/31/24  Plan of Care Expiration: 11/23/24  Progress Note Due: 10/23/24  Visit # / Visits authorized: 16 / 20 (1/1 Eval) (re-assessed on 8/26/24) (re-assessed on 9/23/24)  FOTO: 4 (last performed on 7/22/2024, 8/7/24, 8/26/24,10/7/24)     Precautions: Standard and HTN, R DAVID, L TKA     Time In: 3:45 PM  Time Out: 4:05 PM  Total Billable Time (timed & untimed codes): 20 minutes    PTA Visit #: 0/5     Date of Last visit: 10/7/24  Total Visits Received: 17    Subjective     Patient reports: that she has been feeling good. Knows to do her exercises throughout the day, especially first thing in the morning.    She was compliant with home exercise program.  Response to previous treatment: no adverse reactions reported  Functional change: in progress    Pain: 0/10  Location: right ankle/lower leg      Objective      Objective Measures updated at progress report unless specified.     Posture:  Patient presents with postural abnormalities which include:               [x] Forward Head                                [] Increased Lumbar Lordosis              [x] Rounded Shoulder                         [] Flat Back Posture              [x] Increased Thoracic Kyphosis        [] Pes Planus              [] Increased Trunk Sway                   [] Valgus knee position              [] Increased Trunk Rotation              [] Varus knee position              [] Increased cervical lordosis            [] Other:     Sensation:    Sensation to light touch over LE's is  [x]  Intact [] Impaired [] Defer     Reflexes:  Patellar                        [x] Defer [] Normal [] Hyper []  Hypo   Achilles                        [x] Defer [] Normal [] Hyper []  Hypo     Gait Analysis: The patient ambulated with the following assistive device: straight cane; the patient presents with the following gait abnormalities: occasional unsteady gait      Functional Tests  Outcome Norms   30 second Sit to Stand 13 Age Male Female   60-64 <14 <12   65-69 <12 <11   70-74 <12 <10   75-79 <11 <10   80-84 <10 <9   85-89 <8 <8   90-93 <7 <4         Range of Motion:  Hip AROM/PROM Right Left Pain/Dysfunction with Movement   Hip Flexion (120º) 120 120     Hip Extension (30º) 30 30     Hip Abduction (45º) 45 45     Hip IR (45º) 40 40     Hip ER (45º) 45 45        Strength:  L/E MMT Right  (spine) Left Pain/Dysfunction with Movement   Hip Flexion  5/5 5/5     Hip Extension  5/5 5/5     Hip Abduction  55 5/5     Knee Extension 5/5 5/5     Knee Flexion 5/5 5/5     Hip IR 4+/5 4+/5     Hip ER 4+/5 4+/5     Ankle DF 5/5 5/5     Ankle PF 5/5 5/5        Joint Mobility: WFL B     Palpation: no TTP reported    Treatment     Marysol received the treatments listed below:      Marysol received therapeutic exercises to develop strength, endurance, ROM, and flexibility for 20 minutes including:    Re-assessment  Recumbent Bike x10 min (Level 3) - interval training today up to level 6 for about 2 min at a time         *A portion of this treatment session is provided with the assistance of a skilled rehbailitation technician under the supervision of a licensed physical therapist.    Patient Education and Home Exercises       Education provided:   Home exercise program review  Post Exercise Soreness  Maintaining a pain free range of motion with all activities  Anatomy/Physiology of the Hip and the surrounding musculature    Written Home Exercises Provided: Patient instructed to cont prior HEP. Exercises were reviewed and Marysol was able to  demonstrate them prior to the end of the session.  Marysol demonstrated good  understanding of the education provided. See Electronic Medical Record under Patient Instructions for exercises provided during therapy sessions.    Assessment     Upon re-assessment, noted patient to demonstrate improved tolerance to activity, functional strength, and functional range of motion. Home Exercise Program was progressed and administered this date - patient verbalized understanding. Encouraged patient to perform Home Exercise Program daily moving forward to further progress and maintain gains. At this point, this patient is now discharged from skilled outpatient PT services.    Discharge reason: Patient is now asymptomatic and Patient has reached the maximum rehab potential for the present time    Discharge FOTO Score: 99    Marysol Is progressing well towards her goals.   Patient prognosis is Good.     Patient will continue to benefit from skilled outpatient physical therapy to address the deficits listed in the problem list box on initial evaluation, provide pt/family education and to maximize pt's level of independence in the home and community environment.     Patient's spiritual, cultural and educational needs considered and pt agreeable to plan of care and goals.     Anticipated barriers to physical therapy: none stated    Goals:   Reviewed: 7/22/2024    Short Term Goals: In 4 weeks  Progress Date   1.Patient to be educated on HEP. [] Progressing  [x] MET   [] Not MET   [] Not tested  8/26/24   2.Patient to increase left hip IR range of motion by 5 degrees, in order to improve available range of motion for ADL's.  [] Progressing  [x] MET   [] Not MET  [] Not tested  8/26/24   3.Patient to increase left hip extension strength by 1/2 grade, in order to improve endurance and increase ability to perform all functional activities for increased time.  [] Progressing  [x] MET   [] Not MET  [] Not tested  8/26/24   4.Patient to have  pain less than 3/10 at worst, to improve QOL. [] Progressing  [x] MET   [] Not MET  [] Not tested  8/26/24   5.Patient to improve score on the FOTO, to improve QOL. [] Progressing  [x] MET   [] Not MET  [] Not tested  8/26/24      Long Term Goals: In 8 weeks Progress Date   1.Patient to improve score on the FOTO predicted score or better, to improve QOL. [] Progressing  [x] MET   [] Not MET  [] Not tested  10/7/24   2. Patient to increase hip abduction strength to 5/5 or greater, in order to improve endurance and increase ability to perform all functional activities for increased time. [] Progressing  [] MET   [x] Not MET  [] Not tested     3. Patient to have decreased pain to 1/10 at worst, to improve QOL. [] Progressing  [x] MET   [] Not MET  [] Not tested  10/7/24   5. Patient to perform daily activities including walking, bending, and lifting without increased symptoms and with proper mechanics. [x] Progressing  [x] MET   [] Not MET  [] Not tested  10/7/24      Plan     This patient is discharged from skilled outpatient Physical Therapy services.    Cait Andrews, PT, DPT, Cert. DN

## 2024-10-07 ENCOUNTER — CLINICAL SUPPORT (OUTPATIENT)
Dept: REHABILITATION | Facility: HOSPITAL | Age: 89
End: 2024-10-07
Payer: COMMERCIAL

## 2024-10-07 DIAGNOSIS — R68.89 DECREASED FUNCTIONAL ACTIVITY TOLERANCE: Primary | ICD-10-CM

## 2024-10-07 DIAGNOSIS — G62.9 NEUROPATHY: ICD-10-CM

## 2024-10-07 PROBLEM — M25.652 DECREASED RANGE OF LEFT HIP MOVEMENT: Status: RESOLVED | Noted: 2024-07-22 | Resolved: 2024-10-07

## 2024-10-07 PROCEDURE — 97110 THERAPEUTIC EXERCISES: CPT | Mod: PN

## 2024-10-07 RX ORDER — DULOXETIN HYDROCHLORIDE 60 MG/1
CAPSULE, DELAYED RELEASE ORAL
Qty: 90 CAPSULE | Refills: 1 | Status: SHIPPED | OUTPATIENT
Start: 2024-10-07

## 2024-10-07 NOTE — TELEPHONE ENCOUNTER
Refill Decision Note   Marysol Lyles  is requesting a refill authorization.  Brief Assessment and Rationale for Refill:  Approve     Medication Therapy Plan:         Comments:     Note composed:1:23 PM 10/07/2024

## 2024-10-07 NOTE — TELEPHONE ENCOUNTER
No care due was identified.  Health [facility] Embedded Care Due Messages. Reference number: [id number].   10/07/2024 3:41:40 AM CDT

## 2024-10-22 ENCOUNTER — TELEPHONE (OUTPATIENT)
Dept: FAMILY MEDICINE | Facility: CLINIC | Age: 89
End: 2024-10-22
Payer: COMMERCIAL

## 2024-10-22 ENCOUNTER — TELEPHONE (OUTPATIENT)
Dept: FAMILY MEDICINE | Facility: CLINIC | Age: 89
End: 2024-10-22

## 2024-10-22 ENCOUNTER — PATIENT MESSAGE (OUTPATIENT)
Dept: FAMILY MEDICINE | Facility: CLINIC | Age: 89
End: 2024-10-22

## 2024-10-22 ENCOUNTER — OFFICE VISIT (OUTPATIENT)
Dept: FAMILY MEDICINE | Facility: CLINIC | Age: 89
End: 2024-10-22
Payer: COMMERCIAL

## 2024-10-22 VITALS
SYSTOLIC BLOOD PRESSURE: 120 MMHG | WEIGHT: 189.13 LBS | HEART RATE: 60 BPM | HEIGHT: 62 IN | BODY MASS INDEX: 34.8 KG/M2 | DIASTOLIC BLOOD PRESSURE: 72 MMHG | TEMPERATURE: 98 F | OXYGEN SATURATION: 95 % | RESPIRATION RATE: 16 BRPM

## 2024-10-22 DIAGNOSIS — R30.0 DYSURIA: ICD-10-CM

## 2024-10-22 DIAGNOSIS — R30.0 DYSURIA: Primary | ICD-10-CM

## 2024-10-22 DIAGNOSIS — Z23 IMMUNIZATION DUE: ICD-10-CM

## 2024-10-22 DIAGNOSIS — N39.0 URINARY TRACT INFECTION WITHOUT HEMATURIA, SITE UNSPECIFIED: Primary | ICD-10-CM

## 2024-10-22 LAB
BACTERIA #/AREA URNS HPF: ABNORMAL /HPF
BILIRUB UR QL STRIP: NEGATIVE
CLARITY UR: CLEAR
COLOR UR: YELLOW
GLUCOSE UR QL STRIP: NEGATIVE
HGB UR QL STRIP: ABNORMAL
KETONES UR QL STRIP: NEGATIVE
LEUKOCYTE ESTERASE UR QL STRIP: ABNORMAL
MICROSCOPIC COMMENT: ABNORMAL
NITRITE UR QL STRIP: NEGATIVE
PH UR STRIP: 7 [PH] (ref 5–8)
PROT UR QL STRIP: NEGATIVE
RBC #/AREA URNS HPF: 1 /HPF (ref 0–4)
SP GR UR STRIP: 1.01 (ref 1–1.03)
SQUAMOUS #/AREA URNS HPF: 3 /HPF
URN SPEC COLLECT METH UR: ABNORMAL
WBC #/AREA URNS HPF: >100 /HPF (ref 0–5)

## 2024-10-22 PROCEDURE — 87086 URINE CULTURE/COLONY COUNT: CPT | Performed by: NURSE PRACTITIONER

## 2024-10-22 PROCEDURE — 90653 IIV ADJUVANT VACCINE IM: CPT | Mod: S$GLB,,, | Performed by: NURSE PRACTITIONER

## 2024-10-22 PROCEDURE — 1159F MED LIST DOCD IN RCRD: CPT | Mod: CPTII,S$GLB,, | Performed by: NURSE PRACTITIONER

## 2024-10-22 PROCEDURE — 81000 URINALYSIS NONAUTO W/SCOPE: CPT | Mod: PO | Performed by: NURSE PRACTITIONER

## 2024-10-22 PROCEDURE — 1160F RVW MEDS BY RX/DR IN RCRD: CPT | Mod: CPTII,S$GLB,, | Performed by: NURSE PRACTITIONER

## 2024-10-22 PROCEDURE — 99213 OFFICE O/P EST LOW 20 MIN: CPT | Mod: 25,S$GLB,, | Performed by: NURSE PRACTITIONER

## 2024-10-22 PROCEDURE — 1126F AMNT PAIN NOTED NONE PRSNT: CPT | Mod: CPTII,S$GLB,, | Performed by: NURSE PRACTITIONER

## 2024-10-22 PROCEDURE — 99999 PR PBB SHADOW E&M-EST. PATIENT-LVL V: CPT | Mod: PBBFAC,,, | Performed by: NURSE PRACTITIONER

## 2024-10-22 PROCEDURE — 1101F PT FALLS ASSESS-DOCD LE1/YR: CPT | Mod: CPTII,S$GLB,, | Performed by: NURSE PRACTITIONER

## 2024-10-22 PROCEDURE — 3288F FALL RISK ASSESSMENT DOCD: CPT | Mod: CPTII,S$GLB,, | Performed by: NURSE PRACTITIONER

## 2024-10-22 PROCEDURE — 90471 IMMUNIZATION ADMIN: CPT | Mod: S$GLB,,, | Performed by: NURSE PRACTITIONER

## 2024-10-22 RX ORDER — PHENAZOPYRIDINE HYDROCHLORIDE 100 MG/1
100 TABLET, FILM COATED ORAL
Qty: 21 TABLET | Refills: 0 | Status: SHIPPED | OUTPATIENT
Start: 2024-10-22 | End: 2024-10-29

## 2024-10-22 RX ORDER — SULFAMETHOXAZOLE AND TRIMETHOPRIM 800; 160 MG/1; MG/1
1 TABLET ORAL 2 TIMES DAILY
Qty: 14 TABLET | Refills: 0 | Status: SHIPPED | OUTPATIENT
Start: 2024-10-22

## 2024-10-22 NOTE — TELEPHONE ENCOUNTER
----- Message from Leeann sent at 10/22/2024  9:29 AM CDT -----  Contact: Patient, [phone]  [email or web address]     Patient is calling for Medical Advice regarding: UTI    How long has patient had these symptoms: Since Sunday    Pharmacy name and phone#:   [id number] - [place] - [address] [facility] [address] [facility]  [address]  [place] [address]  Phone: [phone] Fax: [phone]    Patient wants a call back or thru [facility]: Call back    Comments: Calling for an appointment, none available. Please call her. Thanks.    Please advise patient replies from provider may take up to 48 hours.

## 2024-10-22 NOTE — TELEPHONE ENCOUNTER
I have signed for the following orders AND/OR meds.  Please call the patient and ask the patient to schedule the testing AND/OR inform about any medications that were sent. Medications have been sent to pharmacy listed below        Medications Ordered This Encounter   Medications    phenazopyridine (PYRIDIUM) 100 MG tablet     Sig: Take 1 tablet (100 mg total) by mouth 3 (three) times daily with meals. for 7 days     Dispense:  21 tablet     Refill:  0     [facility][id number] - [place], [place] - [address] [facility] [address]  [place] [address]  Phone: [phone] Fax: [phone]

## 2024-10-22 NOTE — PROGRESS NOTES
Assessment/Plan:  Problem List Items Addressed This Visit    None  Visit Diagnoses       Urinary tract infection without hematuria, site unspecified    -  Primary    Relevant Medications    sulfamethoxazole-trimethoprim 800-160mg (BACTRIM DS) 800-160 mg Tab    Dysuria        Relevant Orders    Urinalysis, Reflex to Urine Culture Urine, Clean Catch (Completed)    Immunization due        Relevant Medications    influenza (adjuvanted) (Fluad) 45 mcg/0.5 mL IM vaccine (> or = 66 yo) 0.5 mL (Completed)        U/A positive for infection  Start ABX as prescribed  Will send for culture  Increase hydration with water  Follow up if symptoms worsen or fail to improve.  ER precautions for severe or worsening symptoms.     Lakeisha Rice, ISRRAEL  _____________________________________________________________________________________________________________________________________________________    CC: UTI    HPI: Patient is an 89-year-old female who presents in clinic today as an established patient here for UTI. This is a new problem. The current episode started a couple days ago. The problem occurs every urination. The problem has been unchanged. The quality of the pain is described as burning and aching. The pain is mild. There has been no fever. There is no history of pyelonephritis. Associated symptoms include frequency and urgency. Pertinent negatives include no behavior changes, flank pain, chills, discharge, hematuria, hesitancy, nausea, sweats, vomiting, weight loss, bubble bath use, constipation, rash or withholding. She has tried nothing for the symptoms. There is no history of catheterization, genitourinary reflux, kidney stones, recurrent UTIs, a single kidney, STD, urinary stasis or a urological procedure. she notes that she has done really well with bactrim in the past. Denies recent ABX.    Patient desires to receive flu vaccine today.    BMP  Lab Results   Component Value Date     08/07/2024    K 4.5 08/07/2024     CL 99 08/07/2024    CO2 32 (H) 08/07/2024    BUN 16 08/07/2024    CREATININE 1.0 08/07/2024    CALCIUM 10.4 08/07/2024    ANIONGAP 7 (L) 08/07/2024    EGFRNORACEVR 54.9 (A) 08/07/2024     Past Medical History:  Past Medical History:   Diagnosis Date    Arthritis     Chronic pain 8/14/2013    Colon polyp     DDD (degenerative disc disease)     DDD (degenerative disc disease)     GERD (gastroesophageal reflux disease)     Hyperlipidemia     Hypertension     Osteopenia 3/2/2016    Primary hyperparathyroidism     Urinary incontinence 4/19/2021    She has urinary incontinence and she has used urogesic and detrol and premarin cream to help with this and it has. She will continue them.     Past Surgical History:   Procedure Laterality Date    APPENDECTOMY      CATARACT EXTRACTION W/  INTRAOCULAR LENS IMPLANT Left 05/16/2018    Dr Haywood    CATARACT EXTRACTION W/  INTRAOCULAR LENS IMPLANT Right 12/02/2020    Dr. Tirado    COLONOSCOPY  11/16/2016    Dr. Vickers; polyp removed and small ileocecal valve ulceration; Bx: tubular adenoma; benign ileal mucosa    COLONOSCOPY N/A 04/14/2022    Procedure: COLONOSCOPY;  Surgeon: Jagjit Aguirre MD;  Location: [facility];  Service: Endoscopy;  Laterality: N/A;    COLONOSCOPY W/ BIOPSIES  02/2011    repeat in 3 years    EYE SURGERY  2020    INTRAOCULAR PROSTHESES INSERTION Right 12/02/2020    Procedure: INSERTION, IOL PROSTHESIS;  Surgeon: Nava Tirado MD;  Location: [facility] OR [address] [place] [address];  Service: Ophthalmology;  Laterality: Right;    JOINT REPLACEMENT  yes    LUMBAR EPIDURAL INJECTION N/A 12/2019    Dr. Tejada     PARTIAL KNEE ARTHROPLASTY Right     Right    PHACOEMULSIFICATION OF CATARACT Right 12/02/2020    Procedure: PHACOEMULSIFICATION, CATARACT;  Surgeon: Nava Tirado MD;  Location: [facility] OR [address] [place] [address];  Service: Ophthalmology;  Laterality: Right;    SLEEVE GASTROPLASTY  02/08/2012    TONSILLECTOMY      TOTAL HIP ARTHROPLASTY Right     TOTAL KNEE  ARTHROPLASTY Left     UPPER GASTROINTESTINAL ENDOSCOPY       Review of patient's allergies indicates:   Allergen Reactions    Penicillins Swelling     Other reaction(s): Swelling    Formaldehyde Hives    Mercury Hives    Mercury (elemental) Hives    Penicillamine     Adhesive Rash     Other reaction(s): Rash  Other reaction(s): Rash    Adhesive tape-silicones Rash    Dilaudid [hydromorphone (bulk)] Itching    Hydromorphone Itching    Latex, natural rubber Hives     Social History     Tobacco Use    Smoking status: Former     Current packs/day: 0.00     Types: Cigarettes     Start date: 1954     Quit date: 1971     Years since quittin.8    Smokeless tobacco: Never    Tobacco comments:     less than 2-3 cigarettes per day   Substance Use Topics    Alcohol use: Not Currently     Comment: occasionally    Drug use: No     Family History   Problem Relation Name Age of Onset    Arthritis Mother Evonne     Cancer Mother Evonne              Breast cancer Mother Evonne     Arthritis Father Omari     Heart disease Father Omari     Hyperlipidemia Father Omari     Heart attack Father Omari     Hypertension Father Omari              Cancer Sister x2     Depression Sister x2     Cataracts Sister x2     Cancer Brother Rambo     Depression Brother Rambo     Diabetes Brother Rambo     Heart disease Brother Rambo     Hyperlipidemia Brother Rambo     Blindness Brother Rambo         dm    Retinal detachment Brother Rambo     Alcohol abuse Son Florencio     Birth defects Son Florencio     Diabetes Son Florencio     Intellectual disability Son Florencio     Arthritis Son Florencio     Hyperlipidemia Son Florencio     Hypertension Son Florencio     Learning disabilities Son Florencio     Mental illness Son Florencio     Cancer Maternal Aunt      Alcohol abuse Maternal Uncle uncle     Cancer Maternal Uncle uncle     Depression Maternal Uncle uncle     Cancer Paternal Aunt      Cancer Paternal Uncle      Arthritis Maternal Grandmother       Hypertension Maternal Grandmother      Alcohol abuse Maternal Grandfather uncle     Arthritis Maternal Grandfather uncle     Depression Maternal Grandfather uncle     Hypertension Maternal Grandfather uncle     Arthritis Paternal Grandmother      Arthritis Paternal Grandfather      HIV Son oldest     Glaucoma Son oldest     Alcohol abuse Son oldest     Arthritis Son oldest     Breast cancer Paternal Cousin 2nd     Arthritis Daughter Nafisa     Hearing loss Daughter Nafisa     Cancer Maternal Aunt Joleen              Cancer Maternal Uncle 5 uncles         4 , 1 living    Cancer Paternal Aunt liver cancer [place]              Cancer Paternal Uncle Ashley, liver cancer              Cancer Sister Tita colon, Timbo lung          , living     Cancer Brother Laith Napier          , living     COPD Son Fede     Diabetes Brother tavares Matthews diabetic              Hearing loss Daughter Korin     Amblyopia Neg Hx      Macular degeneration Neg Hx      Strabismus Neg Hx      Stroke Neg Hx      Thyroid disease Neg Hx      Colon cancer Neg Hx       Current Outpatient Medications on File Prior to Visit   Medication Sig Dispense Refill    ACETAMINOPHEN (TYLENOL EXTRA STRENGTH ORAL) Take 500 mg by mouth every 4 to 6 hours as needed.       blood pressure test kit-large Kit Use once daily as directed. 1 each 0    calcium citrate-vitamin D3 315-200 mg (CITRACAL+D) 315-200 mg-unit per tablet Take 1 tablet by mouth 2 (two) times daily.      CYANOCOBALAMIN, VITAMIN B-12, (VITAMIN B-12 ORAL) Take 1 tablet by mouth once daily. As directed      denosumab (PROLIA) 60 mg/mL Syrg Inject 60 mg into the skin every 6 (six) months.      DULoxetine (CYMBALTA) 60 MG capsule TAKE 1 CAPSULE(60 MG) BY MOUTH EVERY DAY 90 capsule 1    estradioL (VAGIFEM) 10 mcg Tab Place 1 tablet (10 mcg total) vaginally every Monday and Thursday. 24 tablet 3    ferrous  gluconate (FERGON) 324 MG tablet Take 1 tablet (324 mg total) by mouth daily with breakfast. 90 tablet 3    gabapentin (NEURONTIN) 300 MG capsule Take 1 capsule (300 mg total) by mouth 3 (three) times daily. 270 capsule 3    glucosamine sulfate 500 mg Tab Take 1 tablet by mouth once daily.      LACTOBACILLUS ACIDOPHILUS (PROBIOTIC ORAL) Take 1 tablet by mouth once daily.       losartan (COZAAR) 50 MG tablet Take 1 tablet (50 mg total) by mouth once daily. 90 tablet 2    melatonin 5 mg Cap Take 1 capsule by mouth nightly as needed.      methen-m.blue-s.phos-phsal-hyo (URIBEL) 118-10-40.8-36 mg Cap Take 1 cap twice a week. 24 capsule 3    metoprolol tartrate (LOPRESSOR) 50 MG tablet Take 1 tablet (50 mg total) by mouth 2 (two) times daily. 180 tablet 3    MULTIVITAMIN ORAL Take 1 tablet by mouth once daily.       nystatin (NYSTOP) powder Apply topically 3 (three) times daily as needed (rash). Give 8 bottles at a time 8 each 11    phenazopyridine (PYRIDIUM) 100 MG tablet Please specify directions, refills and quantity 6 tablet 11    tolterodine (DETROL LA) 4 MG 24 hr capsule TAKE 1 CAPSULE(4 MG) BY MOUTH EVERY DAY 90 capsule 3    vit C,V-Cx-avyfb-lutein-zeaxan (PRESERVISION AREDS-2) 570-346-27-1 mg-unit-mg-mg Cap Take 1 capsule by mouth 2 (two) times a day.       No current facility-administered medications on file prior to visit.     Review of Systems   Constitutional:  Negative for appetite change, chills, fatigue and fever.   HENT:  Negative for congestion, rhinorrhea and sore throat.    Eyes:  Negative for visual disturbance.   Respiratory:  Negative for cough and shortness of breath.    Cardiovascular:  Negative for chest pain, palpitations and leg swelling.   Gastrointestinal:  Negative for abdominal pain, diarrhea and vomiting.   Genitourinary:  Positive for dysuria, frequency and urgency. Negative for difficulty urinating and hematuria.   Musculoskeletal:  Negative for arthralgias and myalgias.   Skin:   "Negative for rash and wound.   Neurological:  Negative for dizziness and headaches.   Psychiatric/Behavioral:  Negative for behavioral problems. The patient is not nervous/anxious.      Vitals:    10/22/24 1503   BP: 120/72   Pulse: 60   Resp: 16   Temp: 97.9 °F (36.6 °C)   TempSrc: Oral   SpO2: 95%   Weight: 85.8 kg (189 lb 1.6 oz)   Height: 5' 2" (1.575 m)     Wt Readings from Last 3 Encounters:   10/22/24 85.8 kg (189 lb 1.6 oz)   05/01/24 85.3 kg (188 lb)   03/23/24 89.4 kg (197 lb)     Physical Exam  Vitals reviewed.   Constitutional:       General: She is not in acute distress.     Appearance: Normal appearance. She is not ill-appearing.   HENT:      Head: Normocephalic and atraumatic.      Right Ear: External ear normal.      Left Ear: External ear normal.      Nose: Nose normal.   Eyes:      Extraocular Movements: Extraocular movements intact.      Conjunctiva/sclera: Conjunctivae normal.   Cardiovascular:      Rate and Rhythm: Normal rate.      Heart sounds: Normal heart sounds.   Pulmonary:      Effort: Pulmonary effort is normal. No respiratory distress.      Breath sounds: Normal breath sounds.   Musculoskeletal:         General: Normal range of motion.      Cervical back: Normal range of motion.   Skin:     General: Skin is warm and dry.      Capillary Refill: Capillary refill takes less than 2 seconds.      Coloration: Skin is not pale.      Findings: No rash.   Neurological:      General: No focal deficit present.      Mental Status: She is alert and oriented to person, place, and time. Mental status is at baseline.   Psychiatric:         Mood and Affect: Mood normal.         Speech: Speech normal. Speech is not rapid and pressured, delayed or slurred.         Behavior: Behavior normal. Behavior is not agitated, slowed, aggressive, withdrawn, hyperactive or combative. Behavior is cooperative.         Thought Content: Thought content normal.         Judgment: Judgment normal.       Health Maintenance "   Topic Date Due    TETANUS VACCINE  06/07/2031    Shingles Vaccine  Completed    Lipid Panel  Discontinued     DISCLAIMER: This note was compiled by using a speech recognition dictation system and therefore please be aware that typographical / speech recognition errors can and do occur.  Please contact me if you see any errors specifically.  Consent was obtained for [employer] recording system prior to the visit.

## 2024-10-22 NOTE — TELEPHONE ENCOUNTER
----- Message from Joeyisaacginette sent at 10/22/2024  4:41 PM CDT -----  Contact: Marysol  Type:  RX Refill Request    Who Called: Marysol  Refill or New Rx:refill  RX Name and Strength:phenazopyridine (PYRIDIUM) 100 MG tablet   How is the patient currently taking it? (ex. 1XDay):  Is this a 30 day or 90 day RX:  Preferred Pharmacy with phone number:  [facility] #[id number] - [place], [place] - [address] AT [place] [address] [address] [place] [address]  [place] [address]  Phone: [phone] Fax: [phone]    Local or Mail Order:local  Ordering Provider:freedom munoz  Would the patient rather a call back or a response via [facility]? call  Best Call Back Number:[phone]  Additional Information:   [see HPI] : see HPI [Negative] : Neurological

## 2024-10-24 LAB — BACTERIA UR CULT: NORMAL

## 2024-11-14 ENCOUNTER — OFFICE VISIT (OUTPATIENT)
Dept: PODIATRY | Facility: CLINIC | Age: 89
End: 2024-11-14
Payer: COMMERCIAL

## 2024-11-14 VITALS — HEIGHT: 62 IN | BODY MASS INDEX: 34.4 KG/M2 | WEIGHT: 186.94 LBS

## 2024-11-14 DIAGNOSIS — M79.671 RIGHT FOOT PAIN: ICD-10-CM

## 2024-11-14 DIAGNOSIS — I73.00 RAYNAUD'S PHENOMENON WITHOUT GANGRENE: Primary | ICD-10-CM

## 2024-11-14 PROCEDURE — 1159F MED LIST DOCD IN RCRD: CPT | Mod: CPTII,S$GLB,,

## 2024-11-14 PROCEDURE — 1125F AMNT PAIN NOTED PAIN PRSNT: CPT | Mod: CPTII,S$GLB,,

## 2024-11-14 PROCEDURE — 1101F PT FALLS ASSESS-DOCD LE1/YR: CPT | Mod: CPTII,S$GLB,,

## 2024-11-14 PROCEDURE — 99213 OFFICE O/P EST LOW 20 MIN: CPT | Mod: S$GLB,,,

## 2024-11-14 PROCEDURE — 3288F FALL RISK ASSESSMENT DOCD: CPT | Mod: CPTII,S$GLB,,

## 2024-11-14 PROCEDURE — 1160F RVW MEDS BY RX/DR IN RCRD: CPT | Mod: CPTII,S$GLB,,

## 2024-11-14 PROCEDURE — 99999 PR PBB SHADOW E&M-EST. PATIENT-LVL III: CPT | Mod: PBBFAC,,,

## 2024-11-14 NOTE — PROGRESS NOTES
"  [address] [facility] [address]  [place] [address]  Phone: [phone]   [phone]    Patient's PCP:Aguilar Vickers MD  Referring Provider: Aaareferral Self    Subjective:      Chief Complaint:: Foot Problem     HPI  Marysol Lyles is a 89 y.o. female who presents today with a complaint of bilateral foot pain and discoloration. The current episode started about 2 weeks . The symptoms include discoloration, foot/toes become purple, tenderness at tip of the toe and medial side. Probable cause of complaint unknown. The symptoms are aggravated by pressur. The problem has stayed the same. Treatment to date have included elevation which provided some relief.       Vitals:    11/14/24 1407   Weight: 84.8 kg (186 lb 15.2 oz)   Height: 5' 2" (1.575 m)   PainSc:   3      Shoe Size: 9    Past Surgical History:   Procedure Laterality Date    APPENDECTOMY      CATARACT EXTRACTION W/  INTRAOCULAR LENS IMPLANT Left 05/16/2018    Dr Haywood    CATARACT EXTRACTION W/  INTRAOCULAR LENS IMPLANT Right 12/02/2020    Dr. Tirado    COLONOSCOPY  11/16/2016    Dr. Vickers; polyp removed and small ileocecal valve ulceration; Bx: tubular adenoma; benign ileal mucosa    COLONOSCOPY N/A 04/14/2022    Procedure: COLONOSCOPY;  Surgeon: Jagjit Aguirre MD;  Location: [facility];  Service: Endoscopy;  Laterality: N/A;    COLONOSCOPY W/ BIOPSIES  02/2011    repeat in 3 years    EYE SURGERY  2020    INTRAOCULAR PROSTHESES INSERTION Right 12/02/2020    Procedure: INSERTION, IOL PROSTHESIS;  Surgeon: Nava Tirado MD;  Location: [address];  Service: Ophthalmology;  Laterality: Right;    JOINT REPLACEMENT  yes    LUMBAR EPIDURAL INJECTION N/A 12/2019    Dr. Tejada     PARTIAL KNEE ARTHROPLASTY Right     Right    PHACOEMULSIFICATION OF CATARACT Right 12/02/2020    Procedure: PHACOEMULSIFICATION, CATARACT;  Surgeon: Nava Tirado MD;  Location: [facility] OR [address] [place] [address];  Service: Ophthalmology;  Laterality: Right;    SLEEVE " GASTROPLASTY  2012    TONSILLECTOMY      TOTAL HIP ARTHROPLASTY Right     TOTAL KNEE ARTHROPLASTY Left     UPPER GASTROINTESTINAL ENDOSCOPY       Past Medical History:   Diagnosis Date    Arthritis     Chronic pain 2013    Colon polyp     DDD (degenerative disc disease)     DDD (degenerative disc disease)     GERD (gastroesophageal reflux disease)     Hyperlipidemia     Hypertension     Osteopenia 3/2/2016    Primary hyperparathyroidism     Urinary incontinence 2021    She has urinary incontinence and she has used urogesic and detrol and premarin cream to help with this and it has. She will continue them.     Family History   Problem Relation Name Age of Onset    Arthritis Mother Evonne     Cancer Mother Evonne              Breast cancer Mother Evonne     Arthritis Father Omari     Heart disease Father Omari     Hyperlipidemia Father Omari     Heart attack Father Omari     Hypertension Father Omari              Cancer Sister x2     Depression Sister x2     Cataracts Sister x2     Cancer Brother Rambo     Depression Brother Rambo     Diabetes Brother Rambo     Heart disease Brother Rambo     Hyperlipidemia Brother Rambo     Blindness Brother Rambo         dm    Retinal detachment Brother Rambo     Alcohol abuse Son Florencio     Birth defects Son Florencio     Diabetes Son Florencio     Intellectual disability Son Florencio     Arthritis Son Florencio     Hyperlipidemia Son Florencio     Hypertension Son Florencio     Learning disabilities Son Florencio     Mental illness Son Florencio     Cancer Maternal Aunt      Alcohol abuse Maternal Uncle uncle     Cancer Maternal Uncle uncle     Depression Maternal Uncle uncle     Cancer Paternal Aunt      Cancer Paternal Uncle      Arthritis Maternal Grandmother      Hypertension Maternal Grandmother      Alcohol abuse Maternal Grandfather uncle     Arthritis Maternal Grandfather uncle     Depression Maternal Grandfather uncle     Hypertension Maternal Grandfather uncle      Arthritis Paternal Grandmother      Arthritis Paternal Grandfather      HIV Son oldest     Glaucoma Son oldest     Alcohol abuse Son oldest     Arthritis Son oldest     Breast cancer Paternal Cousin 2nd     Arthritis Daughter Nafisa     Hearing loss Daughter Nafisa     Cancer Maternal Aunt Joleen              Cancer Maternal Uncle 5 uncles         4 , 1 living    Cancer Paternal Aunt liver cancer [place]              Cancer Paternal Uncle Ashley, liver cancer              Cancer Sister Tita colon, [place] lung          , living 2013    Cancer Brother Laith Napier          , living     COPD Son Fede     Diabetes Brother tavares Matthews diabetic              Hearing loss Daughter Korin     Amblyopia Neg Hx      Macular degeneration Neg Hx      Strabismus Neg Hx      Stroke Neg Hx      Thyroid disease Neg Hx      Colon cancer Neg Hx          Social History:   Marital Status:   Alcohol History:  reports that she does not currently use alcohol.  Tobacco History:  reports that she quit smoking about 54 years ago. Her smoking use included cigarettes. She started smoking about 71 years ago. She has never used smokeless tobacco.  Drug History:  reports no history of drug use.    Review of patient's allergies indicates:   Allergen Reactions    Penicillins Swelling     Other reaction(s): Swelling    Formaldehyde Hives    Mercury Hives    Mercury (elemental) Hives    Penicillamine     Adhesive Rash     Other reaction(s): Rash  Other reaction(s): Rash    Adhesive tape-silicones Rash    Dilaudid [hydromorphone (bulk)] Itching    Hydromorphone Itching    Latex, natural rubber Hives       Current Outpatient Medications   Medication Sig Dispense Refill    ACETAMINOPHEN (TYLENOL EXTRA STRENGTH ORAL) Take 500 mg by mouth every 4 to 6 hours as needed.       blood pressure test kit-large Kit Use once daily as directed. 1 each 0    calcium  citrate-vitamin D3 315-200 mg (CITRACAL+D) 315-200 mg-unit per tablet Take 1 tablet by mouth 2 (two) times daily.      CYANOCOBALAMIN, VITAMIN B-12, (VITAMIN B-12 ORAL) Take 1 tablet by mouth once daily. As directed      denosumab (PROLIA) 60 mg/mL Syrg Inject 60 mg into the skin every 6 (six) months.      DULoxetine (CYMBALTA) 60 MG capsule TAKE 1 CAPSULE(60 MG) BY MOUTH EVERY DAY 90 capsule 1    estradioL (VAGIFEM) 10 mcg Tab Place 1 tablet (10 mcg total) vaginally every Monday and Thursday. 24 tablet 3    ferrous gluconate (FERGON) 324 MG tablet Take 1 tablet (324 mg total) by mouth daily with breakfast. 90 tablet 3    gabapentin (NEURONTIN) 300 MG capsule Take 1 capsule (300 mg total) by mouth 3 (three) times daily. 270 capsule 3    glucosamine sulfate 500 mg Tab Take 1 tablet by mouth once daily.      LACTOBACILLUS ACIDOPHILUS (PROBIOTIC ORAL) Take 1 tablet by mouth once daily.       losartan (COZAAR) 50 MG tablet Take 1 tablet (50 mg total) by mouth once daily. 90 tablet 2    melatonin 5 mg Cap Take 1 capsule by mouth nightly as needed.      methen-m.blue-s.phos-phsal-hyo (URIBEL) 118-10-40.8-36 mg Cap Take 1 cap twice a week. 24 capsule 3    metoprolol tartrate (LOPRESSOR) 50 MG tablet Take 1 tablet (50 mg total) by mouth 2 (two) times daily. 180 tablet 3    MULTIVITAMIN ORAL Take 1 tablet by mouth once daily.       nystatin (NYSTOP) powder Apply topically 3 (three) times daily as needed (rash). Give 8 bottles at a time 8 each 11    sulfamethoxazole-trimethoprim 800-160mg (BACTRIM DS) 800-160 mg Tab Take 1 tablet by mouth 2 (two) times daily. 14 tablet 0    tolterodine (DETROL LA) 4 MG 24 hr capsule TAKE 1 CAPSULE(4 MG) BY MOUTH EVERY DAY 90 capsule 3    vit C,H-Rc-ceyfk-lutein-zeaxan (PRESERVISION AREDS-2) 834-778-08-1 mg-unit-mg-mg Cap Take 1 capsule by mouth 2 (two) times a day.      apixaban (ELIQUIS) 5 mg Tab Take 1 tablet (5 mg total) by mouth 2 (two) times daily. 60 tablet 2    atorvastatin (LIPITOR)  40 MG tablet Take 1 tablet (40 mg total) by mouth once daily. 90 tablet 2     No current facility-administered medications for this visit.       Review of Systems   Constitutional:  Negative for chills, fatigue, fever and unexpected weight change.   HENT:  Negative for hearing loss and trouble swallowing.    Eyes:  Negative for photophobia and visual disturbance.   Respiratory:  Negative for cough, shortness of breath and wheezing.    Cardiovascular:  Negative for chest pain, palpitations and leg swelling.   Gastrointestinal:  Negative for abdominal pain and nausea.   Genitourinary:  Negative for dysuria and frequency.   Musculoskeletal:  Negative for arthralgias, back pain, gait problem, joint swelling, myalgias and neck pain.   Skin:  Positive for color change. Negative for rash and wound.   Neurological:  Negative for tremors, seizures, weakness, numbness and headaches.   Hematological:  Does not bruise/bleed easily.         Objective:        Physical Exam:   Foot Exam    General  General Appearance: appears stated age and healthy   Orientation: alert and oriented to person, place, and time   Affect: appropriate   Gait: unimpaired       Right Foot/Ankle     Inspection and Palpation  Ecchymosis: none  Tenderness: (distal medial great toe irritated)  Swelling: none   Arch: normal  Hammertoes: absent  Claw Toes: absent  Hallux valgus: no  Hallux limitus: no  Skin Exam: skin intact; no abnormal color (normal temp, turgor, and texture. Scant digital hair noted)  Neurovascular  Dorsalis pedis: 2+  Posterior tibial: 2+  Capillary Refill: 3+  Saphenous nerve sensation: normal  Tibial nerve sensation: normal  Superficial peroneal nerve sensation: normal  Deep peroneal nerve sensation: normal  Sural nerve sensation: normal  Achilles reflex: 2+  Babinski reflex: 2+    Muscle Strength  Ankle dorsiflexion: 5  Ankle plantar flexion: 5  Ankle inversion: 5  Ankle eversion: 5  Great toe extension: 5  Great toe flexion: 5    Range  of Motion    Normal right ankle ROM    Tests  Verma squeeze: negative   PT Tinel's sign: negative    Paresthesia: negative  Valleix sign: negative  Comments  No findings of discoloration on today's exam     Left Foot/Ankle      Inspection and Palpation  Ecchymosis: none  Tenderness: (distal medial great toe irritated)  Swelling: none   Arch: normal  Hammertoes: absent  Claw toes: absent  Hallux valgus: no  Hallux limitus: no  Skin Exam: skin intact; no abnormal color (normal temp, turgor, and texture, Scant digital hair noted)  Neurovascular  Dorsalis pedis: 2+  Posterior tibial: 2+  Capillary refill: 3+  Saphenous nerve sensation: normal  Tibial nerve sensation: normal  Superficial peroneal nerve sensation: normal  Deep peroneal nerve sensation: normal  Sural nerve sensation: normal  Achilles reflex: 2+  Babinski reflex: 2+    Muscle Strength  Ankle dorsiflexion: 5  Ankle plantar flexion: 5  Ankle inversion: 5  Ankle eversion: 5  Great toe extension: 5  Great toe flexion: 5    Range of Motion    Normal left ankle ROM    Tests  Verma squeeze: negative   PT Tinel's sign: negative  Paresthesia: negative  Comments  No findings of discoloration on today's exam     Imaging: None         Assessment:       1. Raynaud's phenomenon without gangrene    2. Right foot pain      Plan:   Raynaud's phenomenon without gangrene    Right foot pain        Subjective symptoms consistent with Raynaud's phenomenon, also known as Raynaud's syndrome, is a condition that causes reduced blood flow to the extremities due to spasms in the small arteries. Asymptomatic, no concern for gangrene.      Pedal pulses palpable with brisk cap refill    Inappropriate shoe size, recommend increasing 1/2 size and getting shoes with wide toe box     Follow up PRN     Counseling:     I provided patient education verbally regarding: Patient diagnosis, treatment options, as well as alternatives, risks, and benefits.     This note was created using  Dragon voice recognition software that occasionally misinterpreted phrases or words.

## 2024-12-09 ENCOUNTER — TELEPHONE (OUTPATIENT)
Dept: CARDIOLOGY | Facility: CLINIC | Age: 89
End: 2024-12-09
Payer: COMMERCIAL

## 2024-12-09 ENCOUNTER — TELEPHONE (OUTPATIENT)
Dept: NEUROLOGY | Facility: CLINIC | Age: 89
End: 2024-12-09
Payer: COMMERCIAL

## 2024-12-09 ENCOUNTER — PATIENT MESSAGE (OUTPATIENT)
Dept: FAMILY MEDICINE | Facility: CLINIC | Age: 89
End: 2024-12-09
Payer: COMMERCIAL

## 2024-12-09 NOTE — TELEPHONE ENCOUNTER
----- Message from Marybeth sent at 12/9/2024  9:32 AM CST -----  Regarding: [facility] Appointment Request  Contact: Nafisa hernandez at [phone]  Type:  Sooner Appointment Request    Name of Caller:  Nafisa hernandez at [phone]    When is the first available appointment?  none  Symptoms:  Hospital follow up, Stroke on 12/2 ([facility] Dr. Landa-discharged 12/4)    Additional Information:  Please call and advise. Thank you

## 2024-12-09 NOTE — TELEPHONE ENCOUNTER
Spoke with patients daughter and offered next available in Register with Kady castanon. Daughter states she only wants to see a doctor. Scheduled for 2 pm on 1/9/25 with Amairani Luu at [facility].

## 2024-12-09 NOTE — TELEPHONE ENCOUNTER
----- Message from Marybeth sent at 12/9/2024  9:38 AM CST -----  Regarding: Sooner Appointment Request  Contact: luciano, daughter, at [phone]  Type:  Sooner Appointment Request    Name of Caller:  luciano, daughter, at [phone]    When is the first available appointment?  1/08/2025  Symptoms:  Hospital follow up, stroke 12/2([facility])    Additional Information:  requesting to see Dr. Marlow,  was a patient, Ricardo Lyles. Please call and advise. Thank you

## 2024-12-11 ENCOUNTER — OFFICE VISIT (OUTPATIENT)
Dept: NEUROLOGY | Facility: CLINIC | Age: 89
End: 2024-12-11
Payer: COMMERCIAL

## 2024-12-11 VITALS
BODY MASS INDEX: 34.4 KG/M2 | DIASTOLIC BLOOD PRESSURE: 71 MMHG | HEIGHT: 62 IN | HEART RATE: 63 BPM | WEIGHT: 186.94 LBS | SYSTOLIC BLOOD PRESSURE: 112 MMHG

## 2024-12-11 DIAGNOSIS — Z86.73 HISTORY OF ISCHEMIC LEFT MCA STROKE: Primary | ICD-10-CM

## 2024-12-11 DIAGNOSIS — I48.19 PERSISTENT ATRIAL FIBRILLATION: ICD-10-CM

## 2024-12-11 DIAGNOSIS — I10 PRIMARY HYPERTENSION: ICD-10-CM

## 2024-12-11 DIAGNOSIS — Z79.01 CURRENT USE OF LONG TERM ANTICOAGULATION: ICD-10-CM

## 2024-12-11 PROCEDURE — 99205 OFFICE O/P NEW HI 60 MIN: CPT | Mod: S$GLB,,, | Performed by: PSYCHIATRY & NEUROLOGY

## 2024-12-11 PROCEDURE — 3288F FALL RISK ASSESSMENT DOCD: CPT | Mod: CPTII,S$GLB,, | Performed by: PSYCHIATRY & NEUROLOGY

## 2024-12-11 PROCEDURE — 1159F MED LIST DOCD IN RCRD: CPT | Mod: CPTII,S$GLB,, | Performed by: PSYCHIATRY & NEUROLOGY

## 2024-12-11 PROCEDURE — 99999 PR PBB SHADOW E&M-EST. PATIENT-LVL IV: CPT | Mod: PBBFAC,,, | Performed by: PSYCHIATRY & NEUROLOGY

## 2024-12-11 PROCEDURE — 1101F PT FALLS ASSESS-DOCD LE1/YR: CPT | Mod: CPTII,S$GLB,, | Performed by: PSYCHIATRY & NEUROLOGY

## 2024-12-11 PROCEDURE — 1126F AMNT PAIN NOTED NONE PRSNT: CPT | Mod: CPTII,S$GLB,, | Performed by: PSYCHIATRY & NEUROLOGY

## 2024-12-11 RX ORDER — CLOPIDOGREL BISULFATE 75 MG/1
75 TABLET ORAL DAILY
COMMUNITY
Start: 2024-12-05 | End: 2024-12-26

## 2024-12-11 RX ORDER — ATORVASTATIN CALCIUM 40 MG/1
40 TABLET, FILM COATED ORAL DAILY
Qty: 90 TABLET | Refills: 2 | Status: SHIPPED | OUTPATIENT
Start: 2024-12-11 | End: 2025-12-11

## 2024-12-11 RX ORDER — ATORVASTATIN CALCIUM 40 MG/1
40 TABLET, FILM COATED ORAL DAILY
COMMUNITY
Start: 2024-12-05 | End: 2024-12-11 | Stop reason: SDUPTHER

## 2024-12-11 NOTE — PROGRESS NOTES
Vascular Neurology  Clinic Note    Reason For Visit (Chief Complaint): L-MCA stroke    HPI: 89 y.o. right handed woman with PMH HTN, hyperparathyroid, gastric sleeve  with recent admission for L-MCA stroke here for followup after hospitalization.    Patient presented on 12/2/24 to [place] with aphasia and R hand wkns.  Was taken to ER immediately by son when symptoms started at home.  Symptoms only lasted a few minutes.  MRI showed L parietal cortical infarct.  New diagnosis of afib and reduced EF noted.  She was started on Eliquis 2.5 mg bid and Plavix.    Doing well with no recurrent symptoms.  No falls.  No issues on Eliquis.  Unclear why started on 2.5 mg bid (weight >60 kg, good renal function).    Has been having brief headaches that wake her up from sleep, occipital/parietal region.  Tylenol helps or going back to sleep helps.      Brain Imaging:  Reports only  MRI Brain 12/2/24:  Focal acute cortical infarct involving the left posterior temporal parietal lobe measuring approximately 2 cm.   Moderate subcortical and periventricular chronic microvascular ischemic changes with age-related atrophy not unusual for stated age     CTA H/N 12/2/24:  Moderate severe bilateral carotid bifurcation atherosclerotic plaque may be hemodynamically significant.  Recommend correlation to carotid ultrasound   Multiple thyroid lesions.  Recommend nonemergent thyroid ultrasound   Mild atherosclerotic plaque of the cavernous clinoid internal carotid artery.  No other hemodynamically significant stenosis is identified.       Cardiac Evaluation:  EKG 12/3/24:  afib    TTE 12/3/24:  The study was technically adequate.   Ejection Fraction = 45-50%.   Left ventricular systolic function is mildly reduced.   There is moderate tricuspid regurgitation.   The study was technically adequate.         Relevant Labwork:  Recent Labs   Lab 12/03/24  0305   Hemoglobin A1C 5.6   LDL Calculated 112 H   HDL 55   Triglycerides 75   Cholesterol  182       I independently viewed the above imaging studies in addition to reviewing the report.  I reviewed the above labwork.    Review of Systems  Msk: negative for muscle pain  Skin: negative for pruritis  Neuro: negative for headache  All others negative    Past Medical History  Past Medical History:   Diagnosis Date    Arthritis     Chronic pain 8/14/2013    Colon polyp     DDD (degenerative disc disease)     DDD (degenerative disc disease)     GERD (gastroesophageal reflux disease)     Hyperlipidemia     Hypertension     Osteopenia 3/2/2016    Primary hyperparathyroidism     Urinary incontinence 4/19/2021    She has urinary incontinence and she has used urogesic and detrol and premarin cream to help with this and it has. She will continue them.     Family History  Mother with breast CA    Social History  Remote smoker.  Son lives with her; daughter 2 doors down.       Medication List with Changes/Refills   Current Medications    ACETAMINOPHEN (TYLENOL EXTRA STRENGTH ORAL)    Take 500 mg by mouth every 4 to 6 hours as needed.     BLOOD PRESSURE TEST KIT-LARGE KIT    Use once daily as directed.    CALCIUM CITRATE-VITAMIN D3 315-200 MG (CITRACAL+D) 315-200 MG-UNIT PER TABLET    Take 1 tablet by mouth 2 (two) times daily.    CLOPIDOGREL (PLAVIX) 75 MG TABLET    Take 75 mg by mouth once daily.    CYANOCOBALAMIN, VITAMIN B-12, (VITAMIN B-12 ORAL)    Take 1 tablet by mouth once daily. As directed    DENOSUMAB (PROLIA) 60 MG/ML SYRG    Inject 60 mg into the skin every 6 (six) months.    DULOXETINE (CYMBALTA) 60 MG CAPSULE    TAKE 1 CAPSULE(60 MG) BY MOUTH EVERY DAY    ESTRADIOL (VAGIFEM) 10 MCG TAB    Place 1 tablet (10 mcg total) vaginally every Monday and Thursday.    FERROUS GLUCONATE (FERGON) 324 MG TABLET    Take 1 tablet (324 mg total) by mouth daily with breakfast.    GABAPENTIN (NEURONTIN) 300 MG CAPSULE    Take 1 capsule (300 mg total) by mouth 3 (three) times daily.    GLUCOSAMINE SULFATE 500 MG TAB    Take  "1 tablet by mouth once daily.    LACTOBACILLUS ACIDOPHILUS (PROBIOTIC ORAL)    Take 1 tablet by mouth once daily.     LOSARTAN (COZAAR) 50 MG TABLET    Take 1 tablet (50 mg total) by mouth once daily.    MELATONIN 5 MG CAP    Take 1 capsule by mouth nightly as needed.    METHEN-M.BLUE-S.PHOS-PHSAL-HYO (URIBEL) 118-10-40.8-36 MG CAP    Take 1 cap twice a week.    METOPROLOL TARTRATE (LOPRESSOR) 50 MG TABLET    Take 1 tablet (50 mg total) by mouth 2 (two) times daily.    MULTIVITAMIN ORAL    Take 1 tablet by mouth once daily.     NYSTATIN (NYSTOP) POWDER    Apply topically 3 (three) times daily as needed (rash). Give 8 bottles at a time    SULFAMETHOXAZOLE-TRIMETHOPRIM 800-160MG (BACTRIM DS) 800-160 MG TAB    Take 1 tablet by mouth 2 (two) times daily.    TOLTERODINE (DETROL LA) 4 MG 24 HR CAPSULE    TAKE 1 CAPSULE(4 MG) BY MOUTH EVERY DAY    VIT C,C-EX-HUXOP-LUTEIN-ZEAXAN (PRESERVISION AREDS-2) 481-340-26-1 MG-UNIT-MG-MG CAP    Take 1 capsule by mouth 2 (two) times a day.   Changed and/or Refilled Medications    Modified Medication Previous Medication    APIXABAN (ELIQUIS) 5 MG TAB apixaban (ELIQUIS) 2.5 mg Tab       Take 1 tablet (5 mg total) by mouth 2 (two) times daily.    Take 2.5 mg by mouth 2 (two) times daily.    ATORVASTATIN (LIPITOR) 40 MG TABLET atorvastatin (LIPITOR) 40 MG tablet       Take 1 tablet (40 mg total) by mouth once daily.    Take 40 mg by mouth once daily.       EXAM  Vital Signs:Blood pressure 112/71, pulse 63, height 5' 2" (1.575 m), weight 84.8 kg (186 lb 15.2 oz), last menstrual period 05/06/1994.  General: well appearing without discomfort   Neck: no carotid bruits  Ears: cerumen R ear, L TM clear  CV: irregular, nL S1&S2  Resp: breathing comfortably, no wheezing  Ext: wwp, no pedal edema    Mental Status: Alert and oriented, normal attention, speech fluent and prosodic, naming and repetition intact, follows multistep embedded commands, no e/o neglect or extinction  Cranial Nerves: HILTON, " EOMI, VFF, L beating nystagmus on L gaze, sensation intact, face symmetric, TUP midline, SCM/trap 5/5  Motor: Normal bulk and tone, no drift, finger taps symmetric  Strength 5/5 throughout  Sensory: intact light touch bilaterally, intact proprioception bilaterally  Coordination: no ataxia on finger-to-nose or heel-to-shin testing; no truncal ataxia  Gait & Stance: normal  DTR: 2+ symmetric    NIH Stroke Scale:    Level of Consciousness: 0 - alert  LOC Questions: 0 - answers both correctly  LOC Commands: 0 - performs both correctly  Best Gaze: 0 - normal  Visual: 0 - no visual loss  Facial Palsy: 0 - normal  Motor Left Arm: 0 - no drift  Motor Right Arm: 0 - no drift  Motor Left Le - no drift  Motor Right Le - no drift  Limb Ataxia: 0 - absent  Sensory: 0 - normal  Best Language: 0 - no aphasia  Dysarthria: 0 - normal articulation  Extinction and Inattention: 0 - no neglect  NIH Stroke Scale Total: 0        ___________________  ASSESSMENT & PLAN  89 y.o. right handed woman with PMH HTN, hyperparathyroid, gastric sleeve  with recent admission for L-MCA stroke here for followup after hospitalization.  No current deficits.  Suspect new-onset afib, however will obtain carotid dopplers given report of ?moderate/severe carotid plaque on CTA (degree of stenosis not reported).  Increasing Eliquis to 5 mg bid (does not meet geriatric dosing criteria).  Nocturnal HA likely hypnic -- continue to monitor.      - Continue Eliquis 5 mg bid for secondary stroke prevention.  Stop Plavix.  - Continue atorvastatin 40 mg qhs for HLD.  Goal LDL <70.  - Carotid U/S  - BP at goal.  Goal <130/80.  - F/U with cardiology for new onset afib / HFrEF  - Mediterranean Diet for stroke prevention  - Return to ED for any acute neurological symptoms  - RTC PRN    Problem List Items Addressed This Visit          Unprioritized    Hypertension    Persistent atrial fibrillation    History of ischemic left MCA stroke - Primary    Relevant Orders     CV Ultrasound Bilateral Doppler Carotid    Current use of long term anticoagulation        Amairani Luu MD  Vascular Neurology

## 2024-12-11 NOTE — PATIENT INSTRUCTIONS
- Increase Eliquis to 5 mg twice a day  - STOP Plavix (clopidogrel)  - Continue atorvastatin 40 mg nightly for cholesterol  - Blood pressure at goal  - Followup with Cardiology for new afib    Mediterranean Diet Recommendations    Eat primarily plant-based foods, such as fruits and vegetables, whole grains, legumes (beans) and nuts.  Limit refined carbohydrates (white pasta, bread, rice).  Replace butter with healthy fats such as olive oil.  Use herbs and spices instead of salt to flavor foods.  Limit red meat and processed meats to no more than a few times a month.  Avoid sugary sodas, bakery goods, and sweets.  Eat fish and poultry at least twice a week.  Get plenty of exercise (150 minutes per week).    Adopted from Marnie weeks al, NEJM, 2018.

## 2024-12-18 ENCOUNTER — OFFICE VISIT (OUTPATIENT)
Dept: OTOLARYNGOLOGY | Facility: CLINIC | Age: 89
End: 2024-12-18
Payer: COMMERCIAL

## 2024-12-18 DIAGNOSIS — L29.9 ITCHING OF EAR: ICD-10-CM

## 2024-12-18 DIAGNOSIS — H61.23 BILATERAL IMPACTED CERUMEN: Primary | ICD-10-CM

## 2024-12-18 PROCEDURE — 99203 OFFICE O/P NEW LOW 30 MIN: CPT | Mod: 25,S$GLB,, | Performed by: NURSE PRACTITIONER

## 2024-12-18 PROCEDURE — 69210 REMOVE IMPACTED EAR WAX UNI: CPT | Mod: 50,S$GLB,, | Performed by: NURSE PRACTITIONER

## 2024-12-18 PROCEDURE — 99999 PR PBB SHADOW E&M-EST. PATIENT-LVL I: CPT | Mod: PBBFAC,,, | Performed by: NURSE PRACTITIONER

## 2024-12-18 NOTE — PROGRESS NOTES
Subjective     Patient ID: Marysol Lyles is a 89 y.o. female.    Chief Complaint: No chief complaint on file.    HPI  Patient is new to ENT, self-referred for cerumen impaction. Patient states she had a minor stroke a few weeks ago, and when she was being evaluated, they found that she had a right-sided cerumen impaction. Patient reports TV volume increasingly louder. Patient saw ENT at [facility] Dr. ANTONINA Salgado one year ago for right cerumen impaction.     Review of Systems   Constitutional: Negative.    HENT: Negative.     Eyes: Negative.    Respiratory: Negative.     Cardiovascular: Negative.    Gastrointestinal: Negative.    Musculoskeletal: Negative.    Integumentary:  Negative.   Neurological: Negative.    Hematological: Negative.    Psychiatric/Behavioral: Negative.          Objective     Physical Exam  Vitals and nursing note reviewed.   Constitutional:       General: She is not in acute distress.     Appearance: She is well-developed. She is not ill-appearing or diaphoretic.   HENT:      Head: Normocephalic and atraumatic.      Right Ear: Hearing, tympanic membrane, ear canal and external ear normal. No middle ear effusion. Tympanic membrane is not erythematous.      Left Ear: Hearing, tympanic membrane, ear canal and external ear normal.  No middle ear effusion. Tympanic membrane is not erythematous.      Nose: Nose normal. No mucosal edema, congestion or rhinorrhea.      Mouth/Throat:      Mouth: Mucous membranes are not pale, not dry and not cyanotic. No oral lesions.      Tongue: No lesions.      Palate: No lesions.      Pharynx: Uvula midline. No oropharyngeal exudate or posterior oropharyngeal erythema.   Eyes:      General: Lids are normal. No scleral icterus.        Right eye: No discharge.         Left eye: No discharge.   Neck:      Thyroid: No thyroid mass or thyromegaly.      Trachea: Trachea normal. No tracheal deviation.   Cardiovascular:      Rate and Rhythm: Normal rate.   Pulmonary:       Effort: Pulmonary effort is normal. No respiratory distress.      Breath sounds: Normal air entry. No stridor. No wheezing.   Musculoskeletal:         General: Normal range of motion.      Cervical back: Normal range of motion and neck supple.   Lymphadenopathy:      Cervical: No cervical adenopathy.      Right cervical: No superficial or posterior cervical adenopathy.     Left cervical: No superficial or posterior cervical adenopathy.   Skin:     General: Skin is warm and dry.      Coloration: Skin is not pale.      Findings: No lesion or rash.   Neurological:      Mental Status: She is alert and oriented to person, place, and time.      Motor: Motor function is intact.      Coordination: Coordination is intact. Coordination normal.      Gait: Gait normal.   Psychiatric:         Attention and Perception: Attention normal.         Mood and Affect: Mood normal.         Speech: Speech normal.         Behavior: Behavior normal. Behavior is cooperative.     SEPARATE PROCEDURE IN OFFICE:   Procedure: Removal of impacted cerumen, bilateral   Pre Procedure Diagnosis: Cerumen Impaction   Post Procedure Diagnosis: Cerumen Impaction   Verbal informed consent in regards to risk of trauma to ear canal, ear drum or hearing, discomfort during procedure and/or inability to remove cerumen impaction in one session or unforeseen events or complications.   No anesthesia.     Procedure in detail:   Ear canal visualized bilateral with appropriate size ear speculum utilizing Operating Head Binocular Otomicroscope   Utilizing the following:  Ring curet, delicate alligator forceps used. The impacted cerumen of the ear canals was removed atraumatically. The TM and EAC were then inspected and found to be clear of wax. See description of TMs/EACs in PE above.   Complications: No   Condition: Improved/Good       Assessment and Plan     1. Bilateral impacted cerumen        Baby oil or mineral oil to ears prn aural pruritus.   Patient encouraged  to return to clinic if symptoms worsen/persist and as needed for further ENT symptoms or concerns.          No follow-ups on file.

## 2025-01-01 ENCOUNTER — PATIENT MESSAGE (OUTPATIENT)
Dept: CARDIOLOGY | Facility: CLINIC | Age: 89
End: 2025-01-01
Payer: COMMERCIAL

## 2025-01-01 ENCOUNTER — PATIENT MESSAGE (OUTPATIENT)
Dept: VASCULAR SURGERY | Facility: CLINIC | Age: 89
End: 2025-01-01
Payer: COMMERCIAL

## 2025-01-01 ENCOUNTER — PATIENT MESSAGE (OUTPATIENT)
Dept: FAMILY MEDICINE | Facility: CLINIC | Age: 89
End: 2025-01-01
Payer: COMMERCIAL

## 2025-01-01 DIAGNOSIS — Z95.828 PRESENCE OF INTERNAL CAROTID STENT: Primary | ICD-10-CM

## 2025-01-01 RX ORDER — PSEUDOEPHEDRINE HYDROCHLORIDE 60 MG/1
60 TABLET ORAL EVERY 4 HOURS PRN
Qty: 15 TABLET | Refills: 0 | Status: SHIPPED | OUTPATIENT
Start: 2025-01-01 | End: 2025-04-17

## 2025-01-03 ENCOUNTER — TELEPHONE (OUTPATIENT)
Dept: CARDIOLOGY | Facility: CLINIC | Age: 89
End: 2025-01-03
Payer: COMMERCIAL

## 2025-01-05 DIAGNOSIS — G62.9 NEUROPATHY: ICD-10-CM

## 2025-01-05 RX ORDER — DULOXETIN HYDROCHLORIDE 60 MG/1
CAPSULE, DELAYED RELEASE ORAL
Qty: 90 CAPSULE | Refills: 1 | Status: SHIPPED | OUTPATIENT
Start: 2025-01-05

## 2025-01-05 NOTE — TELEPHONE ENCOUNTER
No care due was identified.  [facility] Embedded Care Due Messages. Reference number: [id number].   1/05/2025 3:42:10 AM CST

## 2025-01-06 NOTE — TELEPHONE ENCOUNTER
Marysol Lyles  is requesting a refill authorization.  Brief Assessment and Rationale for Refill:  Approve     Medication Therapy Plan:         Comments:     Note composed:11:22 PM 01/05/2025

## 2025-01-10 ENCOUNTER — OFFICE VISIT (OUTPATIENT)
Dept: CARDIOLOGY | Facility: CLINIC | Age: 89
End: 2025-01-10
Payer: COMMERCIAL

## 2025-01-10 VITALS
DIASTOLIC BLOOD PRESSURE: 75 MMHG | WEIGHT: 184.5 LBS | SYSTOLIC BLOOD PRESSURE: 159 MMHG | HEART RATE: 50 BPM | BODY MASS INDEX: 33.95 KG/M2 | HEIGHT: 62 IN

## 2025-01-10 DIAGNOSIS — E78.5 HYPERLIPIDEMIA, UNSPECIFIED HYPERLIPIDEMIA TYPE: ICD-10-CM

## 2025-01-10 DIAGNOSIS — I50.22 HEART FAILURE WITH MID-RANGE EJECTION FRACTION: ICD-10-CM

## 2025-01-10 DIAGNOSIS — I48.19 PERSISTENT ATRIAL FIBRILLATION: Primary | ICD-10-CM

## 2025-01-10 DIAGNOSIS — Z86.73 HISTORY OF ISCHEMIC LEFT MCA STROKE: ICD-10-CM

## 2025-01-10 DIAGNOSIS — I10 PRIMARY HYPERTENSION: ICD-10-CM

## 2025-01-10 DIAGNOSIS — I11.0 HYPERTENSIVE HEART DISEASE WITH HEART FAILURE: ICD-10-CM

## 2025-01-10 LAB
OHS QRS DURATION: 86 MS
OHS QTC CALCULATION: 403 MS

## 2025-01-10 PROCEDURE — 93010 ELECTROCARDIOGRAM REPORT: CPT | Mod: S$GLB,,, | Performed by: INTERNAL MEDICINE

## 2025-01-10 PROCEDURE — 93005 ELECTROCARDIOGRAM TRACING: CPT | Mod: PO

## 2025-01-10 PROCEDURE — 99999 PR PBB SHADOW E&M-EST. PATIENT-LVL V: CPT | Mod: PBBFAC,,, | Performed by: INTERNAL MEDICINE

## 2025-01-10 RX ORDER — METOPROLOL SUCCINATE 25 MG/1
50 TABLET, EXTENDED RELEASE ORAL DAILY
Qty: 180 TABLET | Refills: 3 | Status: SHIPPED | OUTPATIENT
Start: 2025-01-10 | End: 2026-01-10

## 2025-01-10 RX ORDER — SODIUM CHLORIDE 9 MG/ML
INJECTION, SOLUTION INTRAVENOUS CONTINUOUS
OUTPATIENT
Start: 2025-01-10

## 2025-01-10 RX ORDER — MUPIROCIN 20 MG/G
OINTMENT TOPICAL
OUTPATIENT
Start: 2025-01-10

## 2025-01-10 NOTE — PROGRESS NOTES
SUBJECTIVE:    Patient ID:  Marysol Lyles is a 89 y.o. female who was referred to electrophysiology clinic for evaluation of AF      Medical history:  Persistent atrial fibrillation  HFmEF  HTN  HLD  Left MCA CVA 12/2/24    Patient was admitted [place] 12/24 with the acute onset of aphasia, dysarthria, and right-sided weakness.  She was diagnosed with atrial fibrillation at that time.  MRI showed a focal acute cortical infarct involving the left posterior temporal and parietal lobe approximately 2 cm.  She was discharged with Eliquis and beta blockes.     Echo showed an EF 45-50%.  Moderate tricuspid regurgitation.  CTA head/neck: Moderate severe bilateral carotid bifurcation atherosclerotic plaque may be hemodynamically significant. Mild atherosclerotic plaque of the cavernous clinoid internal carotid artery. .    Relevant labs: cr 0.82, TSH 0.73  Relevant Rx: Eliquis 5 mg BID, Lipitor 40 mg QD, losartan 50 mg QD, Lopressor 50 mg BID    01/10/2025  Denies any previous history of AF. Denies clear symptoms including palpitations, fatigue, DE LEÓN  All ECGs is EMR reviewed personally which showed most recent ECG 11/2020 which was NSR  All available outside records reviewed  Denies neurologic symptoms. Did not require PT/OT/ST    I personally reviewed the ECG/telemetry strip today. My interpretation is rate AF with slow ventricular response 59 bpm    Past Medical History:   Diagnosis Date    Arthritis     Chronic pain 8/14/2013    Colon polyp     DDD (degenerative disc disease)     DDD (degenerative disc disease)     GERD (gastroesophageal reflux disease)     Hyperlipidemia     Hypertension     Osteopenia 3/2/2016    Primary hyperparathyroidism     Urinary incontinence 4/19/2021    She has urinary incontinence and she has used urogesic and detrol and premarin cream to help with this and it has. She will continue them.       Past Surgical History:   Procedure Laterality Date    APPENDECTOMY      CATARACT EXTRACTION W/   INTRAOCULAR LENS IMPLANT Left 2018    Dr Haywood    CATARACT EXTRACTION W/  INTRAOCULAR LENS IMPLANT Right 2020    Dr. Tirado    COLONOSCOPY  2016    Dr. Vickers; polyp removed and small ileocecal valve ulceration; Bx: tubular adenoma; benign ileal mucosa    COLONOSCOPY N/A 2022    Procedure: COLONOSCOPY;  Surgeon: Jagjit Aguirre MD;  [facility];  Service: Endoscopy;  Laterality: N/A;    COLONOSCOPY W/ BIOPSIES  2011    repeat in 3 years    EYE SURGERY      INTRAOCULAR PROSTHESES INSERTION Right 2020    Procedure: INSERTION, IOL PROSTHESIS;  Surgeon: Nava Tirado MD;  Location: [facility] OR [address] [place] [address];  Service: Ophthalmology;  Laterality: Right;    JOINT REPLACEMENT  yes    LUMBAR EPIDURAL INJECTION N/A 2019    Dr. Tejada     PARTIAL KNEE ARTHROPLASTY Right     Right    PHACOEMULSIFICATION OF CATARACT Right 2020    Procedure: PHACOEMULSIFICATION, CATARACT;  Surgeon: Nava Tirado MD;  Location: [facility] OR [address] [place] [address];  Service: Ophthalmology;  Laterality: Right;    SLEEVE GASTROPLASTY  2012    TONSILLECTOMY      TOTAL HIP ARTHROPLASTY Right     TOTAL KNEE ARTHROPLASTY Left     UPPER GASTROINTESTINAL ENDOSCOPY         Family History   Problem Relation Name Age of Onset    Arthritis Mother Evonne     Cancer Mother Evonne              Breast cancer Mother Evonne     Arthritis Father Omari     Heart disease Father Omari     Hyperlipidemia Father Omari     Heart attack Father Omari     Hypertension Father Omari              Cancer Sister x2     Depression Sister x2     Cataracts Sister x2     Cancer Brother Rambo     Depression Brother Rambo     Diabetes Brother Rambo     Heart disease Brother Rambo     Hyperlipidemia Brother Rambo     Blindness Brother Rambo         dm    Retinal detachment Brother Rambo     Alcohol abuse Son Florencio     Birth defects Son Florencio     Diabetes Son Florencio     Intellectual disability Son  Florencio     Arthritis Son Florencio     Hyperlipidemia Son Florencio     Hypertension Son Florencio     Learning disabilities Son Florencio     Mental illness Son Florencio     Cancer Maternal Aunt      Alcohol abuse Maternal Uncle uncle     Cancer Maternal Uncle uncle     Depression Maternal Uncle uncle     Cancer Paternal Aunt      Cancer Paternal Uncle      Arthritis Maternal Grandmother      Hypertension Maternal Grandmother      Alcohol abuse Maternal Grandfather uncle     Arthritis Maternal Grandfather uncle     Depression Maternal Grandfather uncle     Hypertension Maternal Grandfather uncle     Arthritis Paternal Grandmother      Arthritis Paternal Grandfather      HIV Son oldest     Glaucoma Son oldest     Alcohol abuse Son oldest     Arthritis Son oldest     Breast cancer Paternal Cousin 2nd     Arthritis Daughter Nafisa     Hearing loss Daughter Nafisa     Cancer Maternal Aunt Joleen              Cancer Maternal Uncle 5 uncles         4 , 1 living    Cancer Paternal Aunt liver cancer [place]              Cancer Paternal Uncle Ashley, liver cancer              Cancer Sister Tita colon, [place] lung          , living     Cancer Brother Laith Napier          , living     COPD Son Fede     Diabetes Brother tavares Matthews diabetic              Hearing loss Daughter Korin     Amblyopia Neg Hx      Macular degeneration Neg Hx      Strabismus Neg Hx      Stroke Neg Hx      Thyroid disease Neg Hx      Colon cancer Neg Hx         Social History     Socioeconomic History    Marital status:    Tobacco Use    Smoking status: Former     Current packs/day: 0.00     Types: Cigarettes     Start date: 1954     Quit date: 1971     Years since quittin.0    Smokeless tobacco: Never    Tobacco comments:     less than 2-3 cigarettes per day   Substance and Sexual Activity    Alcohol use: Not Currently     Comment: occasionally    Drug use: No     Sexual activity: Not Currently     Partners: Male     Birth control/protection: None     Comment: 86 years old   Social History Narrative    Wears a seatbelt.     Social Drivers of Health     Financial Resource Strain: Low Risk  (7/10/2024)    Overall Financial Resource Strain (CARDIA)     Difficulty of Paying Living Expenses: Not hard at all   Food Insecurity: Unknown (12/4/2024)    Received from [facility]    Hunger Vital Sign     Ran Out of Food in the Last Year: Never true   Transportation Needs: Unknown (12/4/2024)    Received from [facility]    PRAPARE - Transportation     Lack of Transportation (Medical): No   Physical Activity: Inactive (7/10/2024)    Exercise Vital Sign     Days of Exercise per Week: 0 days     Minutes of Exercise per Session: 0 min   Stress: No Stress Concern Present (7/10/2024)    [employer] [place] [employer] - Occupational Stress Questionnaire     Feeling of Stress : Not at all   Housing Stability: Unknown (12/4/2024)    Received from [facility]    Housing Stability Vital Sign     Homeless in the Last Year: No       Review of patient's allergies indicates:   Allergen Reactions    Penicillins Swelling     Other reaction(s): Swelling    Formaldehyde Hives    Mercury Hives    Mercury (elemental) Hives    Penicillamine     Adhesive Rash     Other reaction(s): Rash  Other reaction(s): Rash    Adhesive tape-silicones Rash    Dilaudid [hydromorphone (bulk)] Itching    Hydromorphone Itching    Latex, natural rubber Hives       Review of Systems   Constitutional: Negative for chills and fever.   HENT:  Negative for congestion and hearing loss.    Eyes:  Negative for blurred vision and double vision.   Cardiovascular:         See HPI   Respiratory:  Negative for cough, hemoptysis and shortness of breath.    Endocrine: Negative for cold intolerance and heat intolerance.   Musculoskeletal:  Negative for joint pain and joint swelling.  "  Gastrointestinal:  Negative for abdominal pain, nausea and vomiting.   Genitourinary:  Negative for dysuria and hematuria.   Neurological:  Negative for focal weakness and headaches.   Psychiatric/Behavioral:  Negative for altered mental status.    All other systems reviewed and are negative.           OBJECTIVE:     Vitals:    01/10/25 1154   BP: (!) 159/75   BP Location: Left arm   Patient Position: Sitting   Pulse: (!) 50   Weight: 83.7 kg (184 lb 8.4 oz)   Height: 5' 2" (1.575 m)       BP Readings from Last 5 Encounters:   01/10/25 (!) 159/75   12/11/24 112/71   10/22/24 120/72   08/14/24 127/66   07/09/24 131/73        Physical Exam  Vitals and nursing note reviewed.   Constitutional:       General: She is not in acute distress.     Appearance: She is well-developed. She is not diaphoretic.   HENT:      Head: Normocephalic and atraumatic.   Eyes:      General: No scleral icterus.        Right eye: No discharge.         Left eye: No discharge.   Cardiovascular:      Rate and Rhythm: Normal rate. Rhythm irregular. No extrasystoles are present.     Pulses: Normal pulses and intact distal pulses.           Radial pulses are 2+ on the right side and 2+ on the left side.      Heart sounds: Normal heart sounds, S1 normal and S2 normal. Heart sounds not distant. No opening snap. No murmur heard.     No friction rub. No gallop. No S3 or S4 sounds.   Pulmonary:      Effort: Pulmonary effort is normal. No respiratory distress.      Breath sounds: No wheezing or rales.   Abdominal:      General: Bowel sounds are normal. There is no distension.      Palpations: Abdomen is soft.      Tenderness: There is no abdominal tenderness.   Musculoskeletal:         General: No deformity. Normal range of motion.      Cervical back: Normal range of motion and neck supple.   Skin:     General: Skin is warm and dry.      Findings: No erythema or rash.   Neurological:      Mental Status: She is alert.             Current Outpatient " Medications   Medication Instructions    ACETAMINOPHEN (TYLENOL EXTRA STRENGTH ORAL) 500 mg, Every 4-6 hours PRN    apixaban (ELIQUIS) 5 mg, Oral, 2 times daily    atorvastatin (LIPITOR) 40 mg, Oral, Daily    blood pressure test kit-large Kit Use once daily as directed.    calcium citrate-vitamin D3 315-200 mg (CITRACAL+D) 315-200 mg-unit per tablet 1 tablet, 2 times daily    CYANOCOBALAMIN, VITAMIN B-12, (VITAMIN B-12 ORAL) 1 tablet, Daily    DULoxetine (CYMBALTA) 60 MG capsule TAKE 1 CAPSULE(60 MG) BY MOUTH EVERY DAY    estradioL (VAGIFEM) 10 mcg, Vaginal, Every Mon, Thurs    ferrous gluconate (FERGON) 324 mg, Oral, With breakfast    gabapentin (NEURONTIN) 300 mg, Oral, 3 times daily    glucosamine sulfate 500 mg Tab 1 tablet, Daily    LACTOBACILLUS ACIDOPHILUS (PROBIOTIC ORAL) 1 tablet, Daily    losartan (COZAAR) 50 mg, Oral, Daily    melatonin 5 mg Cap 1 capsule, Nightly PRN    methen-m.blue-s.phos-phsal-hyo (URIBEL) 118-10-40.8-36 mg Cap Take 1 cap twice a week.    metoprolol tartrate (LOPRESSOR) 50 mg, Oral, 2 times daily    MULTIVITAMIN ORAL 1 tablet, Daily    nystatin (NYSTOP) powder Topical (Top), 3 times daily PRN, Give 8 bottles at a time    PROLIA 60 mg, Every 6 months    sulfamethoxazole-trimethoprim 800-160mg (BACTRIM DS) 800-160 mg Tab 1 tablet, Oral, 2 times daily    tolterodine (DETROL LA) 4 MG 24 hr capsule TAKE 1 CAPSULE(4 MG) BY MOUTH EVERY DAY    vit C,Q-Ta-giuep-lutein-zeaxan (PRESERVISION AREDS-2) 535-047-21-1 mg-unit-mg-mg Cap 1 capsule, 2 times daily       Lipid Panel:   Lab Results   Component Value Date    CHOL 182 12/03/2024    HDL 55 12/03/2024    LDLCALC 112 (H) 12/03/2024    TRIG 75 12/03/2024    CHOLHDL 3.3 12/03/2024         The ASCVD Risk score (Abe DK, et al., 2019) failed to calculate for the following reasons:    The 2019 ASCVD risk score is only valid for ages 40 to 79    Most Recent EKG Results  Results for orders placed or performed in visit on 11/09/20   EKG 12-lead     Collection Time: 11/09/20  2:42 PM    Narrative    Test Reason : Z01.818,    Vent. Rate : 049 BPM     Atrial Rate : 049 BPM     P-R Int : 200 ms          QRS Dur : 070 ms      QT Int : 414 ms       P-R-T Axes : 073 -04 -16 degrees     QTc Int : 373 ms    Sinus bradycardia  Septal infarct (cited on or before 02-FEB-2010)  Abnormal ECG  When compared with ECG of 04-MAY-2018 11:29,  Questionable change in initial forces of Septal leads  Confirmed by FRANCISCO KELLEY MD ([id number]) on 11/9/2020 9:23:40 PM    Referred By: ISRRAEL BLANCO           Confirmed By:FRANCISCO KELLEY MD       Most Recent Echocardiogram Results  No results found for this or any previous visit.      Most Recent Nuclear Stress Test Results  No results found for this or any previous visit.      Most Recent Cardiac PET Stress Test Results  No results found for this or any previous visit.      Most Recent Cardiovascular Angiogram results  No results found for this or any previous visit.      Other Most Recent Cardiology Results  No results found for this or any previous visit.        All pertinent data including labs, imaging, EKGs, and studies listed above were reviewed.  All EKG tracing in [place] were personally interpreted by this provider.    ASSESSMENT:   Marysol Lyles is a 89 y.o. female who presents for evaluation of persistent AF, HFmEF, and recent MCA CVA. Discussed options going forward including rate control AF versus rhythm control. Unclear if source was cardio-embolic as she has evidence of carotid artery disease as well. Will further evaluate carotids with plans for DONA/DCCV if no indication for intervention. Discussed that her duration of AF is unknown given most recent ECG prior to 12/24 admit with AF was in 2020 and AF could have developed at anytime in that time. Discussed that there is some literature to suggest rhythm control of AF reduces stroke risk. Also discussed that CV and PVI associated with increased risk acutely. Given lack of sx  would not pursue PVI. Will performed DONA/DCCV with possible AAD initiation after. Will obtain a 2 week monitor on day of cardioversion. Patient and family understanding of pros/cons of rhythm control and would like to proceed with CV. In [place], I am unsure of the etiology of her reduced EF. She does have ST changes laterally. Denies angina. If EF does not recover with rhythm control recommend ischemic evaluation.    1. Persistent atrial fibrillation  Cardiac Monitor - 3-15 Day Adult (Cupid Only)    metoprolol succinate (TOPROL-XL) 25 MG 24 hr tablet    Case Request Endoscopy: Transesophageal echo (DONA) intra-procedure log documentation, Cardioversion or Defibrillation    Vital signs    Cleanse with Chlorhexidine (CHG)    Diet NPO    Chlorohexidine Gluconate Bath    Full code    Place in Outpatient    Place sequential compression device    EKG 12-lead    IN OFFICE EKG 12-LEAD (to Muse)      2. Heart failure with mid-range ejection fraction        3. Hypertensive heart disease with heart failure  Case Request Endoscopy: Transesophageal echo (DONA) intra-procedure log documentation, Cardioversion or Defibrillation    Vital signs    Cleanse with Chlorhexidine (CHG)    Diet NPO    Chlorohexidine Gluconate Bath    Full code    Place in Outpatient    Place sequential compression device    EKG 12-lead      4. Hyperlipidemia, unspecified hyperlipidemia type  Vital signs    Cleanse with Chlorhexidine (CHG)    Diet NPO    Chlorohexidine Gluconate Bath    Full code    Place in Outpatient    Place sequential compression device    EKG 12-lead      5. Primary hypertension  Case Request Endoscopy: Transesophageal echo (DONA) intra-procedure log documentation, Cardioversion or Defibrillation    Vital signs    Cleanse with Chlorhexidine (CHG)    Diet NPO    Chlorohexidine Gluconate Bath    Full code    Place in Outpatient    Place sequential compression device    EKG 12-lead      6. History of ischemic left MCA stroke  US Carotid  Bilateral    Case Request Endoscopy: Transesophageal echo (DONA) intra-procedure log documentation, Cardioversion or Defibrillation    Vital signs    Cleanse with Chlorhexidine (CHG)    Diet NPO    Chlorohexidine Gluconate Bath    Full code    Place in Outpatient    Place sequential compression device    EKG 12-lead        PLAN FOR TREATMENT OF ABOVE DIAGNOSES:     Persistent atrial fibrillation (?standing persistent): plans for rhythm control given CVA and reduced EF  DONA/DCCV with likely bubble study. Assessment of HR post-CV. Will consider AAD therapy  2 week monitor day of CV  Continue Eliquis 5 mg BID  Decrease beta blocker to 50 mg daily     HFmEF: unclear etiology. Stable. No evidence of ADHF  Stop lopressor and start Toprol XL  Continue ARB  Will reassess EF after rhythm control. If remains reduced, will plan for ischemic evaluation given ECG changes    Left MCA CVA: stable. No neuro deficits. ?source cardio-embolic vs. carotid vs. aorta  Continue Eliquis 5 mg BID  Lipitor 40 mg daily    HTN: uncontrolled  Low sodium diet  Continue ARB  Will likely need uptitration of medications    HLD: . Goal <70  Continue statin  Check lipid panel in 6 weeks      F/u in 4 weeks post-CV    Leonidas Jarvis MD  Cardiac Electrophysiology    This note was partially generated using voice recognition and generative artificial intelligence software. While every effort has been made to ensure its accuracy, transcription errors may exist. Please reach out to me with any questions or if clarification is needed.

## 2025-01-10 NOTE — PATIENT INSTRUCTIONS
Cardioversion/DONA    Arrive for your procedure at:  [facility], check in at building number 2, 1st floor on January 30th at 7am (Approx arrival for 6am)      FASTING:  You MAY NOT have anything to eat AFTER MIDNIGHT. YOU MAY continue with CLEAR LIQUIDS ONLY (water, clear juice, sprite/7up, Gatorade)  up to 2 hours before your procedure.  If your procedure is scheduled in the afternoon, you may have a LIGHT BREAKFAST BEFORE 6:00 A.M.  For example: Two slices of toast; black coffee or black tea.    MEDICATIONS:  You may take your regular morning medications with a small sip of water.      Hold or adjust the following:                -Fluid pills. Hold morning of procedure    -Diabetes medications - Hold morning of procedure.     Continue: Eliquis, Xarelto, Coumadin, Plavix, Effient, Aspirin, Eliquis and other anti-coagulants/blood thinners PLEASE TAKE MORNING OF THE PROCEDURE    Please refer to pre-op instructions received from the surgery center. YOU WILL NEED SOMEONE TO DRIVE YOU HOME.

## 2025-01-26 ENCOUNTER — PATIENT MESSAGE (OUTPATIENT)
Dept: ADMINISTRATIVE | Facility: OTHER | Age: 89
End: 2025-01-26
Payer: COMMERCIAL

## 2025-01-27 ENCOUNTER — HOSPITAL ENCOUNTER (OUTPATIENT)
Dept: RADIOLOGY | Facility: HOSPITAL | Age: 89
Discharge: HOME | End: 2025-01-27
Attending: INTERNAL MEDICINE
Payer: COMMERCIAL

## 2025-01-27 DIAGNOSIS — Z86.73 HISTORY OF ISCHEMIC LEFT MCA STROKE: ICD-10-CM

## 2025-01-27 DIAGNOSIS — I77.9 CAROTID ARTERY DISEASE, UNSPECIFIED LATERALITY, UNSPECIFIED TYPE: Primary | ICD-10-CM

## 2025-01-27 PROCEDURE — 93880 EXTRACRANIAL BILAT STUDY: CPT | Mod: TC,PO

## 2025-01-29 ENCOUNTER — ANESTHESIA EVENT (OUTPATIENT)
Dept: ENDOSCOPY | Facility: HOSPITAL | Age: 89
End: 2025-01-29
Payer: COMMERCIAL

## 2025-01-30 ENCOUNTER — ANESTHESIA (OUTPATIENT)
Dept: ENDOSCOPY | Facility: HOSPITAL | Age: 89
End: 2025-01-30
Payer: COMMERCIAL

## 2025-01-30 ENCOUNTER — HOSPITAL ENCOUNTER (OUTPATIENT)
Facility: HOSPITAL | Age: 89
Discharge: HOME | End: 2025-01-30
Attending: INTERNAL MEDICINE | Admitting: INTERNAL MEDICINE
Payer: COMMERCIAL

## 2025-01-30 ENCOUNTER — HOSPITAL ENCOUNTER (OUTPATIENT)
Dept: CARDIOLOGY | Facility: HOSPITAL | Age: 89
Discharge: HOME | End: 2025-01-30
Attending: INTERNAL MEDICINE
Payer: COMMERCIAL

## 2025-01-30 ENCOUNTER — PATIENT MESSAGE (OUTPATIENT)
Dept: ENDOSCOPY | Facility: HOSPITAL | Age: 89
End: 2025-01-30
Payer: COMMERCIAL

## 2025-01-30 VITALS
SYSTOLIC BLOOD PRESSURE: 134 MMHG | DIASTOLIC BLOOD PRESSURE: 60 MMHG | WEIGHT: 184 LBS | RESPIRATION RATE: 15 BRPM | BODY MASS INDEX: 33.86 KG/M2 | TEMPERATURE: 97 F | OXYGEN SATURATION: 98 % | HEART RATE: 66 BPM | HEIGHT: 62 IN

## 2025-01-30 DIAGNOSIS — Z86.73 HISTORY OF ISCHEMIC LEFT MCA STROKE: ICD-10-CM

## 2025-01-30 DIAGNOSIS — E78.5 HYPERLIPIDEMIA, UNSPECIFIED HYPERLIPIDEMIA TYPE: ICD-10-CM

## 2025-01-30 DIAGNOSIS — I10 PRIMARY HYPERTENSION: ICD-10-CM

## 2025-01-30 DIAGNOSIS — I48.19 PERSISTENT ATRIAL FIBRILLATION: ICD-10-CM

## 2025-01-30 DIAGNOSIS — I10 HTN (HYPERTENSION): ICD-10-CM

## 2025-01-30 DIAGNOSIS — I48.0 PAROXYSMAL A-FIB: ICD-10-CM

## 2025-01-30 DIAGNOSIS — I11.0 HYPERTENSIVE HEART DISEASE WITH HEART FAILURE: ICD-10-CM

## 2025-01-30 LAB
OHS QRS DURATION: 84 MS
OHS QTC CALCULATION: 424 MS
PISA TR MAX VEL: 3 M/S
TR MAX PG: 36 MMHG

## 2025-01-30 PROCEDURE — 63600175 PHARM REV CODE 636 W HCPCS: Mod: PO | Performed by: NURSE ANESTHETIST, CERTIFIED REGISTERED

## 2025-01-30 PROCEDURE — 93325 DOPPLER ECHO COLOR FLOW MAPG: CPT | Mod: 26,,, | Performed by: INTERNAL MEDICINE

## 2025-01-30 PROCEDURE — 93325 DOPPLER ECHO COLOR FLOW MAPG: CPT | Mod: PO

## 2025-01-30 PROCEDURE — 93320 DOPPLER ECHO COMPLETE: CPT | Mod: 26,,, | Performed by: INTERNAL MEDICINE

## 2025-01-30 PROCEDURE — 93246 EXT ECG>7D<15D RECORDING: CPT | Mod: PO

## 2025-01-30 PROCEDURE — 93248 EXT ECG>7D<15D REV&INTERPJ: CPT | Mod: ,,, | Performed by: INTERNAL MEDICINE

## 2025-01-30 PROCEDURE — 93312 ECHO TRANSESOPHAGEAL: CPT | Mod: 26,,, | Performed by: INTERNAL MEDICINE

## 2025-01-30 PROCEDURE — 93010 ELECTROCARDIOGRAM REPORT: CPT | Mod: 59,,, | Performed by: INTERNAL MEDICINE

## 2025-01-30 PROCEDURE — 37000008 HC ANESTHESIA 1ST 15 MINUTES: Mod: PO | Performed by: INTERNAL MEDICINE

## 2025-01-30 PROCEDURE — 37000009 HC ANESTHESIA EA ADD 15 MINS: Mod: PO | Performed by: INTERNAL MEDICINE

## 2025-01-30 PROCEDURE — 63600175 PHARM REV CODE 636 W HCPCS: Mod: PO | Performed by: ANESTHESIOLOGY

## 2025-01-30 PROCEDURE — 93005 ELECTROCARDIOGRAM TRACING: CPT | Mod: PO

## 2025-01-30 RX ORDER — SODIUM CHLORIDE, SODIUM LACTATE, POTASSIUM CHLORIDE, CALCIUM CHLORIDE 600; 310; 30; 20 MG/100ML; MG/100ML; MG/100ML; MG/100ML
INJECTION, SOLUTION INTRAVENOUS CONTINUOUS
Status: DISCONTINUED | OUTPATIENT
Start: 2025-01-30 | End: 2025-01-30 | Stop reason: HOSPADM

## 2025-01-30 RX ORDER — MUPIROCIN 20 MG/G
OINTMENT TOPICAL
Status: DISCONTINUED | OUTPATIENT
Start: 2025-01-30 | End: 2025-01-30 | Stop reason: HOSPADM

## 2025-01-30 RX ORDER — METOPROLOL SUCCINATE 25 MG/1
12.5 TABLET, EXTENDED RELEASE ORAL DAILY
Qty: 45 TABLET | Refills: 3 | Status: SHIPPED | OUTPATIENT
Start: 2025-01-30 | End: 2026-01-30

## 2025-01-30 RX ORDER — AMIODARONE HYDROCHLORIDE 200 MG/1
100 TABLET ORAL DAILY
Qty: 15 TABLET | Refills: 11 | Status: SHIPPED | OUTPATIENT
Start: 2025-01-30 | End: 2026-01-30

## 2025-01-30 RX ORDER — LIDOCAINE HYDROCHLORIDE 20 MG/ML
INJECTION INTRAVENOUS
Status: DISCONTINUED | OUTPATIENT
Start: 2025-01-30 | End: 2025-01-30

## 2025-01-30 RX ORDER — ONDANSETRON HYDROCHLORIDE 2 MG/ML
4 INJECTION, SOLUTION INTRAVENOUS DAILY PRN
Status: DISCONTINUED | OUTPATIENT
Start: 2025-01-30 | End: 2025-01-30 | Stop reason: HOSPADM

## 2025-01-30 RX ORDER — SODIUM CHLORIDE 9 MG/ML
INJECTION, SOLUTION INTRAVENOUS CONTINUOUS
Status: DISCONTINUED | OUTPATIENT
Start: 2025-01-30 | End: 2025-01-30 | Stop reason: HOSPADM

## 2025-01-30 RX ORDER — SODIUM CHLORIDE, SODIUM LACTATE, POTASSIUM CHLORIDE, CALCIUM CHLORIDE 600; 310; 30; 20 MG/100ML; MG/100ML; MG/100ML; MG/100ML
125 INJECTION, SOLUTION INTRAVENOUS CONTINUOUS
Status: DISCONTINUED | OUTPATIENT
Start: 2025-01-30 | End: 2025-01-30 | Stop reason: HOSPADM

## 2025-01-30 RX ORDER — PROPOFOL 10 MG/ML
VIAL (ML) INTRAVENOUS
Status: DISCONTINUED | OUTPATIENT
Start: 2025-01-30 | End: 2025-01-30

## 2025-01-30 RX ADMIN — PROPOFOL 30 MG: 10 INJECTION, EMULSION INTRAVENOUS at 07:01

## 2025-01-30 RX ADMIN — PROPOFOL 20 MG: 10 INJECTION, EMULSION INTRAVENOUS at 07:01

## 2025-01-30 RX ADMIN — LIDOCAINE HYDROCHLORIDE 50 MG: 20 INJECTION INTRAVENOUS at 07:01

## 2025-01-30 RX ADMIN — PROPOFOL 100 MG: 10 INJECTION, EMULSION INTRAVENOUS at 07:01

## 2025-01-30 RX ADMIN — SODIUM CHLORIDE, POTASSIUM CHLORIDE, SODIUM LACTATE AND CALCIUM CHLORIDE: 600; 310; 30; 20 INJECTION, SOLUTION INTRAVENOUS at 06:01

## 2025-01-30 NOTE — ANESTHESIA POSTPROCEDURE EVALUATION
Anesthesia Post Evaluation    Patient: Marysol Lyles    Procedure(s) Performed: Procedure(s) (LRB):  Transesophageal echo (DONA) intra-procedure log documentation (N/A)  Cardioversion or Defibrillation (N/A)    Final Anesthesia Type: general      Patient location during evaluation: PACU  Patient participation: Yes- Able to Participate  Level of consciousness: awake  Post-procedure vital signs: reviewed and stable  Pain management: adequate  Airway patency: patent    PONV status at discharge: No PONV  Anesthetic complications: no      Cardiovascular status: blood pressure returned to baseline  Respiratory status: unassisted  Hydration status: euvolemic  Follow-up not needed.              Vitals Value Taken Time   /60 01/30/25 0757   Temp 36.3 °C (97.3 °F) 01/30/25 0730   Pulse 66 01/30/25 0757   Resp 15 01/30/25 0757   SpO2 98 % 01/30/25 0757         Event Time   Out of Recovery 08:00:00         Pain/Froylan Score: Froylan Score: 10 (1/30/2025  7:49 AM)

## 2025-01-30 NOTE — H&P
[place] [facility]  Cardiology  History and Physical     Patient Name: Marysol Lyles  MRN: [id number]  Admission Date: 1/30/2025  Code Status: Full Code   Attending Provider: Juan Jarvis MD   Primary Care Physician: Aguilar Vickers MD  Principal Problem:<principal problem not specified>    Patient information was obtained from patient.     Subjective:     Chief Complaint:  AF     HPI: here for DONA/DCCV. Took OAC this AM. ECG with AF.    Past Medical History:   Diagnosis Date    Arthritis     Chronic pain 8/14/2013    Colon polyp     DDD (degenerative disc disease)     DDD (degenerative disc disease)     GERD (gastroesophageal reflux disease)     Hyperlipidemia     Hypertension     Osteopenia 3/2/2016    Primary hyperparathyroidism     Urinary incontinence 4/19/2021    She has urinary incontinence and she has used urogesic and detrol and premarin cream to help with this and it has. She will continue them.       Past Surgical History:   Procedure Laterality Date    APPENDECTOMY      CATARACT EXTRACTION W/  INTRAOCULAR LENS IMPLANT Left 05/16/2018    Dr Haywood    CATARACT EXTRACTION W/  INTRAOCULAR LENS IMPLANT Right 12/02/2020    Dr. Tirado    COLONOSCOPY  11/16/2016    Dr. Vickers; polyp removed and small ileocecal valve ulceration; Bx: tubular adenoma; benign ileal mucosa    COLONOSCOPY N/A 04/14/2022    Procedure: COLONOSCOPY;  Surgeon: Jagjit Aguirre MD;  Location: [facility];  Service: Endoscopy;  Laterality: N/A;    COLONOSCOPY W/ BIOPSIES  02/2011    repeat in 3 years    EYE SURGERY  2020    INTRAOCULAR PROSTHESES INSERTION Right 12/02/2020    Procedure: INSERTION, IOL PROSTHESIS;  Surgeon: Nava Tirado MD;  Location: [address];  Service: Ophthalmology;  Laterality: Right;    JOINT REPLACEMENT  yes    LUMBAR EPIDURAL INJECTION N/A 12/2019    Dr. Tejada     PARTIAL KNEE ARTHROPLASTY Right     Right    PHACOEMULSIFICATION OF CATARACT Right 12/02/2020    Procedure:  PHACOEMULSIFICATION, CATARACT;  Surgeon: Nava Tirado MD;  Location: [facility] OR [address] [place] [address];  Service: Ophthalmology;  Laterality: Right;    SLEEVE GASTROPLASTY  02/08/2012    TONSILLECTOMY      TOTAL HIP ARTHROPLASTY Right     TOTAL KNEE ARTHROPLASTY Left     UPPER GASTROINTESTINAL ENDOSCOPY         Review of patient's allergies indicates:   Allergen Reactions    Penicillins Swelling     Other reaction(s): Swelling    Formaldehyde Hives    Mercury Hives    Mercury (elemental) Hives    Penicillamine     Adhesive Rash     Other reaction(s): Rash  Other reaction(s): Rash    Adhesive tape-silicones Rash    Dilaudid [hydromorphone (bulk)] Itching    Hydromorphone Itching    Latex, natural rubber Hives       No current facility-administered medications on file prior to encounter.     Current Outpatient Medications on File Prior to Encounter   Medication Sig    ACETAMINOPHEN (TYLENOL EXTRA STRENGTH ORAL) Take 500 mg by mouth every 4 to 6 hours as needed.     apixaban (ELIQUIS) 5 mg Tab Take 1 tablet (5 mg total) by mouth 2 (two) times daily.    atorvastatin (LIPITOR) 40 MG tablet Take 1 tablet (40 mg total) by mouth once daily.    calcium citrate-vitamin D3 315-200 mg (CITRACAL+D) 315-200 mg-unit per tablet Take 1 tablet by mouth 2 (two) times daily.    CYANOCOBALAMIN, VITAMIN B-12, (VITAMIN B-12 ORAL) Take 1 tablet by mouth once daily. As directed    DULoxetine (CYMBALTA) 60 MG capsule TAKE 1 CAPSULE(60 MG) BY MOUTH EVERY DAY    estradioL (VAGIFEM) 10 mcg Tab Place 1 tablet (10 mcg total) vaginally every Monday and Thursday.    ferrous gluconate (FERGON) 324 MG tablet Take 1 tablet (324 mg total) by mouth daily with breakfast.    gabapentin (NEURONTIN) 300 MG capsule Take 1 capsule (300 mg total) by mouth 3 (three) times daily.    glucosamine sulfate 500 mg Tab Take 1 tablet by mouth once daily.    LACTOBACILLUS ACIDOPHILUS (PROBIOTIC ORAL) Take 1 tablet by mouth once daily.     losartan (COZAAR) 50 MG tablet Take 1  tablet (50 mg total) by mouth once daily.    melatonin 5 mg Cap Take 1 capsule by mouth nightly as needed.    metoprolol succinate (TOPROL-XL) 25 MG 24 hr tablet Take 2 tablets (50 mg total) by mouth once daily.    MULTIVITAMIN ORAL Take 1 tablet by mouth once daily.     tolterodine (DETROL LA) 4 MG 24 hr capsule TAKE 1 CAPSULE(4 MG) BY MOUTH EVERY DAY    vit C,V-Mw-vwpmz-lutein-zeaxan (PRESERVISION AREDS-2) 294-300-90-1 mg-unit-mg-mg Cap Take 1 capsule by mouth 2 (two) times a day.    blood pressure test kit-large Kit Use once daily as directed.    denosumab (PROLIA) 60 mg/mL Syrg Inject 60 mg into the skin every 6 (six) months.    methen-m.blue-s.phos-phsal-hyo (URIBEL) 118-10-40.8-36 mg Cap Take 1 cap twice a week.    nystatin (NYSTOP) powder Apply topically 3 (three) times daily as needed (rash). Give 8 bottles at a time    sulfamethoxazole-trimethoprim 800-160mg (BACTRIM DS) 800-160 mg Tab Take 1 tablet by mouth 2 (two) times daily.     Family History       Problem Relation (Age of Onset)    Alcohol abuse Son, Maternal Uncle, Maternal Grandfather, Son    Arthritis Mother, Father, Son, Maternal Grandmother, Maternal Grandfather, Paternal Grandmother, Paternal Grandfather, Son, Daughter    Birth defects Son    Blindness Brother    Breast cancer Mother, Paternal Cousin    COPD Son    Cancer Mother, Sister, Brother, Maternal Aunt, Maternal Uncle, Paternal Aunt, Paternal Uncle, Maternal Aunt, Maternal Uncle, Paternal Aunt, Paternal Uncle, Sister, Brother    Cataracts Sister    Depression Sister, Brother, Maternal Uncle, Maternal Grandfather    Diabetes Brother, Son, Brother    Glaucoma Son    HIV Son    Hearing loss Daughter, Daughter    Heart attack Father    Heart disease Father, Brother    Hyperlipidemia Father, Brother, Son    Hypertension Father, Son, Maternal Grandmother, Maternal Grandfather    Intellectual disability Son    Learning disabilities Son    Mental illness Son    Retinal detachment Brother           Tobacco Use    Smoking status: Former     Current packs/day: 0.00     Types: Cigarettes     Start date: 1954     Quit date: 1971     Years since quittin.1    Smokeless tobacco: Never    Tobacco comments:     less than 2-3 cigarettes per day   Substance and Sexual Activity    Alcohol use: Not Currently     Comment: occasionally    Drug use: No    Sexual activity: Not Currently     Partners: Male     Birth control/protection: None     Comment: 86 years old     Review of Systems   All other systems reviewed and are negative.    Objective:     Vital Signs (Most Recent):  Temp: 97.7 °F (36.5 °C) (25)  Pulse: (!) 59 (25)  Resp: 16 (25)  BP: 124/70 (25)  SpO2: 96 % (25) Vital Signs (24h Range):  Temp:  [97.7 °F (36.5 °C)] 97.7 °F (36.5 °C)  Pulse:  [59] 59  Resp:  [16] 16  SpO2:  [96 %] 96 %  BP: (124)/(70) 124/70     Weight: 83.5 kg (184 lb)  Body mass index is 33.65 kg/m².    SpO2: 96 %       No intake or output data in the 24 hours ending 25 0703    Lines/Drains/Airways       Peripheral Intravenous Line  Duration                  Peripheral IV - Single Lumen 25 0633 20 G Anterior;Left Forearm <1 day                    Physical Exam  Vitals and nursing note reviewed.   Constitutional:       General: She is not in acute distress.     Appearance: She is well-developed. She is not diaphoretic.   HENT:      Head: Normocephalic and atraumatic.   Eyes:      General: No scleral icterus.        Right eye: No discharge.         Left eye: No discharge.   Cardiovascular:      Rate and Rhythm: Normal rate. Rhythm irregular. No extrasystoles are present.     Pulses: Normal pulses and intact distal pulses.           Radial pulses are 2+ on the right side and 2+ on the left side.      Heart sounds: Normal heart sounds, S1 normal and S2 normal. Heart sounds not distant. No opening snap. No murmur heard.     No friction rub. No gallop. No S3 or S4 sounds.    Pulmonary:      Effort: Pulmonary effort is normal. No respiratory distress.      Breath sounds: No wheezing or rales.   Abdominal:      General: Bowel sounds are normal. There is no distension.      Palpations: Abdomen is soft.      Tenderness: There is no abdominal tenderness.   Musculoskeletal:         General: No deformity. Normal range of motion.      Cervical back: Normal range of motion and neck supple.   Skin:     General: Skin is warm and dry.      Findings: No erythema or rash.   Neurological:      Mental Status: She is alert.         Significant Labs: All pertinent lab results from the last 24 hours have been reviewed.    Significant Imaging:  reviewed  Assessment and Plan:     AF    The risks, benefits and alternatives of transesophageal echocardiogram and cardioversion (DONA/DCCV) were explained to the patient, patient's family and/or surrogate decision maker.   No absolute contraindications of esophageal stricture, tumor, perforation, laceration,or diverticulum and/or active GI bleed.  The risks include (but are not limited to) trauma to oral structures, esophageal trauma/perforation, bleeding, laryngospasm/brochospasm, infection, aspiration, sore throat/hoarseness, & dislodgement of the endotracheal tube/nasogastric tube (where applicable), tachy-/bradyarrhythmias, skin burns/pain, CVA, MI, and death.  Informed consent was obtained. The patient is agreeable to proceed with the procedure and all questions and concerns addressed.       VTE Risk Mitigation (From admission, onward)           Ordered     IP VTE HIGH RISK PATIENT  Once         01/30/25 0618     Place sequential compression device  Until discontinued         01/30/25 0618                    Juan Jarvis MD  Cardiology   [place] [facility]

## 2025-01-30 NOTE — ANESTHESIA PREPROCEDURE EVALUATION
01/30/2025  Marysol Lyles is a 89 y.o., female.      Pre-op Assessment    I have reviewed the Patient Summary Reports.     I have reviewed the Nursing Notes. I have reviewed the NPO Status.   I have reviewed the Medications.     Review of Systems  Anesthesia Hx:  No problems with previous Anesthesia                Social:  Former Smoker       Cardiovascular:     Hypertension    Dysrhythmias atrial fibrillation  CHF    hyperlipidemia    EF - 50%                     Hypertension     Atrial Fibrillation     Hepatic/GI:     GERD   Hx bariatric surgery      Gerd          Musculoskeletal:  Arthritis        Arthritis          Neurological:   CVA            Arthritis                           Endocrine:        Obesity / BMI > 30      Physical Exam  General: Well nourished    Airway:  Mallampati: II   Mouth Opening: Normal  TM Distance: Normal  Neck ROM: Normal ROM        Anesthesia Plan  Type of Anesthesia, risks & benefits discussed:    Anesthesia Type: Gen Natural Airway  Intra-op Monitoring Plan: Standard ASA Monitors  Induction:  IV  Informed Consent: Informed consent signed with the Patient and all parties understand the risks and agree with anesthesia plan.  All questions answered.   ASA Score: 3    Ready For Surgery From Anesthesia Perspective.     .

## 2025-01-30 NOTE — DISCHARGE SUMMARY
[facility]  Cardiology  Discharge Summary      Patient Name: Marysol Lyles  MRN: [id number]  Admission Date: 1/30/2025  Hospital Length of Stay: 0 days  Discharge Date and Time:  01/30/2025 7:43 AM  Attending Physician: Juan Jarvis MD  Discharging Provider: Juan Jarvis MD  Primary Care Physician: Aguilar Vickers MD    Hospital Course:   Successful DONA/cardioversion.  Given marginal heart rates, we will decrease metoprolol from 50 mg daily to 12.5 mg daily and initiate amiodarone 100 mg daily.  Two week cardiac event monitor with follow up thereafter.    HPI:  Patient presented for DONA/cardioversion.  ECG atrial fibrillation with slow ventricular response.  She took her Eliquis this morning.    Procedure(s) (LRB):  Transesophageal echo (DONA) intra-procedure log documentation (N/A)  Cardioversion or Defibrillation (N/A)     Indwelling Lines/Drains at time of discharge:  Lines/Drains/Airways       None                   Significant Diagnostic Studies: Labs: All labs within the past 24 hours have been reviewed    Pending Diagnostic Studies:       Procedure Component Value Units Date/Time    EKG 12-lead [[id number]]     Order Status: Sent Lab Status: No result             There are no hospital problems to display for this patient.      Discharged Condition: stable    Follow Up:    Patient Instructions:   No discharge procedures on file.  Medications:  Reconciled Home Medications:      Medication List        START taking these medications      amiodarone 200 MG Tab  Commonly known as: PACERONE  Take 0.5 tablets (100 mg total) by mouth once daily.            CHANGE how you take these medications      metoprolol succinate 25 MG 24 hr tablet  Commonly known as: TOPROL-XL  Take 0.5 tablets (12.5 mg total) by mouth once daily.  What changed: how much to take            CONTINUE taking these medications      apixaban 5 mg Tab  Commonly known as: ELIQUIS  Take 1 tablet (5 mg total) by mouth 2 (two) times  daily.     atorvastatin 40 MG tablet  Commonly known as: LIPITOR  Take 1 tablet (40 mg total) by mouth once daily.     blood pressure test kit-large Kit  Use once daily as directed.     calcium citrate-vitamin D3 315-200 mg 315 mg-5 mcg (200 unit) per tablet  Commonly known as: CITRACAL+D  Take 1 tablet by mouth 2 (two) times daily.     DULoxetine 60 MG capsule  Commonly known as: CYMBALTA  TAKE 1 CAPSULE(60 MG) BY MOUTH EVERY DAY     estradioL 10 mcg Tab  Commonly known as: VAGIFEM  Place 1 tablet (10 mcg total) vaginally every Monday and Thursday.     ferrous gluconate 324 MG tablet  Commonly known as: FERGON  Take 1 tablet (324 mg total) by mouth daily with breakfast.     gabapentin 300 MG capsule  Commonly known as: NEURONTIN  Take 1 capsule (300 mg total) by mouth 3 (three) times daily.     glucosamine sulfate 500 mg Tab  Take 1 tablet by mouth once daily.     losartan 50 MG tablet  Commonly known as: COZAAR  Take 1 tablet (50 mg total) by mouth once daily.     melatonin 5 mg Cap  Take 1 capsule by mouth nightly as needed.     MULTIVITAMIN ORAL  Take 1 tablet by mouth once daily.     nystatin powder  Commonly known as: NYSTOP  Apply topically 3 (three) times daily as needed (rash). Give 8 bottles at a time     PRESERVISION AREDS-2 250-90-40-1 mg Cap  Generic drug: vit C,E-Im-lyrnc-lutein-zeaxan  Take 1 capsule by mouth 2 (two) times a day.     PROBIOTIC ORAL  Take 1 tablet by mouth once daily.     PROLIA 60 mg/mL Syrg  Generic drug: denosumab  Inject 60 mg into the skin every 6 (six) months.     tolterodine 4 MG 24 hr capsule  Commonly known as: DETROL LA  TAKE 1 CAPSULE(4 MG) BY MOUTH EVERY DAY     TYLENOL EXTRA STRENGTH ORAL  Take 500 mg by mouth every 4 to 6 hours as needed.     UribeL 118-10-40.8-36 mg Cap  Generic drug: methen-m.blue-s.phos-phsal-hyo  Take 1 cap twice a week.     VITAMIN B-12 ORAL  Take 1 tablet by mouth once daily. As directed            STOP taking these medications       sulfamethoxazole-trimethoprim 800-160mg 800-160 mg Tab  Commonly known as: BACTRIM DS              Time spent on the discharge of patient: >30 minutes    Juan Jarvis MD  Cardiology  [place] - Endoscopy

## 2025-01-30 NOTE — TRANSFER OF CARE
"Anesthesia Transfer of Care Note    Patient: Marysol Lyles    Procedure(s) Performed: Procedure(s) (LRB):  Transesophageal echo (DONA) intra-procedure log documentation (N/A)  Cardioversion or Defibrillation (N/A)    Patient location: PACU    Anesthesia Type: general    Transport from OR: Transported from OR on room air with adequate spontaneous ventilation    Post pain: adequate analgesia    Post assessment: no apparent anesthetic complications and tolerated procedure well    Post vital signs: stable    Level of consciousness: sedated    Nausea/Vomiting: no nausea/vomiting    Complications: none    Transfer of care protocol was followed      Last vitals: Visit Vitals  /70   Pulse (!) 59   Temp 36.5 °C (97.7 °F)   Resp 16   Ht 5' 2" (1.575 m)   Wt 83.5 kg (184 lb)   LMP 05/06/1994   SpO2 96%   BMI 33.65 kg/m²     "

## 2025-02-04 ENCOUNTER — LAB VISIT (OUTPATIENT)
Dept: LAB | Facility: HOSPITAL | Age: 89
End: 2025-02-04
Attending: NURSE PRACTITIONER
Payer: COMMERCIAL

## 2025-02-04 ENCOUNTER — OFFICE VISIT (OUTPATIENT)
Dept: ENDOCRINOLOGY | Facility: CLINIC | Age: 89
End: 2025-02-04
Payer: COMMERCIAL

## 2025-02-04 DIAGNOSIS — E21.3 HYPERPARATHYROIDISM: ICD-10-CM

## 2025-02-04 DIAGNOSIS — M85.80 OSTEOPENIA, UNSPECIFIED LOCATION: Primary | ICD-10-CM

## 2025-02-04 DIAGNOSIS — M85.80 OSTEOPENIA, UNSPECIFIED LOCATION: ICD-10-CM

## 2025-02-04 PROCEDURE — 36415 COLL VENOUS BLD VENIPUNCTURE: CPT | Mod: PO | Performed by: NURSE PRACTITIONER

## 2025-02-04 PROCEDURE — 80053 COMPREHEN METABOLIC PANEL: CPT | Performed by: NURSE PRACTITIONER

## 2025-02-04 PROCEDURE — 98005 SYNCH AUDIO-VIDEO EST LOW 20: CPT | Mod: 95,,, | Performed by: NURSE PRACTITIONER

## 2025-02-04 PROCEDURE — G2211 COMPLEX E/M VISIT ADD ON: HCPCS | Mod: 95,,, | Performed by: NURSE PRACTITIONER

## 2025-02-04 NOTE — PROGRESS NOTES
The patient location is: home  The chief complaint leading to consultation is: diabetes    Visit type: audiovisual    Face to Face time with patient: 9 mins  12 minutes of total time spent on the encounter, which includes face to face time and non-face to face time preparing to see the patient (eg, review of tests), Obtaining and/or reviewing separately obtained history, Documenting clinical information in the electronic or other health record, Independently interpreting results (not separately reported) and communicating results to the patient/family/caregiver, or Care coordination (not separately reported).     Each patient to whom he or she provides medical services by telemedicine is:  (1) informed of the relationship between the physician and patient and the respective role of any other health care provider with respect to management of the patient; and (2) notified that he or she may decline to receive medical services by telemedicine and may withdraw from such care at any time.     CC: This 89 y.o. female presents for management of osteopenia and chronic conditions Primary hyperparathyroidism, HTN, GERD    HPI: Diagnosed with osteopenia n 2015. Had been o/n Fosamax. Then switched to Prolia. Tolerating Prolia without issue.    Had a stroke in December 2024 has been doing well since, no residuals   Scheduled for Prolia next week  No recent fall or fractures  No h/o fractures   Has never had kidney stones  Menopause 50s, + HRT for several years and then stopped   Her mom had bone cancer- passed away at age 42   Taking Ca 500 mg daily + D 3  bid   Exercise - none recently      2/5/2024- Dexa- FINDINGS:   The L1 to L4 vertebral bone mineral density is equal to 1.29 g/cm squared with a T score of 2.2.  The left femoral neck bone mineral density is equal to 0.70 g/cm squared with a T score of -1.3.  There is a 11% risk of a major osteoporotic fracture and a 2.9% risk of hip fracture in the next 10 years  "(FRAX).  Impression: Osteopenia    ROS:   Gen: Appetite good   Cardiac: No palpitations, chest pain    GI: no nausea, no vomiting, diarrhea, constipation   Other systems: negative.    PE:  GENERAL: Well developed, well nourished.  PSYCH: AAOx3, appropriate mood and affect, pleasant expression, conversant, appears relaxed, well groomed.   EYES: Conjunctiva, corneas clear  NECK: Supple, trachea midline     Personally reviewed Past Medical, Surgical, Social History.    LMP 05/06/1994      Personally reviewed the below labs:      Chemistry        Component Value Date/Time     12/04/2024 0333     08/07/2024 1434    K 4 12/04/2024 0333    K 4.5 08/07/2024 1434    CL 99 08/07/2024 1434    CO2 27 12/04/2024 0333    CO2 32 (H) 08/07/2024 1434    BUN 16 12/04/2024 0333    BUN 16 08/07/2024 1434    CREATININE 0.82 12/04/2024 0333    CREATININE 1.0 08/07/2024 1434    GLU 85 08/07/2024 1434        Component Value Date/Time    CALCIUM 8.7 (L) 12/04/2024 0333    CALCIUM 10.4 08/07/2024 1434    ALKPHOS 32 12/04/2024 0333    ALKPHOS 32 (L) 08/07/2024 1434    AST 23 12/04/2024 0333    AST 23 08/07/2024 1434    ALT 17 12/04/2024 0333    ALT 19 08/07/2024 1434    BILITOT 0.6 12/04/2024 0333    BILITOT 0.5 08/07/2024 1434    ESTGFRAFRICA 59.8 (A) 07/27/2022 1015    EGFRNONAA 51.9 (A) 07/27/2022 1015            Lab Results   Component Value Date    TSH 0.629 05/07/2024       Recent Labs   Lab 12/03/24  0305   LDL Calculated 112 H   HDL 55   Cholesterol 182        Results for orders placed or performed in visit on 06/21/19   Vitamin D    Collection Time: 06/21/19  2:02 PM   Result Value Ref Range    Vit D, 25-Hydroxy 74 30 - 96 ng/mL     No results found. However, due to the size of the patient record, not all encounters were searched. Please check Results Review for a complete set of results.    No results found for: "MICALBCREAT"    Hemoglobin A1C   Date Value Ref Range Status   12/03/2024 5.6 4.6 - 6.2 % Final   11/18/2011 " 6.1 4.0 - 6.2 % Final        ASSESSMENT and PLAN:    1. Osteopenia- No Fracture history.   Had been on fosamax and now on Prolia. Tolerating well.   Will continue current therapy due to # 2. Therapy plan placed.     Encouraged walking for exercise, around the house  Continue calcium and vitamin  D3     2. Primary hyperparathyroidism-   based on history of hypercalcemia and high urine Ca.    Historically serum calcium fluctuates from normal to mildly elevated.   On Prolia for osteopenia. If Ca were to increase  could consider sensipar.   Lab is pending today therapy for calcium.      3. GERD- Unable to take PO bisphosphonates.       4. HTN-  Avoid use of thiazide diuretics. Due to hypercalcemia.      FOLLOWUP cmp, PTH in 6 months     Follow-up: in 12 months with CMP

## 2025-02-05 ENCOUNTER — PATIENT MESSAGE (OUTPATIENT)
Dept: ENDOCRINOLOGY | Facility: CLINIC | Age: 89
End: 2025-02-05
Payer: COMMERCIAL

## 2025-02-05 LAB
ALBUMIN SERPL BCP-MCNC: 3.9 G/DL (ref 3.5–5.2)
ALP SERPL-CCNC: 38 U/L (ref 40–150)
ALT SERPL W/O P-5'-P-CCNC: 30 U/L (ref 10–44)
ANION GAP SERPL CALC-SCNC: 11 MMOL/L (ref 8–16)
AST SERPL-CCNC: 38 U/L (ref 10–40)
BILIRUB SERPL-MCNC: 0.5 MG/DL (ref 0.1–1)
BUN SERPL-MCNC: 18 MG/DL (ref 8–23)
CALCIUM SERPL-MCNC: 10.5 MG/DL (ref 8.7–10.5)
CHLORIDE SERPL-SCNC: 100 MMOL/L (ref 95–110)
CO2 SERPL-SCNC: 27 MMOL/L (ref 23–29)
CREAT SERPL-MCNC: 0.9 MG/DL (ref 0.5–1.4)
EST. GFR  (NO RACE VARIABLE): >60 ML/MIN/1.73 M^2
GLUCOSE SERPL-MCNC: 98 MG/DL (ref 70–110)
POTASSIUM SERPL-SCNC: 4.6 MMOL/L (ref 3.5–5.1)
PROT SERPL-MCNC: 7.2 G/DL (ref 6–8.4)
SODIUM SERPL-SCNC: 138 MMOL/L (ref 136–145)

## 2025-02-10 ENCOUNTER — INFUSION (OUTPATIENT)
Dept: INFUSION THERAPY | Facility: HOSPITAL | Age: 89
End: 2025-02-10
Attending: INTERNAL MEDICINE
Payer: COMMERCIAL

## 2025-02-10 ENCOUNTER — PATIENT MESSAGE (OUTPATIENT)
Dept: CARDIOLOGY | Facility: CLINIC | Age: 89
End: 2025-02-10
Payer: COMMERCIAL

## 2025-02-10 VITALS
HEART RATE: 66 BPM | DIASTOLIC BLOOD PRESSURE: 60 MMHG | RESPIRATION RATE: 18 BRPM | OXYGEN SATURATION: 99 % | TEMPERATURE: 98 F | SYSTOLIC BLOOD PRESSURE: 134 MMHG

## 2025-02-10 DIAGNOSIS — M85.80 OSTEOPENIA, UNSPECIFIED LOCATION: Primary | ICD-10-CM

## 2025-02-10 PROCEDURE — 63600175 PHARM REV CODE 636 W HCPCS: Mod: JZ,TB,PN | Performed by: NURSE PRACTITIONER

## 2025-02-10 PROCEDURE — 96372 THER/PROPH/DIAG INJ SC/IM: CPT | Mod: PN

## 2025-02-10 RX ADMIN — DENOSUMAB 60 MG: 60 INJECTION SUBCUTANEOUS at 10:02

## 2025-02-13 ENCOUNTER — INITIAL CONSULT (OUTPATIENT)
Dept: VASCULAR SURGERY | Facility: CLINIC | Age: 89
End: 2025-02-13
Payer: COMMERCIAL

## 2025-02-13 VITALS
BODY MASS INDEX: 34.45 KG/M2 | HEIGHT: 62 IN | SYSTOLIC BLOOD PRESSURE: 126 MMHG | HEART RATE: 63 BPM | DIASTOLIC BLOOD PRESSURE: 60 MMHG | WEIGHT: 187.19 LBS

## 2025-02-13 DIAGNOSIS — Z13.6 ENCOUNTER FOR SCREENING FOR CARDIOVASCULAR DISORDERS: Primary | ICD-10-CM

## 2025-02-13 DIAGNOSIS — I77.9 CAROTID ARTERY DISEASE, UNSPECIFIED LATERALITY, UNSPECIFIED TYPE: ICD-10-CM

## 2025-02-13 PROCEDURE — 99205 OFFICE O/P NEW HI 60 MIN: CPT | Mod: S$GLB,,, | Performed by: STUDENT IN AN ORGANIZED HEALTH CARE EDUCATION/TRAINING PROGRAM

## 2025-02-13 PROCEDURE — 99999 PR PBB SHADOW E&M-EST. PATIENT-LVL V: CPT | Mod: PBBFAC,,, | Performed by: STUDENT IN AN ORGANIZED HEALTH CARE EDUCATION/TRAINING PROGRAM

## 2025-02-13 NOTE — PROGRESS NOTES
[place] - [facility]  [facility]  History & Physical    PATIENT NAME: Marysol Lyles  MRN: [id number]  TODAY'S DATE: 02/13/2025    SUBJECTIVE:     Chief Complaint:   Chief Complaint   Patient presents with    Carotid Artery Disease         History of Present Illness:  Marysol Lyles is a 89 y.o. female presents with a history of hypertension, hyperlipidemia, AFib and recent CVA.  She had a CVA 12/2024. This was witness by her son.  They describe her stroke symptoms as aphasia but they do not recall that she lost weakness in her extremities.  She went to [facility] and was found to have a left posterior temporal parietal lobe. She had a CTA scan that showed bilateral carotid artery stenosis.     She takes eliquis 5mg daily for afib. She did have cardioversion a few weeks ago. She was giving plavix for a short period of time and has now stopped plavix.     She reports a remote history of tobacco abuse.    She does take a statin.        Review of patient's allergies indicates:   Allergen Reactions    Penicillins Swelling     Other reaction(s): Swelling    Formaldehyde Hives    Mercury Hives    Mercury (elemental) Hives    Penicillamine     Adhesive Rash     Other reaction(s): Rash  Other reaction(s): Rash    Adhesive tape-silicones Rash    Dilaudid [hydromorphone (bulk)] Itching    Hydromorphone Itching    Latex, natural rubber Hives       Past Medical History:   Diagnosis Date    Arthritis     Chronic pain 8/14/2013    Colon polyp     DDD (degenerative disc disease)     DDD (degenerative disc disease)     GERD (gastroesophageal reflux disease)     Hyperlipidemia     Hypertension     Osteopenia 3/2/2016    Primary hyperparathyroidism     Urinary incontinence 4/19/2021    She has urinary incontinence and she has used urogesic and detrol and premarin cream to help with this and it has. She will continue them.     Past Surgical History:   Procedure Laterality Date    APPENDECTOMY      CATARACT  EXTRACTION W/  INTRAOCULAR LENS IMPLANT Left 2018    Dr Haywood    CATARACT EXTRACTION W/  INTRAOCULAR LENS IMPLANT Right 2020    Dr. Tirado    COLONOSCOPY  2016    Dr. Vickers; polyp removed and small ileocecal valve ulceration; Bx: tubular adenoma; benign ileal mucosa    COLONOSCOPY N/A 2022    Procedure: COLONOSCOPY;  Surgeon: Jagjit Aguirre MD;  Location: [facility];  Service: Endoscopy;  Laterality: N/A;    COLONOSCOPY W/ BIOPSIES  2011    repeat in 3 years    ECHOCARDIOGRAM,TRANSESOPHAGEAL N/A 2025    Procedure: Transesophageal echo (DONA) intra-procedure log documentation;  Surgeon: Juan Jarvis MD;  Location: [facility] ENDO;  Service: Cardiology;  Laterality: N/A;    EYE SURGERY      INTRAOCULAR PROSTHESES INSERTION Right 2020    Procedure: INSERTION, IOL PROSTHESIS;  Surgeon: Nava Tirado MD;  Location: [facility] OR [address] [place] [address];  Service: Ophthalmology;  Laterality: Right;    JOINT REPLACEMENT  yes    LUMBAR EPIDURAL INJECTION N/A 2019    Dr. Tejada     PARTIAL KNEE ARTHROPLASTY Right     Right    PHACOEMULSIFICATION OF CATARACT Right 2020    Procedure: PHACOEMULSIFICATION, CATARACT;  Surgeon: Nava Tirado MD;  Location: [facility] OR [address] [place] [address];  Service: Ophthalmology;  Laterality: Right;    SLEEVE GASTROPLASTY  2012    TONSILLECTOMY      TOTAL HIP ARTHROPLASTY Right     TOTAL KNEE ARTHROPLASTY Left     TREATMENT OF CARDIAC ARRHYTHMIA N/A 2025    Procedure: Cardioversion or Defibrillation;  Surgeon: Juan Jarvis MD;  Location: [facility];  Service: Cardiology;  Laterality: N/A;    UPPER GASTROINTESTINAL ENDOSCOPY       Family History   Problem Relation Name Age of Onset    Arthritis Mother Evonne     Cancer Mother Evonne          1950    Breast cancer Mother Evonne     Arthritis Father Omari     Heart disease Father Omari     Hyperlipidemia Father Omari     Heart attack Father Omari     Hypertension Father Omari               Cancer Sister x2     Depression Sister x2     Cataracts Sister x2     Cancer Brother Rambo     Depression Brother Rambo     Diabetes Brother Rambo     Heart disease Brother Rambo     Hyperlipidemia Brother Rambo     Blindness Brother Rambo         dm    Retinal detachment Brother Rambo     Alcohol abuse Son Florencio     Birth defects Son Florencio     Diabetes Son Florencio     Intellectual disability Son Florencio     Arthritis Son Florencio     Hyperlipidemia Son Florencio     Hypertension Son Florencio     Learning disabilities Son Florencio     Mental illness Son Florencio     Cancer Maternal Aunt      Alcohol abuse Maternal Uncle uncle     Cancer Maternal Uncle uncle     Depression Maternal Uncle uncle     Cancer Paternal Aunt      Cancer Paternal Uncle      Arthritis Maternal Grandmother      Hypertension Maternal Grandmother      Alcohol abuse Maternal Grandfather uncle     Arthritis Maternal Grandfather uncle     Depression Maternal Grandfather uncle     Hypertension Maternal Grandfather uncle     Arthritis Paternal Grandmother      Arthritis Paternal Grandfather      HIV Son oldest     Glaucoma Son oldest     Alcohol abuse Son oldest     Arthritis Son oldest     Breast cancer Paternal Cousin 2nd     Arthritis Daughter Nafisa     Hearing loss Daughter Nafisa     Cancer Maternal Aunt Joleen              Cancer Maternal Uncle 5 uncles         4 , 1 living    Cancer Paternal Aunt liver cancer [place]              Cancer Paternal Uncle Ashley, liver cancer              Cancer Sister Tita colon, [place] lung          , living     Cancer Brother Laith Napier          , living     COPD Son Fede     Diabetes Brother tavares Matthews diabetic              Hearing loss Daughter Korin     Amblyopia Neg Hx      Macular degeneration Neg Hx      Strabismus Neg Hx      Stroke Neg Hx      Thyroid disease Neg Hx      Colon cancer Neg Hx       Social  History     Tobacco Use    Smoking status: Former     Current packs/day: 0.00     Types: Cigarettes     Start date: 1954     Quit date: 1971     Years since quittin.1    Smokeless tobacco: Never    Tobacco comments:     less than 2-3 cigarettes per day   Substance Use Topics    Alcohol use: Not Currently     Comment: occasionally    Drug use: No        Review of Systems:  ROS    OBJECTIVE:     Vital Signs (Most Recent):  Pulse: 63 (25 1216)  BP: 126/60 (25 1216)      Physical Exam:  Physical Exam  Constitutional: Awake and oriented to person, place, and time. Appears well-developed and well-nourished. In no apparent distress.  HEENT: Normocephalic. Pupils normal and conjunctivae normal. Mucous membranes normal, no cyanosis or icterus, trachea central, no pallor or icterus is noted.  Neck: Normal range of motion. Neck supple. No JVD present. No bruit present.   Cardiovascular: Normal rate, regular rhythm and normal heart sounds. S1, S2. Exam reveals no gallop, clicks, or friction rub. No murmur noted.  Pulmonary/Chest: Effort normal and breath sounds clear to auscultation. No respiratory distress. No wheezes, rales, or coughing present.   Abdominal: Soft. Bowel sounds are normal. There is no tenderness. No rebound tenderness or guarding present. No abdomen pulsations, bruits, or masses noted.   Urinary: No cervantes catheter present  Skin: Skin is warm and dry.  Extremities: No cyanosis, erythema, clubbing or edema noted at this time. No calf tenderness bilaterally.   Peripheral vascular system: Good palpable distal pulses.       Laboratory:  Lab Results   Component Value Date    WBC 5.60 2024    HGB 13.2 2024    HCT 41.9 2024     2024    CHOL 182 2024    TRIG 75 2024    HDL 55 2024    ALT 30 2025    AST 38 2025     2025    K 4.6 2025     2025    CREATININE 0.9 2025    BUN 18 2025    CO2 27  02/04/2025    TSH 0.629 05/07/2024    INR 1.0 08/07/2009    HGBA1C 5.6 12/03/2024         Diagnostic Results:  Transesophageal echo (DONA)with possible cardioversion    Left Ventricle: There is mildly reduced systolic function.    Right Ventricle: Normal right ventricular cavity size.    Left Atrium: Left atrium is dilated. Agitated saline study of the   atrial septum is negative after vasalva maneuver, suggesting absence of   intracardiac shunt at the atrial level. The left atrial appendage appears   normal. The left atrial appendage has a windsock morphology. Appendage   velocity is reduced at less than 40 cm/sec. There is no thrombus in the   left atrial appendage.    Mitral Valve: There is mild regurgitation.    Tricuspid Valve: There is moderate regurgitation.    Pulmonary Artery: PA systolic pressure at least 39 mmHg    No results found. However, due to the size of the patient record, not all encounters were searched. Please check Results Review for a complete set of results.       ASSESSMENT/PLAN:     89-year-old female who presents there was a referral for bilateral carotid artery stenosis with a recent left temporal stroke on 12/2024.    I did attempt to review her CTA scan but was unable to because the study was done at an outside facility.  I was able to review the report which documents severe bilateral carotid artery stenosis.  This is most consistent with left severe symptomatic (CVA event <6mths) carotid artery stenosis and then right severe asymptomatic carotid artery stenosis.  I have personally discussed these findings as well as the etiology of the pathology.    Since the recent sustained cerebrovascular accident, the patient has been optimized medically as much as possible.  Despite medical optimization the patient still remains at risk for a subsequent cerebrovascular event that could be very debilitating and increase the risk of increased morbidity and mortality.  I do recommend a carotid artery  endarterectomy versus TCAR to further mitigate these risks of subsequent devastating CVA.  We explained the risks and the benefits we can both procedures in extensive detail.    I discussed my recommendations with the patient and the family that was present. We discussed the benefits as well as the risk which is not limited to intraoperative/postoperative stroke, hemorrhage, nerve/arterial injury, infection, and myocardial infarct. .    I did review her laboratory studies in his creatinine is suitable for contrast administration      Plan:  We will plan for a CTA head and neck so that I can visualize the imaging to decide whether she has a TCAR candidate or not considering her age    If TCAR is a Plan we will plan to treat her left side 1st since it is symptomatic followed by her right side if it truly is severe    She was advised to continue her Eliquis.  Continue statin    Carotid artery disease, unspecified laterality, unspecified type  -     Ambulatory referral/consult to Vascular Surgery          Oni Ferguson M.D.   [facility]   Date of Service: 02/13/2025

## 2025-02-18 ENCOUNTER — HOSPITAL ENCOUNTER (OUTPATIENT)
Dept: RADIOLOGY | Facility: HOSPITAL | Age: 89
Discharge: HOME | End: 2025-02-18
Attending: STUDENT IN AN ORGANIZED HEALTH CARE EDUCATION/TRAINING PROGRAM
Payer: COMMERCIAL

## 2025-02-18 DIAGNOSIS — Z13.6 ENCOUNTER FOR SCREENING FOR CARDIOVASCULAR DISORDERS: ICD-10-CM

## 2025-02-18 PROCEDURE — 25500020 PHARM REV CODE 255: Performed by: STUDENT IN AN ORGANIZED HEALTH CARE EDUCATION/TRAINING PROGRAM

## 2025-02-18 PROCEDURE — 70496 CT ANGIOGRAPHY HEAD: CPT | Mod: TC

## 2025-02-18 RX ADMIN — IOHEXOL 75 ML: 350 INJECTION, SOLUTION INTRAVENOUS at 03:02

## 2025-02-22 LAB
OHS CV EVENT MONITOR DAY: 13
OHS CV HOLTER AFIB AVERAGE HR: 66 BPM
OHS CV HOLTER AFIB MAX HR: 114 BPM
OHS CV HOLTER AFIB MIN HR: 43 BPM
OHS CV HOLTER HOOKUP DATE: NORMAL
OHS CV HOLTER HOOKUP TIME: NORMAL
OHS CV HOLTER LENGTH DECIMAL HOURS: 319
OHS CV HOLTER LENGTH HOURS: 7
OHS CV HOLTER LENGTH MINUTES: 0
OHS CV HOLTER SCAN DATE: NORMAL
OHS CV HOLTER SINUS AVERAGE HR: 67 BPM
OHS CV HOLTER SINUS MAX HR: 114 BPM
OHS CV HOLTER SINUS MIN HR: 43 BPM
OHS CV HOLTER STUDY END DATE: NORMAL
OHS CV HOLTER STUDY END TIME: NORMAL

## 2025-02-22 NOTE — PROGRESS NOTES
SUBJECTIVE:   Marysol Lyles is a 89 y.o. female who presents for AF follow up.     Medical history:  Persistent atrial fibrillation  HFmEF  HTN  HLD  Left MCA CVA 12/2/24     Patient was admitted [facility] 12/24 with the acute onset of aphasia, dysarthria, and right-sided weakness.  She was diagnosed with atrial fibrillation at that time.  MRI showed a focal acute cortical infarct involving the left posterior temporal and parietal lobe approximately 2 cm.  She was discharged with Eliquis and beta blockes.      Echo showed an EF 45-50%.  Moderate tricuspid regurgitation.  CTA head/neck: Moderate severe bilateral carotid bifurcation atherosclerotic plaque may be hemodynamically significant. Mild atherosclerotic plaque of the cavernous clinoid internal carotid artery. .     Relevant labs: cr 0.82, TSH 0.73  Relevant Rx: Eliquis 5 mg BID, Lipitor 40 mg QD, losartan 50 mg QD, Lopressor 50 mg BID     01/10/2025  Denies any previous history of AF. Denies clear symptoms including palpitations, fatigue, DE LEÓN  Denies neurologic symptoms. Did not require PT/OT/ST    02/22/2025  S/p DONA/DCCV 1/30/25 (negative bubble study) > amiodarone initiation > 2 week monitor with 67% AF burden.   Seen by Dr. Ferguson > CTA H&N with <50% stenosis of right and 50-60% stenosis of the left carotid bifurcations.  Plans for CEA versus TCAR.  No bleeding issues with Eliquis   Denies chest pain or shortness of breath  Tolerating amiodarone    I personally reviewed the ECG/telemetry strip today. My interpretation is atrial fibrillation with a controlled ventricular rate 64 beats per minute.    Past Medical History:   Diagnosis Date    Arthritis     Chronic pain 8/14/2013    Colon polyp     DDD (degenerative disc disease)     DDD (degenerative disc disease)     GERD (gastroesophageal reflux disease)     Hyperlipidemia     Hypertension     Osteopenia 3/2/2016    Primary hyperparathyroidism     Urinary incontinence 4/19/2021    She has urinary  incontinence and she has used urogesic and detrol and premarin cream to help with this and it has. She will continue them.       Past Surgical History:   Procedure Laterality Date    APPENDECTOMY      CATARACT EXTRACTION W/  INTRAOCULAR LENS IMPLANT Left 05/16/2018    Dr Haywood    CATARACT EXTRACTION W/  INTRAOCULAR LENS IMPLANT Right 12/02/2020    Dr. Tirado    COLONOSCOPY  11/16/2016    Dr. Vickers; polyp removed and small ileocecal valve ulceration; Bx: tubular adenoma; benign ileal mucosa    COLONOSCOPY N/A 04/14/2022    Procedure: COLONOSCOPY;  Surgeon: Jagjit Aguirre MD;  Location: [facility];  Service: Endoscopy;  Laterality: N/A;    COLONOSCOPY W/ BIOPSIES  02/2011    repeat in 3 years    ECHOCARDIOGRAM,TRANSESOPHAGEAL N/A 1/30/2025    Procedure: Transesophageal echo (DONA) intra-procedure log documentation;  Surgeon: Juan Jarvis MD;  Location: [facility];  Service: Cardiology;  Laterality: N/A;    EYE SURGERY  2020    INTRAOCULAR PROSTHESES INSERTION Right 12/02/2020    Procedure: INSERTION, IOL PROSTHESIS;  Surgeon: Nava Tirado MD;  Location: [facility] OR [address] [place] [address];  Service: Ophthalmology;  Laterality: Right;    JOINT REPLACEMENT  yes    LUMBAR EPIDURAL INJECTION N/A 12/2019    Dr. Tejada     PARTIAL KNEE ARTHROPLASTY Right     Right    PHACOEMULSIFICATION OF CATARACT Right 12/02/2020    Procedure: PHACOEMULSIFICATION, CATARACT;  Surgeon: Nava Tirado MD;  Location: [facility] OR [address] [place] [address];  Service: Ophthalmology;  Laterality: Right;    SLEEVE GASTROPLASTY  02/08/2012    TONSILLECTOMY      TOTAL HIP ARTHROPLASTY Right     TOTAL KNEE ARTHROPLASTY Left     TREATMENT OF CARDIAC ARRHYTHMIA N/A 1/30/2025    Procedure: Cardioversion or Defibrillation;  Surgeon: Juan Jarvis MD;  Location: [facility];  Service: Cardiology;  Laterality: N/A;    UPPER GASTROINTESTINAL ENDOSCOPY         Family History   Problem Relation Name Age of Onset    Arthritis Mother Evonne     Cancer Mother Evonne           1950    Breast cancer Mother Evonne     Arthritis Father Omari     Heart disease Father Omari     Hyperlipidemia Father Omari     Heart attack Father Omari     Hypertension Father Omari              Cancer Sister x2     Depression Sister x2     Cataracts Sister x2     Cancer Brother Rambo     Depression Brother Rambo     Diabetes Brother Rambo     Heart disease Brother Rambo     Hyperlipidemia Brother Rambo     Blindness Brother Rambo         dm    Retinal detachment Brother Rambo     Alcohol abuse Son Florencio     Birth defects Son Flroencio     Diabetes Son Florencio     Intellectual disability Son Florencio     Arthritis Son Florencio     Hyperlipidemia Son Florencio     Hypertension Son Florencio     Learning disabilities Son Florencio     Mental illness Son Florencio     Cancer Maternal Aunt      Alcohol abuse Maternal Uncle uncle     Cancer Maternal Uncle uncle     Depression Maternal Uncle uncle     Cancer Paternal Aunt      Cancer Paternal Uncle      Arthritis Maternal Grandmother      Hypertension Maternal Grandmother      Alcohol abuse Maternal Grandfather uncle     Arthritis Maternal Grandfather uncle     Depression Maternal Grandfather uncle     Hypertension Maternal Grandfather uncle     Arthritis Paternal Grandmother      Arthritis Paternal Grandfather      HIV Son oldest     Glaucoma Son oldest     Alcohol abuse Son oldest     Arthritis Son oldest     Breast cancer Paternal Cousin 2nd     Arthritis Daughter Nafisa     Hearing loss Daughter Nafisa     Cancer Maternal Aunt Joleen              Cancer Maternal Uncle 5 uncles         4 , 1 living    Cancer Paternal Aunt liver cancer [place]              Cancer Paternal Uncle Ashley, liver cancer              Cancer Sister Tita colon, Timbo lung          , living     Cancer Brother Laith Napier          , living     COPD Son Fede     Diabetes Brother tavares Matthews diabetic               Hearing loss Daughter Korin     Amblyopia Neg Hx      Macular degeneration Neg Hx      Strabismus Neg Hx      Stroke Neg Hx      Thyroid disease Neg Hx      Colon cancer Neg Hx         Social History     Socioeconomic History    Marital status:    Tobacco Use    Smoking status: Former     Current packs/day: 0.00     Types: Cigarettes     Start date: 1954     Quit date: 1971     Years since quittin.1    Smokeless tobacco: Never    Tobacco comments:     less than 2-3 cigarettes per day   Substance and Sexual Activity    Alcohol use: Not Currently     Comment: occasionally    Drug use: No    Sexual activity: Not Currently     Partners: Male     Birth control/protection: None     Comment: 86 years old   Social History Narrative    Wears a seatbelt.     Social Drivers of Health     Financial Resource Strain: Low Risk  (7/10/2024)    Overall Financial Resource Strain (CARDIA)     Difficulty of Paying Living Expenses: Not hard at all   Food Insecurity: Unknown (2024)    Received from [facility]    Hunger Vital Sign     Ran Out of Food in the Last Year: Never true   Transportation Needs: Unknown (2024)    Received from [facility]    PRAPARE - Transportation     Lack of Transportation (Medical): No   Physical Activity: Inactive (7/10/2024)    Exercise Vital Sign     Days of Exercise per Week: 0 days     Minutes of Exercise per Session: 0 min   Stress: No Stress Concern Present (7/10/2024)    [facility] - Occupational Stress Questionnaire     Feeling of Stress : Not at all   Housing Stability: Unknown (2024)    Received from [facility]    Housing Stability Vital Sign     Homeless in the Last Year: No       Review of patient's allergies indicates:   Allergen Reactions    Penicillins Swelling     Other reaction(s): Swelling    Formaldehyde Hives    Mercury Hives    Mercury (elemental) Hives     Penicillamine     Adhesive Rash     Other reaction(s): Rash  Other reaction(s): Rash    Adhesive tape-silicones Rash    Dilaudid [hydromorphone (bulk)] Itching    Hydromorphone Itching    Latex, natural rubber Hives       Review of Systems   Constitutional: Negative for chills and fever.   HENT:  Negative for congestion and hearing loss.    Eyes:  Negative for blurred vision and double vision.   Cardiovascular:         See HPI   Respiratory:  Negative for cough, hemoptysis and shortness of breath.    Endocrine: Negative for cold intolerance and heat intolerance.   Musculoskeletal:  Negative for joint pain and joint swelling.   Gastrointestinal:  Negative for abdominal pain, nausea and vomiting.   Genitourinary:  Negative for dysuria and hematuria.   Neurological:  Negative for focal weakness and headaches.   Psychiatric/Behavioral:  Negative for altered mental status.    All other systems reviewed and are negative.         OBJECTIVE:     Vitals:    02/24/25 1100   BP: (!) 150/81   Pulse: (!) 57         BP Readings from Last 5 Encounters:   02/13/25 126/60   02/10/25 134/60   01/30/25 134/60   01/10/25 (!) 159/75   12/11/24 112/71        Physical Exam  Vitals and nursing note reviewed.   Constitutional:       General: She is not in acute distress.     Appearance: She is well-developed. She is not diaphoretic.   HENT:      Head: Normocephalic and atraumatic.   Eyes:      General: No scleral icterus.        Right eye: No discharge.         Left eye: No discharge.   Cardiovascular:      Rate and Rhythm: Normal rate. Rhythm irregular. No extrasystoles are present.     Pulses: Normal pulses and intact distal pulses.           Radial pulses are 2+ on the right side and 2+ on the left side.      Heart sounds: Normal heart sounds, S1 normal and S2 normal. Heart sounds not distant. No opening snap. No murmur heard.     No friction rub. No gallop. No S3 or S4 sounds.   Pulmonary:      Effort: Pulmonary effort is normal. No  respiratory distress.      Breath sounds: No wheezing or rales.   Abdominal:      General: Bowel sounds are normal. There is no distension.      Palpations: Abdomen is soft.      Tenderness: There is no abdominal tenderness.   Musculoskeletal:         General: No deformity. Normal range of motion.      Cervical back: Normal range of motion and neck supple.   Skin:     General: Skin is warm and dry.      Findings: No erythema or rash.   Neurological:      Mental Status: She is alert.             Current Outpatient Medications   Medication Instructions    ACETAMINOPHEN (TYLENOL EXTRA STRENGTH ORAL) 500 mg, Every 4-6 hours PRN    amiodarone (PACERONE) 100 mg, Oral, Daily    apixaban (ELIQUIS) 5 mg, Oral, 2 times daily    atorvastatin (LIPITOR) 40 mg, Oral, Daily    blood pressure test kit-large Kit Use once daily as directed.    calcium citrate-vitamin D3 315-200 mg (CITRACAL+D) 315-200 mg-unit per tablet 1 tablet, 2 times daily    CYANOCOBALAMIN, VITAMIN B-12, (VITAMIN B-12 ORAL) 1 tablet, Daily    DULoxetine (CYMBALTA) 60 MG capsule TAKE 1 CAPSULE(60 MG) BY MOUTH EVERY DAY    estradioL (VAGIFEM) 10 mcg, Vaginal, Every Mon, Thurs    ferrous gluconate (FERGON) 324 mg, Oral, With breakfast    gabapentin (NEURONTIN) 300 mg, Oral, 3 times daily    glucosamine sulfate 500 mg Tab 1 tablet, Daily    LACTOBACILLUS ACIDOPHILUS (PROBIOTIC ORAL) 1 tablet, Daily    losartan (COZAAR) 50 mg, Oral, Daily    melatonin 5 mg Cap 1 capsule, Nightly PRN    methen-m.blue-s.phos-phsal-hyo (URIBEL) 118-10-40.8-36 mg Cap Take 1 cap twice a week.    metoprolol succinate (TOPROL-XL) 12.5 mg, Oral, Daily    MULTIVITAMIN ORAL 1 tablet, Daily    nystatin (NYSTOP) powder Topical (Top), 3 times daily PRN, Give 8 bottles at a time    PROLIA 60 mg, Every 6 months    tolterodine (DETROL LA) 4 MG 24 hr capsule TAKE 1 CAPSULE(4 MG) BY MOUTH EVERY DAY    vit C,B-Eq-mhqqw-lutein-zeaxan (PRESERVISION AREDS-2) 038-205-48-1 mg-unit-mg-mg Cap 1 capsule, 2  times daily       Lipid Panel:   Lab Results   Component Value Date    CHOL 182 12/03/2024    HDL 55 12/03/2024    LDLCALC 112 (H) 12/03/2024    TRIG 75 12/03/2024    CHOLHDL 3.3 12/03/2024       The ASCVD Risk score (Abe MELGAR, et al., 2019) failed to calculate for the following reasons:    The 2019 ASCVD risk score is only valid for ages 40 to 79    Most Recent EKG Results  Results for orders placed or performed during the hospital encounter of 01/30/25   EKG 12-lead    Collection Time: 01/30/25  7:47 AM   Result Value Ref Range    QRS Duration 84 ms    [facility] QTC Calculation 424 ms    Narrative    Test Reason : I10,    Vent. Rate :  57 BPM     Atrial Rate :  57 BPM     P-R Int : 254 ms          QRS Dur :  84 ms      QT Int : 436 ms       P-R-T Axes :  79   0  17 degrees    QTcB Int : 424 ms    Sinus bradycardia with 1st degree A-V block  Low voltage QRS  Abnormal ECG  When compared with ECG of 30-Jan-2025 06:43,  Sinus rhythm has replaced Atrial fibrillation  Confirmed by Louise Rodriguez ([id number]) on 1/30/2025 8:57:49 AM    Referred By: LOUISE RODRIGUEZ           Confirmed By: Louise Rodriguez       Most Recent Echocardiogram Results  Results for orders placed during the hospital encounter of 01/30/25    Transesophageal echo (DONA)with possible cardioversion    Interpretation Summary    Left Ventricle: There is mildly reduced systolic function.    Right Ventricle: Normal right ventricular cavity size.    Left Atrium: Left atrium is dilated. Agitated saline study of the atrial septum is negative after vasalva maneuver, suggesting absence of intracardiac shunt at the atrial level. The left atrial appendage appears normal. The left atrial appendage has a windsock morphology. Appendage velocity is reduced at less than 40 cm/sec. There is no thrombus in the left atrial appendage.    Mitral Valve: There is mild regurgitation.    Tricuspid Valve: There is moderate regurgitation.    Pulmonary Artery: PA systolic pressure at least 39  mmHg      Most Recent Nuclear Stress Test Results  No results found for this or any previous visit.      Most Recent Cardiac PET Stress Test Results  No results found for this or any previous visit.      Most Recent Cardiovascular Angiogram results  No results found for this or any previous visit.      Other Most Recent Cardiology Results  Results for orders placed during the hospital encounter of 01/30/25    Cardiac Monitor - 3-15 Day Adult (Cupid Only)    Interpretation Summary    14 day cardiac event monitor    Predominant rhythm was atrial fibrillation (67% burden) wiht an average heart rate of 67 bpm ( bpm). Longest 9 days and 2 hours.    Rare PAC and PVCs <1% each    No clinically significant bradyarrhythmias    No patient triggered events        All pertinent data including labs, imaging, EKGs, and studies listed above were reviewed.  All of the patients ECG tracings since most recent visit were personally interpreted by this provider    ASSESSMENT:   Marysol Lyles is a 89 y.o. female who presents for follow of HFmEF and atrial fibrillation.  Undergoing evaluation for carotid intervention given recent CVA.  We will defer further rhythm control for atrial fibrillation at this time and resume following carotid intervention.    1. Primary hypertension        2. Hyperlipidemia, unspecified hyperlipidemia type        3. Persistent atrial fibrillation        4. Heart failure with mid-range ejection fraction        5. Hypertensive heart disease with heart failure        6. History of ischemic left MCA stroke        7. Current use of long term anticoagulation          PLAN FOR TREATMENT OF ABOVE DIAGNOSES:     Continue amiodarone 100 mg QD  Continue Toprol 25 mg QD  Continue Eliquis 5 mg BID  Continue Lipitor 40 mg QD  Continue losartan 50 mg QD  Low-sodium diet less than 2000 mg per day    F/u 6 weeks    Leonidas Jarvis MD  Cardiac Electrophysiology    This note was partially generated using voice recognition and  generative artificial intelligence software. While every effort has been made to ensure its accuracy, transcription errors may exist. Please reach out to me with any questions or if clarification is needed.

## 2025-02-24 ENCOUNTER — OFFICE VISIT (OUTPATIENT)
Dept: CARDIOLOGY | Facility: CLINIC | Age: 89
End: 2025-02-24
Attending: INTERNAL MEDICINE
Payer: COMMERCIAL

## 2025-02-24 VITALS
HEIGHT: 62 IN | DIASTOLIC BLOOD PRESSURE: 81 MMHG | HEART RATE: 57 BPM | BODY MASS INDEX: 33.95 KG/M2 | SYSTOLIC BLOOD PRESSURE: 150 MMHG | WEIGHT: 184.5 LBS

## 2025-02-24 DIAGNOSIS — I10 PRIMARY HYPERTENSION: Primary | ICD-10-CM

## 2025-02-24 DIAGNOSIS — Z79.01 CURRENT USE OF LONG TERM ANTICOAGULATION: ICD-10-CM

## 2025-02-24 DIAGNOSIS — E78.5 HYPERLIPIDEMIA, UNSPECIFIED HYPERLIPIDEMIA TYPE: ICD-10-CM

## 2025-02-24 DIAGNOSIS — I11.0 HYPERTENSIVE HEART DISEASE WITH HEART FAILURE: ICD-10-CM

## 2025-02-24 DIAGNOSIS — Z86.73 HISTORY OF ISCHEMIC LEFT MCA STROKE: ICD-10-CM

## 2025-02-24 DIAGNOSIS — I50.22 HEART FAILURE WITH MID-RANGE EJECTION FRACTION: ICD-10-CM

## 2025-02-24 DIAGNOSIS — I48.19 PERSISTENT ATRIAL FIBRILLATION: ICD-10-CM

## 2025-02-24 PROCEDURE — 1101F PT FALLS ASSESS-DOCD LE1/YR: CPT | Mod: CPTII,S$GLB,, | Performed by: INTERNAL MEDICINE

## 2025-02-24 PROCEDURE — 99999 PR PBB SHADOW E&M-EST. PATIENT-LVL IV: CPT | Mod: PBBFAC,,, | Performed by: INTERNAL MEDICINE

## 2025-02-24 PROCEDURE — 1160F RVW MEDS BY RX/DR IN RCRD: CPT | Mod: CPTII,S$GLB,, | Performed by: INTERNAL MEDICINE

## 2025-02-24 PROCEDURE — 3288F FALL RISK ASSESSMENT DOCD: CPT | Mod: CPTII,S$GLB,, | Performed by: INTERNAL MEDICINE

## 2025-02-24 PROCEDURE — 99214 OFFICE O/P EST MOD 30 MIN: CPT | Mod: S$GLB,,, | Performed by: INTERNAL MEDICINE

## 2025-02-24 PROCEDURE — 1159F MED LIST DOCD IN RCRD: CPT | Mod: CPTII,S$GLB,, | Performed by: INTERNAL MEDICINE

## 2025-02-27 ENCOUNTER — PATIENT MESSAGE (OUTPATIENT)
Dept: NEUROLOGY | Facility: CLINIC | Age: 89
End: 2025-02-27
Payer: COMMERCIAL

## 2025-02-28 ENCOUNTER — PATIENT MESSAGE (OUTPATIENT)
Dept: CARDIOLOGY | Facility: CLINIC | Age: 89
End: 2025-02-28
Payer: COMMERCIAL

## 2025-03-06 ENCOUNTER — TELEPHONE (OUTPATIENT)
Dept: CARDIOLOGY | Facility: CLINIC | Age: 89
End: 2025-03-06
Payer: COMMERCIAL

## 2025-03-06 ENCOUNTER — OFFICE VISIT (OUTPATIENT)
Dept: VASCULAR SURGERY | Facility: CLINIC | Age: 89
End: 2025-03-06
Payer: COMMERCIAL

## 2025-03-06 VITALS
HEART RATE: 71 BPM | HEIGHT: 62 IN | DIASTOLIC BLOOD PRESSURE: 54 MMHG | OXYGEN SATURATION: 96 % | SYSTOLIC BLOOD PRESSURE: 138 MMHG | WEIGHT: 179.25 LBS | BODY MASS INDEX: 32.99 KG/M2

## 2025-03-06 DIAGNOSIS — I65.29 CAROTID STENOSIS: ICD-10-CM

## 2025-03-06 DIAGNOSIS — I77.9 CAROTID ARTERY DISEASE, UNSPECIFIED LATERALITY, UNSPECIFIED TYPE: Primary | ICD-10-CM

## 2025-03-06 PROCEDURE — 1159F MED LIST DOCD IN RCRD: CPT | Mod: CPTII,S$GLB,, | Performed by: STUDENT IN AN ORGANIZED HEALTH CARE EDUCATION/TRAINING PROGRAM

## 2025-03-06 PROCEDURE — 3288F FALL RISK ASSESSMENT DOCD: CPT | Mod: CPTII,S$GLB,, | Performed by: STUDENT IN AN ORGANIZED HEALTH CARE EDUCATION/TRAINING PROGRAM

## 2025-03-06 PROCEDURE — 99999 PR PBB SHADOW E&M-EST. PATIENT-LVL V: CPT | Mod: PBBFAC,,, | Performed by: STUDENT IN AN ORGANIZED HEALTH CARE EDUCATION/TRAINING PROGRAM

## 2025-03-06 PROCEDURE — 99215 OFFICE O/P EST HI 40 MIN: CPT | Mod: S$GLB,,, | Performed by: STUDENT IN AN ORGANIZED HEALTH CARE EDUCATION/TRAINING PROGRAM

## 2025-03-06 PROCEDURE — 1126F AMNT PAIN NOTED NONE PRSNT: CPT | Mod: CPTII,S$GLB,, | Performed by: STUDENT IN AN ORGANIZED HEALTH CARE EDUCATION/TRAINING PROGRAM

## 2025-03-06 PROCEDURE — 1101F PT FALLS ASSESS-DOCD LE1/YR: CPT | Mod: CPTII,S$GLB,, | Performed by: STUDENT IN AN ORGANIZED HEALTH CARE EDUCATION/TRAINING PROGRAM

## 2025-03-06 RX ORDER — LIDOCAINE HYDROCHLORIDE 10 MG/ML
1 INJECTION, SOLUTION EPIDURAL; INFILTRATION; INTRACAUDAL; PERINEURAL ONCE
OUTPATIENT
Start: 2025-03-06 | End: 2025-03-06

## 2025-03-06 NOTE — PROGRESS NOTES
[place] [facility]  [facility]  History & Physical    PATIENT NAME: Marysol Lyles  MRN: [id number]  TODAY'S DATE: 03/06/2025    SUBJECTIVE:     Chief Complaint:   Chief Complaint   Patient presents with    Follow-up    Carotid Artery Disease         History of Present Illness:  Marysol Lyles is a 89 y.o. female presents with a history of hypertension, hyperlipidemia, AFib and recent CVA.  She had a CVA 12/2024. This was witness by her son.  They describe her stroke symptoms as aphasia but they do not recall that she lost weakness in her extremities.  She went to [facility] and was found to have a left posterior temporal parietal lobe. She had a CTA scan that showed bilateral carotid artery stenosis.      She takes eliquis 5mg daily for afib. She did have cardioversion a few weeks ago. She was giving plavix for a short period of time and has now stopped plavix.      She reports a remote history of tobacco abuse.     She does take a statin.     Interval history 03/06/2025:  The patient is doing well since our last visit.  She denies any new onset stroke or TIA.  She now presents for follow up after a CTA head and neck was obtained.      Review of patient's allergies indicates:   Allergen Reactions    Penicillins Swelling     Other reaction(s): Swelling    Formaldehyde Hives    Mercury Hives    Mercury (elemental) Hives    Penicillamine     Adhesive Rash     Other reaction(s): Rash  Other reaction(s): Rash    Adhesive tape-silicones Rash    Dilaudid [hydromorphone (bulk)] Itching    Hydromorphone Itching    Latex, natural rubber Hives       Past Medical History:   Diagnosis Date    Arthritis     Chronic pain 8/14/2013    Colon polyp     DDD (degenerative disc disease)     DDD (degenerative disc disease)     GERD (gastroesophageal reflux disease)     Hyperlipidemia     Hypertension     Osteopenia 3/2/2016    Primary hyperparathyroidism     Urinary incontinence 4/19/2021    She has urinary  incontinence and she has used urogesic and detrol and premarin cream to help with this and it has. She will continue them.     Past Surgical History:   Procedure Laterality Date    APPENDECTOMY      CATARACT EXTRACTION W/  INTRAOCULAR LENS IMPLANT Left 05/16/2018    Dr Haywood    CATARACT EXTRACTION W/  INTRAOCULAR LENS IMPLANT Right 12/02/2020    Dr. Tirado    COLONOSCOPY  11/16/2016    Dr. Vickers; polyp removed and small ileocecal valve ulceration; Bx: tubular adenoma; benign ileal mucosa    COLONOSCOPY N/A 04/14/2022    Procedure: COLONOSCOPY;  Surgeon: Jagjit Aguirre MD;  Location: [facility];  Service: Endoscopy;  Laterality: N/A;    COLONOSCOPY W/ BIOPSIES  02/2011    repeat in 3 years    ECHOCARDIOGRAM,TRANSESOPHAGEAL N/A 1/30/2025    Procedure: Transesophageal echo (DONA) intra-procedure log documentation;  Surgeon: Juan Jarvis MD;  Location: [facility];  Service: Cardiology;  Laterality: N/A;    EYE SURGERY  2020    INTRAOCULAR PROSTHESES INSERTION Right 12/02/2020    Procedure: INSERTION, IOL PROSTHESIS;  Surgeon: Nava Tirado MD;  Location: [facility] OR [address] [place] [address];  Service: Ophthalmology;  Laterality: Right;    JOINT REPLACEMENT  yes    LUMBAR EPIDURAL INJECTION N/A 12/2019    Dr. Tejada     PARTIAL KNEE ARTHROPLASTY Right     Right    PHACOEMULSIFICATION OF CATARACT Right 12/02/2020    Procedure: PHACOEMULSIFICATION, CATARACT;  Surgeon: Nava Tirado MD;  Location: [facility] OR [address] [place] [address];  Service: Ophthalmology;  Laterality: Right;    SLEEVE GASTROPLASTY  02/08/2012    TONSILLECTOMY      TOTAL HIP ARTHROPLASTY Right     TOTAL KNEE ARTHROPLASTY Left     TREATMENT OF CARDIAC ARRHYTHMIA N/A 1/30/2025    Procedure: Cardioversion or Defibrillation;  Surgeon: Juan Jarvis MD;  Location: [facility];  Service: Cardiology;  Laterality: N/A;    UPPER GASTROINTESTINAL ENDOSCOPY       Family History   Problem Relation Name Age of Onset    Arthritis Mother Evonne     Cancer Mother Evonne           1950    Breast cancer Mother Evonne     Arthritis Father Omari     Heart disease Father Omari     Hyperlipidemia Father Omari     Heart attack Father Omari     Hypertension Father Omari              Cancer Sister x2     Depression Sister x2     Cataracts Sister x2     Cancer Brother Rambo     Depression Brother Rambo     Diabetes Brother Rambo     Heart disease Brother Rambo     Hyperlipidemia Brother Rambo     Blindness Brother Rambo         dm    Retinal detachment Brother Rambo     Alcohol abuse Son Florencio     Birth defects Son Florencio     Diabetes Son Florencio     Intellectual disability Son Florencio     Arthritis Son Florencio     Hyperlipidemia Son Florencio     Hypertension Son Florencio     Learning disabilities Son Florencio     Mental illness Son Florencio     Cancer Maternal Aunt      Alcohol abuse Maternal Uncle uncle     Cancer Maternal Uncle uncle     Depression Maternal Uncle uncle     Cancer Paternal Aunt      Cancer Paternal Uncle      Arthritis Maternal Grandmother      Hypertension Maternal Grandmother      Alcohol abuse Maternal Grandfather uncle     Arthritis Maternal Grandfather uncle     Depression Maternal Grandfather uncle     Hypertension Maternal Grandfather uncle     Arthritis Paternal Grandmother      Arthritis Paternal Grandfather      HIV Son oldest     Glaucoma Son oldest     Alcohol abuse Son oldest     Arthritis Son oldest     Breast cancer Paternal Cousin 2nd     Arthritis Daughter Nafisa     Hearing loss Daughter Nafisa     Cancer Maternal Aunt Joleen              Cancer Maternal Uncle 5 uncles         4 , 1 living    Cancer Paternal Aunt liver cancer [place]              Cancer Paternal Uncle Ashley, liver cancer              Cancer Sister Tita colon, Timbo lung          , living     Cancer Brother Laith Napier          , living     COPD Son Fede     Diabetes Brother tavares Matthews diabetic               Hearing loss Daughter Korin     Amblyopia Neg Hx      Macular degeneration Neg Hx      Strabismus Neg Hx      Stroke Neg Hx      Thyroid disease Neg Hx      Colon cancer Neg Hx       Social History[1]     Review of Systems:  ROS    OBJECTIVE:     Vital Signs (Most Recent):  Pulse: 71 (25 1134)  BP: (!) 138/54 (25 1134)  SpO2: 96 % (25 113)      Physical Exam:  Physical Exam  Constitutional: Awake and oriented to person, place, and time. Appears well-developed and well-nourished. In no apparent distress.  HEENT: Normocephalic. Pupils normal and conjunctivae normal. Mucous membranes normal, no cyanosis or icterus, trachea central, no pallor or icterus is noted.  Neck: Normal range of motion. Neck supple. No JVD present. No bruit present.   Cardiovascular: Normal rate, regular rhythm and normal heart sounds. S1, S2. Exam reveals no gallop, clicks, or friction rub. No murmur noted.  Pulmonary/Chest: Effort normal and breath sounds clear to auscultation. No respiratory distress. No wheezes, rales, or coughing present.   Abdominal: Soft. Bowel sounds are normal. There is no tenderness. No rebound tenderness or guarding present. No abdomen pulsations, bruits, or masses noted.   Urinary: No cervantes catheter present  Skin: Skin is warm and dry.  Extremities: No cyanosis, erythema, clubbing or edema noted at this time. No calf tenderness bilaterally.   Peripheral vascular system: Good palpable distal pulses.       Laboratory:  Lab Results   Component Value Date    WBC 5.60 2024    HGB 13.2 2024    HCT 41.9 2024     2024    CHOL 182 2024    TRIG 75 2024    HDL 55 2024    ALT 30 2025    AST 38 2025     2025    K 4.6 2025     2025    CREATININE 0.9 2025    BUN 18 2025    CO2 27 2025    TSH 0.629 2024    INR 1.0 2009    HGBA1C 5.6 2024         Diagnostic  Results:  Cardiac Monitor - 3-15 Day Adult (Cupid Only)    14 day cardiac event monitor    Predominant rhythm was atrial fibrillation (67% burden) wiht an average   heart rate of 67 bpm ( bpm). Longest 9 days and 2 hours.    Rare PAC and PVCs <1% each    No clinically significant bradyarrhythmias    No patient triggered events    Results for orders placed or performed during the hospital encounter of 02/18/25 (from the past 2160 hours)   CTA Head and Neck (xpd)    Narrative    EXAMINATION:  CTA HEAD AND NECK (XPD)    CLINICAL HISTORY:  Carotid artery stenosis screening, no risk factors;Encounter for screening for cardiovascular disorders    TECHNIQUE:  Axial CT images obtained throughout the region of the head before and after the administration of intravenous contrast.  CT angiogram was performed through the cervical and intracranial vasculature during the IV bolus administration of 75mL of Omnipaque 350.  Multiplanar MPR and MIP reformats were performed.    COMPARISON:  CT head 12/02/2024, CTA head neck 12/02/2024, MRI 12/02/2024    FINDINGS:  CT:    Ventricles are unchanged in size.  Mild generalized cerebral volume loss.   No evidence of hydrocephalus.    Small remote cortical infarct at the left temporoparietal junction.  No parenchymal changes to indicate an acute major vascular distribution infarct.  No acute parenchymal hemorrhage.  No focal edema or intracranial mass effect..  No intracranial enhancing lesions.    No extra-axial blood or fluid collections.    The cranium appears intact.  Left maxillary mucous retention cyst.    Bilateral pseudophakia.    Multilevel cervical and thoracic spondylosis.    Thyroid gland enlarged with multiple small nodules, overall similar in size and distribution.    Smoking related change in the partially visualized lung apices.    CTA:    Three-vessel aortic arch.  Aortic arch demonstrates mild calcific atherosclerosis with no significant stenosis at the major branch  vessel origins.    The right common carotid artery is normal in caliber. Calcified atherosclerotic plaque at the carotid bifurcation, resulting in less than 50% stenosis the ICA origin.The right internal carotid artery is otherwise normal in caliber, noting mild calcification in the cavernous segment.    The left common carotid artery is normal in caliber. Large calcified and noncalcified atherosclerotic plaque at the carotid bifurcation.  This results in approximately 50-60% stenosis the ICA origin per NASCET.The left internal carotid artery is normal in caliber, noting mild calcification cavernous segment..    Calcific plaque at the origin of the right vertebral artery.  Likely moderate result stenosis, although evaluation confounded by some patient motion.  Otherwise the artery is normal in caliber through its course.    The left vertebral artery is normal in caliber.    Basilar artery is within normal limits without focal abnormality.    The proximal anterior, middle, and posterior cerebral arteries are within normal limits.  No evidence of significant stenosis, focal occlusion, or intracranial aneurysm.      Impression    No evidence of acute hemorrhage or major vascular distribution infarct.    Small remote left temporoparietal infarct.    Atherosclerotic plaque of the bilateral carotid bifurcations.  This results in <50% stenosis on the right and approximately 50-60% stenosis on the left.    Plaque with probable moderate stenosis at the right vertebral artery origin.    The intracranial vasculature appears within normal limits.  No significant stenosis or occlusion.    Other incidental findings as above.    Electronically signed by resident: Albert Quezada  Date:    02/18/2025  Time:    15:20    Electronically signed by: Jono Danielson MD  Date:    02/18/2025  Time:    16:48     *Note: Due to a large number of results and/or encounters for the requested time period, some results have not been displayed. A  complete set of results can be found in Results Review.          ASSESSMENT/PLAN:     Patient presents with a history and findings that are most consistent with a transient ischemic attack/stroke.  I did personally review all of the patient's imaging.  The CTA head and neck illustrates moderate 60% stenosis.  The carotid ultrasounds illustrates moderate stenosis of the left and mild stenosis on the right.  This is most consistent with left moderate 60% symptomatic (CVA event <6mths) carotid artery stenosis.  I have personally discussed these findings as well as the etiology of the pathology.    Since the recent sustained cerebrovascular accident, the patient has been optimized medically as much as possible.  Despite medical optimization the patient still remains at risk for a subsequent cerebrovascular event that could be very debilitating and increase the risk of increased morbidity and mortality.  I do recommend a carotid artery endarterectomy versus TCAR to further mitigate these risks of subsequent devastating CVA.  I explained the risks and benefits of both especially considering her age.  Considering her age I do think it is beneficial to move forward with a TCAR.  Also considering her age I do think she would benefit from intervention considering she has been relatively good health and independent and a stroke would be very debilitating.    I discussed my recommendations with the patient and the family that was present. We discussed the benefits as well as the risk which is not limited to intraoperative/postoperative stroke, hemorrhage, nerve/arterial injury, infection, and myocardial infarct.         Plan:  Left TCAR  Will add brilinta. Cont delia Ferguson M.D.   [facility]   Date of Service: 03/06/2025             [1]   Social History  Tobacco Use    Smoking status: Former     Current packs/day: 0.00     Types: Cigarettes     Start date: 1/1/1954     Quit date: 1/1/1971      Years since quittin.2    Smokeless tobacco: Never    Tobacco comments:     less than 2-3 cigarettes per day   Substance Use Topics    Alcohol use: Not Currently     Comment: occasionally    Drug use: No

## 2025-03-06 NOTE — TELEPHONE ENCOUNTER
----- Message from Monet sent at 3/6/2025  3:16 PM CST -----  Type:  Appointment RequestCaller is requesting a appointment. Name of Caller:pt daughter Would the patient rather a call back or a response via [facility]? Please call Best Call Back Number:[phone] Additional Information: Pt Daughter would like to change appt from 04/07 to 04/10     Please call back to advise. Thanks!

## 2025-03-06 NOTE — TELEPHONE ENCOUNTER
Called pts daughter, Nafisa, to discuss. Informed her that pts appt with Dr. Jarvis has been changed to April 10th; same day as follow up with Dr. Ferguson. Appt moved to 4/10 at 1:30pm with MARQUEZ Jc. Nafisa stated that they would go eat lunch in between appointments.      Nafisa requested a refill for pts Eliquis as a 90 day supply to [facility].

## 2025-03-10 DIAGNOSIS — I48.0 PAROXYSMAL A-FIB: Primary | ICD-10-CM

## 2025-03-18 ENCOUNTER — PATIENT MESSAGE (OUTPATIENT)
Dept: VASCULAR SURGERY | Facility: CLINIC | Age: 89
End: 2025-03-18
Payer: COMMERCIAL

## 2025-03-22 ENCOUNTER — NURSE TRIAGE (OUTPATIENT)
Dept: ADMINISTRATIVE | Facility: CLINIC | Age: 89
End: 2025-03-22
Payer: COMMERCIAL

## 2025-03-22 NOTE — TELEPHONE ENCOUNTER
Avel      Spoke with [facility], Dr. Ferguson. He is the operative surgeon on 3/25. Informed of patient's question re using OTC Bactroban as they do not have it at home like they thought. RX was to be written for mupirocin 2%. OK to use OTC Bactroban     Message left. Ok to use OTC Bactroban.     Reason for Disposition   [1] Caller has URGENT medicine question about med that PCP or specialist prescribed AND [2] triager unable to answer question    Additional Information   Negative: [1] Intentional drug overdose AND [2] suicidal thoughts or ideas    Protocols used: Medication Question Call-A-

## 2025-03-25 ENCOUNTER — PATIENT MESSAGE (OUTPATIENT)
Dept: CARDIOLOGY | Facility: CLINIC | Age: 89
End: 2025-03-25
Payer: COMMERCIAL

## 2025-03-25 DIAGNOSIS — I77.9 CAROTID ARTERY DISEASE, UNSPECIFIED LATERALITY, UNSPECIFIED TYPE: Primary | ICD-10-CM

## 2025-03-25 DIAGNOSIS — I70.90 UNSPECIFIED ATHEROSCLEROSIS: ICD-10-CM

## 2025-03-25 DIAGNOSIS — I65.22 OCCLUSION AND STENOSIS OF LEFT CAROTID ARTERY: ICD-10-CM

## 2025-03-25 DIAGNOSIS — I65.29 CAROTID STENOSIS: ICD-10-CM

## 2025-03-25 RX ORDER — CIPROFLOXACIN 2 MG/ML
400 INJECTION, SOLUTION INTRAVENOUS
OUTPATIENT
Start: 2025-03-25

## 2025-03-27 ENCOUNTER — PATIENT MESSAGE (OUTPATIENT)
Dept: VASCULAR SURGERY | Facility: CLINIC | Age: 89
End: 2025-03-27
Payer: COMMERCIAL

## 2025-03-27 RX ORDER — MUPIROCIN 20 MG/G
OINTMENT TOPICAL 2 TIMES DAILY
Qty: 1 EACH | Refills: 0 | Status: SHIPPED | OUTPATIENT
Start: 2025-03-27

## 2025-03-27 NOTE — TELEPHONE ENCOUNTER
Spoke to pt's daughter about moving surgery to 2nd case on , her arrival time will be approximately 7am. Advised she will not need a new pre-op but she will still receive a call from the pre-op nurses with information. She stated she will have her hold all supplements as advised at last pre-op appt. Also requested Mupirocin to be sent to pharmacy, as the one they have is . Gave direct number for her to contact with any other questions or concerns.

## 2025-04-05 PROBLEM — R54 AGE-RELATED PHYSICAL DEBILITY: Status: ACTIVE | Noted: 2025-04-05

## 2025-04-10 ENCOUNTER — PATIENT OUTREACH (OUTPATIENT)
Dept: ADMINISTRATIVE | Facility: HOSPITAL | Age: 89
End: 2025-04-10
Payer: COMMERCIAL

## (undated) DEVICE — CARTRIDGE LENS D

## (undated) DEVICE — TIP PHACO 45 DEGREE KELMAN

## (undated) DEVICE — PAD EYE OVAL CNTOUR 1.62X2.62

## (undated) DEVICE — SYR 3CC 21 X 1 LUER LOCK

## (undated) DEVICE — SYR LUER LOCK 1CC

## (undated) DEVICE — GLOVE BIOGEL ECLIPSE SZ 6.5

## (undated) DEVICE — PACK CUSTOM EYE KIT

## (undated) DEVICE — DRESSING TRANS 4X4 TEGADERM

## (undated) DEVICE — SHEILD PLASTIC EYE

## (undated) DEVICE — VISCOAT 0.5ML

## (undated) DEVICE — SHIELD EYE PLASTIC 3100G

## (undated) DEVICE — SLEEVE ULTRA INFUSION

## (undated) DEVICE — SOL IRR BSS OPHTH 500ML STRL

## (undated) DEVICE — KIT GREY EYE

## (undated) DEVICE — CYSTOTOME IRRIG 24G 13MM

## (undated) DEVICE — SOL WATER STRL IRR 1000ML

## (undated) DEVICE — NDL 25G X 1IN NON SAFETY

## (undated) DEVICE — DRAPE STERI-DRAPE APERTURE

## (undated) DEVICE — GLOVE BIOGEL ECLIPSE SZ 7

## (undated) DEVICE — NDL HYPODERMIC BLUNT 18G 1.5IN

## (undated) DEVICE — SEE MEDLINE ITEM 157131

## (undated) DEVICE — SYR DISP LL 5CC

## (undated) DEVICE — SYR 10CC LUER LOCK

## (undated) DEVICE — KNIFE OPT SFTY SD PRT 1.15MM20

## (undated) DEVICE — SOL SOD HYLR VISC INJ .85

## (undated) DEVICE — SOL BETADINE 5%

## (undated) DEVICE — Device

## (undated) DEVICE — KNIFE SLIT PHACO 2.4MM

## (undated) DEVICE — GOWN SURGICAL X-LARGE

## (undated) DEVICE — SEE L#120831

## (undated) DEVICE — SHIELD COLLAGEN 12HR CORNEAL

## (undated) DEVICE — SYS LABEL CORRECT MED

## (undated) DEVICE — DRAPE OPTHALMIC W/POUCH